# Patient Record
Sex: FEMALE | Race: WHITE | NOT HISPANIC OR LATINO | URBAN - METROPOLITAN AREA
[De-identification: names, ages, dates, MRNs, and addresses within clinical notes are randomized per-mention and may not be internally consistent; named-entity substitution may affect disease eponyms.]

---

## 2019-03-01 ENCOUNTER — HOSPITAL ENCOUNTER (OUTPATIENT)
Dept: LAB | Facility: HOSPITAL | Age: 79
Setting detail: SPECIMEN
Discharge: HOME | End: 2019-03-01
Attending: INTERNAL MEDICINE | Admitting: INTERNAL MEDICINE

## 2019-03-04 LAB — HBV SURFACE AG SERPL QL IA: NONREACTIVE

## 2019-03-07 ENCOUNTER — HOSPITAL ENCOUNTER (OUTPATIENT)
Dept: INTERVENTIONAL RADIOLOGY/VASCULAR | Facility: HOSPITAL | Age: 79
Discharge: HOME | End: 2019-03-07
Attending: INTERNAL MEDICINE | Admitting: INTERNAL MEDICINE

## 2024-04-07 ENCOUNTER — HOSPITAL ENCOUNTER (INPATIENT)
Facility: HOSPITAL | Age: 84
LOS: 3 days | Discharge: HOME | DRG: 312 | End: 2024-04-12
Attending: EMERGENCY MEDICINE | Admitting: HOSPITALIST
Payer: MEDICARE

## 2024-04-07 ENCOUNTER — APPOINTMENT (OUTPATIENT)
Dept: GENERAL RADIOLOGY | Facility: HOSPITAL | Age: 84
DRG: 312 | End: 2024-04-07
Payer: MEDICARE

## 2024-04-07 ENCOUNTER — APPOINTMENT (OUTPATIENT)
Dept: CARDIOLOGY | Facility: HOSPITAL | Age: 84
DRG: 312 | End: 2024-04-07
Payer: MEDICARE

## 2024-04-07 DIAGNOSIS — R55 SYNCOPE, UNSPECIFIED SYNCOPE TYPE: Primary | ICD-10-CM

## 2024-04-07 LAB
ANION GAP SERPL CALCULATED.3IONS-SCNC: 13 MMOL/L (ref 5–15)
BASOPHILS # BLD AUTO: 0.02 10*3/MM3 (ref 0–0.2)
BASOPHILS NFR BLD AUTO: 0.7 % (ref 0–1.5)
BH CV XLRA MEAS LEFT DIST CCA EDV: -7.8 CM/SEC
BH CV XLRA MEAS LEFT DIST CCA PSV: -45.9 CM/SEC
BH CV XLRA MEAS LEFT DIST ICA EDV: -10.4 CM/SEC
BH CV XLRA MEAS LEFT DIST ICA PSV: -47.5 CM/SEC
BH CV XLRA MEAS LEFT ICA/CCA RATIO: 0.7
BH CV XLRA MEAS LEFT PROX CCA EDV: 9.3 CM/SEC
BH CV XLRA MEAS LEFT PROX CCA PSV: 73.5 CM/SEC
BH CV XLRA MEAS LEFT PROX ECA PSV: -63.1 CM/SEC
BH CV XLRA MEAS LEFT PROX ICA EDV: -12.6 CM/SEC
BH CV XLRA MEAS LEFT PROX ICA PSV: -50.5 CM/SEC
BH CV XLRA MEAS LEFT PROX SCLA PSV: -191 CM/SEC
BH CV XLRA MEAS LEFT VERTEBRAL A PSV: -41.7 CM/SEC
BH CV XLRA MEAS RIGHT DIST CCA EDV: -7.9 CM/SEC
BH CV XLRA MEAS RIGHT DIST CCA PSV: -44.2 CM/SEC
BH CV XLRA MEAS RIGHT DIST ICA EDV: -13.1 CM/SEC
BH CV XLRA MEAS RIGHT DIST ICA PSV: -68.6 CM/SEC
BH CV XLRA MEAS RIGHT ICA/CCA RATIO: 1.1
BH CV XLRA MEAS RIGHT PROX CCA EDV: -12.4 CM/SEC
BH CV XLRA MEAS RIGHT PROX CCA PSV: -64.6 CM/SEC
BH CV XLRA MEAS RIGHT PROX ECA PSV: -53.3 CM/SEC
BH CV XLRA MEAS RIGHT PROX ICA EDV: -9.8 CM/SEC
BH CV XLRA MEAS RIGHT PROX ICA PSV: -45.5 CM/SEC
BH CV XLRA MEAS RIGHT PROX SCLA PSV: 118 CM/SEC
BH CV XLRA MEAS RIGHT VERTEBRAL A PSV: -47.7 CM/SEC
BUN SERPL-MCNC: 28 MG/DL (ref 8–23)
BUN/CREAT SERPL: 10.6 (ref 7–25)
CALCIUM SPEC-SCNC: 9.2 MG/DL (ref 8.6–10.5)
CHLORIDE SERPL-SCNC: 97 MMOL/L (ref 98–107)
CO2 SERPL-SCNC: 28 MMOL/L (ref 22–29)
CREAT SERPL-MCNC: 2.64 MG/DL (ref 0.57–1)
DEPRECATED RDW RBC AUTO: 50.2 FL (ref 37–54)
EGFRCR SERPLBLD CKD-EPI 2021: 17.5 ML/MIN/1.73
EOSINOPHIL # BLD AUTO: 0.02 10*3/MM3 (ref 0–0.4)
EOSINOPHIL NFR BLD AUTO: 0.7 % (ref 0.3–6.2)
ERYTHROCYTE [DISTWIDTH] IN BLOOD BY AUTOMATED COUNT: 13.8 % (ref 12.3–15.4)
GEN 5 2HR TROPONIN T REFLEX: 67 NG/L
GLUCOSE SERPL-MCNC: 81 MG/DL (ref 65–99)
HCT VFR BLD AUTO: 36.7 % (ref 34–46.6)
HGB BLD-MCNC: 11.2 G/DL (ref 12–15.9)
HOLD SPECIMEN: NORMAL
HOLD SPECIMEN: NORMAL
IMM GRANULOCYTES # BLD AUTO: 0.01 10*3/MM3 (ref 0–0.05)
IMM GRANULOCYTES NFR BLD AUTO: 0.3 % (ref 0–0.5)
LYMPHOCYTES # BLD AUTO: 0.43 10*3/MM3 (ref 0.7–3.1)
LYMPHOCYTES NFR BLD AUTO: 14.8 % (ref 19.6–45.3)
MAGNESIUM SERPL-MCNC: 2 MG/DL (ref 1.6–2.4)
MCH RBC QN AUTO: 30.3 PG (ref 26.6–33)
MCHC RBC AUTO-ENTMCNC: 30.5 G/DL (ref 31.5–35.7)
MCV RBC AUTO: 99.2 FL (ref 79–97)
MONOCYTES # BLD AUTO: 0.28 10*3/MM3 (ref 0.1–0.9)
MONOCYTES NFR BLD AUTO: 9.6 % (ref 5–12)
NEUTROPHILS NFR BLD AUTO: 2.15 10*3/MM3 (ref 1.7–7)
NEUTROPHILS NFR BLD AUTO: 73.9 % (ref 42.7–76)
NRBC BLD AUTO-RTO: 0 /100 WBC (ref 0–0.2)
NT-PROBNP SERPL-MCNC: ABNORMAL PG/ML (ref 0–1800)
PLATELET # BLD AUTO: 63 10*3/MM3 (ref 140–450)
PMV BLD AUTO: 10.3 FL (ref 6–12)
POTASSIUM SERPL-SCNC: 3.5 MMOL/L (ref 3.5–5.2)
RBC # BLD AUTO: 3.7 10*6/MM3 (ref 3.77–5.28)
SODIUM SERPL-SCNC: 138 MMOL/L (ref 136–145)
TROPONIN T DELTA: 2 NG/L
TROPONIN T SERPL HS-MCNC: 65 NG/L
TSH SERPL DL<=0.05 MIU/L-ACNC: 4.83 UIU/ML (ref 0.27–4.2)
WBC NRBC COR # BLD AUTO: 2.91 10*3/MM3 (ref 3.4–10.8)
WHOLE BLOOD HOLD COAG: NORMAL
WHOLE BLOOD HOLD SPECIMEN: NORMAL

## 2024-04-07 PROCEDURE — G0378 HOSPITAL OBSERVATION PER HR: HCPCS

## 2024-04-07 PROCEDURE — 99285 EMERGENCY DEPT VISIT HI MDM: CPT

## 2024-04-07 PROCEDURE — 93880 EXTRACRANIAL BILAT STUDY: CPT

## 2024-04-07 PROCEDURE — 80048 BASIC METABOLIC PNL TOTAL CA: CPT | Performed by: PHYSICIAN ASSISTANT

## 2024-04-07 PROCEDURE — 83735 ASSAY OF MAGNESIUM: CPT | Performed by: PHYSICIAN ASSISTANT

## 2024-04-07 PROCEDURE — 36415 COLL VENOUS BLD VENIPUNCTURE: CPT

## 2024-04-07 PROCEDURE — 93880 EXTRACRANIAL BILAT STUDY: CPT | Performed by: SURGERY

## 2024-04-07 PROCEDURE — 85025 COMPLETE CBC W/AUTO DIFF WBC: CPT | Performed by: PHYSICIAN ASSISTANT

## 2024-04-07 PROCEDURE — 84484 ASSAY OF TROPONIN QUANT: CPT | Performed by: PHYSICIAN ASSISTANT

## 2024-04-07 PROCEDURE — 83880 ASSAY OF NATRIURETIC PEPTIDE: CPT | Performed by: PHYSICIAN ASSISTANT

## 2024-04-07 PROCEDURE — 84443 ASSAY THYROID STIM HORMONE: CPT | Performed by: PHYSICIAN ASSISTANT

## 2024-04-07 PROCEDURE — 71045 X-RAY EXAM CHEST 1 VIEW: CPT

## 2024-04-07 PROCEDURE — 93005 ELECTROCARDIOGRAM TRACING: CPT

## 2024-04-07 RX ORDER — POLYETHYLENE GLYCOL 3350 17 G/17G
17 POWDER, FOR SOLUTION ORAL DAILY PRN
Status: DISCONTINUED | OUTPATIENT
Start: 2024-04-07 | End: 2024-04-12 | Stop reason: HOSPADM

## 2024-04-07 RX ORDER — SIMVASTATIN 20 MG
20 TABLET ORAL NIGHTLY
COMMUNITY

## 2024-04-07 RX ORDER — FEBUXOSTAT 40 MG/1
40 TABLET, FILM COATED ORAL DAILY
COMMUNITY

## 2024-04-07 RX ORDER — SODIUM CHLORIDE 0.9 % (FLUSH) 0.9 %
10 SYRINGE (ML) INJECTION AS NEEDED
Status: DISCONTINUED | OUTPATIENT
Start: 2024-04-07 | End: 2024-04-12 | Stop reason: HOSPADM

## 2024-04-07 RX ORDER — BISACODYL 10 MG
10 SUPPOSITORY, RECTAL RECTAL DAILY PRN
Status: DISCONTINUED | OUTPATIENT
Start: 2024-04-07 | End: 2024-04-12 | Stop reason: HOSPADM

## 2024-04-07 RX ORDER — AMOXICILLIN 250 MG
2 CAPSULE ORAL 2 TIMES DAILY PRN
Status: DISCONTINUED | OUTPATIENT
Start: 2024-04-07 | End: 2024-04-12 | Stop reason: HOSPADM

## 2024-04-07 RX ORDER — ONDANSETRON 2 MG/ML
4 INJECTION INTRAMUSCULAR; INTRAVENOUS EVERY 6 HOURS PRN
Status: DISCONTINUED | OUTPATIENT
Start: 2024-04-07 | End: 2024-04-12 | Stop reason: HOSPADM

## 2024-04-07 RX ORDER — BISACODYL 5 MG/1
5 TABLET, DELAYED RELEASE ORAL DAILY PRN
Status: DISCONTINUED | OUTPATIENT
Start: 2024-04-07 | End: 2024-04-12 | Stop reason: HOSPADM

## 2024-04-07 RX ORDER — ACETAMINOPHEN 500 MG
500 TABLET ORAL EVERY 6 HOURS PRN
COMMUNITY

## 2024-04-07 RX ORDER — FEBUXOSTAT 40 MG/1
40 TABLET, FILM COATED ORAL DAILY
Status: DISCONTINUED | OUTPATIENT
Start: 2024-04-07 | End: 2024-04-12 | Stop reason: HOSPADM

## 2024-04-07 RX ORDER — ATORVASTATIN CALCIUM 10 MG/1
10 TABLET, FILM COATED ORAL NIGHTLY
Status: DISCONTINUED | OUTPATIENT
Start: 2024-04-07 | End: 2024-04-12 | Stop reason: HOSPADM

## 2024-04-07 RX ORDER — SODIUM CHLORIDE 9 MG/ML
40 INJECTION, SOLUTION INTRAVENOUS AS NEEDED
Status: DISCONTINUED | OUTPATIENT
Start: 2024-04-07 | End: 2024-04-12 | Stop reason: HOSPADM

## 2024-04-07 RX ORDER — SODIUM CHLORIDE 0.9 % (FLUSH) 0.9 %
10 SYRINGE (ML) INJECTION EVERY 12 HOURS SCHEDULED
Status: DISCONTINUED | OUTPATIENT
Start: 2024-04-07 | End: 2024-04-12 | Stop reason: HOSPADM

## 2024-04-07 RX ADMIN — Medication 10 ML: at 20:35

## 2024-04-07 RX ADMIN — FEBUXOSTAT 40 MG: 40 TABLET, FILM COATED ORAL at 15:39

## 2024-04-07 RX ADMIN — ATORVASTATIN CALCIUM 10 MG: 10 TABLET, FILM COATED ORAL at 20:34

## 2024-04-07 NOTE — ED NOTES
EMS states pt had a syncopal episode at home in her recliner. Pt did not fall out of recliner. Denies hitting head.

## 2024-04-07 NOTE — ED PROVIDER NOTES
Subjective   History of Present Illness  Patient is an 83-year-old female [facility] significant for chronic kidney disease currently on dialysis (M,W,F) presenting to the ED with reports of a syncopal episode earlier today.  Patient states she was try to get up to go the restroom when she felt very lightheaded and dizzy.  She states she has had syncopal episodes in the past usually after dialysis patient was able to get back in bed before she passed out.  No reports of falls or injury from the syncopal episode.  Does report some nausea and vomiting earlier today.  She denies significant abdominal pain, chest pain or current dyspnea.  She does report some palpitations at times.  Patient states she is currently followed by cardiology at [facility].  She denies any new fever, cough, congestion.  Last had dialysis 2 days ago.    Nephrologist Dr. Garcia  Cardiologist Dr. Figueroa    History provided by:  Patient      Review of Systems   Constitutional: Negative.    Respiratory:  Negative for shortness of breath.    Cardiovascular:  Negative for chest pain, palpitations and leg swelling.   Gastrointestinal:  Negative for diarrhea, nausea and vomiting.   Neurological:  Positive for dizziness, syncope and light-headedness.       History reviewed. No pertinent past medical history.    No Known Allergies    History reviewed. No pertinent surgical history.    History reviewed. No pertinent family history.    Social History     Socioeconomic History    Marital status:            Objective   Physical Exam  Vitals and nursing note reviewed.   Constitutional:       General: She is not in acute distress.     Appearance: Normal appearance. She is well-developed. She is not ill-appearing, toxic-appearing or diaphoretic.   HENT:      Head: Normocephalic and atraumatic.      Mouth/Throat:      Mouth: Mucous membranes are moist.      Pharynx: Oropharynx is clear.   Eyes:      General: No scleral icterus.     Extraocular Movements: Extraocular  "movements intact.      Pupils: Pupils are equal, round, and reactive to light.   Cardiovascular:      Rate and Rhythm: Normal rate and regular rhythm.      Pulses: Normal pulses.      Heart sounds: No murmur heard.     No friction rub. No gallop.   Pulmonary:      Effort: Pulmonary effort is normal. No respiratory distress.      Breath sounds: Normal breath sounds. No stridor. No wheezing, rhonchi or rales.   Chest:      Chest wall: No tenderness.   Abdominal:      General: Bowel sounds are normal. There is no distension. There are no signs of injury.      Palpations: Abdomen is soft.      Tenderness: There is no abdominal tenderness. There is no right CVA tenderness, left CVA tenderness, guarding or rebound.   Skin:     General: Skin is warm.      Capillary Refill: Capillary refill takes less than 2 seconds.      Coloration: Skin is not cyanotic, jaundiced or pale.      Findings: No rash.   Neurological:      General: No focal deficit present.      Mental Status: She is alert and oriented to person, place, and time.      GCS: GCS eye subscore is 4. GCS verbal subscore is 5. GCS motor subscore is 6.   Psychiatric:         Mood and Affect: Mood normal.         Behavior: Behavior normal.         Procedures           ED Course    /61   Pulse 80   Temp 97.9 °F (36.6 °C)   Resp 16   Ht 162.6 cm (64\")   Wt 53.5 kg (118 lb)   SpO2 96%   BMI 20.25 kg/m²   Medications   sodium chloride 0.9 % flush 10 mL (has no administration in time range)   sodium chloride 0.9 % flush 10 mL (has no administration in time range)     Labs Reviewed   BASIC METABOLIC PANEL - Abnormal; Notable for the following components:       Result Value    BUN 28 (*)     Creatinine 2.64 (*)     Chloride 97 (*)     eGFR 17.5 (*)     All other components within normal limits    Narrative:     GFR Normal >60  Chronic Kidney Disease <60  Kidney Failure <15    The GFR formula is only valid for adults with stable renal function between ages 18 and " 70.   TROPONIN - Abnormal; Notable for the following components:    HS Troponin T 65 (*)     All other components within normal limits    Narrative:     High Sensitive Troponin T Reference Range:  <14.0 ng/L- Negative Female for AMI  <22.0 ng/L- Negative Male for AMI  >=14 - Abnormal Female indicating possible myocardial injury.  >=22 - Abnormal Male indicating possible myocardial injury.   Clinicians would have to utilize clinical acumen, EKG, Troponin, and serial changes to determine if it is an Acute Myocardial Infarction or myocardial injury due to an underlying chronic condition.        BNP (IN-HOUSE) - Abnormal; Notable for the following components:    proBNP 51,559.0 (*)     All other components within normal limits    Narrative:     This assay is used as an aid in the diagnosis of individuals suspected of having heart failure. It can be used as an aid in the diagnosis of acute decompensated heart failure (ADHF) in patients presenting with signs and symptoms of ADHF to the emergency department (ED). In addition, NT-proBNP of <300 pg/mL indicates ADHF is not likely.    Age Range Result Interpretation  NT-proBNP Concentration (pg/mL:      <50             Positive            >450                   Gray                 300-450                    Negative             <300    50-75           Positive            >900                  Gray                300-900                  Negative            <300      >75             Positive            >1800                  Gray                300-1800                  Negative            <300   TSH - Abnormal; Notable for the following components:    TSH 4.830 (*)     All other components within normal limits   CBC WITH AUTO DIFFERENTIAL - Abnormal; Notable for the following components:    WBC 2.91 (*)     RBC 3.70 (*)     Hemoglobin 11.2 (*)     MCV 99.2 (*)     MCHC 30.5 (*)     Platelets 63 (*)     Lymphocyte % 14.8 (*)     Lymphocytes, Absolute 0.43 (*)     All other  components within normal limits   MAGNESIUM - Normal   RAINBOW DRAW    Narrative:     The following orders were created for panel order [place] Draw.  Procedure                               Abnormality         Status                     ---------                               -----------         ------                     Green Top (Gel)[[id number]]                                  Final result               Lavender Top[[id number]]                                     Final result               Gold Top - SST[[id number]]                                   Final result               Light Blue Top[[id number]]                                   Final result                 Please view results for these tests on the individual orders.   HIGH SENSITIVITIY TROPONIN T 2HR   GREEN TOP   LAVENDER TOP   GOLD TOP - SST   LIGHT BLUE TOP   CBC AND DIFFERENTIAL    Narrative:     The following orders were created for panel order CBC & Differential.  Procedure                               Abnormality         Status                     ---------                               -----------         ------                     CBC Auto Differential[[id number]]        Abnormal            Final result               Scan Slide[[id number]]                                                                    Please view results for these tests on the individual orders.     XR Chest 1 View   Final Result   Impression:   Stable chronic findings without acute process.         Electronically Signed: German Reece MD     4/7/2024 10:57 AM EDT     Workstation ID: [id number]                                                 Medical Decision Making  Chart Review: Cardiology note reviewed from 3/18/2024 follow-up on chronic A-fib was also noted she had a longstanding recurrent history of syncopal episodes after dialysis had 2 that week it was noted her antihypertensive medication is gradually being withdrawn never had ischemic heart disease.     Differentials:  Arrhythmia, ACS, electrolyte abnormality, dehydration, orthostatic hypotension     ;this list is not all inclusive and does not constitute the entirety of considered causes  EKG: Interpreted by myself and independently interpreted by Dr. Garcia shows A-fib rate 83 IVCD probable anterolateral infarct age indeterminant no previous to review.  Labs: As above  Radiology: Reviewed by myself and independently interpreted by Jose shows no acute infiltrate correlate with radiologist interpretation as below  XR Chest 1 View   Final Result    Impression:    Stable chronic findings without acute process.            Electronically Signed: German Reece MD      4/7/2024 10:57 AM EDT      Workstation ID: [id number]    Disposition/Treatment:  Appropriate PPE was worn during exam and throughout all encounters with the patient.  When the ED IV was placed and labs were obtained patient presents to the ED with reports of recurrent syncopal episode.  On chart review patient has been seen by her cardiologist in regards to this in the past.  I do see an echocardiogram was ordered last month but I cannot see results unfortunately.  Statics unremarkable for orthostatic hypotension today.  EKG showed chronic A-fib no acute STEMI.  Labs and imaging were obtained while in the ED.    CBC showed pancytopenia (wbc 2.91, hgb 11.2, plts 63) on chart review patient does have a history of this 3 weeks ago  WBC 3.12, HGB 10.6, PLTS 86.  Metabolic panel showed BUN 28 creatinine 2.64 normal potassium of 3.5 no recent charts to review to see patient's kidney function baseline.  Initial troponin 65 pending repeat troponin.  BNP 51,559 no old BNP's to compare.  No significant edema on physical exam.  Chest x-ray showed no signs of acute CHF.  Magnesium normal.  TSH mildly elevated 4.83.  Upon reassessment patient is resting quietly findings were discussed with the patient at bedside voiced understanding of admission for further cardiac workup including  possible echocardiogram.  She was in agreement with plan spoke to Dr. Erickson who agreed for admission with hospitalist group    Amount and/or Complexity of Data Reviewed  External Data Reviewed: notes.     Details: As above  Labs: ordered. Decision-making details documented in ED Course.  Radiology: ordered. Decision-making details documented in ED Course.  Discussion of management or test interpretation with external provider(s): As above     Risk  Prescription drug management.        Final diagnoses:   Syncope, unspecified syncope type       ED Disposition  ED Disposition       ED Disposition   Decision to Admit    Condition   --    Comment   Level of Care: Telemetry [5]   Admitting Physician: DELIA ERICKSON [[id number]]   Attending Physician: DELIA ERICKSON [[id number]]                 No follow-up provider specified.       Medication List      No changes were made to your prescriptions during this visit.            Nayely Ascencio PA  04/07/24 2239

## 2024-04-07 NOTE — PROGRESS NOTES
Renal  Received the consult   Or well known to me  She had previous similar presentations   No immediate need for the dialysis today.  Full consult to follow

## 2024-04-07 NOTE — CONSULTS
Nephrology Consult Note                                                [facility]      Patient Identification:  Name: Deann Warren  Age: 83 y.o.  Sex: female  :  1940  MRN: [id number]               Requesting Physician: Cindy Dasilva MD  Reason for Consultation: management recommendations      History of Present Illness:     Patient is an 83-year-old white female patient with history of end-stage renal disease on hemodialysis  history of hyperlipidemia Afib presented to the hospital with near syncope   Patient had similar episodes previously with workup in [facility]  She is due for dialysis tomorrow  Problem List:    Syncope and collapse     Past Medical History:  History reviewed. No pertinent past medical history.  Past Surgical History:  History reviewed. No pertinent surgical history.   Home Meds:  (Not in a hospital admission)    Current Meds:     Current Facility-Administered Medications:     [Held by provider] apixaban (ELIQUIS) tablet 2.5 mg, 2.5 mg, Oral, BID, Cindy Dasilva MD    atorvastatin (LIPITOR) tablet 10 mg, 10 mg, Oral, Nightly, Cindy Dasilva MD    sennosides-docusate (PERICOLACE) 8.6-50 MG per tablet 2 tablet, 2 tablet, Oral, BID PRN **AND** polyethylene glycol (MIRALAX) packet 17 g, 17 g, Oral, Daily PRN **AND** bisacodyl (DULCOLAX) EC tablet 5 mg, 5 mg, Oral, Daily PRN **AND** bisacodyl (DULCOLAX) suppository 10 mg, 10 mg, Rectal, Daily PRN, Cindy Dasilva MD    febuxostat (ULORIC) tablet 40 mg, 40 mg, Oral, Daily, Cindy Dasilva MD, 40 mg at 24 1539    ondansetron (ZOFRAN) injection 4 mg, 4 mg, Intravenous, Q6H PRN, Cindy Dasilva MD    sodium chloride 0.9 % flush 10 mL, 10 mL, Intravenous, PRN, Emergency, Triage Protocol, MD    [COMPLETED] Insert Peripheral IV, , , Once **AND** sodium chloride 0.9 % flush 10 mL, 10 mL, Intravenous, PRN, Nayely Ascencio PA    sodium chloride 0.9 % flush  "10 mL, 10 mL, Intravenous, Q12H, Cindy Dasilva MD    sodium chloride 0.9 % flush 10 mL, 10 mL, Intravenous, PRN, Cindy Dasilva MD    sodium chloride 0.9 % infusion 40 mL, 40 mL, Intravenous, PRN, Cindy Dasilva MD    Current Outpatient Medications:     apixaban (ELIQUIS) 2.5 MG tablet tablet, Take 1 tablet by mouth 2 (Two) Times a Day., Disp: , Rfl:     B Complex-C-Folic Acid (EM-MARIA ESTHER PO), Take 1 tablet by mouth Daily., Disp: , Rfl:     febuxostat (ULORIC) 40 MG tablet, Take 1 tablet by mouth Daily., Disp: , Rfl:     simvastatin (ZOCOR) 20 MG tablet, Take 1 tablet by mouth Every Night., Disp: , Rfl:     acetaminophen (TYLENOL) 500 MG tablet, Take 1 tablet by mouth Every 6 (Six) Hours As Needed for Mild Pain., Disp: , Rfl:     NON FORMULARY, Lidocaine 2.5% ointment -  Apply 1 application Monday, Wednesday, Friday before dialysis, Disp: , Rfl:     Allergies:  No Known Allergies  Social History:   Social History     Tobacco Use    Smoking status: Not on file    Smokeless tobacco: Not on file   Substance Use Topics    Alcohol use: Not on file      Family History:  History reviewed. No pertinent family history.     Review of Systems  Reviewed 12 systems were reviewed, all negative except for those mentioned in HPI    Objective:  Vitals:   /69   Pulse 70   Temp 97.9 °F (36.6 °C)   Resp 16   Ht 162.6 cm (64\")   Wt 53.5 kg (118 lb)   SpO2 97%   BMI 20.25 kg/m²   I/O:   No intake or output data in the 24 hours ending 04/07/24 5751    Exam:  General Appearance:  Alert  Head:  Normocephalic, without obvious abnormality, atraumatic  Eyes:  PERRL, conjunctiva/corneas clear     Neck:  Supple,  no adenopathy;      Lungs:  Decreased BS occasion ronchi  Heart:  Regular rate and rhythm, S1 and S2 normal  Abdomen:  Soft, non-tender, bowel sounds active   Extremities: trace edema  Pulses: 2+ and symmetric all extremities  Skin:  No rashes or lesions  Data Review:  All labs (24hrs):   Recent Results (from " the past 24 hour(s))   ECG 12 Lead Syncope    Collection Time: 04/07/24 10:07 AM   Result Value Ref Range    QT Interval 421 ms    QTC Interval 496 ms   Green Top (Gel)    Collection Time: 04/07/24 10:12 AM   Result Value Ref Range    Extra Tube Hold for add-ons.    Lavender Top    Collection Time: 04/07/24 10:12 AM   Result Value Ref Range    Extra Tube hold for add-on    Gold Top - SST    Collection Time: 04/07/24 10:12 AM   Result Value Ref Range    Extra Tube Hold for add-ons.    Light Blue Top    Collection Time: 04/07/24 10:12 AM   Result Value Ref Range    Extra Tube Hold for add-ons.    BNP    Collection Time: 04/07/24 10:12 AM    Specimen: Blood   Result Value Ref Range    proBNP 51,559.0 (H) 0.0 - 1,800.0 pg/mL   TSH    Collection Time: 04/07/24 10:12 AM    Specimen: Blood   Result Value Ref Range    TSH 4.830 (H) 0.270 - 4.200 uIU/mL   Magnesium    Collection Time: 04/07/24 10:12 AM    Specimen: Blood   Result Value Ref Range    Magnesium 2.0 1.6 - 2.4 mg/dL   CBC Auto Differential    Collection Time: 04/07/24 10:12 AM    Specimen: Blood   Result Value Ref Range    WBC 2.91 (L) 3.40 - 10.80 10*3/mm3    RBC 3.70 (L) 3.77 - 5.28 10*6/mm3    Hemoglobin 11.2 (L) 12.0 - 15.9 g/dL    Hematocrit 36.7 34.0 - 46.6 %    MCV 99.2 (H) 79.0 - 97.0 fL    MCH 30.3 26.6 - 33.0 pg    MCHC 30.5 (L) 31.5 - 35.7 g/dL    RDW 13.8 12.3 - 15.4 %    RDW-SD 50.2 37.0 - 54.0 fl    MPV 10.3 6.0 - 12.0 fL    Platelets 63 (L) 140 - 450 10*3/mm3    Neutrophil % 73.9 42.7 - 76.0 %    Lymphocyte % 14.8 (L) 19.6 - 45.3 %    Monocyte % 9.6 5.0 - 12.0 %    Eosinophil % 0.7 0.3 - 6.2 %    Basophil % 0.7 0.0 - 1.5 %    Immature Grans % 0.3 0.0 - 0.5 %    Neutrophils, Absolute 2.15 1.70 - 7.00 10*3/mm3    Lymphocytes, Absolute 0.43 (L) 0.70 - 3.10 10*3/mm3    Monocytes, Absolute 0.28 0.10 - 0.90 10*3/mm3    Eosinophils, Absolute 0.02 0.00 - 0.40 10*3/mm3    Basophils, Absolute 0.02 0.00 - 0.20 10*3/mm3    Immature Grans, Absolute 0.01 0.00 -  0.05 10*3/mm3    nRBC 0.0 0.0 - 0.2 /100 WBC   Basic Metabolic Panel    Collection Time: 04/07/24 11:18 AM    Specimen: Blood   Result Value Ref Range    Glucose 81 65 - 99 mg/dL    BUN 28 (H) 8 - 23 mg/dL    Creatinine 2.64 (H) 0.57 - 1.00 mg/dL    Sodium 138 136 - 145 mmol/L    Potassium 3.5 3.5 - 5.2 mmol/L    Chloride 97 (L) 98 - 107 mmol/L    CO2 28.0 22.0 - 29.0 mmol/L    Calcium 9.2 8.6 - 10.5 mg/dL    BUN/Creatinine Ratio 10.6 7.0 - 25.0    Anion Gap 13.0 5.0 - 15.0 mmol/L    eGFR 17.5 (L) >60.0 mL/min/1.73   High Sensitivity Troponin T    Collection Time: 04/07/24 11:18 AM    Specimen: Blood   Result Value Ref Range    HS Troponin T 65 (C) <14 ng/L   High Sensitivity Troponin T 2Hr    Collection Time: 04/07/24  1:16 PM    Specimen: Blood   Result Value Ref Range    HS Troponin T 67 (C) <14 ng/L    Troponin T Delta 2 >=-4 - <+4 ng/L   Duplex Carotid Ultrasound CAR    Collection Time: 04/07/24  4:16 PM   Result Value Ref Range    Prox CCA PSV -64.6 cm/sec    Prox CCA EDV -12.4 cm/sec    Dist CCA PSV -44.2 cm/sec    Dist CCA EDV -7.9 cm/sec    Prox ICA PSV -45.5 cm/sec    Prox ICA EDV -9.8 cm/sec    Dist ICA PSV -68.6 cm/sec    Dist ICA EDV -13.1 cm/sec    Prox ECA PSV -53.3 cm/sec    Vertebral A PSV -47.7 cm/sec    Prox SCLA .0 cm/sec    Prox CCA PSV 73.5 cm/sec    Prox CCA EDV 9.3 cm/sec    Dist CCA PSV -45.9 cm/sec    Dist CCA EDV -7.8 cm/sec    Prox ICA PSV -50.5 cm/sec    Prox ICA EDV -12.6 cm/sec    Dist ICA PSV -47.5 cm/sec    Dist ICA EDV -10.4 cm/sec    Prox ECA PSV -63.1 cm/sec    Vertebral A PSV -41.7 cm/sec    Prox SCLA PSV -191.0 cm/sec    ICA/CCA ratio 1.10     ICA/CCA ratio 0.70        Current Facility-Administered Medications:     [Held by provider] apixaban (ELIQUIS) tablet 2.5 mg, 2.5 mg, Oral, BID, Cindy Dasilva MD    atorvastatin (LIPITOR) tablet 10 mg, 10 mg, Oral, Nightly, Cindy Dasilva MD    sennosides-docusate (PERICOLACE) 8.6-50 MG per tablet 2 tablet, 2 tablet, Oral,  BID PRN **AND** polyethylene glycol (MIRALAX) packet 17 g, 17 g, Oral, Daily PRN **AND** bisacodyl (DULCOLAX) EC tablet 5 mg, 5 mg, Oral, Daily PRN **AND** bisacodyl (DULCOLAX) suppository 10 mg, 10 mg, Rectal, Daily PRN, Cindy Dasilva MD    febuxostat (ULORIC) tablet 40 mg, 40 mg, Oral, Daily, Cindy Dasilva MD, 40 mg at 04/07/24 1539    ondansetron (ZOFRAN) injection 4 mg, 4 mg, Intravenous, Q6H PRN, Cindy Dasilva MD    sodium chloride 0.9 % flush 10 mL, 10 mL, Intravenous, PRN, Emergency, Triage Protocol, MD    [COMPLETED] Insert Peripheral IV, , , Once **AND** sodium chloride 0.9 % flush 10 mL, 10 mL, Intravenous, PRN, Nayely Ascencio PA    sodium chloride 0.9 % flush 10 mL, 10 mL, Intravenous, Q12H, Cindy Dasilva MD    sodium chloride 0.9 % flush 10 mL, 10 mL, Intravenous, PRN, Cindy Dasilva MD    sodium chloride 0.9 % infusion 40 mL, 40 mL, Intravenous, PRN, Cindy Dasilva MD    Current Outpatient Medications:     apixaban (ELIQUIS) 2.5 MG tablet tablet, Take 1 tablet by mouth 2 (Two) Times a Day., Disp: , Rfl:     B Complex-C-Folic Acid (EM-MARIA ESTHER PO), Take 1 tablet by mouth Daily., Disp: , Rfl:     febuxostat (ULORIC) 40 MG tablet, Take 1 tablet by mouth Daily., Disp: , Rfl:     simvastatin (ZOCOR) 20 MG tablet, Take 1 tablet by mouth Every Night., Disp: , Rfl:     acetaminophen (TYLENOL) 500 MG tablet, Take 1 tablet by mouth Every 6 (Six) Hours As Needed for Mild Pain., Disp: , Rfl:     NON FORMULARY, Lidocaine 2.5% ointment -  Apply 1 application Monday, Wednesday, Friday before dialysis, Disp: , Rfl:     Assessment:   ESRD hemodialysis dependent   Syncope  Elevated BNP   Atrial fibrillation   Hyperlipidemia       Recommendations:  No need for dialysis yet   Will re-evaluate tomorrow      Cass Garcia MD  4/7/2024  17:51 EDT

## 2024-04-07 NOTE — H&P
[facility]  History & Physical    Patient Name: Deann Warren  : 1940  MRN: [id number]  Primary Care Physician:  Antonino Shen MD  Date of admission: 2024  Date and Time of Service: 2024 at 14:23 EDT     Subjective      Chief Complaint: Passing out frequently    History of Present Illness: Deann Warren is a 83 y.o. female with a PMH of hyperlipidemia, atrial fibrillation, end-stage renal disease on dialysis who presented to [facility] on 2024 with complaints of recurrent syncope.  Per patient, she has had history of multiple syncopal episodes especially during dialysis.  She said that she is followed by her cardiologist, Dr. Figueroa but nothing has been found as a source of her recurrent syncope.She always knows when her episodes are about to come up and would go lay down.  Sometimes laying down helps with the syncopal episodes. She stated that this morning when she woke up she had a syncopal episode twice. She stated that she passes out at least 2-3 times in the month.  She is tired of going to [facility] as they have not found the cause of her syncopal episode so she came here today.   Patient endorsed sweatiness and shakiness during this episode.  She also had an episode of nausea and vomiting this morning.  She denied any fever, chills, chest pain, palpitation or abdominal pain.        Review of Systems  As above  Personal History     History reviewed. No pertinent past medical history.    History reviewed. No pertinent surgical history.    Family History: family history is not on file. Otherwise pertinent FHx was reviewed and not pertinent to current issue.    Social History:      Home Medications:  Prior to Admission Medications       Prescriptions Last Dose Informant Patient Reported? Taking?    apixaban (ELIQUIS) 2.5 MG tablet tablet 2024  Yes Yes    Take 1 tablet by mouth 2 (Two) Times a Day.    B Complex-C-Folic Acid (EM-MARIA ESTHER PO) 2024  Yes  Yes    Take 1 tablet by mouth Daily.    febuxostat (ULORIC) 40 MG tablet 4/6/2024  Yes Yes    Take 1 tablet by mouth Daily.    simvastatin (ZOCOR) 20 MG tablet 4/6/2024  Yes Yes    Take 1 tablet by mouth Every Night.    acetaminophen (TYLENOL) 500 MG tablet   Yes No    Take 1 tablet by mouth Every 6 (Six) Hours As Needed for Mild Pain.    NON FORMULARY   Yes No    Lidocaine 2.5% ointment -  Apply 1 application Monday, Wednesday, Friday before dialysis              Allergies:  No Known Allergies    Objective      Vitals:   Temp:  [97.9 °F (36.6 °C)] 97.9 °F (36.6 °C)  Heart Rate:  [] 80  Resp:  [16] 16  BP: (124-147)/(61-74) 124/61  Body mass index is 20.25 kg/m².  Physical Exam  General Appearance: AOO x 4, cooperative, no distress, appropriate for age  Head:  Normocephalic, without obvious abnormality  Eyes:  PERRL, EOM's intact, conjunctivae and cornea clear  Nose:  Nares symmetrical, septum midline, mucosa pink  Throat:  Lips, tongue, and mucosa are moist, pink, and intact  Neck:  Supple; symmetrical, trachea midline, no adenopathy  Back:  Symmetrical, ROM normal, no CVA tenderness  Lungs: Respirations unlabored, no audible wheeze  Heart: Irregular rhythm.  Regular rate  Abdomen:  Soft, nontender, bowel sounds active all four quadrants  Musculoskeletal: Tone and strength strong and symmetrical, all extremities; no joint pain or edema         Skin/Hair/Nails:  Skin warm, dry and intact, no rashes or abnormal dyspigmentation       Diagnostic Data:  Lab Results (last 24 hours)       Procedure Component Value Units Date/Time    High Sensitivity Troponin T 2Hr [[id number]]  (Abnormal) Collected: 04/07/24 1316    Specimen: Blood Updated: 04/07/24 1347     HS Troponin T 67 ng/L      Comment: Specimen hemolyzed.  Results may be falsely decreased.        Troponin T Delta 2 ng/L     Narrative:      High Sensitive Troponin T Reference Range:  <14.0 ng/L- Negative Female for AMI  <22.0 ng/L- Negative Male for AMI  >=14  - Abnormal Female indicating possible myocardial injury.  >=22 - Abnormal Male indicating possible myocardial injury.   Clinicians would have to utilize clinical acumen, EKG, Troponin, and serial changes to determine if it is an Acute Myocardial Infarction or myocardial injury due to an underlying chronic condition.         Basic Metabolic Panel [[id number]]  (Abnormal) Collected: 04/07/24 1118    Specimen: Blood Updated: 04/07/24 1156     Glucose 81 mg/dL      BUN 28 mg/dL      Creatinine 2.64 mg/dL      Sodium 138 mmol/L      Potassium 3.5 mmol/L      Chloride 97 mmol/L      CO2 28.0 mmol/L      Calcium 9.2 mg/dL      BUN/Creatinine Ratio 10.6     Anion Gap 13.0 mmol/L      eGFR 17.5 mL/min/1.73     Narrative:      GFR Normal >60  Chronic Kidney Disease <60  Kidney Failure <15    The GFR formula is only valid for adults with stable renal function between ages 18 and 70.    High Sensitivity Troponin T [[id number]]  (Abnormal) Collected: 04/07/24 1118    Specimen: Blood Updated: 04/07/24 1148     HS Troponin T 65 ng/L     Narrative:      High Sensitive Troponin T Reference Range:  <14.0 ng/L- Negative Female for AMI  <22.0 ng/L- Negative Male for AMI  >=14 - Abnormal Female indicating possible myocardial injury.  >=22 - Abnormal Male indicating possible myocardial injury.   Clinicians would have to utilize clinical acumen, EKG, Troponin, and serial changes to determine if it is an Acute Myocardial Infarction or myocardial injury due to an underlying chronic condition.         [place] Draw [[id number]] Collected: 04/07/24 1012    Specimen: Blood Updated: 04/07/24 1115    Narrative:      The following orders were created for panel order [place] Draw.  Procedure                               Abnormality         Status                     ---------                               -----------         ------                     Green Top (Gel)[[id number]]                                  Final result               Lavender  Top[[id number]]                                     Final result               Gold Top - SST[[id number]]                                   Final result               Light Blue Top[[id number]]                                   Final result                 Please view results for these tests on the individual orders.    Green Top (Gel) [[id number]] Collected: 04/07/24 1012    Specimen: Blood Updated: 04/07/24 1115     Extra Tube Hold for add-ons.     Comment: Auto resulted.       Lavender Top [[id number]] Collected: 04/07/24 1012    Specimen: Blood Updated: 04/07/24 1115     Extra Tube hold for add-on     Comment: Auto resulted       Gold Top - SST [[id number]] Collected: 04/07/24 1012    Specimen: Blood Updated: 04/07/24 1115     Extra Tube Hold for add-ons.     Comment: Auto resulted.       Light Blue Top [[id number]] Collected: 04/07/24 1012    Specimen: Blood Updated: 04/07/24 1115     Extra Tube Hold for add-ons.     Comment: Auto resulted       BNP [[id number]]  (Abnormal) Collected: 04/07/24 1012    Specimen: Blood Updated: 04/07/24 1110     proBNP 51,559.0 pg/mL     Narrative:      This assay is used as an aid in the diagnosis of individuals suspected of having heart failure. It can be used as an aid in the diagnosis of acute decompensated heart failure (ADHF) in patients presenting with signs and symptoms of ADHF to the emergency department (ED). In addition, NT-proBNP of <300 pg/mL indicates ADHF is not likely.    Age Range Result Interpretation  NT-proBNP Concentration (pg/mL:      <50             Positive            >450                   Gray                 300-450                    Negative             <300    50-75           Positive            >900                  Gray                300-900                  Negative            <300      >75             Positive            >1800                  Gray                300-1800                  Negative            <300    Magnesium [[id number]]  (Normal)  Collected: 04/07/24 1012    Specimen: Blood Updated: 04/07/24 1101     Magnesium 2.0 mg/dL     TSH [[id number]]  (Abnormal) Collected: 04/07/24 1012    Specimen: Blood Updated: 04/07/24 1057     TSH 4.830 uIU/mL     CBC & Differential [[id number]]  (Abnormal) Collected: 04/07/24 1012    Specimen: Blood Updated: 04/07/24 1046    Narrative:      The following orders were created for panel order CBC & Differential.  Procedure                               Abnormality         Status                     ---------                               -----------         ------                     CBC Auto Differential[[id number]]        Abnormal            Final result               Scan Slide[[id number]]                                                                    Please view results for these tests on the individual orders.    CBC Auto Differential [[id number]]  (Abnormal) Collected: 04/07/24 1012    Specimen: Blood Updated: 04/07/24 1046     WBC 2.91 10*3/mm3      RBC 3.70 10*6/mm3      Hemoglobin 11.2 g/dL      Hematocrit 36.7 %      MCV 99.2 fL      MCH 30.3 pg      MCHC 30.5 g/dL      RDW 13.8 %      RDW-SD 50.2 fl      MPV 10.3 fL      Platelets 63 10*3/mm3      Neutrophil % 73.9 %      Lymphocyte % 14.8 %      Monocyte % 9.6 %      Eosinophil % 0.7 %      Basophil % 0.7 %      Immature Grans % 0.3 %      Neutrophils, Absolute 2.15 10*3/mm3      Lymphocytes, Absolute 0.43 10*3/mm3      Monocytes, Absolute 0.28 10*3/mm3      Eosinophils, Absolute 0.02 10*3/mm3      Basophils, Absolute 0.02 10*3/mm3      Immature Grans, Absolute 0.01 10*3/mm3      nRBC 0.0 /100 WBC              Imaging Results (Last 24 Hours)       Procedure Component Value Units Date/Time    XR Chest 1 View [[id number]] Collected: 04/07/24 1055     Updated: 04/07/24 1059    Narrative:      XR CHEST 1 VW    Date of Exam: 4/7/2024 10:40 AM EDT    Indication: Syncope    Comparison: 3/8/2024    Findings:  Heart is enlarged, stable from prior study. There is  left basilar retrocardiac airspace disease which may relate to chronic scarring or atelectasis, unchanged from prior exam. Calcified granuloma overlies the right lung base. Negative for pneumothorax.   Aortic arch atherosclerosis. Osseous structures grossly intact.      Impression:      Impression:  Stable chronic findings without acute process.      Electronically Signed: German Reece MD    4/7/2024 10:57 AM EDT    Workstation ID: [id number]              Assessment & Plan        This is a 83 y.o. female with PMH of    Active and Resolved Problems  Active Hospital Problems    Diagnosis  POA    **Syncope and collapse [R55]  Yes      Resolved Hospital Problems   No resolved problems to display.       Syncope and collapse  Patient has had recurrent episodes of syncope in the past  Obtain echocardiogram  Obtain carotid Dopplers  Consult cardiology    Elevated BNP  BNP 51,559  Obtain echocardiogram    End-stage renal disease  Dialysis Monday Wednesday Friday  Consult nephrology  Repeat BMP in the a.m    Thrombocytopenia  Platelets 63  Consult oncology    Atrial fibrillation  Rate controlled  Hold Eliquis due to thrombocytopenia    Hyperlipidemia  Resume statin  Obtain lipid panel    DVT prophylaxis:  Medical DVT prophylaxis orders are present.        CODE STATUS:    Code Status (Patient has no pulse and is not breathing): CPR (Attempt to Resuscitate)  Medical Interventions (Patient has pulse or is breathing): Full Support        Admission Status:  I believe this patient meets Obs status.      I discussed the patient's findings and my recommendations with patient.      Signature:     This document has been electronically signed by Cindy Dasilva MD on April 7, 2024 14:22 EDT   [facility] Hospitalist Team

## 2024-04-07 NOTE — Clinical Note
Level of Care: Telemetry [5]   Admitting Physician: DELIA ERICKSON [[id number]]   Attending Physician: DELIA ERICKSON [[id number]]

## 2024-04-08 PROBLEM — N18.6 ANEMIA DUE TO CHRONIC KIDNEY DISEASE, ON CHRONIC DIALYSIS: Status: ACTIVE | Noted: 2020-08-25

## 2024-04-08 PROBLEM — D63.1 ANEMIA DUE TO CHRONIC KIDNEY DISEASE, ON CHRONIC DIALYSIS: Chronic | Status: ACTIVE | Noted: 2020-08-25

## 2024-04-08 PROBLEM — N18.6 ESRD (END STAGE RENAL DISEASE): Chronic | Status: ACTIVE | Noted: 2020-08-25

## 2024-04-08 PROBLEM — E78.5 HYPERLIPIDEMIA: Chronic | Status: ACTIVE | Noted: 2024-04-08

## 2024-04-08 PROBLEM — E55.9 VITAMIN D DEFICIENCY: Status: ACTIVE | Noted: 2021-07-22

## 2024-04-08 PROBLEM — N18.6 ESRD (END STAGE RENAL DISEASE): Status: ACTIVE | Noted: 2020-08-25

## 2024-04-08 PROBLEM — E11.22 TYPE 2 DIABETES MELLITUS WITH DIABETIC CHRONIC KIDNEY DISEASE: Status: ACTIVE | Noted: 2022-07-01

## 2024-04-08 PROBLEM — N18.6 ANEMIA DUE TO CHRONIC KIDNEY DISEASE, ON CHRONIC DIALYSIS: Chronic | Status: ACTIVE | Noted: 2020-08-25

## 2024-04-08 PROBLEM — Z99.2 ANEMIA DUE TO CHRONIC KIDNEY DISEASE, ON CHRONIC DIALYSIS: Status: ACTIVE | Noted: 2020-08-25

## 2024-04-08 PROBLEM — E78.5 HYPERLIPIDEMIA: Status: ACTIVE | Noted: 2024-04-08

## 2024-04-08 PROBLEM — Z99.2 DEPENDENCE ON RENAL DIALYSIS: Status: ACTIVE | Noted: 2022-07-01

## 2024-04-08 PROBLEM — Z99.2 ANEMIA DUE TO CHRONIC KIDNEY DISEASE, ON CHRONIC DIALYSIS: Chronic | Status: ACTIVE | Noted: 2020-08-25

## 2024-04-08 PROBLEM — I48.0 PAROXYSMAL ATRIAL FIBRILLATION: Status: ACTIVE | Noted: 2022-07-01

## 2024-04-08 PROBLEM — D63.1 ANEMIA DUE TO CHRONIC KIDNEY DISEASE, ON CHRONIC DIALYSIS: Status: ACTIVE | Noted: 2020-08-25

## 2024-04-08 PROBLEM — I48.0 PAROXYSMAL ATRIAL FIBRILLATION: Chronic | Status: ACTIVE | Noted: 2022-07-01

## 2024-04-08 PROBLEM — Z86.19 HISTORY OF SHINGLES: Status: ACTIVE | Noted: 2022-01-18

## 2024-04-08 PROBLEM — E11.22 TYPE 2 DIABETES MELLITUS WITH DIABETIC CHRONIC KIDNEY DISEASE: Chronic | Status: ACTIVE | Noted: 2022-07-01

## 2024-04-08 LAB
ANION GAP SERPL CALCULATED.3IONS-SCNC: 12 MMOL/L (ref 5–15)
BUN SERPL-MCNC: 35 MG/DL (ref 8–23)
BUN/CREAT SERPL: 12.1 (ref 7–25)
CALCIUM SPEC-SCNC: 9 MG/DL (ref 8.6–10.5)
CHLORIDE SERPL-SCNC: 99 MMOL/L (ref 98–107)
CHOLEST SERPL-MCNC: 67 MG/DL (ref 0–200)
CO2 SERPL-SCNC: 23 MMOL/L (ref 22–29)
CREAT SERPL-MCNC: 2.89 MG/DL (ref 0.57–1)
EGFRCR SERPLBLD CKD-EPI 2021: 15.7 ML/MIN/1.73
GLUCOSE BLDC GLUCOMTR-MCNC: 81 MG/DL (ref 70–105)
GLUCOSE SERPL-MCNC: 101 MG/DL (ref 65–99)
HDLC SERPL-MCNC: 49 MG/DL (ref 40–60)
LDLC SERPL CALC-MCNC: <5 MG/DL (ref 0–100)
LDLC/HDLC SERPL: 0.09 {RATIO}
POTASSIUM SERPL-SCNC: 3.6 MMOL/L (ref 3.5–5.2)
QT INTERVAL: 421 MS
QTC INTERVAL: 496 MS
SODIUM SERPL-SCNC: 134 MMOL/L (ref 136–145)
TRIGL SERPL-MCNC: 67 MG/DL (ref 0–150)
VLDLC SERPL-MCNC: NORMAL MG/DL

## 2024-04-08 PROCEDURE — 99204 OFFICE O/P NEW MOD 45 MIN: CPT | Performed by: INTERNAL MEDICINE

## 2024-04-08 PROCEDURE — 36415 COLL VENOUS BLD VENIPUNCTURE: CPT | Performed by: HOSPITALIST

## 2024-04-08 PROCEDURE — G0378 HOSPITAL OBSERVATION PER HR: HCPCS

## 2024-04-08 PROCEDURE — 82948 REAGENT STRIP/BLOOD GLUCOSE: CPT

## 2024-04-08 PROCEDURE — 80048 BASIC METABOLIC PNL TOTAL CA: CPT | Performed by: HOSPITALIST

## 2024-04-08 PROCEDURE — 97161 PT EVAL LOW COMPLEX 20 MIN: CPT

## 2024-04-08 PROCEDURE — 80061 LIPID PANEL: CPT | Performed by: HOSPITALIST

## 2024-04-08 RX ADMIN — Medication 10 ML: at 10:38

## 2024-04-08 RX ADMIN — Medication 10 ML: at 20:43

## 2024-04-08 RX ADMIN — ATORVASTATIN CALCIUM 10 MG: 10 TABLET, FILM COATED ORAL at 20:43

## 2024-04-08 RX ADMIN — FEBUXOSTAT 40 MG: 40 TABLET, FILM COATED ORAL at 10:39

## 2024-04-08 NOTE — CONSULTS
Referring Provider: Cindy Dasilva MD    Reason for Consultation: syncope      Patient Care Team:  Antonino Shen MD as PCP - Family Medicine  Kamran Figueroa MD as Consulting Physician (Cardiology)      SUBJECTIVE     Chief Complaint:  syncope    History of present illness:  Deann Warren is a 83 y.o. female with a history of end-stage renal disease on dialysis, A-fib, hypertension, hyperlipidemia and recurrent syncope who presented to [facility] with complaint of syncope.  She follows with Dr. Figueroa at [place] and has been worked up for this syncope but continues to have episodes.    She recently saw Dr. Harris in the office.  She was taken off of most of her antihypertensives.  However she had remained on losartan and this was also recently discontinued.  In the ER her blood pressure was 147/65 with negative orthostatics.  Heart rate was 101 when she initially presented.  High-sensitivity troponin was 65 and 67.  proBNP was 51,000.  Creatinine was 2.64.  CBC was significant for leukopenia and thrombocytopenia chest x-ray was stable with no acute findings and bilateral carotid showed less than 50% stenosis in the VILMA and less than 50% stenosis in the ;LICA  Review of systems:    Constitutional: No weakness, fatigue, fever, rigors, chills   Eyes: No vision changes, eye pain   ENT/oropharynx: No difficulty swallowing, sore throat, epistaxis, changes in hearing   Cardiovascular: No chest pain, chest tightness, palpitations, paroxysmal nocturnal dyspnea, orthopnea, diaphoresis, dizziness / +syncopal episode   Respiratory: No shortness of breath, dyspnea on exertion, cough, wheezing, hemoptysis   Gastrointestinal: No abdominal pain, nausea, vomiting, diarrhea, bloody stools   Genitourinary: No hematuria, dysuria   Neurological: No headache, tremors, numbness, one-sided weakness    Musculoskeletal: No cramps, myalgias, joint pain, joint swelling   Integument: No rash, edema        Personal History:       Past Medical History:   Diagnosis Date    Allergic conjunctivitis and rhinitis 11/06/2014    Anemia due to chronic kidney disease, on chronic dialysis 08/25/2020    Anxiety 07/30/2012    Chronic gouty arthritis 11/06/2014    Dependence on renal dialysis 07/01/2022    ESRD (end stage renal disease) 08/25/2020    Essential hypertension 09/16/2015    History of shingles 01/18/2022    Hyperlipidemia 04/08/2024    Paroxysmal atrial fibrillation 07/01/2022    Type 2 diabetes mellitus with diabetic chronic kidney disease 07/01/2022    Vitamin D deficiency 07/22/2021       Past Surgical History:   Procedure Laterality Date    ARTERIOVENOUS FISTULA Left        Family History   Family history unknown: Yes       Social History     Tobacco Use    Smoking status: Never    Smokeless tobacco: Never   Vaping Use    Vaping status: Never Used   Substance Use Topics    Alcohol use: Never    Drug use: Never        Home meds:  Prior to Admission medications    Medication Sig Start Date End Date Taking? Authorizing Provider   apixaban (ELIQUIS) 2.5 MG tablet tablet Take 1 tablet by mouth 2 (Two) Times a Day.   Yes Katerin Bradshaw MD   B Complex-C-Folic Acid (EM-MARIA ESTHER PO) Take 1 tablet by mouth Daily.   Yes Katerin Bradshaw MD   febuxostat (ULORIC) 40 MG tablet Take 1 tablet by mouth Daily.   Yes Katerin Bradshaw MD   simvastatin (ZOCOR) 20 MG tablet Take 1 tablet by mouth Every Night.   Yes Katerin Bradshaw MD   acetaminophen (TYLENOL) 500 MG tablet Take 1 tablet by mouth Every 6 (Six) Hours As Needed for Mild Pain.    Katerin Bradshaw MD   NON FORMULARY Lidocaine 2.5% ointment -  Apply 1 application Monday, Wednesday, Friday before dialysis    ProviderKaterin MD       Allergies:     Patient has no known allergies.    Scheduled Meds:[Held by provider] apixaban, 2.5 mg, Oral, BID  atorvastatin, 10 mg, Oral, Nightly  febuxostat, 40 mg, Oral, Daily  sodium chloride, 10 mL, Intravenous,  "Q12H      Continuous Infusions:   PRN Meds:  senna-docusate sodium **AND** polyethylene glycol **AND** bisacodyl **AND** bisacodyl    ondansetron    sodium chloride    [COMPLETED] Insert Peripheral IV **AND** sodium chloride    sodium chloride    sodium chloride      OBJECTIVE    Vital Signs  Vitals:    04/08/24 0500 04/08/24 0530 04/08/24 0731 04/08/24 1100   BP: 144/69 126/59 146/71 127/57   Patient Position:       Pulse: 75 62 66 79   Resp:       Temp:    98.1 °F (36.7 °C)   TempSrc:    Oral   SpO2: 97% 95% 99% 96%   Weight:       Height:           Flowsheet Rows      Flowsheet Row First Filed Value   Admission Height 162.6 cm (64\") Documented at 04/07/2024 0953   Admission Weight 53.5 kg (118 lb) Documented at 04/07/2024 0953              Intake/Output Summary (Last 24 hours) at 4/8/2024 1353  Last data filed at 4/8/2024 1000  Gross per 24 hour   Intake 360 ml   Output --   Net 360 ml        Telemetry: Normal sinus rhythm  Physical Exam:  The patient is alert, oriented and in no distress.  Vital signs as noted above.  Head and neck revealed no carotid bruits or jugular venous distention.  No thyromegaly or lymphadenopathy is present  Lungs clear.  No wheezing.  Breath sounds are normal bilaterally.  Heart: Normal first and second heart sounds. No murmur.  No precordial rub is present.  No gallop is present.  AV fistula  Abdomen: Soft and nontender.  No organomegaly is present.  Extremities with good peripheral pulses without any pedal edema.  Skin: Warm and dry.  Musculoskeletal system is grossly normal.  CNS grossly normal.       Results Review:  I have personally reviewed the results from the time of this admission to 4/8/2024 13:53 EDT and agree with these findings:  []  Laboratory  []  Microbiology  []  Radiology  []  EKG/Telemetry   []  Cardiology/Vascular   []  Pathology  []  Old records  []  Other:    Most notable findings include:     Lab Results (last 24 hours)       Procedure Component Value Units " Date/Time    POC Glucose Once [[id number]]  (Normal) Collected: 04/08/24 0915    Specimen: Blood Updated: 04/08/24 0917     Glucose 81 mg/dL      Comment: Serial Number: [id number]:  [id number]       Basic Metabolic Panel [[id number]]  (Abnormal) Collected: 04/08/24 0446    Specimen: Blood from Arm, Right Updated: 04/08/24 0523     Glucose 101 mg/dL      BUN 35 mg/dL      Creatinine 2.89 mg/dL      Sodium 134 mmol/L      Potassium 3.6 mmol/L      Comment: Slight hemolysis detected by analyzer. Result may be falsely elevated.        Chloride 99 mmol/L      CO2 23.0 mmol/L      Calcium 9.0 mg/dL      BUN/Creatinine Ratio 12.1     Anion Gap 12.0 mmol/L      eGFR 15.7 mL/min/1.73     Narrative:      GFR Normal >60  Chronic Kidney Disease <60  Kidney Failure <15    The GFR formula is only valid for adults with stable renal function between ages 18 and 70.    Lipid Panel [[id number]] Collected: 04/08/24 0446    Specimen: Blood from Arm, Right Updated: 04/08/24 0523     Total Cholesterol 67 mg/dL      Triglycerides 67 mg/dL      HDL Cholesterol 49 mg/dL      LDL Cholesterol  <5 mg/dL      VLDL Cholesterol --     Comment: Unable to calculate        LDL/HDL Ratio 0.09    Narrative:      Cholesterol Reference Ranges  (U.S. Department of Health and Human Services ATP III Classifications)    Desirable          <200 mg/dL  Borderline High    200-239 mg/dL  High Risk          >240 mg/dL      Triglyceride Reference Ranges  (U.S. Department of Health and Human Services ATP III Classifications)    Normal           <150 mg/dL  Borderline High  150-199 mg/dL  High             200-499 mg/dL  Very High        >500 mg/dL    HDL Reference Ranges  (U.S. Department of Health and Human Services ATP III Classifications)    Low     <40 mg/dl (major risk factor for CHD)  High    >60 mg/dl ('negative' risk factor for CHD)        LDL Reference Ranges  (U.S. Department of Health and Human Services ATP III Classifications)    Optimal           <100 mg/dL  Near Optimal     100-129 mg/dL  Borderline High  130-159 mg/dL  High             160-189 mg/dL  Very High        >189 mg/dL            Imaging Results (Last 24 Hours)       ** No results found for the last 24 hours. **            LAB RESULTS (LAST 7 DAYS)    CBC  Results from last 7 days   Lab Units 04/07/24  1012   WBC 10*3/mm3 2.91*   RBC 10*6/mm3 3.70*   HEMOGLOBIN g/dL 11.2*   HEMATOCRIT % 36.7   MCV fL 99.2*   PLATELETS 10*3/mm3 63*       BMP  Results from last 7 days   Lab Units 04/08/24  0446 04/07/24  1118 04/07/24  1012   SODIUM mmol/L 134* 138  --    POTASSIUM mmol/L 3.6 3.5  --    CHLORIDE mmol/L 99 97*  --    CO2 mmol/L 23.0 28.0  --    BUN mg/dL 35* 28*  --    CREATININE mg/dL 2.89* 2.64*  --    GLUCOSE mg/dL 101* 81  --    MAGNESIUM mg/dL  --   --  2.0       CMP   Results from last 7 days   Lab Units 04/08/24  0446 04/07/24  1118   SODIUM mmol/L 134* 138   POTASSIUM mmol/L 3.6 3.5   CHLORIDE mmol/L 99 97*   CO2 mmol/L 23.0 28.0   BUN mg/dL 35* 28*   CREATININE mg/dL 2.89* 2.64*   GLUCOSE mg/dL 101* 81       BNP        TROPONIN  Results from last 7 days   Lab Units 04/07/24  1316   HSTROP T ng/L 67*       CoAg        Creatinine Clearance  Estimated Creatinine Clearance: 12.5 mL/min (A) (by C-G formula based on SCr of 2.89 mg/dL (H)).    ABG          Radiology  XR Chest 1 View    Result Date: 4/7/2024  Impression: Stable chronic findings without acute process. Electronically Signed: German Reece MD  4/7/2024 10:57 AM EDT  Workstation ID: [id number]       EKG  I personally viewed and interpreted the patient's EKG/Telemetry data:  ECG 12 Lead Syncope   Final Result   HEART RATE= 83  bpm   RR Interval= 720  ms   DC Interval=   ms   P Horizontal Axis=   deg   P Front Axis=   deg   QRSD Interval= 130  ms   QT Interval= 421  ms   QTcB= 496  ms   QRS Axis= -79  deg   T Wave Axis= 102  deg   - ABNORMAL ECG -   Atrial fibrillation   IVCD, consider LBBB   Probable anterolateral infarct, age indeterm   No  previous ECG available for comparison   Electronically Signed By: Harvinder Garcia (Russ) 08-Apr-2024 06:11:35   Date and Time of Study: 2024-04-07 10:07:46            Echocardiogram:          Stress Test:        Cardiac Catheterization:  No results found for this or any previous visit.        Other:      ASSESSMENT & PLAN:    Principal Problem:    Syncope and collapse  Active Problems:    Type 2 diabetes mellitus with diabetic chronic kidney disease    Paroxysmal atrial fibrillation    Essential hypertension    ESRD (end stage renal disease)    Anemia due to chronic kidney disease, on chronic dialysis    Hyperlipidemia    Syncope  Recurrent syncope with previous negative workup  Obtain echocardiogram to rule out any structural heart disease  Holter monitor on discharge  She may benefit from midodrine on dialysis days to prevent drops in blood pressure    End-stage renal disease  Currently on dialysis  Nephrology is following    Atrial fibrillation  Currently on low-dose Eliquis  Has had bleeding issues with dialysis  May benefit from Watchman    Hyperlipidemia  Continue with atorvastatin    Labile hypertension  Hold home antihypertensive    Dilcia Lui MD  04/08/24  13:53 EDT

## 2024-04-08 NOTE — THERAPY EVALUATION
Patient Name: Deann Warren  : 1940    MRN: [id number]                              Today's Date: 2024       Admit Date: 2024    Visit Dx:     ICD-10-CM ICD-9-CM   1. Syncope, unspecified syncope type  R55 780.2     Patient Active Problem List   Diagnosis    Syncope and collapse    Dependence on renal dialysis    Type 2 diabetes mellitus with diabetic chronic kidney disease    Anxiety    Chronic gouty arthritis    Allergic conjunctivitis and rhinitis    Paroxysmal atrial fibrillation    Essential hypertension    ESRD (end stage renal disease)    Anemia due to chronic kidney disease, on chronic dialysis    History of shingles    Hyperlipidemia    Vitamin D deficiency     Past Medical History:   Diagnosis Date    Allergic conjunctivitis and rhinitis 2014    Anemia due to chronic kidney disease, on chronic dialysis 2020    Anxiety 2012    Chronic gouty arthritis 2014    Dependence on renal dialysis 2022    ESRD (end stage renal disease) 2020    Essential hypertension 2015    History of shingles 2022    Hyperlipidemia 2024    Paroxysmal atrial fibrillation 2022    Type 2 diabetes mellitus with diabetic chronic kidney disease 2022    Vitamin D deficiency 2021     Past Surgical History:   Procedure Laterality Date    ARTERIOVENOUS FISTULA Left       General Information       Row Name 24 1447          Physical Therapy Time and Intention    Document Type evaluation  -CR     Mode of Treatment physical therapy  -CR       Row Name 24 1447          General Information    Patient Profile Reviewed yes  -CR     Prior Level of Function independent:;all household mobility;community mobility;driving;ADL's  patient reports recently started using rollator  -CR     Existing Precautions/Restrictions --  syncope  -CR     Barriers to Rehab medically complex  -CR       Row Name 24 1447          Living Environment    People in Home  grandchild(vamsi)  -CR       Row Name 04/08/24 1447          Home Main Entrance    Number of Stairs, Main Entrance none  -CR       Row Name 04/08/24 1447          Stairs Within Home, Primary    Number of Stairs, Within Home, Primary none  -CR       Row Name 04/08/24 1447          Cognition    Orientation Status (Cognition) oriented x 4  -CR               User Key  (r) = Recorded By, (t) = Taken By, (c) = Cosigned By      Initials Name Provider Type    CR Reyes, Carmela, PT Physical Therapist                   Mobility       Row Name 04/08/24 1448          Bed Mobility    Bed Mobility bed mobility (all) activities  -CR     All Activities, [place] (Bed Mobility) modified independence  -CR     Assistive Device (Bed Mobility) bed rails  -CR       Row Name 04/08/24 1448          Sit-Stand Transfer    Sit-Stand [place] (Transfers) standby assist  -CR     Assistive Device (Sit-Stand Transfers) walker, front-wheeled  -CR       Row Name 04/08/24 1448          Gait/Stairs (Locomotion)    [place] Level (Gait) standby assist  -CR     Assistive Device (Gait) walker, front-wheeled  -CR     Distance in Feet (Gait) 20  -CR     Deviations/Abnormal Patterns (Gait) gait speed decreased  -CR     Bilateral Gait Deviations forward flexed posture  -CR               User Key  (r) = Recorded By, (t) = Taken By, (c) = Cosigned By      Initials Name Provider Type    CR Reyes, Carmela, PT Physical Therapist                   Obj/Interventions       Row Name 04/08/24 1450          Range of Motion Comprehensive    General Range of Motion no range of motion deficits identified  -CR       Row Name 04/08/24 1450          Strength Comprehensive (MMT)    General Manual Muscle Testing (MMT) Assessment no strength deficits identified  -CR       Row Name 04/08/24 1450          Balance    Balance Assessment sitting static balance;standing static balance;standing dynamic balance  -CR     Static Sitting Balance independent  -CR     Position,  Sitting Balance sitting edge of bed  -CR     Static Standing Balance modified independence  -CR     Dynamic Standing Balance modified independence  -CR     Position/Device Used, Standing Balance walker, front-wheeled  -CR       Row Name 04/08/24 1453          Sensory Assessment (Somatosensory)    Sensory Assessment (Somatosensory) sensation intact  -CR               User Key  (r) = Recorded By, (t) = Taken By, (c) = Cosigned By      Initials Name Provider Type    CR Reyes, Carmela, VAL Physical Therapist                   Goals/Plan    No documentation.                  Clinical Impression       Row Name 04/08/24 1451          Pain    Pretreatment Pain Rating 0/10 - no pain  -CR     Posttreatment Pain Rating 0/10 - no pain  -CR       Row Name 04/08/24 145          Plan of Care Review    Plan of Care Reviewed With patient  -CR     Outcome Evaluation 84 y/o female brought in hospital on 4/7 due to syncopal episode with n/v earlier that day. Found with elevated troponin. PMH includes CKD on dialysis MWF, afib and recurrent syncopal episodes mostly after dialysis. Patient lives with grandson and normally independent with household and community mobility without a.d including driving self to/from dialysis. Patient reports using rollator last week due to not feeling as strong. Patient remains independent with bed mobility and transfers with SBA using rw. Patient ambulated 20 ft using rw with SBA, gait slow but steady, no loss of balance, no veering. No dizziness reported with position changes and BP/HR stable entire tx session. Patient did mention recent medication changes. At this time, patient is safe to return home but recommend she continue to utilize rollator for mobility. No further Physical therapy services indicated.  -CR       Row Name 04/08/24 1456          Therapy Assessment/Plan (PT)    Patient/Family Therapy Goals Statement (PT) figure out what is causing syncopal episodes  -CR     Criteria for Skilled  Interventions Met (PT) no;does not meet criteria for skilled intervention  -CR     Therapy Frequency (PT) evaluation only  -CR               User Key  (r) = Recorded By, (t) = Taken By, (c) = Cosigned By      Initials Name Provider Type    CR Reyes, Carmela, PT Physical Therapist                   Outcome Measures       Row Name 04/08/24 1525          How much help from another person do you currently need...    Turning from your back to your side while in flat bed without using bedrails? 4  -CR     Moving from lying on back to sitting on the side of a flat bed without bedrails? 4  -CR     Moving to and from a bed to a chair (including a wheelchair)? 4  -CR     Standing up from a chair using your arms (e.g., wheelchair, bedside chair)? 4  -CR     Climbing 3-5 steps with a railing? 4  -CR     To walk in hospital room? 4  -CR     AM-PAC 6 Clicks Score (PT) 24  -CR     Highest Level of Mobility Goal 8 --> Walked 250 feet or more  -CR               User Key  (r) = Recorded By, (t) = Taken By, (c) = Cosigned By      Initials Name Provider Type    CR Reyes, Carmela, PT Physical Therapist                                 Physical Therapy Education       Title: PT OT SLP Therapies (Done)       Topic: Physical Therapy (Done)       Point: Precautions (Done)       Learning Progress Summary             Patient Acceptance, E, VU by CR at 4/8/2024 1525                                         User Key       Initials Effective Dates Name Provider Type Discipline    CR 06/16/21 -  Reyes, Carmela, PT Physical Therapist PT                  PT Recommendation and Plan     Plan of Care Reviewed With: patient  Outcome Evaluation: 84 y/o female brought in hospital on 4/7 due to syncopal episode with n/v earlier that day. Found with elevated troponin. PMH includes CKD on dialysis MWF, afib and recurrent syncopal episodes mostly after dialysis. Patient lives with grandson and normally independent with household and community mobility without  a.d including driving self to/from dialysis. Patient reports using rollator last week due to not feeling as strong. Patient remains independent with bed mobility and transfers with SBA using rw. Patient ambulated 20 ft using rw with SBA, gait slow but steady, no loss of balance, no veering. No dizziness reported with position changes and BP/HR stable entire tx session. Patient did mention recent medication changes. At this time, patient is safe to return home but recommend she continue to utilize rollator for mobility. No further Physical therapy services indicated.     Time Calculation:         PT Charges       Row Name 04/08/24 1525             Time Calculation    Start Time 1415  -CR      Stop Time 1440  -CR      Time Calculation (min) 25 min  -CR      PT Received On 04/08/24  -CR         Time Calculation- PT    Total Timed Code Minutes- PT 0 minute(s)  -CR                User Key  (r) = Recorded By, (t) = Taken By, (c) = Cosigned By      Initials Name Provider Type    CR Reyes, Carmela, PT Physical Therapist                  Therapy Charges for Today       Code Description Service Date Service Provider Modifiers Qty    12970806478 HC PT EVAL LOW COMPLEXITY 4 4/8/2024 Reyes, Carmela, PT GP 1            PT G-Codes  AM-PAC 6 Clicks Score (PT): 24  PT Discharge Summary  Anticipated Discharge Disposition (PT): home with assist    Carmela Reyes, PT  4/8/2024

## 2024-04-08 NOTE — CASE MANAGEMENT/SOCIAL WORK
Discharge Planning Assessment   Neto     Patient Name: Deann Warren  MRN: [id number]  Today's Date: 4/8/2024    Admit Date: 4/7/2024    Plan: Home with Donaldo Fofana, pending PT/OT ashley. Pt is current with [facility].   Discharge Needs Assessment       Row Name 04/08/24 1120       Living Environment    People in Home grandchild(vamsi)    Name(s) of People in Home Adult Donaldo Fofana    Current Living Arrangements home    Potentially Unsafe Housing Conditions none    In the past 12 months has the electric, gas, oil, or water company threatened to shut off services in your home? No    Primary Care Provided by self;other (see comments)  Donaldo Rose    Provides Primary Care For no one    Quality of Family Relationships helpful;involved;supportive    Able to Return to Prior Arrangements yes       Resource/Environmental Concerns    Resource/Environmental Concerns none    Transportation Concerns none       Transportation Needs    In the past 12 months, has lack of transportation kept you from medical appointments or from getting medications? no    In the past 12 months, has lack of transportation kept you from meetings, work, or from getting things needed for daily living? No       Food Insecurity    Within the past 12 months, you worried that your food would run out before you got the money to buy more. Never true    Within the past 12 months, the food you bought just didn't last and you didn't have money to get more. Never true       Transition Planning    Patient/Family Anticipates Transition to home with family    Patient/Family Anticipated Services at Transition none    Transportation Anticipated family or friend will provide       Discharge Needs Assessment    Readmission Within the Last 30 Days no previous admission in last 30 days    Current Outpatient/Agency/Support Group outpatient peritoneal dialysis    Equipment Currently Used at Home walker, rolling;glucometer    Concerns to be Addressed  no discharge needs identified    Anticipated Changes Related to Illness none    Equipment Needed After Discharge none              Discharge Plan       Row Name 04/08/24 1122       Plan    Plan Home with Donaldo Larose, pending PT/OT eval. Pt is current with [facility].    Patient/Family in Agreement with Plan yes    Plan Comments CM met with patient at bedside. Confirmed PCP and pharmacy. Does not want to enroll in M2Bs. Pt lives at home with grown Donaldo larose and plans on returning home at d/c, pending PT/OT eval. Pt is current with [facility] through [facility]. Pt requires limited assistance with ADLs. DMEs: rollator, shower chair, glucommeter. Pt denies needing financial assistance with food or medication. DC Barriers: Hem/Onc, Cardio, Neph following. Echo pending, PT/OT eval.              Demographic Summary       Row Name 04/08/24 1119       General Information    Admission Type observation    Arrived From emergency department    Required Notices Provided Observation Status Notice    Referral Source admission list    Reason for Consult discharge planning    Preferred Language English              Functional Status       Row Name 04/08/24 1119       Functional Status    Usual Activity Tolerance moderate    Current Activity Tolerance moderate       Functional Status, IADL    Medications assistive equipment and person    Meal Preparation assistive equipment    Housekeeping assistive equipment    Laundry assistive equipment    Shopping assistive equipment and person       Mental Status    General Appearance WDL WDL       Mental Status Summary    Recent Changes in Mental Status/Cognitive Functioning no changes              Patient Forms       Row Name 04/08/24 1126       Patient Forms    Important Message from Medicare ([facility]) --  BRISENO 4/7/24 per registration           Zandra Mackey RN    phone [phone]  fax [phone]

## 2024-04-08 NOTE — PLAN OF CARE
Goal Outcome Evaluation:  Plan of Care Reviewed With: patient           Outcome Evaluation: Pt admited for syncope episodes. No c/o of dizziness tonight. Nephro consulted to manage dialysis. Pt expected to have Echo in AM. Will continue to monitor.

## 2024-04-08 NOTE — PROGRESS NOTES
"                                                                                                                                      Nephrology  Progress Note                                        [facility]    Patient Identification    Name: Deann Warren  Age: 83 y.o.  Sex: female  :  1940  MRN: [id number]      DATE OF SERVICE:  2024        Subective     Still feeling lightheaded had another syncopal episode earlier today     Objective   Scheduled Meds:[Held by provider] apixaban, 2.5 mg, Oral, BID  atorvastatin, 10 mg, Oral, Nightly  febuxostat, 40 mg, Oral, Daily  sodium chloride, 10 mL, Intravenous, Q12H          Continuous Infusions:     PRN Meds:  senna-docusate sodium **AND** polyethylene glycol **AND** bisacodyl **AND** bisacodyl    ondansetron    sodium chloride    [COMPLETED] Insert Peripheral IV **AND** sodium chloride    sodium chloride    sodium chloride     Exam:  /59   Pulse 62   Temp 97.5 °F (36.4 °C) (Oral)   Resp 18   Ht 162.6 cm (64\")   Wt 53.5 kg (118 lb)   SpO2 95%   BMI 20.25 kg/m²     Intake/Output last 3 shifts:  No intake/output data recorded.    Intake/Output this shift:  I/O this shift:  In: 120 [P.O.:120]  Out: -     Physical exam:  General Appearance:  Alert  Head:  Normocephalic, without obvious abnormality, atraumatic  Eyes:  PERRL, conjunctiva/corneas clear     Neck:  Supple,  no adenopathy;      Lungs:  Decreased BS occasion ronchi  Heart:  Regular rate and rhythm, S1 and S2 normal  Abdomen:  Soft, non-tender, bowel sounds active   Extremities: trace edema  Pulses: 2+ and symmetric all extremities  Skin:  No rashes or lesions       Data Review:  All labs (24hrs):   Recent Results (from the past 24 hour(s))   ECG 12 Lead Syncope    Collection Time: 24 10:07 AM   Result Value Ref Range    QT Interval 421 ms    QTC Interval 496 ms   Green Top (Gel)    Collection Time: 24 10:12 AM   Result Value Ref Range    Extra Tube Hold for " add-ons.    Lavender Top    Collection Time: 04/07/24 10:12 AM   Result Value Ref Range    Extra Tube hold for add-on    Gold Top - SST    Collection Time: 04/07/24 10:12 AM   Result Value Ref Range    Extra Tube Hold for add-ons.    Light Blue Top    Collection Time: 04/07/24 10:12 AM   Result Value Ref Range    Extra Tube Hold for add-ons.    BNP    Collection Time: 04/07/24 10:12 AM    Specimen: Blood   Result Value Ref Range    proBNP 51,559.0 (H) 0.0 - 1,800.0 pg/mL   TSH    Collection Time: 04/07/24 10:12 AM    Specimen: Blood   Result Value Ref Range    TSH 4.830 (H) 0.270 - 4.200 uIU/mL   Magnesium    Collection Time: 04/07/24 10:12 AM    Specimen: Blood   Result Value Ref Range    Magnesium 2.0 1.6 - 2.4 mg/dL   CBC Auto Differential    Collection Time: 04/07/24 10:12 AM    Specimen: Blood   Result Value Ref Range    WBC 2.91 (L) 3.40 - 10.80 10*3/mm3    RBC 3.70 (L) 3.77 - 5.28 10*6/mm3    Hemoglobin 11.2 (L) 12.0 - 15.9 g/dL    Hematocrit 36.7 34.0 - 46.6 %    MCV 99.2 (H) 79.0 - 97.0 fL    MCH 30.3 26.6 - 33.0 pg    MCHC 30.5 (L) 31.5 - 35.7 g/dL    RDW 13.8 12.3 - 15.4 %    RDW-SD 50.2 37.0 - 54.0 fl    MPV 10.3 6.0 - 12.0 fL    Platelets 63 (L) 140 - 450 10*3/mm3    Neutrophil % 73.9 42.7 - 76.0 %    Lymphocyte % 14.8 (L) 19.6 - 45.3 %    Monocyte % 9.6 5.0 - 12.0 %    Eosinophil % 0.7 0.3 - 6.2 %    Basophil % 0.7 0.0 - 1.5 %    Immature Grans % 0.3 0.0 - 0.5 %    Neutrophils, Absolute 2.15 1.70 - 7.00 10*3/mm3    Lymphocytes, Absolute 0.43 (L) 0.70 - 3.10 10*3/mm3    Monocytes, Absolute 0.28 0.10 - 0.90 10*3/mm3    Eosinophils, Absolute 0.02 0.00 - 0.40 10*3/mm3    Basophils, Absolute 0.02 0.00 - 0.20 10*3/mm3    Immature Grans, Absolute 0.01 0.00 - 0.05 10*3/mm3    nRBC 0.0 0.0 - 0.2 /100 WBC   Basic Metabolic Panel    Collection Time: 04/07/24 11:18 AM    Specimen: Blood   Result Value Ref Range    Glucose 81 65 - 99 mg/dL    BUN 28 (H) 8 - 23 mg/dL    Creatinine 2.64 (H) 0.57 - 1.00 mg/dL     Sodium 138 136 - 145 mmol/L    Potassium 3.5 3.5 - 5.2 mmol/L    Chloride 97 (L) 98 - 107 mmol/L    CO2 28.0 22.0 - 29.0 mmol/L    Calcium 9.2 8.6 - 10.5 mg/dL    BUN/Creatinine Ratio 10.6 7.0 - 25.0    Anion Gap 13.0 5.0 - 15.0 mmol/L    eGFR 17.5 (L) >60.0 mL/min/1.73   High Sensitivity Troponin T    Collection Time: 04/07/24 11:18 AM    Specimen: Blood   Result Value Ref Range    HS Troponin T 65 (C) <14 ng/L   High Sensitivity Troponin T 2Hr    Collection Time: 04/07/24  1:16 PM    Specimen: Blood   Result Value Ref Range    HS Troponin T 67 (C) <14 ng/L    Troponin T Delta 2 >=-4 - <+4 ng/L   Duplex Carotid Ultrasound CAR    Collection Time: 04/07/24  4:16 PM   Result Value Ref Range    Prox CCA PSV -64.6 cm/sec    Prox CCA EDV -12.4 cm/sec    Dist CCA PSV -44.2 cm/sec    Dist CCA EDV -7.9 cm/sec    Prox ICA PSV -45.5 cm/sec    Prox ICA EDV -9.8 cm/sec    Dist ICA PSV -68.6 cm/sec    Dist ICA EDV -13.1 cm/sec    Prox ECA PSV -53.3 cm/sec    Vertebral A PSV -47.7 cm/sec    Prox SCLA .0 cm/sec    Prox CCA PSV 73.5 cm/sec    Prox CCA EDV 9.3 cm/sec    Dist CCA PSV -45.9 cm/sec    Dist CCA EDV -7.8 cm/sec    Prox ICA PSV -50.5 cm/sec    Prox ICA EDV -12.6 cm/sec    Dist ICA PSV -47.5 cm/sec    Dist ICA EDV -10.4 cm/sec    Prox ECA PSV -63.1 cm/sec    Vertebral A PSV -41.7 cm/sec    Prox SCLA PSV -191.0 cm/sec    ICA/CCA ratio 1.10     ICA/CCA ratio 0.70    Basic Metabolic Panel    Collection Time: 04/08/24  4:46 AM    Specimen: Arm, Right; Blood   Result Value Ref Range    Glucose 101 (H) 65 - 99 mg/dL    BUN 35 (H) 8 - 23 mg/dL    Creatinine 2.89 (H) 0.57 - 1.00 mg/dL    Sodium 134 (L) 136 - 145 mmol/L    Potassium 3.6 3.5 - 5.2 mmol/L    Chloride 99 98 - 107 mmol/L    CO2 23.0 22.0 - 29.0 mmol/L    Calcium 9.0 8.6 - 10.5 mg/dL    BUN/Creatinine Ratio 12.1 7.0 - 25.0    Anion Gap 12.0 5.0 - 15.0 mmol/L    eGFR 15.7 (L) >60.0 mL/min/1.73   Lipid Panel    Collection Time: 04/08/24  4:46 AM    Specimen: Arm,  Right; Blood   Result Value Ref Range    Total Cholesterol 67 0 - 200 mg/dL    Triglycerides 67 0 - 150 mg/dL    HDL Cholesterol 49 40 - 60 mg/dL    LDL Cholesterol  <5 0 - 100 mg/dL    VLDL Cholesterol      LDL/HDL Ratio 0.09           Imaging:  XR Chest 1 View    Result Date: 4/7/2024  Impression: Stable chronic findings without acute process. Electronically Signed: German Reece MD  4/7/2024 10:57 AM EDT  Workstation ID: [id number]     Assessment/Plan:     Syncope and collapse            ESRD hemodialysis dependent   Syncope  Elevated BNP   Atrial fibrillation   Hyperlipidemia         Recommendations:   Needs formal cardiac workup for her syncopal episode   No need for dialysis yet   Will re-evaluate tomorrow

## 2024-04-08 NOTE — PLAN OF CARE
Goal Outcome Evaluation:            Patient A&O a MWF dialysis. Patient didn't have dialysis today. Planned ECHO tomorrow. Bedside table and call light within reach.

## 2024-04-08 NOTE — PROGRESS NOTES
[facility] MEDICINE SERVICE  DAILY PROGRESS NOTE    NAME: Deann Warren  : 1940  MRN: [id number]      LOS: 0 days     PROVIDER OF SERVICE: Cindy Dasilva MD    Chief Complaint: Syncope and collapse    Subjective:     Interval History:  History taken from: patient  Patient seen and examined.  No acute events overnight.  Stated she had a brief syncopal episode this morning while laying in bed.  States that she feels good however after she woke up.  Denies any chest pain or shortness of breath.  Vital signs reviewed and stable.    Review of Systems:   Review of Systems  As above  Objective:     Vital Signs  Temp:  [97.5 °F (36.4 °C)-98.1 °F (36.7 °C)] 98.1 °F (36.7 °C)  Heart Rate:  [47-86] 79  Resp:  [17-18] 18  BP: (114-146)/(49-71) 127/57   Body mass index is 20.25 kg/m².    Physical Exam  Physical Exam  General Appearance: AOO x 4, cooperative, no distress, appropriate for age  Head:  Normocephalic, without obvious abnormality  Eyes:  PERRL, EOM's intact, conjunctivae and cornea clear  Nose:  Nares symmetrical, septum midline, mucosa pink  Throat:  Lips, tongue, and mucosa are moist, pink, and intact  Neck:  Supple; symmetrical, trachea midline, no adenopathy  Back:  Symmetrical, ROM normal, no CVA tenderness  Lungs: Respirations unlabored, no audible wheeze  Heart: Regular rate & rhythm, S1 and S2 normal  Abdomen:  Soft, nontender, bowel sounds active all four quadrants  Musculoskeletal: Tone and strength strong and symmetrical, all extremities; no joint pain or edema         Skin/Hair/Nails:  Skin warm, dry and intact, no rashes or abnormal dyspigmentation     Scheduled Meds   [Held by provider] apixaban, 2.5 mg, Oral, BID  atorvastatin, 10 mg, Oral, Nightly  febuxostat, 40 mg, Oral, Daily  sodium chloride, 10 mL, Intravenous, Q12H       PRN Meds     senna-docusate sodium **AND** polyethylene glycol **AND** bisacodyl **AND** bisacodyl    ondansetron    sodium chloride    [COMPLETED] Insert  Peripheral IV **AND** sodium chloride    sodium chloride    sodium chloride   Infusions         Diagnostic Data    Results from last 7 days   Lab Units 04/08/24  0446 04/07/24  1118 04/07/24  1012   WBC 10*3/mm3  --   --  2.91*   HEMOGLOBIN g/dL  --   --  11.2*   HEMATOCRIT %  --   --  36.7   PLATELETS 10*3/mm3  --   --  63*   GLUCOSE mg/dL 101*   < >  --    CREATININE mg/dL 2.89*   < >  --    BUN mg/dL 35*   < >  --    SODIUM mmol/L 134*   < >  --    POTASSIUM mmol/L 3.6   < >  --    ANION GAP mmol/L 12.0   < >  --     < > = values in this interval not displayed.       XR Chest 1 View    Result Date: 4/7/2024  Impression: Stable chronic findings without acute process. Electronically Signed: German Reece MD  4/7/2024 10:57 AM EDT  Workstation ID: [id number]       I reviewed the patient's new clinical results.    Assessment/Plan:     Active and Resolved Problems  Active Hospital Problems    Diagnosis  POA    **Syncope and collapse [R55]  Yes    Hyperlipidemia [E78.5]  Yes    Type 2 diabetes mellitus with diabetic chronic kidney disease [E11.22]  Yes    Paroxysmal atrial fibrillation [I48.0]  Yes    ESRD (end stage renal disease) [N18.6]  Yes    Anemia due to chronic kidney disease, on chronic dialysis [N18.6, D63.1, Z99.2]  Not Applicable    Essential hypertension [I10]  Yes      Resolved Hospital Problems   No resolved problems to display.     Syncope and collapse  Patient has had recurrent episodes of syncope in the past  Echocardiogram pending  Carotid Dopplers unremarkable  Consult cardiology     Elevated BNP  BNP 51,559  Echocardiogram pending  Urology consulted     End-stage renal disease  Dialysis Monday Wednesday Friday  Urology on board  Repeat BMP in the a.m     Thrombocytopenia  Platelets 63  Consult oncology  Repeat CBC    Atrial fibrillation  Rate controlled  Hold Eliquis due to thrombocytopenia     Hyperlipidemia  Resume statin  Lipid panel within normal limits      DVT prophylaxis:  Medical DVT  prophylaxis orders are present.         Code status is   Code Status and Medical Interventions:   Ordered at: 04/07/24 1420     Code Status (Patient has no pulse and is not breathing):    CPR (Attempt to Resuscitate)     Medical Interventions (Patient has pulse or is breathing):    Full Support       Plan for disposition:Home in 1-2  days    Time: 30 minutes    Signature: Electronically signed by Cindy Dasilva MD, 04/08/24, 16:04 EDT.  [facility] Hospitalist Team

## 2024-04-08 NOTE — PLAN OF CARE
Goal Outcome Evaluation:  Plan of Care Reviewed With: patient           Outcome Evaluation: 82 y/o female brought in hospital on 4/7 due to syncopal episode with n/v earlier that day. Found with elevated troponin. PMH includes CKD on dialysis MWF, afib and recurrent syncopal episodes mostly after dialysis. Patient lives with grandson and normally independent with household and community mobility without a.d including driving self to/from dialysis. Patient reports using rollator last week due to not feeling as strong. Patient remains independent with bed mobility and transfers with SBA using rw. Patient ambulated 20 ft using rw with SBA, gait slow but steady, no loss of balance, no veering. No dizziness reported with position changes and BP/HR stable entire tx session. Patient did mention recent medication changes. At this time, patient is safe to return home but recommend she continue to utilize rollator for mobility. No further Physical therapy services indicated.      Anticipated Discharge Disposition (PT): home with assist

## 2024-04-09 ENCOUNTER — APPOINTMENT (OUTPATIENT)
Dept: GENERAL RADIOLOGY | Facility: HOSPITAL | Age: 84
DRG: 312 | End: 2024-04-09
Payer: MEDICARE

## 2024-04-09 ENCOUNTER — APPOINTMENT (OUTPATIENT)
Dept: CARDIOLOGY | Facility: HOSPITAL | Age: 84
DRG: 312 | End: 2024-04-09
Payer: MEDICARE

## 2024-04-09 ENCOUNTER — APPOINTMENT (OUTPATIENT)
Dept: CT IMAGING | Facility: HOSPITAL | Age: 84
DRG: 312 | End: 2024-04-09
Payer: MEDICARE

## 2024-04-09 LAB
ANION GAP SERPL CALCULATED.3IONS-SCNC: 13 MMOL/L (ref 5–15)
AORTIC DIMENSIONLESS INDEX: 0.49 (DI)
BH CV ECHO LEFT VENTRICLE GLOBAL LONGITUDINAL STRAIN: -7.8 %
BH CV ECHO MEAS - ACS: 1.3 CM
BH CV ECHO MEAS - AI P1/2T: 268.3 MSEC
BH CV ECHO MEAS - AO MAX PG: 11.7 MMHG
BH CV ECHO MEAS - AO MEAN PG: 7 MMHG
BH CV ECHO MEAS - AO V2 MAX: 171 CM/SEC
BH CV ECHO MEAS - AO V2 VTI: 32.4 CM
BH CV ECHO MEAS - AVA(I,D): 1.74 CM2
BH CV ECHO MEAS - EDV(CUBED): 132.7 ML
BH CV ECHO MEAS - EDV(MOD-SP4): 69.8 ML
BH CV ECHO MEAS - EF(MOD-BP): 35 %
BH CV ECHO MEAS - EF(MOD-SP4): 35.4 %
BH CV ECHO MEAS - ESV(CUBED): 64 ML
BH CV ECHO MEAS - ESV(MOD-SP4): 45.1 ML
BH CV ECHO MEAS - FS: 21.6 %
BH CV ECHO MEAS - IVS/LVPW: 1 CM
BH CV ECHO MEAS - IVSD: 1.1 CM
BH CV ECHO MEAS - LA DIMENSION: 4 CM
BH CV ECHO MEAS - LAT PEAK E' VEL: 8.6 CM/SEC
BH CV ECHO MEAS - LV DIASTOLIC VOL/BSA (35-75): 44.7 CM2
BH CV ECHO MEAS - LV MASS(C)D: 213.9 GRAMS
BH CV ECHO MEAS - LV MAX PG: 2.8 MMHG
BH CV ECHO MEAS - LV MEAN PG: 1 MMHG
BH CV ECHO MEAS - LV SYSTOLIC VOL/BSA (12-30): 28.9 CM2
BH CV ECHO MEAS - LV V1 MAX: 83.9 CM/SEC
BH CV ECHO MEAS - LV V1 VTI: 19.9 CM
BH CV ECHO MEAS - LVIDD: 5.1 CM
BH CV ECHO MEAS - LVIDS: 4 CM
BH CV ECHO MEAS - LVOT AREA: 2.8 CM2
BH CV ECHO MEAS - LVOT DIAM: 1.9 CM
BH CV ECHO MEAS - LVPWD: 1.1 CM
BH CV ECHO MEAS - MED PEAK E' VEL: 6.2 CM/SEC
BH CV ECHO MEAS - MR MAX PG: 78.1 MMHG
BH CV ECHO MEAS - MR MAX VEL: 442 CM/SEC
BH CV ECHO MEAS - MV DEC SLOPE: 477 CM/SEC2
BH CV ECHO MEAS - MV DEC TIME: 0.15 SEC
BH CV ECHO MEAS - MV E MAX VEL: 96.5 CM/SEC
BH CV ECHO MEAS - MV MAX PG: 4.8 MMHG
BH CV ECHO MEAS - MV MEAN PG: 2 MMHG
BH CV ECHO MEAS - MV P1/2T: 69.4 MSEC
BH CV ECHO MEAS - MV V2 VTI: 27.3 CM
BH CV ECHO MEAS - MVA(P1/2T): 3.2 CM2
BH CV ECHO MEAS - MVA(VTI): 2.07 CM2
BH CV ECHO MEAS - PA ACC TIME: 0.09 SEC
BH CV ECHO MEAS - PA V2 MAX: 118 CM/SEC
BH CV ECHO MEAS - PI END-D VEL: 102 CM/SEC
BH CV ECHO MEAS - PULM A REVS DUR: 0.1 SEC
BH CV ECHO MEAS - PULM A REVS VEL: 20.4 CM/SEC
BH CV ECHO MEAS - PULM DIAS VEL: 61.1 CM/SEC
BH CV ECHO MEAS - PULM S/D: 0.44
BH CV ECHO MEAS - PULM SYS VEL: 27 CM/SEC
BH CV ECHO MEAS - RAP SYSTOLE: 8 MMHG
BH CV ECHO MEAS - RV MAX PG: 3.5 MMHG
BH CV ECHO MEAS - RV V1 MAX: 93.8 CM/SEC
BH CV ECHO MEAS - RV V1 VTI: 18.9 CM
BH CV ECHO MEAS - RVDD: 2.7 CM
BH CV ECHO MEAS - RVSP: 43.8 MMHG
BH CV ECHO MEAS - SI(MOD-SP4): 15.8 ML/M2
BH CV ECHO MEAS - SV(LVOT): 56.4 ML
BH CV ECHO MEAS - SV(MOD-SP4): 24.7 ML
BH CV ECHO MEAS - TAPSE (>1.6): 1.37 CM
BH CV ECHO MEAS - TR MAX PG: 35.8 MMHG
BH CV ECHO MEAS - TR MAX VEL: 299 CM/SEC
BH CV ECHO MEASUREMENTS AVERAGE E/E' RATIO: 13.04
BH CV ECHO SHUNT ASSESSMENT PERFORMED (HIDDEN SCRIPTING): 1
BH CV XLRA - TDI S': 11.7 CM/SEC
BUN SERPL-MCNC: 49 MG/DL (ref 8–23)
BUN/CREAT SERPL: 15.9 (ref 7–25)
CALCIUM SPEC-SCNC: 9.5 MG/DL (ref 8.6–10.5)
CHLORIDE SERPL-SCNC: 100 MMOL/L (ref 98–107)
CO2 SERPL-SCNC: 26 MMOL/L (ref 22–29)
CREAT SERPL-MCNC: 3.08 MG/DL (ref 0.57–1)
DEPRECATED RDW RBC AUTO: 51.3 FL (ref 37–54)
EGFRCR SERPLBLD CKD-EPI 2021: 14.5 ML/MIN/1.73
ERYTHROCYTE [DISTWIDTH] IN BLOOD BY AUTOMATED COUNT: 14.1 % (ref 12.3–15.4)
GLUCOSE BLDC GLUCOMTR-MCNC: 162 MG/DL (ref 70–105)
GLUCOSE SERPL-MCNC: 92 MG/DL (ref 65–99)
HCT VFR BLD AUTO: 37.7 % (ref 34–46.6)
HGB BLD-MCNC: 11.4 G/DL (ref 12–15.9)
LEFT ATRIUM VOLUME INDEX: 35.5 ML/M2
MAGNESIUM SERPL-MCNC: 2.1 MG/DL (ref 1.6–2.4)
MCH RBC QN AUTO: 30.3 PG (ref 26.6–33)
MCHC RBC AUTO-ENTMCNC: 30.2 G/DL (ref 31.5–35.7)
MCV RBC AUTO: 100.3 FL (ref 79–97)
PHOSPHATE SERPL-MCNC: 4.1 MG/DL (ref 2.5–4.5)
PLATELET # BLD AUTO: 70 10*3/MM3 (ref 140–450)
PMV BLD AUTO: 10.3 FL (ref 6–12)
POTASSIUM SERPL-SCNC: 3.7 MMOL/L (ref 3.5–5.2)
RBC # BLD AUTO: 3.76 10*6/MM3 (ref 3.77–5.28)
SINUS: 3.1 CM
SODIUM SERPL-SCNC: 139 MMOL/L (ref 136–145)
TROPONIN T SERPL HS-MCNC: 60 NG/L
WBC NRBC COR # BLD AUTO: 3.71 10*3/MM3 (ref 3.4–10.8)

## 2024-04-09 PROCEDURE — 83735 ASSAY OF MAGNESIUM: CPT | Performed by: NURSE PRACTITIONER

## 2024-04-09 PROCEDURE — 99232 SBSQ HOSP IP/OBS MODERATE 35: CPT | Performed by: INTERNAL MEDICINE

## 2024-04-09 PROCEDURE — 71046 X-RAY EXAM CHEST 2 VIEWS: CPT

## 2024-04-09 PROCEDURE — 80048 BASIC METABOLIC PNL TOTAL CA: CPT | Performed by: NURSE PRACTITIONER

## 2024-04-09 PROCEDURE — 93306 TTE W/DOPPLER COMPLETE: CPT

## 2024-04-09 PROCEDURE — 94799 UNLISTED PULMONARY SVC/PX: CPT

## 2024-04-09 PROCEDURE — 85027 COMPLETE CBC AUTOMATED: CPT | Performed by: NURSE PRACTITIONER

## 2024-04-09 PROCEDURE — 82948 REAGENT STRIP/BLOOD GLUCOSE: CPT

## 2024-04-09 PROCEDURE — 93356 MYOCRD STRAIN IMG SPCKL TRCK: CPT | Performed by: INTERNAL MEDICINE

## 2024-04-09 PROCEDURE — 71250 CT THORAX DX C-: CPT

## 2024-04-09 PROCEDURE — 93356 MYOCRD STRAIN IMG SPCKL TRCK: CPT

## 2024-04-09 PROCEDURE — 93306 TTE W/DOPPLER COMPLETE: CPT | Performed by: INTERNAL MEDICINE

## 2024-04-09 PROCEDURE — 84100 ASSAY OF PHOSPHORUS: CPT | Performed by: NURSE PRACTITIONER

## 2024-04-09 PROCEDURE — 84484 ASSAY OF TROPONIN QUANT: CPT | Performed by: STUDENT IN AN ORGANIZED HEALTH CARE EDUCATION/TRAINING PROGRAM

## 2024-04-09 RX ORDER — BUDESONIDE 0.5 MG/2ML
0.5 INHALANT ORAL
Status: DISCONTINUED | OUTPATIENT
Start: 2024-04-09 | End: 2024-04-12

## 2024-04-09 RX ORDER — IPRATROPIUM BROMIDE AND ALBUTEROL SULFATE 2.5; .5 MG/3ML; MG/3ML
3 SOLUTION RESPIRATORY (INHALATION)
Status: DISCONTINUED | OUTPATIENT
Start: 2024-04-09 | End: 2024-04-12

## 2024-04-09 RX ADMIN — FEBUXOSTAT 40 MG: 40 TABLET, FILM COATED ORAL at 08:36

## 2024-04-09 RX ADMIN — IPRATROPIUM BROMIDE AND ALBUTEROL SULFATE 3 ML: .5; 3 SOLUTION RESPIRATORY (INHALATION) at 20:30

## 2024-04-09 RX ADMIN — BUDESONIDE 0.5 MG: 0.5 INHALANT RESPIRATORY (INHALATION) at 20:34

## 2024-04-09 RX ADMIN — ATORVASTATIN CALCIUM 10 MG: 10 TABLET, FILM COATED ORAL at 21:48

## 2024-04-09 RX ADMIN — Medication 10 ML: at 21:50

## 2024-04-09 RX ADMIN — Medication 10 ML: at 08:41

## 2024-04-09 NOTE — PROGRESS NOTES
[facility] MEDICINE SERVICE  DAILY PROGRESS NOTE    NAME: Deann Warren  : 1940  MRN: [id number]      LOS: 0 days     PROVIDER OF SERVICE: Mary Ellen Martinez MD    Chief Complaint: Syncope and collapse    Subjective:     Interval History:  History taken from: patient  Patient seen and examined.  No acute events overnight.  Stated she had a brief syncopal episode this morning while laying in bed.  States that she feels good however after she woke up.  Denies any chest pain or shortness of breath.  Vital signs reviewed and stable.    Review of Systems:   Review of Systems  As above      Patient had echocardiogram done with possible ASD that requires YINA for further evaluation  Patient already followed up per cardiology  Pleural effusion is noted  Will get chest x-ray repeated and pulmonary consult for pleural effusion  effusion to evaluate if she needs thoracentesis  Patient had multiple syncopes as an outpatient has been follow-up with Dr. Figueroa    Patient need Holter monitor as an outpatient when discharged  Objective:     Vital Signs  Temp:  [97.8 °F (36.6 °C)-99.1 °F (37.3 °C)] 97.8 °F (36.6 °C)  Heart Rate:  [69-77] 69  Resp:  [16-20] 20  BP: (122-136)/(51-64) 131/58  Flow (L/min):  [2-3] 2   Body mass index is 20.25 kg/m².    Physical Exam  Physical Exam  General Appearance: AOO x 4, cooperative, no distress, appropriate for age  Head:  Normocephalic, without obvious abnormality  Eyes:  PERRL, EOM's intact, conjunctivae and cornea clear  Nose:  Nares symmetrical, septum midline, mucosa pink  Throat:  Lips, tongue, and mucosa are moist, pink, and intact  Neck:  Supple; symmetrical, trachea midline, no adenopathy  Back:  Symmetrical, ROM normal, no CVA tenderness  Lungs: Respirations unlabored, no audible wheeze  Heart: Regular rate & rhythm, S1 and S2 normal  Abdomen:  Soft, nontender, bowel sounds active all four quadrants  Musculoskeletal: Tone and strength strong and symmetrical, all  extremities; no joint pain or edema         Skin/Hair/Nails:  Skin warm, dry and intact, no rashes or abnormal dyspigmentation     Scheduled Meds   [Held by provider] apixaban, 2.5 mg, Oral, BID  atorvastatin, 10 mg, Oral, Nightly  febuxostat, 40 mg, Oral, Daily  sodium chloride, 10 mL, Intravenous, Q12H       PRN Meds     senna-docusate sodium **AND** polyethylene glycol **AND** bisacodyl **AND** bisacodyl    ondansetron    sodium chloride    [COMPLETED] Insert Peripheral IV **AND** sodium chloride    sodium chloride    sodium chloride   Infusions         Diagnostic Data    Results from last 7 days   Lab Units 04/09/24  1044 04/08/24  0446   WBC 10*3/mm3 3.71  --    HEMOGLOBIN g/dL 11.4*  --    HEMATOCRIT % 37.7  --    PLATELETS 10*3/mm3 70*  --    GLUCOSE mg/dL  --  101*   CREATININE mg/dL  --  2.89*   BUN mg/dL  --  35*   SODIUM mmol/L  --  134*   POTASSIUM mmol/L  --  3.6   ANION GAP mmol/L  --  12.0       No radiology results for the last day      I reviewed the patient's new clinical results.    Assessment/Plan:     Active and Resolved Problems  Active Hospital Problems    Diagnosis  POA    **Syncope and collapse [R55]  Yes    Hyperlipidemia [E78.5]  Yes    Type 2 diabetes mellitus with diabetic chronic kidney disease [E11.22]  Yes    Paroxysmal atrial fibrillation [I48.0]  Yes    ESRD (end stage renal disease) [N18.6]  Yes    Anemia due to chronic kidney disease, on chronic dialysis [N18.6, D63.1, Z99.2]  Not Applicable    Essential hypertension [I10]  Yes      Resolved Hospital Problems   No resolved problems to display.     Syncope and collapse  Patient has had recurrent episodes of syncope in the past  Echocardiogram pending  Carotid Dopplers unremarkable  Consult cardiology     Elevated BNP  BNP 51,559  Echocardiogram pending  Urology consulted     End-stage renal disease  Dialysis Monday Wednesday Friday  Urology on board  Repeat BMP in the a.m     Thrombocytopenia  Platelets 63  Consult oncology  Repeat  CBC    Atrial fibrillation  Rate controlled  Hold Eliquis due to thrombocytopenia     Hyperlipidemia  Resume statin  Lipid panel within normal limits      DVT prophylaxis:  Medical DVT prophylaxis orders are present.         Code status is   Code Status and Medical Interventions:   Ordered at: 04/07/24 1420     Code Status (Patient has no pulse and is not breathing):    CPR (Attempt to Resuscitate)     Medical Interventions (Patient has pulse or is breathing):    Full Support       Plan for disposition:Home in 1-2  days    Time: 30 minutes    Signature: Electronically signed by Mary Ellen Martinez MD, 04/09/24, 12:37 EDT.  [facility] Hospitalist Team

## 2024-04-09 NOTE — PROGRESS NOTES
"                                                                                                                                      Nephrology  Progress Note                                        [facility]    Patient Identification    Name: Deann Warren  Age: 83 y.o.  Sex: female  :  1940  MRN: [id number]      DATE OF SERVICE:  2024        Subective    No new c/o  Objective   Scheduled Meds:[Held by provider] apixaban, 2.5 mg, Oral, BID  atorvastatin, 10 mg, Oral, Nightly  febuxostat, 40 mg, Oral, Daily  sodium chloride, 10 mL, Intravenous, Q12H          Continuous Infusions:     PRN Meds:  senna-docusate sodium **AND** polyethylene glycol **AND** bisacodyl **AND** bisacodyl    ondansetron    sodium chloride    [COMPLETED] Insert Peripheral IV **AND** sodium chloride    sodium chloride    sodium chloride     Exam:  /60 (BP Location: Left arm, Patient Position: Lying)   Pulse 79   Temp 98.7 °F (37.1 °C) (Oral)   Resp 16   Ht 162.6 cm (64\")   Wt 53.5 kg (118 lb)   SpO2 96%   BMI 20.25 kg/m²     Intake/Output last 3 shifts:  I/O last 3 completed shifts:  In: 360 [P.O.:360]  Out: -     Intake/Output this shift:  No intake/output data recorded.    Physical exam:  General Appearance:  Alert  Head:  Normocephalic, without obvious abnormality, atraumatic  Eyes:  PERRL, conjunctiva/corneas clear     Neck:  Supple,  no adenopathy;      Lungs:  Decreased BS occasion ronchi  Heart:  Regular rate and rhythm, S1 and S2 normal  Abdomen:  Soft, non-tender, bowel sounds active   Extremities: trace edema  Pulses: 2+ and symmetric all extremities  Skin:  No rashes or lesions       Data Review:  All labs (24hrs):   Recent Results (from the past 24 hour(s))   POC Glucose Once    Collection Time: 24  9:15 AM    Specimen: Blood   Result Value Ref Range    Glucose 81 70 - 105 mg/dL          Imaging:  XR Chest 1 View    Result Date: 2024  Impression: Stable chronic findings without " acute process. Electronically Signed: German Reece MD  4/7/2024 10:57 AM EDT  Workstation ID: [id number]     Assessment/Plan:     Syncope and collapse    Type 2 diabetes mellitus with diabetic chronic kidney disease    Paroxysmal atrial fibrillation    Essential hypertension    ESRD (end stage renal disease)    Anemia due to chronic kidney disease, on chronic dialysis    Hyperlipidemia            ESRD hemodialysis dependent   Syncope  Elevated BNP   Atrial fibrillation   Hyperlipidemia         Recommendations:   Needs formal cardiac workup - in progress for her syncopal episode   No need for dialysis yet   Will re-evaluate tomorrow

## 2024-04-09 NOTE — PLAN OF CARE
Goal Outcome Evaluation:         Patient has had no complaints this shift, call light in reach.

## 2024-04-09 NOTE — PLAN OF CARE
Goal Outcome Evaluation:         Patient A&O Had ECHO today. Hema/onc consulted. Call light and bedside table within reach.

## 2024-04-09 NOTE — PROGRESS NOTES
Referring Provider: Mary Ellen Martinez MD    Reason for follow-up syncope     Patient Care Team:  Antonino Shen MD as PCP - Family Medicine  FigueroaKamran chapa MD as Consulting Physician (Cardiology)      SUBJECTIVE      No further episodes of syncope.    Results for orders placed during the hospital encounter of 04/07/24    Adult Transthoracic Echo Complete W/ Cont if Necessary Per Protocol    Interpretation Summary    Left ventricular systolic function is moderately decreased. Calculated left ventricular EF = 35%    The left ventricular cavity is mildly dilated.    The following left ventricular wall segments are hypokinetic: apical anterior.    Left ventricular diastolic function was indeterminate.    The left atrial cavity is moderately dilated.    Saline test results are positive with valsalva manuever.    Estimated right ventricular systolic pressure from tricuspid regurgitation is mildly elevated (35-45 mmHg).    There is a large left pleural effusion.    There appears to be an ASD present with left to right shunting based on color dopple. Consider YINA to further evaluate      ROS  Review of all systems negative except as indicated.    Since I have last seen, the patient has been without any chest discomfort, shortness of breath, palpitations, dizziness or syncope.  Denies having any headache, abdominal pain, nausea, vomiting, diarrhea, constipation, loss of weight or loss of appetite.  Denies having any excessive bruising, hematuria or blood in the stool.  ROS      Personal History:    Past Medical History:   Diagnosis Date    Allergic conjunctivitis and rhinitis 11/06/2014    Anemia due to chronic kidney disease, on chronic dialysis 08/25/2020    Anxiety 07/30/2012    Chronic gouty arthritis 11/06/2014    Dependence on renal dialysis 07/01/2022    ESRD (end stage renal disease) 08/25/2020    Essential hypertension 09/16/2015    History of shingles 01/18/2022    Hyperlipidemia 04/08/2024    Paroxysmal atrial  fibrillation 07/01/2022    Type 2 diabetes mellitus with diabetic chronic kidney disease 07/01/2022    Vitamin D deficiency 07/22/2021       Past Surgical History:   Procedure Laterality Date    ARTERIOVENOUS FISTULA Left        Family History   Family history unknown: Yes       Social History     Tobacco Use    Smoking status: Never    Smokeless tobacco: Never   Vaping Use    Vaping status: Never Used   Substance Use Topics    Alcohol use: Never    Drug use: Never        Home meds:  Prior to Admission medications    Medication Sig Start Date End Date Taking? Authorizing Provider   apixaban (ELIQUIS) 2.5 MG tablet tablet Take 1 tablet by mouth 2 (Two) Times a Day.   Yes Katerin Bradshaw MD B Complex-C-Folic Acid (EM-MARIA ESTHER PO) Take 1 tablet by mouth Daily.   Yes Katerin Bradshaw MD   febuxostat (ULORIC) 40 MG tablet Take 1 tablet by mouth Daily.   Yes Katerin Bradshaw MD   simvastatin (ZOCOR) 20 MG tablet Take 1 tablet by mouth Every Night.   Yes Katerin Bradshaw MD   acetaminophen (TYLENOL) 500 MG tablet Take 1 tablet by mouth Every 6 (Six) Hours As Needed for Mild Pain.    ProviderKaterin MD   NON FORMULARY Lidocaine 2.5% ointment -  Apply 1 application Monday, Wednesday, Friday before dialysis    ProviderKaterin MD       Allergies:  Patient has no known allergies.    Scheduled Meds:[Held by provider] apixaban, 2.5 mg, Oral, BID  atorvastatin, 10 mg, Oral, Nightly  budesonide, 0.5 mg, Nebulization, BID - RT  febuxostat, 40 mg, Oral, Daily  ipratropium-albuterol, 3 mL, Nebulization, 4x Daily - RT  sodium chloride, 10 mL, Intravenous, Q12H      Continuous Infusions:   PRN Meds:.  senna-docusate sodium **AND** polyethylene glycol **AND** bisacodyl **AND** bisacodyl    ondansetron    sodium chloride    [COMPLETED] Insert Peripheral IV **AND** sodium chloride    sodium chloride    sodium chloride      OBJECTIVE    Vital Signs  Vitals:    04/09/24 0406 04/09/24 0821 04/09/24 1158  "04/09/24 1540   BP: 136/60 127/62 131/58 124/55   BP Location: Left arm Right arm Right arm Right arm   Patient Position: Lying Lying Lying Lying   Pulse:  77 69 61   Resp: 16 16 20 23   Temp: 98.7 °F (37.1 °C) 98.1 °F (36.7 °C) 97.8 °F (36.6 °C) 97.9 °F (36.6 °C)   TempSrc: Oral Oral Oral Oral   SpO2:  96% 97% 97%   Weight:  53.5 kg (118 lb)     Height:  162.6 cm (64\")         Flowsheet Rows      Flowsheet Row First Filed Value   Admission Height 162.6 cm (64\") Documented at 04/07/2024 0953   Admission Weight 53.5 kg (118 lb) Documented at 04/07/2024 0953              Intake/Output Summary (Last 24 hours) at 4/9/2024 1736  Last data filed at 4/9/2024 1540  Gross per 24 hour   Intake 760 ml   Output --   Net 760 ml          Telemetry: Normal sinus rhythm    Physical Exam:  The patient is alert, oriented and in no distress.  Vital signs as noted above.  Head and neck revealed no carotid bruits or jugular venous distention.  No thyromegaly or lymphadenopathy is present  Lungs clear.  No wheezing.  Breath sounds are normal bilaterally.  Heart normal first and second heart sounds.  No murmur. No precordial rub is present.  No gallop is present.  Abdomen soft and nontender.  No organomegaly is present.  Extremities with good peripheral pulses without any pedal edema.  Skin warm and dry.  Musculoskeletal system is grossly normal.  CNS grossly normal.       Results Review:  I have personally reviewed the results from the time of this admission to 4/9/2024 17:36 EDT and agree with these findings:  []  Laboratory  []  Microbiology  []  Radiology  []  EKG/Telemetry   []  Cardiology/Vascular   []  Pathology  []  Old records  []  Other:    Most notable findings include:    Lab Results (last 24 hours)       Procedure Component Value Units Date/Time    Basic Metabolic Panel [[id number]]  (Abnormal) Collected: 04/09/24 1157    Specimen: Blood from Arm, Right Updated: 04/09/24 1304     Glucose 92 mg/dL      BUN 49 mg/dL      " Creatinine 3.08 mg/dL      Sodium 139 mmol/L      Potassium 3.7 mmol/L      Chloride 100 mmol/L      CO2 26.0 mmol/L      Calcium 9.5 mg/dL      BUN/Creatinine Ratio 15.9     Anion Gap 13.0 mmol/L      eGFR 14.5 mL/min/1.73      Comment: <15 Indicative of kidney failure       Narrative:      GFR Normal >60  Chronic Kidney Disease <60  Kidney Failure <15    The GFR formula is only valid for adults with stable renal function between ages 18 and 70.    Magnesium [[id number]]  (Normal) Collected: 04/09/24 1157    Specimen: Blood from Arm, Right Updated: 04/09/24 1304     Magnesium 2.1 mg/dL     Phosphorus [[id number]]  (Normal) Collected: 04/09/24 1044    Specimen: Blood from Arm, Right Updated: 04/09/24 1138     Phosphorus 4.1 mg/dL     CBC (No Diff) [[id number]]  (Abnormal) Collected: 04/09/24 1044    Specimen: Blood from Arm, Right Updated: 04/09/24 1114     WBC 3.71 10*3/mm3      RBC 3.76 10*6/mm3      Hemoglobin 11.4 g/dL      Hematocrit 37.7 %      .3 fL      MCH 30.3 pg      MCHC 30.2 g/dL      RDW 14.1 %      RDW-SD 51.3 fl      MPV 10.3 fL      Platelets 70 10*3/mm3             Imaging Results (Last 24 Hours)       Procedure Component Value Units Date/Time    CT Chest Without Contrast Diagnostic [[id number]] Collected: 04/09/24 1604     Updated: 04/09/24 1610    Narrative:      CT CHEST WO CONTRAST DIAGNOSTIC    Date of Exam: 4/9/2024 4:02 PM EDT    Indication: sob.    Comparison: None available.    Technique: Axial CT images were obtained of the chest without contrast administration.  Sagittal and coronal reconstructions were performed.  Automated exposure control and iterative reconstruction methods were used.      Findings:  The right thyroid lobe is enlarged and extends behind the right clavicle. There are no enlarged mediastinal nodes. Central pulmonary arteries are prominent. The ascending thoracic aorta measures 4 cm transversely and the descending measures 3 cm   transversely. There are extensive  coronary calcifications. There is moderately severe cardiomegaly. There are small bilateral pleural effusions, greatest on the left. There is mild atelectasis of the lung bases, greatest on the left. There is no definite   pulmonary edema. Some increased interstitial markings of the bilateral lung fields suggest chronic lung disease.      Impression:      Impression:  Bilateral effusions with bibasilar atelectasis, greatest on the left. Moderately severe cardiomegaly without pulmonary edema. Evidence of chronic underlying lung disease.      Electronically Signed: Meghan Gregg MD    4/9/2024 4:08 PM EDT    Workstation ID: [id number]    XR Chest PA & Lateral [[id number]] Collected: 04/09/24 1403     Updated: 04/09/24 1409    Narrative:      XR CHEST PA AND LATERAL    Date of Exam: 4/9/2024 1:37 PM EDT    Indication: pleural effusion    Comparison: 4/7/2024    Findings:  Cardiomegaly is stable. Pulmonary vessels are within normal limits. There is tissue markings are prominent suggesting interstitial edema. There is a small left pleural effusion. No focal airspace consolidation. No right pleural effusion or pneumothorax.   Bony structures are unremarkable.      Impression:      Impression:    1. Stable cardiomegaly.  2. Prominent interstitial markings compatible with interstitial edema.  3. Stable small left pleural effusion      Electronically Signed: Arie Nathan MD    4/9/2024 2:05 PM EDT    Workstation ID: [id number]            LAB RESULTS (LAST 7 DAYS)    CBC  Results from last 7 days   Lab Units 04/09/24  1044 04/07/24  1012   WBC 10*3/mm3 3.71 2.91*   RBC 10*6/mm3 3.76* 3.70*   HEMOGLOBIN g/dL 11.4* 11.2*   HEMATOCRIT % 37.7 36.7   MCV fL 100.3* 99.2*   PLATELETS 10*3/mm3 70* 63*       BMP  Results from last 7 days   Lab Units 04/09/24  1157 04/09/24  1044 04/08/24  0446 04/07/24  1118 04/07/24  1012   SODIUM mmol/L 139  --  134* 138  --    POTASSIUM mmol/L 3.7  --  3.6 3.5  --    CHLORIDE mmol/L 100  --  99 97*   --    CO2 mmol/L 26.0  --  23.0 28.0  --    BUN mg/dL 49*  --  35* 28*  --    CREATININE mg/dL 3.08*  --  2.89* 2.64*  --    GLUCOSE mg/dL 92  --  101* 81  --    MAGNESIUM mg/dL 2.1  --   --   --  2.0   PHOSPHORUS mg/dL  --  4.1  --   --   --        CMP   Results from last 7 days   Lab Units 04/09/24  1157 04/08/24  0446 04/07/24  1118   SODIUM mmol/L 139 134* 138   POTASSIUM mmol/L 3.7 3.6 3.5   CHLORIDE mmol/L 100 99 97*   CO2 mmol/L 26.0 23.0 28.0   BUN mg/dL 49* 35* 28*   CREATININE mg/dL 3.08* 2.89* 2.64*   GLUCOSE mg/dL 92 101* 81       BNP        TROPONIN  Results from last 7 days   Lab Units 04/07/24  1316   HSTROP T ng/L 67*       CoAg        Creatinine Clearance  Estimated Creatinine Clearance: 11.7 mL/min (A) (by C-G formula based on SCr of 3.08 mg/dL (H)).    ABG        Radiology  CT Chest Without Contrast Diagnostic    Result Date: 4/9/2024  Impression: Bilateral effusions with bibasilar atelectasis, greatest on the left. Moderately severe cardiomegaly without pulmonary edema. Evidence of chronic underlying lung disease. Electronically Signed: Meghan Gregg MD  4/9/2024 4:08 PM EDT  Workstation ID: [id number]    XR Chest PA & Lateral    Result Date: 4/9/2024  Impression: 1. Stable cardiomegaly. 2. Prominent interstitial markings compatible with interstitial edema. 3. Stable small left pleural effusion Electronically Signed: Arie Nathan MD  4/9/2024 2:05 PM EDT  Workstation ID: [id number]       EKG  I personally viewed and interpreted the patient's EKG/Telemetry data:  ECG 12 Lead Syncope   Final Result   HEART RATE= 83  bpm   RR Interval= 720  ms   LA Interval=   ms   P Horizontal Axis=   deg   P Front Axis=   deg   QRSD Interval= 130  ms   QT Interval= 421  ms   QTcB= 496  ms   QRS Axis= -79  deg   T Wave Axis= 102  deg   - ABNORMAL ECG -   Atrial fibrillation   IVCD, consider LBBB   Probable anterolateral infarct, age indeterm   No previous ECG available for comparison   Electronically Signed By:  Harvinder Garcia ([facility]) 08-Apr-2024 06:11:35   Date and Time of Study: 2024-04-07 10:07:46      Telemetry Scan   Final Result      Telemetry Scan   Final Result      Telemetry Scan   Final Result            Echocardiogram:    Results for orders placed during the hospital encounter of 04/07/24    Adult Transthoracic Echo Complete W/ Cont if Necessary Per Protocol    Interpretation Summary    Left ventricular systolic function is moderately decreased. Calculated left ventricular EF = 35%    The left ventricular cavity is mildly dilated.    The following left ventricular wall segments are hypokinetic: apical anterior.    Left ventricular diastolic function was indeterminate.    The left atrial cavity is moderately dilated.    Saline test results are positive with valsalva manuever.    Estimated right ventricular systolic pressure from tricuspid regurgitation is mildly elevated (35-45 mmHg).    There is a large left pleural effusion.    There appears to be an ASD present with left to right shunting based on color dopple. Consider YINA to further evaluate        Stress Test:         Cardiac Catheterization:  No results found for this or any previous visit.         Other:         ASSESSMENT & PLAN:    Principal Problem:    Syncope and collapse  Active Problems:    Type 2 diabetes mellitus with diabetic chronic kidney disease    Paroxysmal atrial fibrillation    Essential hypertension    ESRD (end stage renal disease)    Anemia due to chronic kidney disease, on chronic dialysis    Hyperlipidemia    Syncope  Recurrent syncope with previous negative workup  No evidence of valvular heart disease that could explain her syncope on echocardiogram  Holter monitor on discharge  She may benefit from midodrine on dialysis days to prevent drops in blood pressure     ASD: Possible  This was an incidental finding on her echocardiogram  Given her advanced age I do not recommend further workup with a YINA at this time because I do not think  that closure would be indicated in this case    Cardiomyopathy  She did have a drop in her EF with reported normal echo at previous cardiologist  Also noted to have anteroapical wall motion abnormalities  Will start GDMT as tolerated  Will need ischemic evaluation and will decide timing on this whether it will be outpatient or inpatient as this is not urgent at this time    Thrombocytopenia  New onset  Being followed by hematology  No evidence of bleeding but is on Eliquis    Pleural effusion  Noted on echocardiogram  Pulmonology has been consulted    End-stage renal disease  Currently on dialysis  Nephrology is following     Atrial fibrillation  Currently on low-dose Eliquis  Has had bleeding issues with dialysis  She would benefit from Watchman and will discuss this outpatient with her     Hyperlipidemia  Continue with atorvastatin     Labile hypertension  Hold home antihypertensive      Dilcia Lui MD  04/09/24  17:36 EDT

## 2024-04-09 NOTE — CONSULTS
PULMONARY CRITICAL CARE CONSULT NOTE      PATIENT IDENTIFICATION:  Name: Deann Warren  MRN: [id number]  :  1940     Age: 83 y.o.  Sex: female        DATE OF CONSULTATION:  2024  PRIMARY CARE PHYSICIAN    No primary care provider on file.                  CHIEF COMPLAINT: pleural effusion     History of Present Illness:   Deann Warren is a 83 y.o. female with a PMH of hyperlipidemia, atrial fibrillation, end-stage renal disease on dialysis who presented to [facility] on 2024 with complaints of recurrent syncope.  Per patient, she has had history of multiple syncopal episodes especially during dialysis.  She said that she is followed by her cardiologist, Dr. Figueroa but nothing has been found as a source of her recurrent syncope.She always knows when her episodes are about to come up and would go lay down.  Sometimes laying down helps with the syncopal episodes. She stated that this morning when she woke up she had a syncopal episode twice. She stated that she passes out at least 2-3 times in the month.  She is tired of going to [facility] as they have not found the cause of her syncopal episode so she came here today.   Patient endorsed sweatiness and shakiness during this episode.  She also had an episode of nausea and vomiting this morning.  She denied any fever, chills, chest pain, palpitation or abdominal pain.  Per CXR patient noted with left pleural effusion and our service was asked to see.       Review of Systems:   Constitutional: As above   Eyes: negative   ENT/oropharynx: negative   Cardiovascular: As above   Respiratory: negative   Gastrointestinal: negative   Genitourinary: As above   Neurological: negative   Musculoskeletal: negative   Integument/breast: negative   Endocrine: negative   Allergic/Immunologic: negative     Past Medical History:  Past Medical History:   Diagnosis Date    Allergic conjunctivitis and rhinitis 2014    Anemia due to chronic kidney disease, on  chronic dialysis 2020    Anxiety 2012    Chronic gouty arthritis 2014    Dependence on renal dialysis 2022    ESRD (end stage renal disease) 2020    Essential hypertension 2015    History of shingles 2022    Hyperlipidemia 2024    Paroxysmal atrial fibrillation 2022    Type 2 diabetes mellitus with diabetic chronic kidney disease 2022    Vitamin D deficiency 2021       Past Surgical History:  Past Surgical History:   Procedure Laterality Date    ARTERIOVENOUS FISTULA Left         Family History:  Family History   Family history unknown: Yes        Social History:   Social History     Tobacco Use    Smoking status: Never    Smokeless tobacco: Never   Substance Use Topics    Alcohol use: Never        Allergies:  No Known Allergies    Home Meds:  Medications Prior to Admission   Medication Sig Dispense Refill Last Dose    apixaban (ELIQUIS) 2.5 MG tablet tablet Take 1 tablet by mouth 2 (Two) Times a Day.   2024    B Complex-C-Folic Acid (EM-MARIA ESTHER PO) Take 1 tablet by mouth Daily.   2024    febuxostat (ULORIC) 40 MG tablet Take 1 tablet by mouth Daily.   2024    simvastatin (ZOCOR) 20 MG tablet Take 1 tablet by mouth Every Night.   2024    acetaminophen (TYLENOL) 500 MG tablet Take 1 tablet by mouth Every 6 (Six) Hours As Needed for Mild Pain.       NON FORMULARY Lidocaine 2.5% ointment -  Apply 1 application Monday, Wednesday, Friday before dialysis          Objective:  tMax 24 hrs: Temp (24hrs), Av.4 °F (36.9 °C), Min:97.8 °F (36.6 °C), Max:99.1 °F (37.3 °C)      Vitals Ranges:   Temp:  [97.8 °F (36.6 °C)-99.1 °F (37.3 °C)] 97.8 °F (36.6 °C)  Heart Rate:  [69-77] 69  Resp:  [16-20] 20  BP: (122-136)/(51-64) 131/58    Intake and Output Last 3 Shifts:   I/O last 3 completed shifts:  In: 360 [P.O.:360]  Out: -     Exam:  /58 (BP Location: Right arm, Patient Position: Lying)   Pulse 69   Temp 97.8 °F (36.6 °C) (Oral)   Resp 20   " Ht 162.6 cm (64\")   Wt 53.5 kg (118 lb)   SpO2 97%   BMI 20.25 kg/m²       04/07/24  0953 04/09/24  0821   Weight: 53.5 kg (118 lb) 53.5 kg (118 lb)     General Appearance:      HEENT:  Normocephalic, without obvious abnormality, atraumatic. Conjunctivae/corneas clear.  Normal external ear canals. Nares normal, no drainage.  Neck:  Supple, symmetrical, trachea midline. No JVD.  Lungs /Chest wall:   Bilateral basal rhonchi, respirations unlabored, symmetrical wall movement.     Heart:  Regular rate and rhythm, systolic murmur PMI left sternal border  Abdomen: Soft, nontender, no masses, no organomegaly.    Extremities: Trace edema, no clubbing or cyanosis        Data Review:  All labs (24hrs):   Recent Results (from the past 24 hour(s))   Adult Transthoracic Echo Complete W/ Cont if Necessary Per Protocol    Collection Time: 04/09/24  9:35 AM   Result Value Ref Range    BH CV ECHO SHUNT ASSESSMENT PERFORMED (HIDDEN SCRIPTING) 1     LV GLOBAL STRAIN  -7.8 %    LVIDd 5.1 cm    LVIDs 4.0 cm    IVSd 1.10 cm    LVPWd 1.10 cm    FS 21.6 %    IVS/LVPW 1.00 cm    ESV(cubed) 64.0 ml    LV Sys Vol (BSA corrected) 28.9 cm2    EDV(cubed) 132.7 ml    LV [place] Vol (BSA corrected) 44.7 cm2    LV mass(C)d 213.9 grams    LVOT area 2.8 cm2    LVOT diam 1.90 cm    EDV(MOD-sp4) 69.8 ml    ESV(MOD-sp4) 45.1 ml    SV(MOD-sp4) 24.7 ml    SI(MOD-sp4) 15.8 ml/m2    EF(MOD-sp4) 35.4 %    MV E max felipe 96.5 cm/sec    MV dec time 0.15 sec    Pulm A Revs Dur 0.10 sec    LA ESV Index (BP) 35.5 ml/m2    Med Peak E' Felipe 6.2 cm/sec    Lat Peak E' Felipe 8.6 cm/sec    TR max felipe 299.0 cm/sec    Avg E/e' ratio 13.04     SV(LVOT) 56.4 ml    RVIDd 2.7 cm    TAPSE (>1.6) 1.37 cm    RV S' 11.7 cm/sec    LA dimension (2D)  4.0 cm    Pulm Sys Felipe 27.0 cm/sec    Pulm [place] Felipe 61.1 cm/sec    Pulm S/D 0.44     Pulm A Revs Felipe 20.4 cm/sec    LV V1 max 83.9 cm/sec    LV V1 max PG 2.8 mmHg    LV V1 mean PG 1.00 mmHg    LV V1 VTI 19.9 cm    Ao pk felipe 171.0 cm/sec "    Ao max PG 11.7 mmHg    Ao mean PG 7.0 mmHg    Ao V2 VTI 32.4 cm    EFRAIN(I,D) 1.74 cm2    AI P1/2t 268.3 msec    MV max PG 4.8 mmHg    MV mean PG 2.00 mmHg    MV V2 VTI 27.3 cm    MV P1/2t 69.4 msec    MVA(P1/2t) 3.2 cm2    MVA(VTI) 2.07 cm2    MV dec slope 477.0 cm/sec2    MR max marva 442.0 cm/sec    MR max PG 78.1 mmHg    TR max PG 35.8 mmHg    RVSP(TR) 43.8 mmHg    RAP systole 8.0 mmHg    RV V1 max PG 3.5 mmHg    RV V1 max 93.8 cm/sec    RV V1 VTI 18.9 cm    PA V2 max 118.0 cm/sec    PA acc time 0.09 sec    PI end-d marva 102.0 cm/sec    ACS 1.30 cm    Sinus 3.1 cm    EF(MOD-bp) 35.0 %    Dimensionless Index 0.49 (DI)   CBC (No Diff)    Collection Time: 04/09/24 10:44 AM    Specimen: Arm, Right; Blood   Result Value Ref Range    WBC 3.71 3.40 - 10.80 10*3/mm3    RBC 3.76 (L) 3.77 - 5.28 10*6/mm3    Hemoglobin 11.4 (L) 12.0 - 15.9 g/dL    Hematocrit 37.7 34.0 - 46.6 %    .3 (H) 79.0 - 97.0 fL    MCH 30.3 26.6 - 33.0 pg    MCHC 30.2 (L) 31.5 - 35.7 g/dL    RDW 14.1 12.3 - 15.4 %    RDW-SD 51.3 37.0 - 54.0 fl    MPV 10.3 6.0 - 12.0 fL    Platelets 70 (L) 140 - 450 10*3/mm3   Phosphorus    Collection Time: 04/09/24 10:44 AM    Specimen: Arm, Right; Blood   Result Value Ref Range    Phosphorus 4.1 2.5 - 4.5 mg/dL   Basic Metabolic Panel    Collection Time: 04/09/24 11:57 AM    Specimen: Arm, Right; Blood   Result Value Ref Range    Glucose 92 65 - 99 mg/dL    BUN 49 (H) 8 - 23 mg/dL    Creatinine 3.08 (H) 0.57 - 1.00 mg/dL    Sodium 139 136 - 145 mmol/L    Potassium 3.7 3.5 - 5.2 mmol/L    Chloride 100 98 - 107 mmol/L    CO2 26.0 22.0 - 29.0 mmol/L    Calcium 9.5 8.6 - 10.5 mg/dL    BUN/Creatinine Ratio 15.9 7.0 - 25.0    Anion Gap 13.0 5.0 - 15.0 mmol/L    eGFR 14.5 (L) >60.0 mL/min/1.73   Magnesium    Collection Time: 04/09/24 11:57 AM    Specimen: Arm, Right; Blood   Result Value Ref Range    Magnesium 2.1 1.6 - 2.4 mg/dL        Imaging:  Adult Transthoracic Echo Complete W/ Cont if Necessary Per  Protocol    Result Date: 4/9/2024    Left ventricular systolic function is moderately decreased. Calculated left ventricular EF = 35%   The left ventricular cavity is mildly dilated.   The following left ventricular wall segments are hypokinetic: apical anterior.   Left ventricular diastolic function was indeterminate.   The left atrial cavity is moderately dilated.   Saline test results are positive with valsalva manuever.   Estimated right ventricular systolic pressure from tricuspid regurgitation is mildly elevated (35-45 mmHg).   There is a large left pleural effusion.   There appears to be an ASD present with left to right shunting based on color dopple. Consider YINA to further evaluate     Duplex Carotid Ultrasound CAR    Result Date: 4/7/2024    Right internal carotid artery demonstrates a less than 50% stenosis.   Left internal carotid artery demonstrates a less than 50% stenosis.     XR Chest 1 View    Result Date: 4/7/2024  XR CHEST 1 VW Date of Exam: 4/7/2024 10:40 AM EDT Indication: Syncope Comparison: 3/8/2024 Findings: Heart is enlarged, stable from prior study. There is left basilar retrocardiac airspace disease which may relate to chronic scarring or atelectasis, unchanged from prior exam. Calcified granuloma overlies the right lung base. Negative for pneumothorax. Aortic arch atherosclerosis. Osseous structures grossly intact.     Impression: Stable chronic findings without acute process. Electronically Signed: German Reece MD  4/7/2024 10:57 AM EDT  Workstation ID: [id number]       Current:  [Held by provider] apixaban, 2.5 mg, Oral, BID  atorvastatin, 10 mg, Oral, Nightly  febuxostat, 40 mg, Oral, Daily  sodium chloride, 10 mL, Intravenous, Q12H        Infusion:        PRN:    senna-docusate sodium **AND** polyethylene glycol **AND** bisacodyl **AND** bisacodyl    ondansetron    sodium chloride    [COMPLETED] Insert Peripheral IV **AND** sodium chloride    sodium chloride    sodium  chloride    ASSESSMENT:  Left pleural effusion   Syncope  Congestive heart failure EF 35%  A-fib  HTN  ESRD on HD  Anemia  Hyperlipidemia           PLAN:  Get CT chest without contrast   Chest x-ray suggestive of underlying emphysematous changes  Need to watch JUMANA's and diuretics per renal  Antibiotics  Bronchodilators  Inhaled corticosteroids  Incentive spirometer  Electrolytes/ glycemic control  DVT and GI prophylaxis.    Discussed with Dr Stephanie Christianson, APRN  4/9/2024  13:45 EDT      I personally have examined  and interviewed the patient. I have reviewed the history, data, problems, assessment and plan with our NP.  Total Critical care time in direct medical management (   ) minutes, This time specifically excludes time spent performing procedures.    Alma Brown MD   4/9/2024  14:53 EDT

## 2024-04-10 LAB
ANION GAP SERPL CALCULATED.3IONS-SCNC: 12 MMOL/L (ref 5–15)
BASOPHILS # BLD AUTO: 0.01 10*3/MM3 (ref 0–0.2)
BASOPHILS NFR BLD AUTO: 0.3 % (ref 0–1.5)
BUN SERPL-MCNC: 56 MG/DL (ref 8–23)
BUN/CREAT SERPL: 16.1 (ref 7–25)
CALCIUM SPEC-SCNC: 8.7 MG/DL (ref 8.6–10.5)
CHLORIDE SERPL-SCNC: 103 MMOL/L (ref 98–107)
CO2 SERPL-SCNC: 24 MMOL/L (ref 22–29)
CREAT SERPL-MCNC: 3.48 MG/DL (ref 0.57–1)
DEPRECATED RDW RBC AUTO: 51 FL (ref 37–54)
DEPRECATED RDW RBC AUTO: 51.7 FL (ref 37–54)
EGFRCR SERPLBLD CKD-EPI 2021: 12.5 ML/MIN/1.73
ELLIPTOCYTES BLD QL SMEAR: NORMAL
EOSINOPHIL # BLD AUTO: 0.05 10*3/MM3 (ref 0–0.4)
EOSINOPHIL NFR BLD AUTO: 1.5 % (ref 0.3–6.2)
ERYTHROCYTE [DISTWIDTH] IN BLOOD BY AUTOMATED COUNT: 14.2 % (ref 12.3–15.4)
ERYTHROCYTE [DISTWIDTH] IN BLOOD BY AUTOMATED COUNT: 14.3 % (ref 12.3–15.4)
GEN 5 2HR TROPONIN T REFLEX: 64 NG/L
GLUCOSE SERPL-MCNC: 97 MG/DL (ref 65–99)
HBV SURFACE AG SERPL QL IA: NORMAL
HCT VFR BLD AUTO: 32.3 % (ref 34–46.6)
HCT VFR BLD AUTO: 34.8 % (ref 34–46.6)
HCV AB SER DONR QL: NORMAL
HGB BLD-MCNC: 10.1 G/DL (ref 12–15.9)
HGB BLD-MCNC: 10.7 G/DL (ref 12–15.9)
HIV 1+2 AB+HIV1 P24 AG SERPL QL IA: NORMAL
IMM GRANULOCYTES # BLD AUTO: 0.01 10*3/MM3 (ref 0–0.05)
IMM GRANULOCYTES NFR BLD AUTO: 0.3 % (ref 0–0.5)
LYMPHOCYTES # BLD AUTO: 0.7 10*3/MM3 (ref 0.7–3.1)
LYMPHOCYTES NFR BLD AUTO: 21.1 % (ref 19.6–45.3)
MCH RBC QN AUTO: 30.7 PG (ref 26.6–33)
MCH RBC QN AUTO: 30.7 PG (ref 26.6–33)
MCHC RBC AUTO-ENTMCNC: 30.7 G/DL (ref 31.5–35.7)
MCHC RBC AUTO-ENTMCNC: 31.3 G/DL (ref 31.5–35.7)
MCV RBC AUTO: 100 FL (ref 79–97)
MCV RBC AUTO: 98.2 FL (ref 79–97)
MONOCYTES # BLD AUTO: 0.33 10*3/MM3 (ref 0.1–0.9)
MONOCYTES NFR BLD AUTO: 9.9 % (ref 5–12)
NEUTROPHILS NFR BLD AUTO: 2.22 10*3/MM3 (ref 1.7–7)
NEUTROPHILS NFR BLD AUTO: 66.9 % (ref 42.7–76)
NRBC BLD AUTO-RTO: 0 /100 WBC (ref 0–0.2)
PLATELET # BLD AUTO: 63 10*3/MM3 (ref 140–450)
PLATELET # BLD AUTO: 67 10*3/MM3 (ref 140–450)
PMV BLD AUTO: 10.1 FL (ref 6–12)
PMV BLD AUTO: 10.1 FL (ref 6–12)
POTASSIUM SERPL-SCNC: 4.3 MMOL/L (ref 3.5–5.2)
RBC # BLD AUTO: 3.29 10*6/MM3 (ref 3.77–5.28)
RBC # BLD AUTO: 3.48 10*6/MM3 (ref 3.77–5.28)
SMALL PLATELETS BLD QL SMEAR: NORMAL
SODIUM SERPL-SCNC: 139 MMOL/L (ref 136–145)
TROPONIN T DELTA: 4 NG/L
WBC MORPH BLD: NORMAL
WBC NRBC COR # BLD AUTO: 3.32 10*3/MM3 (ref 3.4–10.8)
WBC NRBC COR # BLD AUTO: 3.6 10*3/MM3 (ref 3.4–10.8)

## 2024-04-10 PROCEDURE — 5A1D70Z PERFORMANCE OF URINARY FILTRATION, INTERMITTENT, LESS THAN 6 HOURS PER DAY: ICD-10-PCS | Performed by: INTERNAL MEDICINE

## 2024-04-10 PROCEDURE — 25010000002 ONDANSETRON PER 1 MG: Performed by: HOSPITALIST

## 2024-04-10 PROCEDURE — 84466 ASSAY OF TRANSFERRIN: CPT | Performed by: NURSE PRACTITIONER

## 2024-04-10 PROCEDURE — 80048 BASIC METABOLIC PNL TOTAL CA: CPT | Performed by: NURSE PRACTITIONER

## 2024-04-10 PROCEDURE — 94664 DEMO&/EVAL PT USE INHALER: CPT

## 2024-04-10 PROCEDURE — 82728 ASSAY OF FERRITIN: CPT | Performed by: NURSE PRACTITIONER

## 2024-04-10 PROCEDURE — 99221 1ST HOSP IP/OBS SF/LOW 40: CPT | Performed by: INTERNAL MEDICINE

## 2024-04-10 PROCEDURE — 94799 UNLISTED PULMONARY SVC/PX: CPT

## 2024-04-10 PROCEDURE — G0432 EIA HIV-1/HIV-2 SCREEN: HCPCS | Performed by: INTERNAL MEDICINE

## 2024-04-10 PROCEDURE — 85025 COMPLETE CBC W/AUTO DIFF WBC: CPT | Performed by: INTERNAL MEDICINE

## 2024-04-10 PROCEDURE — 86803 HEPATITIS C AB TEST: CPT | Performed by: INTERNAL MEDICINE

## 2024-04-10 PROCEDURE — 83540 ASSAY OF IRON: CPT | Performed by: NURSE PRACTITIONER

## 2024-04-10 PROCEDURE — 85007 BL SMEAR W/DIFF WBC COUNT: CPT | Performed by: INTERNAL MEDICINE

## 2024-04-10 PROCEDURE — 87340 HEPATITIS B SURFACE AG IA: CPT | Performed by: INTERNAL MEDICINE

## 2024-04-10 PROCEDURE — 86022 PLATELET ANTIBODIES: CPT | Performed by: INTERNAL MEDICINE

## 2024-04-10 PROCEDURE — 99232 SBSQ HOSP IP/OBS MODERATE 35: CPT | Performed by: INTERNAL MEDICINE

## 2024-04-10 PROCEDURE — 94761 N-INVAS EAR/PLS OXIMETRY MLT: CPT

## 2024-04-10 PROCEDURE — 84484 ASSAY OF TROPONIN QUANT: CPT | Performed by: STUDENT IN AN ORGANIZED HEALTH CARE EDUCATION/TRAINING PROGRAM

## 2024-04-10 PROCEDURE — 85027 COMPLETE CBC AUTOMATED: CPT | Performed by: NURSE PRACTITIONER

## 2024-04-10 PROCEDURE — 82607 VITAMIN B-12: CPT | Performed by: INTERNAL MEDICINE

## 2024-04-10 PROCEDURE — 82746 ASSAY OF FOLIC ACID SERUM: CPT | Performed by: INTERNAL MEDICINE

## 2024-04-10 PROCEDURE — 86038 ANTINUCLEAR ANTIBODIES: CPT | Performed by: INTERNAL MEDICINE

## 2024-04-10 RX ORDER — ALBUMIN (HUMAN) 12.5 G/50ML
12.5 SOLUTION INTRAVENOUS AS NEEDED
Status: ACTIVE | OUTPATIENT
Start: 2024-04-10 | End: 2024-04-11

## 2024-04-10 RX ORDER — METOPROLOL SUCCINATE 25 MG/1
12.5 TABLET, EXTENDED RELEASE ORAL
Status: DISCONTINUED | OUTPATIENT
Start: 2024-04-10 | End: 2024-04-12 | Stop reason: HOSPADM

## 2024-04-10 RX ADMIN — FEBUXOSTAT 40 MG: 40 TABLET, FILM COATED ORAL at 10:07

## 2024-04-10 RX ADMIN — METOPROLOL SUCCINATE 12.5 MG: 25 TABLET, FILM COATED, EXTENDED RELEASE ORAL at 10:07

## 2024-04-10 RX ADMIN — IPRATROPIUM BROMIDE AND ALBUTEROL SULFATE 3 ML: .5; 3 SOLUTION RESPIRATORY (INHALATION) at 07:06

## 2024-04-10 RX ADMIN — Medication 10 ML: at 10:07

## 2024-04-10 RX ADMIN — Medication 10 ML: at 21:06

## 2024-04-10 RX ADMIN — ATORVASTATIN CALCIUM 10 MG: 10 TABLET, FILM COATED ORAL at 21:06

## 2024-04-10 RX ADMIN — BUDESONIDE 0.5 MG: 0.5 INHALANT RESPIRATORY (INHALATION) at 07:06

## 2024-04-10 RX ADMIN — ONDANSETRON 4 MG: 2 INJECTION INTRAMUSCULAR; INTRAVENOUS at 21:08

## 2024-04-10 RX ADMIN — IPRATROPIUM BROMIDE AND ALBUTEROL SULFATE 3 ML: .5; 3 SOLUTION RESPIRATORY (INHALATION) at 15:04

## 2024-04-10 NOTE — PROGRESS NOTES
[facility] MEDICINE SERVICE  DAILY PROGRESS NOTE    NAME: Deann Warren  : 1940  MRN: [id number]      LOS: 1 day     PROVIDER OF SERVICE: Mary Ellen Martinez MD    Chief Complaint: Syncope and collapse    Subjective:     Interval History:  History taken from: patient  Patient seen and examined.  No acute events overnight.  Stated she had a brief syncopal episode this morning while laying in bed.  States that she feels good however after she woke up.  Denies any chest pain or shortness of breath.  Vital signs reviewed and stable.    Review of Systems:   Review of Systems  As above      Patient had echocardiogram done with possible ASD that requires YINA for further evaluation  Patient already followed up per cardiology  Pleural effusion is noted  Will get chest x-ray repeated and pulmonary consult for pleural effusion  effusion to evaluate if she needs thoracentesis  Patient had multiple syncopes as an outpatient has been follow-up with Dr. Figueroa    Patient need Holter monitor as an outpatient when discharged  4/10  Patient seen per cardiology  Patient echo noted  AST is probably not clinically significant at her age no YINA will be needed  Patient will need ischemic workup prior to discharge  Her platelet is 70 hematology will be consulted  Pulmonary CT of the chest done with bilateral pleural effusion left more than right  Hemodynamically stable otherwise    Objective:     Vital Signs  Temp:  [97.4 °F (36.3 °C)-98 °F (36.7 °C)] 98 °F (36.7 °C)  Heart Rate:  [61-82] 81  Resp:  [16-24] 24  BP: (118-151)/(42-77) 133/66   Body mass index is 20.25 kg/m².    Physical Exam  Physical Exam  General Appearance: AOO x 4, cooperative, no distress, appropriate for age  Head:  Normocephalic, without obvious abnormality  Eyes:  PERRL, EOM's intact, conjunctivae and cornea clear  Nose:  Nares symmetrical, septum midline, mucosa pink  Throat:  Lips, tongue, and mucosa are moist, pink, and intact  Neck:  Supple;  symmetrical, trachea midline, no adenopathy  Back:  Symmetrical, ROM normal, no CVA tenderness  Lungs: Respirations unlabored, no audible wheeze  Heart: Regular rate & rhythm, S1 and S2 normal  Abdomen:  Soft, nontender, bowel sounds active all four quadrants  Musculoskeletal: Tone and strength strong and symmetrical, all extremities; no joint pain or edema         Skin/Hair/Nails:  Skin warm, dry and intact, no rashes or abnormal dyspigmentation     Scheduled Meds   [Held by provider] apixaban, 2.5 mg, Oral, BID  atorvastatin, 10 mg, Oral, Nightly  budesonide, 0.5 mg, Nebulization, BID - RT  febuxostat, 40 mg, Oral, Daily  ipratropium-albuterol, 3 mL, Nebulization, 4x Daily - RT  metoprolol succinate XL, 12.5 mg, Oral, Q24H  sodium chloride, 10 mL, Intravenous, Q12H       PRN Meds     senna-docusate sodium **AND** polyethylene glycol **AND** bisacodyl **AND** bisacodyl    ondansetron    sodium chloride    [COMPLETED] Insert Peripheral IV **AND** sodium chloride    sodium chloride    sodium chloride   Infusions         Diagnostic Data    Results from last 7 days   Lab Units 04/10/24  1021 04/10/24  0354   WBC 10*3/mm3 3.60  --    HEMOGLOBIN g/dL 10.7*  --    HEMATOCRIT % 34.8  --    PLATELETS 10*3/mm3 63*  --    GLUCOSE mg/dL  --  97   CREATININE mg/dL  --  3.48*   BUN mg/dL  --  56*   SODIUM mmol/L  --  139   POTASSIUM mmol/L  --  4.3   ANION GAP mmol/L  --  12.0       CT Chest Without Contrast Diagnostic    Result Date: 4/9/2024  Impression: Bilateral effusions with bibasilar atelectasis, greatest on the left. Moderately severe cardiomegaly without pulmonary edema. Evidence of chronic underlying lung disease. Electronically Signed: Meghan Gregg MD  4/9/2024 4:08 PM EDT  Workstation ID: [id number]    XR Chest PA & Lateral    Result Date: 4/9/2024  Impression: 1. Stable cardiomegaly. 2. Prominent interstitial markings compatible with interstitial edema. 3. Stable small left pleural effusion Electronically Signed:  Arie Nathan MD  4/9/2024 2:05 PM EDT  Workstation ID: [id number]       I reviewed the patient's new clinical results.    Assessment/Plan:     Active and Resolved Problems  Active Hospital Problems    Diagnosis  POA    **Syncope and collapse [R55]  Yes    Hyperlipidemia [E78.5]  Yes    Type 2 diabetes mellitus with diabetic chronic kidney disease [E11.22]  Yes    Paroxysmal atrial fibrillation [I48.0]  Yes    ESRD (end stage renal disease) [N18.6]  Yes    Anemia due to chronic kidney disease, on chronic dialysis [N18.6, D63.1, Z99.2]  Not Applicable    Essential hypertension [I10]  Yes      Resolved Hospital Problems   No resolved problems to display.     Syncope and collapse  Patient has had recurrent episodes of syncope in the past  Echocardiogram pending  Carotid Dopplers unremarkable  Consult cardiology     Elevated BNP  BNP 51,559  Echocardiogram pending  Urology consulted     End-stage renal disease  Dialysis Monday Wednesday Friday  Urology on board  Repeat BMP in the a.m     Thrombocytopenia  Platelets 63  Consult oncology  Repeat CBC    Atrial fibrillation  Rate controlled  Hold Eliquis due to thrombocytopenia     Hyperlipidemia  Resume statin  Lipid panel within normal limits      DVT prophylaxis:  Medical DVT prophylaxis orders are present.         Code status is   Code Status and Medical Interventions:   Ordered at: 04/07/24 1420     Code Status (Patient has no pulse and is not breathing):    CPR (Attempt to Resuscitate)     Medical Interventions (Patient has pulse or is breathing):    Full Support       Plan for disposition:Home in 1-2  days    Time: 30 minutes    Signature: Electronically signed by Mary Ellen Martinez MD, 04/10/24, 12:41 EDT.  [facility] Hospitalist Team

## 2024-04-10 NOTE — NURSING NOTE
Dialysis Treatment    Treatment: HD  Access: AVF  BVP: 40.7 liters   UF: 0 mL    VSS. No distress present. Tolerated well.    Report given to JULIO Hubbard .

## 2024-04-10 NOTE — PLAN OF CARE
Problem: Adult Inpatient Plan of Care  Goal: Plan of Care Review  Outcome: Ongoing, Progressing  Goal: Patient-Specific Goal (Individualized)  Outcome: Ongoing, Progressing  Goal: Absence of Hospital-Acquired Illness or Injury  Outcome: Ongoing, Progressing  Intervention: Identify and Manage Fall Risk  Recent Flowsheet Documentation  Taken 4/10/2024 1659 by Jolanta Beaver RN  Safety Promotion/Fall Prevention: safety round/check completed  Taken 4/10/2024 1424 by Jolanta Beaver RN  Safety Promotion/Fall Prevention: safety round/check completed  Taken 4/10/2024 1230 by Jolanta Beaver RN  Safety Promotion/Fall Prevention: safety round/check completed  Taken 4/10/2024 1000 by Jolanta Beaver RN  Safety Promotion/Fall Prevention: safety round/check completed  Taken 4/10/2024 0837 by Jolanta Beaver RN  Safety Promotion/Fall Prevention: safety round/check completed  Intervention: Prevent Skin Injury  Recent Flowsheet Documentation  Taken 4/10/2024 0837 by Jolanta Beaver RN  Body Position: position changed independently  Intervention: Prevent and Manage VTE (Venous Thromboembolism) Risk  Recent Flowsheet Documentation  Taken 4/10/2024 0837 by Jolanta Beaver RN  VTE Prevention/Management:   bilateral   sequential compression devices off  Range of Motion: active ROM (range of motion) encouraged  Intervention: Prevent Infection  Recent Flowsheet Documentation  Taken 4/10/2024 0837 by Jolanta Beaver RN  Infection Prevention: single patient room provided  Goal: Optimal Comfort and Wellbeing  Outcome: Ongoing, Progressing  Intervention: Provide Person-Centered Care  Recent Flowsheet Documentation  Taken 4/10/2024 0837 by Jolanta Beaver RN  Trust Relationship/Rapport:   care explained   choices provided   emotional support provided   empathic listening provided  Goal: Readiness for Transition of Care  Outcome: Ongoing, Progressing     Problem: Syncope  Goal: Absence of  Syncopal Symptoms  Outcome: Ongoing, Progressing     Problem: Fall Injury Risk  Goal: Absence of Fall and Fall-Related Injury  Outcome: Ongoing, Progressing  Intervention: Promote Injury-Free Environment  Recent Flowsheet Documentation  Taken 4/10/2024 1659 by Jolanta eBaver RN  Safety Promotion/Fall Prevention: safety round/check completed  Taken 4/10/2024 1424 by Jolanta Beaver RN  Safety Promotion/Fall Prevention: safety round/check completed  Taken 4/10/2024 1230 by Jolanta Beaver RN  Safety Promotion/Fall Prevention: safety round/check completed  Taken 4/10/2024 1000 by Jolanta Beaver RN  Safety Promotion/Fall Prevention: safety round/check completed  Taken 4/10/2024 0837 by Jolanta Beaver RN  Safety Promotion/Fall Prevention: safety round/check completed   Goal Outcome Evaluation:   Pt completed dialysis today. No concerns and call light within reach.

## 2024-04-10 NOTE — PLAN OF CARE
Problem: Adult Inpatient Plan of Care  Goal: Plan of Care Review  Outcome: Ongoing, Progressing  Goal: Patient-Specific Goal (Individualized)  Outcome: Ongoing, Progressing  Goal: Absence of Hospital-Acquired Illness or Injury  Outcome: Ongoing, Progressing  Intervention: Identify and Manage Fall Risk  Recent Flowsheet Documentation  Taken 4/10/2024 0300 by Lary Crocker RN  Safety Promotion/Fall Prevention: safety round/check completed  Taken 4/10/2024 0045 by Lary Crocker RN  Safety Promotion/Fall Prevention: safety round/check completed  Taken 4/9/2024 2030 by Lary Crocker RN  Safety Promotion/Fall Prevention: safety round/check completed  Taken 4/9/2024 1931 by Lary Crocker RN  Safety Promotion/Fall Prevention:   safety round/check completed   clutter free environment maintained   nonskid shoes/slippers when out of bed   fall prevention program maintained   room organization consistent  Intervention: Prevent Skin Injury  Recent Flowsheet Documentation  Taken 4/10/2024 0045 by Lary Crocker RN  Body Position: position changed independently  Taken 4/9/2024 1931 by Lary Crocker RN  Body Position: position changed independently  Intervention: Prevent and Manage VTE (Venous Thromboembolism) Risk  Recent Flowsheet Documentation  Taken 4/10/2024 0045 by Lary Crocker RN  Activity Management: ambulated to bathroom  Intervention: Prevent Infection  Recent Flowsheet Documentation  Taken 4/9/2024 1931 by Lary Crocker RN  Infection Prevention: rest/sleep promoted  Goal: Optimal Comfort and Wellbeing  Outcome: Ongoing, Progressing  Intervention: Provide Person-Centered Care  Recent Flowsheet Documentation  Taken 4/9/2024 1931 by Lary Crocker RN  Trust Relationship/Rapport:   care explained   emotional support provided   choices provided   empathic listening provided   questions answered   questions encouraged   reassurance provided   thoughts/feelings acknowledged  Goal:  Readiness for Transition of Care  Outcome: Ongoing, Progressing     Problem: Syncope  Goal: Absence of Syncopal Symptoms  Outcome: Ongoing, Progressing  Intervention: Manage Effect of Syncopal Symptoms  Recent Flowsheet Documentation  Taken 4/9/2024 1931 by Lary Crocker RN  Supportive Measures: self-care encouraged     Problem: Fall Injury Risk  Goal: Absence of Fall and Fall-Related Injury  Outcome: Ongoing, Progressing  Intervention: Promote Injury-Free Environment  Recent Flowsheet Documentation  Taken 4/10/2024 0300 by Lary Crocker, RN  Safety Promotion/Fall Prevention: safety round/check completed  Taken 4/10/2024 0045 by Lary Crocker RN  Safety Promotion/Fall Prevention: safety round/check completed  Taken 4/9/2024 2030 by Lary Crocker, RN  Safety Promotion/Fall Prevention: safety round/check completed  Taken 4/9/2024 1931 by Lary Crocker, RN  Safety Promotion/Fall Prevention:   safety round/check completed   clutter free environment maintained   nonskid shoes/slippers when out of bed   fall prevention program maintained   room organization consistent   Goal Outcome Evaluation:

## 2024-04-10 NOTE — PROGRESS NOTES
Referring Provider: Mary Ellen Martinez MD    Reason for follow-up syncope     Patient Care Team:  Antonino Shen MD as PCP - Family Medicine  FigueroaKamran chapa MD as Consulting Physician (Cardiology)      SUBJECTIVE      Feels well, no syncope noted, no tele events      ROS  Review of all systems negative except as indicated.    Since I have last seen, the patient has been without any chest discomfort, shortness of breath, palpitations, dizziness or syncope.  Denies having any headache, abdominal pain, nausea, vomiting, diarrhea, constipation, loss of weight or loss of appetite.  Denies having any excessive bruising, hematuria or blood in the stool.  ROS      Personal History:    Past Medical History:   Diagnosis Date    Allergic conjunctivitis and rhinitis 11/06/2014    Anemia due to chronic kidney disease, on chronic dialysis 08/25/2020    Anxiety 07/30/2012    Chronic gouty arthritis 11/06/2014    Dependence on renal dialysis 07/01/2022    ESRD (end stage renal disease) 08/25/2020    Essential hypertension 09/16/2015    History of shingles 01/18/2022    Hyperlipidemia 04/08/2024    Paroxysmal atrial fibrillation 07/01/2022    Type 2 diabetes mellitus with diabetic chronic kidney disease 07/01/2022    Vitamin D deficiency 07/22/2021       Past Surgical History:   Procedure Laterality Date    ARTERIOVENOUS FISTULA Left        Family History   Family history unknown: Yes       Social History     Tobacco Use    Smoking status: Never    Smokeless tobacco: Never   Vaping Use    Vaping status: Never Used   Substance Use Topics    Alcohol use: Never    Drug use: Never        Home meds:  Prior to Admission medications    Medication Sig Start Date End Date Taking? Authorizing Provider   apixaban (ELIQUIS) 2.5 MG tablet tablet Take 1 tablet by mouth 2 (Two) Times a Day.   Yes ProviderKaterin MD   B Complex-C-Folic Acid (EM-MARIA ESTHER PO) Take 1 tablet by mouth Daily.   Yes ProviderKaterin MD   febuxostat (ULORIC) 40  "MG tablet Take 1 tablet by mouth Daily.   Yes Katerin Bradshaw MD   simvastatin (ZOCOR) 20 MG tablet Take 1 tablet by mouth Every Night.   Yes Katerin Bradshaw MD   acetaminophen (TYLENOL) 500 MG tablet Take 1 tablet by mouth Every 6 (Six) Hours As Needed for Mild Pain.    ProviderKaterin MD   NON FORMULARY Lidocaine 2.5% ointment -  Apply 1 application Monday, Wednesday, Friday before dialysis    ProviderKaterin MD       Allergies:  Patient has no known allergies.    Scheduled Meds:[Held by provider] apixaban, 2.5 mg, Oral, BID  atorvastatin, 10 mg, Oral, Nightly  budesonide, 0.5 mg, Nebulization, BID - RT  febuxostat, 40 mg, Oral, Daily  ipratropium-albuterol, 3 mL, Nebulization, 4x Daily - RT  sodium chloride, 10 mL, Intravenous, Q12H      Continuous Infusions:   PRN Meds:.  senna-docusate sodium **AND** polyethylene glycol **AND** bisacodyl **AND** bisacodyl    ondansetron    sodium chloride    [COMPLETED] Insert Peripheral IV **AND** sodium chloride    sodium chloride    sodium chloride      OBJECTIVE    Vital Signs  Vitals:    04/10/24 0709 04/10/24 0710 04/10/24 0713 04/10/24 0837   BP:    133/57   BP Location:    Right arm   Patient Position:    Lying   Pulse: 80 69 73 76   Resp: 18 18 20 22   Temp:    97.9 °F (36.6 °C)   TempSrc:    Oral   SpO2: 100% 100% 100% 92%   Weight:       Height:           Flowsheet Rows      Flowsheet Row First Filed Value   Admission Height 162.6 cm (64\") Documented at 04/07/2024 0953   Admission Weight 53.5 kg (118 lb) Documented at 04/07/2024 0953              Intake/Output Summary (Last 24 hours) at 4/10/2024 0934  Last data filed at 4/10/2024 0837  Gross per 24 hour   Intake 1000 ml   Output --   Net 1000 ml          Telemetry: Normal sinus rhythm    Physical Exam:  The patient is alert, oriented and in no distress.  Vital signs as noted above.  Head and neck revealed no carotid bruits or jugular venous distention.  No thyromegaly or lymphadenopathy is " present  Lungs clear.  No wheezing.  Breath sounds are normal bilaterally.  Heart normal first and second heart sounds.  No murmur. No precordial rub is present.  No gallop is present.  Abdomen soft and nontender.  No organomegaly is present.  Extremities with good peripheral pulses without any pedal edema.  Skin warm and dry.  Musculoskeletal system is grossly normal.  CNS grossly normal.       Results Review:  I have personally reviewed the results from the time of this admission to 4/10/2024 09:34 EDT and agree with these findings:  []  Laboratory  []  Microbiology  []  Radiology  []  EKG/Telemetry   []  Cardiology/Vascular   []  Pathology  []  Old records  []  Other:    Most notable findings include:    Lab Results (last 24 hours)       Procedure Component Value Units Date/Time    Basic Metabolic Panel [[id number]]  (Abnormal) Collected: 04/10/24 0354    Specimen: Blood Updated: 04/10/24 0902     Glucose 97 mg/dL      BUN 56 mg/dL      Creatinine 3.48 mg/dL      Sodium 139 mmol/L      Potassium 4.3 mmol/L      Comment: Slight hemolysis detected by analyzer. Result may be falsely elevated.        Chloride 103 mmol/L      CO2 24.0 mmol/L      Calcium 8.7 mg/dL      BUN/Creatinine Ratio 16.1     Anion Gap 12.0 mmol/L      eGFR 12.5 mL/min/1.73      Comment: <15 Indicative of kidney failure       Narrative:      GFR Normal >60  Chronic Kidney Disease <60  Kidney Failure <15    The GFR formula is only valid for adults with stable renal function between ages 18 and 70.    High Sensitivity Troponin T 2Hr [[id number]]  (Abnormal) Collected: 04/10/24 0354    Specimen: Blood Updated: 04/10/24 0513     HS Troponin T 64 ng/L      Troponin T Delta 4 ng/L     Narrative:      High Sensitive Troponin T Reference Range:  <14.0 ng/L- Negative Female for AMI  <22.0 ng/L- Negative Male for AMI  >=14 - Abnormal Female indicating possible myocardial injury.  >=22 - Abnormal Male indicating possible myocardial injury.   Clinicians would  have to utilize clinical acumen, EKG, Troponin, and serial changes to determine if it is an Acute Myocardial Infarction or myocardial injury due to an underlying chronic condition.         High Sensitivity Troponin T [[id number]]  (Abnormal) Collected: 04/09/24 2236    Specimen: Blood Updated: 04/09/24 2322     HS Troponin T 60 ng/L      Comment: Specimen hemolyzed.  Results may be falsely decreased.       Narrative:      High Sensitive Troponin T Reference Range:  <14.0 ng/L- Negative Female for AMI  <22.0 ng/L- Negative Male for AMI  >=14 - Abnormal Female indicating possible myocardial injury.  >=22 - Abnormal Male indicating possible myocardial injury.   Clinicians would have to utilize clinical acumen, EKG, Troponin, and serial changes to determine if it is an Acute Myocardial Infarction or myocardial injury due to an underlying chronic condition.         POC Glucose Once [[id number]]  (Abnormal) Collected: 04/09/24 1925    Specimen: Blood Updated: 04/09/24 1927     Glucose 162 mg/dL      Comment: Serial Number: [id number]:  [id number]       Basic Metabolic Panel [[id number]]  (Abnormal) Collected: 04/09/24 1157    Specimen: Blood from Arm, Right Updated: 04/09/24 1304     Glucose 92 mg/dL      BUN 49 mg/dL      Creatinine 3.08 mg/dL      Sodium 139 mmol/L      Potassium 3.7 mmol/L      Chloride 100 mmol/L      CO2 26.0 mmol/L      Calcium 9.5 mg/dL      BUN/Creatinine Ratio 15.9     Anion Gap 13.0 mmol/L      eGFR 14.5 mL/min/1.73      Comment: <15 Indicative of kidney failure       Narrative:      GFR Normal >60  Chronic Kidney Disease <60  Kidney Failure <15    The GFR formula is only valid for adults with stable renal function between ages 18 and 70.    Magnesium [[id number]]  (Normal) Collected: 04/09/24 1157    Specimen: Blood from Arm, Right Updated: 04/09/24 1304     Magnesium 2.1 mg/dL     Phosphorus [[id number]]  (Normal) Collected: 04/09/24 1044    Specimen: Blood from Arm, Right Updated: 04/09/24  1138     Phosphorus 4.1 mg/dL     CBC (No Diff) [[id number]]  (Abnormal) Collected: 04/09/24 1044    Specimen: Blood from Arm, Right Updated: 04/09/24 1114     WBC 3.71 10*3/mm3      RBC 3.76 10*6/mm3      Hemoglobin 11.4 g/dL      Hematocrit 37.7 %      .3 fL      MCH 30.3 pg      MCHC 30.2 g/dL      RDW 14.1 %      RDW-SD 51.3 fl      MPV 10.3 fL      Platelets 70 10*3/mm3             Imaging Results (Last 24 Hours)       Procedure Component Value Units Date/Time    CT Chest Without Contrast Diagnostic [[id number]] Collected: 04/09/24 1604     Updated: 04/09/24 1610    Narrative:      CT CHEST WO CONTRAST DIAGNOSTIC    Date of Exam: 4/9/2024 4:02 PM EDT    Indication: sob.    Comparison: None available.    Technique: Axial CT images were obtained of the chest without contrast administration.  Sagittal and coronal reconstructions were performed.  Automated exposure control and iterative reconstruction methods were used.      Findings:  The right thyroid lobe is enlarged and extends behind the right clavicle. There are no enlarged mediastinal nodes. Central pulmonary arteries are prominent. The ascending thoracic aorta measures 4 cm transversely and the descending measures 3 cm   transversely. There are extensive coronary calcifications. There is moderately severe cardiomegaly. There are small bilateral pleural effusions, greatest on the left. There is mild atelectasis of the lung bases, greatest on the left. There is no definite   pulmonary edema. Some increased interstitial markings of the bilateral lung fields suggest chronic lung disease.      Impression:      Impression:  Bilateral effusions with bibasilar atelectasis, greatest on the left. Moderately severe cardiomegaly without pulmonary edema. Evidence of chronic underlying lung disease.      Electronically Signed: Meghan Gregg MD    4/9/2024 4:08 PM EDT    Workstation ID: [id number]    XR Chest PA & Lateral [[id number]] Collected: 04/09/24 2570      Updated: 04/09/24 1409    Narrative:      XR CHEST PA AND LATERAL    Date of Exam: 4/9/2024 1:37 PM EDT    Indication: pleural effusion    Comparison: 4/7/2024    Findings:  Cardiomegaly is stable. Pulmonary vessels are within normal limits. There is tissue markings are prominent suggesting interstitial edema. There is a small left pleural effusion. No focal airspace consolidation. No right pleural effusion or pneumothorax.   Bony structures are unremarkable.      Impression:      Impression:    1. Stable cardiomegaly.  2. Prominent interstitial markings compatible with interstitial edema.  3. Stable small left pleural effusion      Electronically Signed: Arie Nathan MD    4/9/2024 2:05 PM EDT    Workstation ID: [id number]            LAB RESULTS (LAST 7 DAYS)    CBC  Results from last 7 days   Lab Units 04/09/24  1044 04/07/24  1012   WBC 10*3/mm3 3.71 2.91*   RBC 10*6/mm3 3.76* 3.70*   HEMOGLOBIN g/dL 11.4* 11.2*   HEMATOCRIT % 37.7 36.7   MCV fL 100.3* 99.2*   PLATELETS 10*3/mm3 70* 63*       BMP  Results from last 7 days   Lab Units 04/10/24  0354 04/09/24  1157 04/09/24  1044 04/08/24  0446 04/07/24  1118 04/07/24  1012   SODIUM mmol/L 139 139  --  134* 138  --    POTASSIUM mmol/L 4.3 3.7  --  3.6 3.5  --    CHLORIDE mmol/L 103 100  --  99 97*  --    CO2 mmol/L 24.0 26.0  --  23.0 28.0  --    BUN mg/dL 56* 49*  --  35* 28*  --    CREATININE mg/dL 3.48* 3.08*  --  2.89* 2.64*  --    GLUCOSE mg/dL 97 92  --  101* 81  --    MAGNESIUM mg/dL  --  2.1  --   --   --  2.0   PHOSPHORUS mg/dL  --   --  4.1  --   --   --        CMP   Results from last 7 days   Lab Units 04/10/24  0354 04/09/24  1157 04/08/24  0446 04/07/24  1118   SODIUM mmol/L 139 139 134* 138   POTASSIUM mmol/L 4.3 3.7 3.6 3.5   CHLORIDE mmol/L 103 100 99 97*   CO2 mmol/L 24.0 26.0 23.0 28.0   BUN mg/dL 56* 49* 35* 28*   CREATININE mg/dL 3.48* 3.08* 2.89* 2.64*   GLUCOSE mg/dL 97 92 101* 81       BNP        TROPONIN  Results from last 7 days   Lab Units  04/10/24  0354   HSTROP T ng/L 64*       CoAg        Creatinine Clearance  Estimated Creatinine Clearance: 10.3 mL/min (A) (by C-G formula based on SCr of 3.48 mg/dL (H)).    ABG        Radiology  CT Chest Without Contrast Diagnostic    Result Date: 4/9/2024  Impression: Bilateral effusions with bibasilar atelectasis, greatest on the left. Moderately severe cardiomegaly without pulmonary edema. Evidence of chronic underlying lung disease. Electronically Signed: Meghan Gregg MD  4/9/2024 4:08 PM EDT  Workstation ID: [id number]    XR Chest PA & Lateral    Result Date: 4/9/2024  Impression: 1. Stable cardiomegaly. 2. Prominent interstitial markings compatible with interstitial edema. 3. Stable small left pleural effusion Electronically Signed: Arie Nathan MD  4/9/2024 2:05 PM EDT  Workstation ID: [id number]       EKG  I personally viewed and interpreted the patient's EKG/Telemetry data:  ECG 12 Lead Syncope   Final Result   HEART RATE= 83  bpm   RR Interval= 720  ms   MD Interval=   ms   P Horizontal Axis=   deg   P Front Axis=   deg   QRSD Interval= 130  ms   QT Interval= 421  ms   QTcB= 496  ms   QRS Axis= -79  deg   T Wave Axis= 102  deg   - ABNORMAL ECG -   Atrial fibrillation   IVCD, consider LBBB   Probable anterolateral infarct, age indeterm   No previous ECG available for comparison   Electronically Signed By: Harvinder Garcia (Russ) 08-Apr-2024 06:11:35   Date and Time of Study: 2024-04-07 10:07:46      Telemetry Scan   Final Result      Telemetry Scan   Final Result      Telemetry Scan   Final Result      Telemetry Scan   Final Result      Telemetry Scan   Final Result            Echocardiogram:    Results for orders placed during the hospital encounter of 04/07/24    Adult Transthoracic Echo Complete W/ Cont if Necessary Per Protocol    Interpretation Summary    Left ventricular systolic function is moderately decreased. Calculated left ventricular EF = 35%    The left ventricular cavity is mildly dilated.     The following left ventricular wall segments are hypokinetic: apical anterior.    Left ventricular diastolic function was indeterminate.    The left atrial cavity is moderately dilated.    Saline test results are positive with valsalva manuever.    Estimated right ventricular systolic pressure from tricuspid regurgitation is mildly elevated (35-45 mmHg).    There is a large left pleural effusion.    There appears to be an ASD present with left to right shunting based on color dopple. Consider YINA to further evaluate        Stress Test:         Cardiac Catheterization:  No results found for this or any previous visit.         Other:         ASSESSMENT & PLAN:    Principal Problem:    Syncope and collapse  Active Problems:    Type 2 diabetes mellitus with diabetic chronic kidney disease    Paroxysmal atrial fibrillation    Essential hypertension    ESRD (end stage renal disease)    Anemia due to chronic kidney disease, on chronic dialysis    Hyperlipidemia    Syncope  Recurrent syncope with previous negative workup  No evidence of valvular heart disease that could explain her syncope on echocardiogram  Holter monitor on discharge  She may benefit from midodrine on dialysis days to prevent drops in blood pressure     ASD: Possible  This was an incidental finding on her echocardiogram  Given her advanced age I do not recommend further workup with a YINA at this time because I do not think that closure would be indicated in this case    Cardiomyopathy  She did have a drop in her EF with reported normal echo at previous cardiologist  Also noted to have anteroapical wall motion abnormalities  Start low dose BB to avoid hypotension  Will introduce lisinopril if BP stays stable  No SGLT2i due to ESRD  Will need ischemic evaluation and will decide timing on this whether it will be outpatient or inpatient as this is not urgent at this time    Thrombocytopenia  New onset  Being followed by hematology  No evidence of bleeding  but is on Eliquis    Pleural effusion  Noted on echocardiogram  Pulmonology has been consulted    End-stage renal disease  Currently on dialysis  Nephrology is following     Atrial fibrillation  Currently on low-dose Eliquis  Has had bleeding issues with dialysis  She would benefit from Watchman and will discuss this outpatient with her     Hyperlipidemia  Continue with atorvastatin     Labile hypertension  Hold home antihypertensive      Dilcia Lui MD  04/10/24  09:34 EDT

## 2024-04-10 NOTE — PROGRESS NOTES
"                                                                                                                                      Nephrology  Progress Note                                        [facility]    Patient Identification    Name: Deann Warren  Age: 83 y.o.  Sex: female  :  1940  MRN: [id number]      DATE OF SERVICE:  4/10/2024        Subective    No new c/o  Objective   Scheduled Meds:[Held by provider] apixaban, 2.5 mg, Oral, BID  atorvastatin, 10 mg, Oral, Nightly  budesonide, 0.5 mg, Nebulization, BID - RT  febuxostat, 40 mg, Oral, Daily  ipratropium-albuterol, 3 mL, Nebulization, 4x Daily - RT  sodium chloride, 10 mL, Intravenous, Q12H          Continuous Infusions:     PRN Meds:  senna-docusate sodium **AND** polyethylene glycol **AND** bisacodyl **AND** bisacodyl    ondansetron    sodium chloride    [COMPLETED] Insert Peripheral IV **AND** sodium chloride    sodium chloride    sodium chloride     Exam:  /56 (BP Location: Right arm, Patient Position: Lying)   Pulse 73   Temp 97.4 °F (36.3 °C) (Axillary)   Resp 20   Ht 162.6 cm (64\")   Wt 53.5 kg (118 lb)   SpO2 100%   BMI 20.25 kg/m²     Intake/Output last 3 shifts:  I/O last 3 completed shifts:  In: 1000 [P.O.:1000]  Out: -     Intake/Output this shift:  No intake/output data recorded.    Physical exam:  General Appearance:  Alert  Head:  Normocephalic, without obvious abnormality, atraumatic  Eyes:  PERRL, conjunctiva/corneas clear     Neck:  Supple,  no adenopathy;      Lungs:  Decreased BS occasion ronchi  Heart:  Regular rate and rhythm, S1 and S2 normal  Abdomen:  Soft, non-tender, bowel sounds active   Extremities: trace edema  Pulses: 2+ and symmetric all extremities  Skin:  No rashes or lesions       Data Review:  All labs (24hrs):   Recent Results (from the past 24 hour(s))   Adult Transthoracic Echo Complete W/ Cont if Necessary Per Protocol    Collection Time: 24  9:35 AM   Result Value " Prescription approved per [facility] Refill Protocol.  Neel Zarate RN, BSN     Ref Range    BH CV ECHO SHUNT ASSESSMENT PERFORMED (HIDDEN SCRIPTING) 1     LV GLOBAL STRAIN  -7.8 %    LVIDd 5.1 cm    LVIDs 4.0 cm    IVSd 1.10 cm    LVPWd 1.10 cm    FS 21.6 %    IVS/LVPW 1.00 cm    ESV(cubed) 64.0 ml    LV Sys Vol (BSA corrected) 28.9 cm2    EDV(cubed) 132.7 ml    LV [place] Vol (BSA corrected) 44.7 cm2    LV mass(C)d 213.9 grams    LVOT area 2.8 cm2    LVOT diam 1.90 cm    EDV(MOD-sp4) 69.8 ml    ESV(MOD-sp4) 45.1 ml    SV(MOD-sp4) 24.7 ml    SI(MOD-sp4) 15.8 ml/m2    EF(MOD-sp4) 35.4 %    MV E max felipe 96.5 cm/sec    MV dec time 0.15 sec    Pulm A Revs Dur 0.10 sec    LA ESV Index (BP) 35.5 ml/m2    Med Peak E' Felipe 6.2 cm/sec    Lat Peak E' Felipe 8.6 cm/sec    TR max felipe 299.0 cm/sec    Avg E/e' ratio 13.04     SV(LVOT) 56.4 ml    RVIDd 2.7 cm    TAPSE (>1.6) 1.37 cm    RV S' 11.7 cm/sec    LA dimension (2D)  4.0 cm    Pulm Sys Felipe 27.0 cm/sec    Pulm [place] Felipe 61.1 cm/sec    Pulm S/D 0.44     Pulm A Revs Felipe 20.4 cm/sec    LV V1 max 83.9 cm/sec    LV V1 max PG 2.8 mmHg    LV V1 mean PG 1.00 mmHg    LV V1 VTI 19.9 cm    Ao pk felipe 171.0 cm/sec    Ao max PG 11.7 mmHg    Ao mean PG 7.0 mmHg    Ao V2 VTI 32.4 cm    EFRAIN(I,D) 1.74 cm2    AI P1/2t 268.3 msec    MV max PG 4.8 mmHg    MV mean PG 2.00 mmHg    MV V2 VTI 27.3 cm    MV P1/2t 69.4 msec    MVA(P1/2t) 3.2 cm2    MVA(VTI) 2.07 cm2    MV dec slope 477.0 cm/sec2    MR max felipe 442.0 cm/sec    MR max PG 78.1 mmHg    TR max PG 35.8 mmHg    RVSP(TR) 43.8 mmHg    RAP systole 8.0 mmHg    RV V1 max PG 3.5 mmHg    RV V1 max 93.8 cm/sec    RV V1 VTI 18.9 cm    PA V2 max 118.0 cm/sec    PA acc time 0.09 sec    PI end-d felipe 102.0 cm/sec    ACS 1.30 cm    Sinus 3.1 cm    EF(MOD-bp) 35.0 %    Dimensionless Index 0.49 (DI)   CBC (No Diff)    Collection Time: 04/09/24 10:44 AM    Specimen: Arm, Right; Blood   Result Value Ref Range    WBC 3.71 3.40 - 10.80 10*3/mm3    RBC 3.76 (L) 3.77 - 5.28 10*6/mm3    Hemoglobin 11.4 (L) 12.0 - 15.9 g/dL    Hematocrit 37.7 34.0 -  46.6 %    .3 (H) 79.0 - 97.0 fL    MCH 30.3 26.6 - 33.0 pg    MCHC 30.2 (L) 31.5 - 35.7 g/dL    RDW 14.1 12.3 - 15.4 %    RDW-SD 51.3 37.0 - 54.0 fl    MPV 10.3 6.0 - 12.0 fL    Platelets 70 (L) 140 - 450 10*3/mm3   Phosphorus    Collection Time: 04/09/24 10:44 AM    Specimen: Arm, Right; Blood   Result Value Ref Range    Phosphorus 4.1 2.5 - 4.5 mg/dL   Basic Metabolic Panel    Collection Time: 04/09/24 11:57 AM    Specimen: Arm, Right; Blood   Result Value Ref Range    Glucose 92 65 - 99 mg/dL    BUN 49 (H) 8 - 23 mg/dL    Creatinine 3.08 (H) 0.57 - 1.00 mg/dL    Sodium 139 136 - 145 mmol/L    Potassium 3.7 3.5 - 5.2 mmol/L    Chloride 100 98 - 107 mmol/L    CO2 26.0 22.0 - 29.0 mmol/L    Calcium 9.5 8.6 - 10.5 mg/dL    BUN/Creatinine Ratio 15.9 7.0 - 25.0    Anion Gap 13.0 5.0 - 15.0 mmol/L    eGFR 14.5 (L) >60.0 mL/min/1.73   Magnesium    Collection Time: 04/09/24 11:57 AM    Specimen: Arm, Right; Blood   Result Value Ref Range    Magnesium 2.1 1.6 - 2.4 mg/dL   POC Glucose Once    Collection Time: 04/09/24  7:25 PM    Specimen: Blood   Result Value Ref Range    Glucose 162 (H) 70 - 105 mg/dL   High Sensitivity Troponin T    Collection Time: 04/09/24 10:36 PM    Specimen: Blood   Result Value Ref Range    HS Troponin T 60 (C) <14 ng/L   High Sensitivity Troponin T 2Hr    Collection Time: 04/10/24  3:54 AM    Specimen: Blood   Result Value Ref Range    HS Troponin T 64 (C) <14 ng/L    Troponin T Delta 4 (C) >=-4 - <+4 ng/L          Imaging:  CT Chest Without Contrast Diagnostic    Result Date: 4/9/2024  Impression: Bilateral effusions with bibasilar atelectasis, greatest on the left. Moderately severe cardiomegaly without pulmonary edema. Evidence of chronic underlying lung disease. Electronically Signed: Meghan Gregg MD  4/9/2024 4:08 PM EDT  Workstation ID: [id number]    XR Chest PA & Lateral    Result Date: 4/9/2024  Impression: 1. Stable cardiomegaly. 2. Prominent interstitial markings compatible with  interstitial edema. 3. Stable small left pleural effusion Electronically Signed: Arie Nathan MD  4/9/2024 2:05 PM EDT  Workstation ID: [id number]    XR Chest 1 View    Result Date: 4/7/2024  Impression: Stable chronic findings without acute process. Electronically Signed: German Reece MD  4/7/2024 10:57 AM EDT  Workstation ID: [id number]     Assessment/Plan:     Syncope and collapse    Type 2 diabetes mellitus with diabetic chronic kidney disease    Paroxysmal atrial fibrillation    Essential hypertension    ESRD (end stage renal disease)    Anemia due to chronic kidney disease, on chronic dialysis    Hyperlipidemia            ESRD hemodialysis dependent   Syncope  Elevated BNP   Atrial fibrillation   Hyperlipidemia         Recommendations:  HD today  Follow cardiology work up

## 2024-04-10 NOTE — CONSULTS
Hematology/Oncology Inpatient Consultation    Patient name: Deann Warren  : 1940  MRN: [id number]  Referring Provider: Dr. Martinez  Reason for Consultation: Thrombocytopenia    Chief complaint: Multiple syncopal episodes    History of present illness:    Deann Warren is a 83 y.o. female who presented to [facility] on 2024 with complaints of multiple syncopal episodes often occurring during dialysis.  Past medical history of hyperlipidemia, atrial fibrillation, end-stage renal disease on dialysis.  Per the patient she is followed by Dr. Figueroa, cardiologist, but nothing has been found as a source of her recurrent syncope.  Her admission she is underwent cardiac evaluation that showed no evidence of valvular heart disease that could explain her syncope on echocardiogram.  She will have a Holter monitor placed at discharge.    04/10/24  Hematology/Oncology was consulted for thrombocytopenia.  Per review of the chart patient had a platelet count of 86,000 a month ago.  Baseline CBC 3 years ago shows mild macrocytic anemia and a normal platelet count at 180,000.    He/She  has a past medical history of Allergic conjunctivitis and rhinitis (2014), Anemia due to chronic kidney disease, on chronic dialysis (2020), Anxiety (2012), Chronic gouty arthritis (2014), Dependence on renal dialysis (2022), ESRD (end stage renal disease) (2020), Essential hypertension (2015), History of shingles (2022), Hyperlipidemia (2024), Paroxysmal atrial fibrillation (2022), Type 2 diabetes mellitus with diabetic chronic kidney disease (2022), and Vitamin D deficiency (2021).    PCP: No primary care provider on file.    History:  Past Medical History:   Diagnosis Date    Allergic conjunctivitis and rhinitis 2014    Anemia due to chronic kidney disease, on chronic dialysis 2020    Anxiety 2012    Chronic gouty arthritis  11/06/2014    Dependence on renal dialysis 07/01/2022    ESRD (end stage renal disease) 08/25/2020    Essential hypertension 09/16/2015    History of shingles 01/18/2022    Hyperlipidemia 04/08/2024    Paroxysmal atrial fibrillation 07/01/2022    Type 2 diabetes mellitus with diabetic chronic kidney disease 07/01/2022    Vitamin D deficiency 07/22/2021   ,   Past Surgical History:   Procedure Laterality Date    ARTERIOVENOUS FISTULA Left    ,   Family History   Family history unknown: Yes   ,   Social History     Tobacco Use    Smoking status: Never    Smokeless tobacco: Never   Vaping Use    Vaping status: Never Used   Substance Use Topics    Alcohol use: Never    Drug use: Never   ,   Medications Prior to Admission   Medication Sig Dispense Refill Last Dose    apixaban (ELIQUIS) 2.5 MG tablet tablet Take 1 tablet by mouth 2 (Two) Times a Day.   4/6/2024    B Complex-C-Folic Acid (EM-MARIA ESTHER PO) Take 1 tablet by mouth Daily.   4/6/2024    febuxostat (ULORIC) 40 MG tablet Take 1 tablet by mouth Daily.   4/6/2024    simvastatin (ZOCOR) 20 MG tablet Take 1 tablet by mouth Every Night.   4/6/2024    acetaminophen (TYLENOL) 500 MG tablet Take 1 tablet by mouth Every 6 (Six) Hours As Needed for Mild Pain.       NON FORMULARY Lidocaine 2.5% ointment -  Apply 1 application Monday, Wednesday, Friday before dialysis      , Scheduled Meds:  [Held by provider] apixaban, 2.5 mg, Oral, BID  atorvastatin, 10 mg, Oral, Nightly  budesonide, 0.5 mg, Nebulization, BID - RT  febuxostat, 40 mg, Oral, Daily  ipratropium-albuterol, 3 mL, Nebulization, 4x Daily - RT  metoprolol succinate XL, 12.5 mg, Oral, Q24H  Pharmacy Consult, , Does not apply, Once  sodium chloride, 10 mL, Intravenous, Q12H    , Continuous Infusions:   , PRN Meds:    albumin human    senna-docusate sodium **AND** polyethylene glycol **AND** bisacodyl **AND** bisacodyl    ondansetron    sodium chloride    [COMPLETED] Insert Peripheral IV **AND** sodium chloride     "sodium chloride    sodium chloride   Allergies:  Heparin    Subjective     ROS:  Review of Systems   Neurological:  Positive for syncope.   Hematological:  Bruises/bleeds easily.        Reports prolonged bleeding from her AV shunt post dialysis catheter removal.        Objective   Vital Signs:   /55   Pulse 65   Temp 97.8 °F (36.6 °C) (Oral)   Resp 18   Ht 162.6 cm (64\")   Wt 53.5 kg (118 lb)   SpO2 97%   BMI 20.25 kg/m²     Physical Exam: (performed by MD)  Physical Exam    Results Review:  Lab Results (last 48 hours)       Procedure Component Value Units Date/Time    Basic Metabolic Panel [[id number]] Collected: 04/10/24 0354    Specimen: Blood Updated: 04/10/24 0835    High Sensitivity Troponin T 2Hr [[id number]]  (Abnormal) Collected: 04/10/24 0354    Specimen: Blood Updated: 04/10/24 0513     HS Troponin T 64 ng/L      Troponin T Delta 4 ng/L     Narrative:      High Sensitive Troponin T Reference Range:  <14.0 ng/L- Negative Female for AMI  <22.0 ng/L- Negative Male for AMI  >=14 - Abnormal Female indicating possible myocardial injury.  >=22 - Abnormal Male indicating possible myocardial injury.   Clinicians would have to utilize clinical acumen, EKG, Troponin, and serial changes to determine if it is an Acute Myocardial Infarction or myocardial injury due to an underlying chronic condition.         High Sensitivity Troponin T [[id number]]  (Abnormal) Collected: 04/09/24 2236    Specimen: Blood Updated: 04/09/24 2322     HS Troponin T 60 ng/L      Comment: Specimen hemolyzed.  Results may be falsely decreased.       Narrative:      High Sensitive Troponin T Reference Range:  <14.0 ng/L- Negative Female for AMI  <22.0 ng/L- Negative Male for AMI  >=14 - Abnormal Female indicating possible myocardial injury.  >=22 - Abnormal Male indicating possible myocardial injury.   Clinicians would have to utilize clinical acumen, EKG, Troponin, and serial changes to determine if it is an Acute Myocardial " Infarction or myocardial injury due to an underlying chronic condition.         POC Glucose Once [[id number]]  (Abnormal) Collected: 04/09/24 1925    Specimen: Blood Updated: 04/09/24 1927     Glucose 162 mg/dL      Comment: Serial Number: [id number]:  [id number]       Basic Metabolic Panel [[id number]]  (Abnormal) Collected: 04/09/24 1157    Specimen: Blood from Arm, Right Updated: 04/09/24 1304     Glucose 92 mg/dL      BUN 49 mg/dL      Creatinine 3.08 mg/dL      Sodium 139 mmol/L      Potassium 3.7 mmol/L      Chloride 100 mmol/L      CO2 26.0 mmol/L      Calcium 9.5 mg/dL      BUN/Creatinine Ratio 15.9     Anion Gap 13.0 mmol/L      eGFR 14.5 mL/min/1.73      Comment: <15 Indicative of kidney failure       Narrative:      GFR Normal >60  Chronic Kidney Disease <60  Kidney Failure <15    The GFR formula is only valid for adults with stable renal function between ages 18 and 70.    Magnesium [[id number]]  (Normal) Collected: 04/09/24 1157    Specimen: Blood from Arm, Right Updated: 04/09/24 1304     Magnesium 2.1 mg/dL     Phosphorus [[id number]]  (Normal) Collected: 04/09/24 1044    Specimen: Blood from Arm, Right Updated: 04/09/24 1138     Phosphorus 4.1 mg/dL     CBC (No Diff) [[id number]]  (Abnormal) Collected: 04/09/24 1044    Specimen: Blood from Arm, Right Updated: 04/09/24 1114     WBC 3.71 10*3/mm3      RBC 3.76 10*6/mm3      Hemoglobin 11.4 g/dL      Hematocrit 37.7 %      .3 fL      MCH 30.3 pg      MCHC 30.2 g/dL      RDW 14.1 %      RDW-SD 51.3 fl      MPV 10.3 fL      Platelets 70 10*3/mm3     POC Glucose Once [[id number]]  (Normal) Collected: 04/08/24 0915    Specimen: Blood Updated: 04/08/24 0917     Glucose 81 mg/dL      Comment: Serial Number: [id number]:  [id number]                Pending Results:     Imaging Reviewed:   CT Chest Without Contrast Diagnostic    Result Date: 4/9/2024  Impression: Bilateral effusions with bibasilar atelectasis, greatest on the left. Moderately severe  cardiomegaly without pulmonary edema. Evidence of chronic underlying lung disease. Electronically Signed: Meghan Gregg MD  4/9/2024 4:08 PM EDT  Workstation ID: [id number]    XR Chest PA & Lateral    Result Date: 4/9/2024  Impression: 1. Stable cardiomegaly. 2. Prominent interstitial markings compatible with interstitial edema. 3. Stable small left pleural effusion Electronically Signed: Arie Nathan MD  4/9/2024 2:05 PM EDT  Workstation ID: [id number]    XR Chest 1 View    Result Date: 4/7/2024  Impression: Stable chronic findings without acute process. Electronically Signed: German Reece MD  4/7/2024 10:57 AM EDT  Workstation ID: [id number]          Assessment & Plan   ASSESSMENT  Thrombocytopenia: Patient with platelet count of 70,000 today which is up from 63,000 at admission.  Out patient labs show a platelet count of 86,000 last month.  Patient reports that she has had some prolonged bleeding and also easy bruising.  Denies any new medications.  Denies heparin use with her dialysis-although current dialysis orders show tight heparin.  4t score shows high probability for HIT.  Review of medications shows Uloric as a possible culprit for thrombocytopenia.  Macrocytic anemia: Hemoglobin 11.4, .3  Atrial fibrillation: On Eliquis for anticoagulation.  Platelets sufficient for continued anticoagulation.  Per review of cardiology notes patient being considered for watchman's procedure as an outpatient.    PLAN  Check HIT antibody, begin argatroban while in progress  CBC completed up to evaluate for platelet count being  Viral serologies  Check WOODROW, B12, folate, iron profile and ferritin      Further recommendations per Dr. Hansen  Electronically signed by LUCA Gomes, 04/10/24, 8:50 AM EDT.    4/10/2024: I was asked to see Ms. Warren, who came in the hospital because of recurrent syncope. She has a history of atrial fibrillation and she has been on hemodialysis for several years. For at least 3  years her dialysis has been done without hepatin because of presumed heparin allergy. However, she has not had thrombocytopenia or a diagnosis of heparin induced thrombocytopenia before. She is feeling better. Underwent dialysis today without heparin. She has had no bleeding and no history of thrombosis. Her past medical history is significant for diabetes that she reports never has required medication. She also has been treated for hypertension. Prior to the admission she had been on febuxostat and simvastatin. Social history and family history were reviewed. On exam she was found to be a chronically ill appearing woman. No distress. No jaundice. Poor dentition. No palpable adenopathy. Lungs clear and heart irregular. Abdomen soft and without hepatomegaly or splenomegaly. No edema. Reviewed the laboratory exams. My first inclination was to consider heparin induced thrombocytopenia because of the history of dialysis. However, that is not likely the case. Other possibilities need to be investigated. Discussed with her.   Reviewed the records with Ms. Manolo SEGOVIA and concur with her notes. I formulated the analysis and the plans.     Lazaro Hansen MD on 4/10/2024 at 19:24

## 2024-04-11 ENCOUNTER — APPOINTMENT (OUTPATIENT)
Dept: ULTRASOUND IMAGING | Facility: HOSPITAL | Age: 84
DRG: 312 | End: 2024-04-11
Payer: MEDICARE

## 2024-04-11 LAB
ANISOCYTOSIS BLD QL: NORMAL
APTT PPP: 27.6 SECONDS (ref 24–31)
BASOPHILS # BLD AUTO: 0.02 10*3/MM3 (ref 0–0.2)
BASOPHILS NFR BLD AUTO: 0.6 % (ref 0–1.5)
D DIMER PPP FEU-MCNC: 0.48 MG/L (FEU) (ref 0–0.83)
DEPRECATED RDW RBC AUTO: 52.4 FL (ref 37–54)
ELLIPTOCYTES BLD QL SMEAR: NORMAL
EOSINOPHIL # BLD AUTO: 0.08 10*3/MM3 (ref 0–0.4)
EOSINOPHIL NFR BLD AUTO: 2.4 % (ref 0.3–6.2)
ERYTHROCYTE [DISTWIDTH] IN BLOOD BY AUTOMATED COUNT: 14.2 % (ref 12.3–15.4)
FERRITIN SERPL-MCNC: 877 NG/ML (ref 13–150)
FOLATE SERPL-MCNC: 4.4 NG/ML (ref 4.78–24.2)
HCT VFR BLD AUTO: 33 % (ref 34–46.6)
HGB BLD-MCNC: 9.9 G/DL (ref 12–15.9)
IMM GRANULOCYTES # BLD AUTO: 0.01 10*3/MM3 (ref 0–0.05)
IMM GRANULOCYTES NFR BLD AUTO: 0.3 % (ref 0–0.5)
IRON 24H UR-MRATE: 66 MCG/DL (ref 37–145)
IRON SATN MFR SERPL: 25 % (ref 20–50)
LARGE PLATELETS: NORMAL
LYMPHOCYTES # BLD AUTO: 0.8 10*3/MM3 (ref 0.7–3.1)
LYMPHOCYTES NFR BLD AUTO: 24.2 % (ref 19.6–45.3)
MCH RBC QN AUTO: 30.3 PG (ref 26.6–33)
MCHC RBC AUTO-ENTMCNC: 30 G/DL (ref 31.5–35.7)
MCV RBC AUTO: 100.9 FL (ref 79–97)
MONOCYTES # BLD AUTO: 0.35 10*3/MM3 (ref 0.1–0.9)
MONOCYTES NFR BLD AUTO: 10.6 % (ref 5–12)
NEUTROPHILS NFR BLD AUTO: 2.05 10*3/MM3 (ref 1.7–7)
NEUTROPHILS NFR BLD AUTO: 61.9 % (ref 42.7–76)
NRBC BLD AUTO-RTO: 0 /100 WBC (ref 0–0.2)
PLATELET # BLD AUTO: 66 10*3/MM3 (ref 140–450)
PMV BLD AUTO: 10.3 FL (ref 6–12)
POIKILOCYTOSIS BLD QL SMEAR: NORMAL
RBC # BLD AUTO: 3.27 10*6/MM3 (ref 3.77–5.28)
SMALL PLATELETS BLD QL SMEAR: NORMAL
TIBC SERPL-MCNC: 265 MCG/DL (ref 298–536)
TRANSFERRIN SERPL-MCNC: 178 MG/DL (ref 200–360)
VIT B12 BLD-MCNC: 654 PG/ML (ref 211–946)
WBC MORPH BLD: NORMAL
WBC NRBC COR # BLD AUTO: 3.31 10*3/MM3 (ref 3.4–10.8)

## 2024-04-11 PROCEDURE — 85730 THROMBOPLASTIN TIME PARTIAL: CPT | Performed by: NURSE PRACTITIONER

## 2024-04-11 PROCEDURE — 99232 SBSQ HOSP IP/OBS MODERATE 35: CPT | Performed by: INTERNAL MEDICINE

## 2024-04-11 PROCEDURE — 94799 UNLISTED PULMONARY SVC/PX: CPT

## 2024-04-11 PROCEDURE — 83921 ORGANIC ACID SINGLE QUANT: CPT | Performed by: INTERNAL MEDICINE

## 2024-04-11 PROCEDURE — 85014 HEMATOCRIT: CPT | Performed by: INTERNAL MEDICINE

## 2024-04-11 PROCEDURE — 94761 N-INVAS EAR/PLS OXIMETRY MLT: CPT

## 2024-04-11 PROCEDURE — 85379 FIBRIN DEGRADATION QUANT: CPT | Performed by: NURSE PRACTITIONER

## 2024-04-11 PROCEDURE — 85007 BL SMEAR W/DIFF WBC COUNT: CPT | Performed by: NURSE PRACTITIONER

## 2024-04-11 PROCEDURE — 85025 COMPLETE CBC W/AUTO DIFF WBC: CPT | Performed by: NURSE PRACTITIONER

## 2024-04-11 PROCEDURE — 82747 ASSAY OF FOLIC ACID RBC: CPT | Performed by: INTERNAL MEDICINE

## 2024-04-11 PROCEDURE — 94664 DEMO&/EVAL PT USE INHALER: CPT

## 2024-04-11 PROCEDURE — 76705 ECHO EXAM OF ABDOMEN: CPT

## 2024-04-11 PROCEDURE — 83090 ASSAY OF HOMOCYSTEINE: CPT | Performed by: INTERNAL MEDICINE

## 2024-04-11 RX ORDER — FOLIC ACID 1 MG/1
1 TABLET ORAL DAILY
Status: DISCONTINUED | OUTPATIENT
Start: 2024-04-11 | End: 2024-04-12 | Stop reason: HOSPADM

## 2024-04-11 RX ADMIN — Medication 10 ML: at 09:29

## 2024-04-11 RX ADMIN — FEBUXOSTAT 40 MG: 40 TABLET, FILM COATED ORAL at 09:28

## 2024-04-11 RX ADMIN — IPRATROPIUM BROMIDE AND ALBUTEROL SULFATE 3 ML: .5; 3 SOLUTION RESPIRATORY (INHALATION) at 07:16

## 2024-04-11 RX ADMIN — FOLIC ACID 1 MG: 1 TABLET ORAL at 17:05

## 2024-04-11 RX ADMIN — METOPROLOL SUCCINATE 12.5 MG: 25 TABLET, FILM COATED, EXTENDED RELEASE ORAL at 09:28

## 2024-04-11 RX ADMIN — ATORVASTATIN CALCIUM 10 MG: 10 TABLET, FILM COATED ORAL at 20:32

## 2024-04-11 RX ADMIN — Medication 10 ML: at 20:33

## 2024-04-11 RX ADMIN — IPRATROPIUM BROMIDE AND ALBUTEROL SULFATE 3 ML: .5; 3 SOLUTION RESPIRATORY (INHALATION) at 11:19

## 2024-04-11 RX ADMIN — BUDESONIDE 0.5 MG: 0.5 INHALANT RESPIRATORY (INHALATION) at 07:11

## 2024-04-11 NOTE — PLAN OF CARE
Goal Outcome Evaluation: Patient will discharge home when medically stable.

## 2024-04-11 NOTE — PROGRESS NOTES
[facility] MEDICINE SERVICE  DAILY PROGRESS NOTE    NAME: Deann Warren  : 1940  MRN: [id number]      LOS: 2 days     PROVIDER OF SERVICE: Mary Ellen Martinez MD    Chief Complaint: Syncope and collapse    Subjective:     Interval History:  History taken from: patient  Patient seen and examined.  No acute events overnight.  Stated she had a brief syncopal episode this morning while laying in bed.  States that she feels good however after she woke up.  Denies any chest pain or shortness of breath.  Vital signs reviewed and stable.    Review of Systems:   Review of Systems  As above      Patient had echocardiogram done with possible ASD that requires YINA for further evaluation  Patient already followed up per cardiology  Pleural effusion is noted  Will get chest x-ray repeated and pulmonary consult for pleural effusion  effusion to evaluate if she needs thoracentesis  Patient had multiple syncopes as an outpatient has been follow-up with Dr. Figueroa    Patient need Holter monitor as an outpatient when discharged  4/10  Patient seen per cardiology  Patient echo noted  AST is probably not clinically significant at her age no YINA will be needed  Patient will need ischemic workup prior to discharge  Her platelet is 70 hematology will be consulted  Pulmonary CT of the chest done with bilateral pleural effusion left more than right  Hemodynamically stable otherwise      Patient seen with the son at bedside  As per cardiology ischemic workup may need to be done as an outpatient  Seen per pulmonary for pleural effusion  Seen per hematology for her thrombocytopenia which seems to be chronic  Complaining of cough  No respiratory distress currently on room air  Continue hemodialysis  Hold Eliquis due to her thrombocytopenia    Patient was possible ASD but given her advanced age she does not need YINA or any intervention at this time  Patient with recurrent syncope with no evidence of valvular heart disease to  explain her syncope on echo  Patient is going to need Holter monitor as an outpatient  May benefit from midodrine postdialysis  Discharge planning to skilled nursing facility  Will get PT evaluation and ambulation    Objective:     Vital Signs  Temp:  [97.5 °F (36.4 °C)-98.2 °F (36.8 °C)] 97.5 °F (36.4 °C)  Heart Rate:  [58-80] 61  Resp:  [14-22] 17  BP: (110-137)/(47-93) 126/57   Body mass index is 20.25 kg/m².    Physical Exam  Physical Exam  General Appearance: AOO x 4, cooperative, no distress, appropriate for age  Head:  Normocephalic, without obvious abnormality  Eyes:  PERRL, EOM's intact, conjunctivae and cornea clear  Nose:  Nares symmetrical, septum midline, mucosa pink  Throat:  Lips, tongue, and mucosa are moist, pink, and intact  Neck:  Supple; symmetrical, trachea midline, no adenopathy  Back:  Symmetrical, ROM normal, no CVA tenderness  Lungs: Respirations unlabored, no audible wheeze  Heart: Regular rate & rhythm, S1 and S2 normal  Abdomen:  Soft, nontender, bowel sounds active all four quadrants  Musculoskeletal: Tone and strength strong and symmetrical, all extremities; no joint pain or edema         Skin/Hair/Nails:  Skin warm, dry and intact, no rashes or abnormal dyspigmentation     Scheduled Meds   [Held by provider] apixaban, 2.5 mg, Oral, BID  atorvastatin, 10 mg, Oral, Nightly  budesonide, 0.5 mg, Nebulization, BID - RT  febuxostat, 40 mg, Oral, Daily  ipratropium-albuterol, 3 mL, Nebulization, 4x Daily - RT  metoprolol succinate XL, 12.5 mg, Oral, Q24H  sodium chloride, 10 mL, Intravenous, Q12H       PRN Meds     albumin human    senna-docusate sodium **AND** polyethylene glycol **AND** bisacodyl **AND** bisacodyl    ondansetron    sodium chloride    [COMPLETED] Insert Peripheral IV **AND** sodium chloride    sodium chloride    sodium chloride   Infusions         Diagnostic Data    Results from last 7 days   Lab Units 04/11/24  0500 04/10/24  1021 04/10/24  0354   WBC 10*3/mm3 3.31*   < >   --    HEMOGLOBIN g/dL 9.9*   < >  --    HEMATOCRIT % 33.0*   < >  --    PLATELETS 10*3/mm3 66*   < >  --    GLUCOSE mg/dL  --   --  97   CREATININE mg/dL  --   --  3.48*   BUN mg/dL  --   --  56*   SODIUM mmol/L  --   --  139   POTASSIUM mmol/L  --   --  4.3   ANION GAP mmol/L  --   --  12.0    < > = values in this interval not displayed.       US Abdomen Limited    Result Date: 4/11/2024  Spleen is normal in size without acute abnormality Liver is grossly unremarkable in appearance. Bilateral pleural effusions. Electronically Signed: Mani Hernandez MD  4/11/2024 10:45 AM EDT  Workstation ID: [id number]    CT Chest Without Contrast Diagnostic    Result Date: 4/9/2024  Impression: Bilateral effusions with bibasilar atelectasis, greatest on the left. Moderately severe cardiomegaly without pulmonary edema. Evidence of chronic underlying lung disease. Electronically Signed: Meghan Gregg MD  4/9/2024 4:08 PM EDT  Workstation ID: [id number]    XR Chest PA & Lateral    Result Date: 4/9/2024  Impression: 1. Stable cardiomegaly. 2. Prominent interstitial markings compatible with interstitial edema. 3. Stable small left pleural effusion Electronically Signed: Arie Nathan MD  4/9/2024 2:05 PM EDT  Workstation ID: [id number]       I reviewed the patient's new clinical results.    Assessment/Plan:     Active and Resolved Problems  Active Hospital Problems    Diagnosis  POA    **Syncope and collapse [R55]  Yes    Hyperlipidemia [E78.5]  Yes    Type 2 diabetes mellitus with diabetic chronic kidney disease [E11.22]  Yes    Paroxysmal atrial fibrillation [I48.0]  Yes    ESRD (end stage renal disease) [N18.6]  Yes    Anemia due to chronic kidney disease, on chronic dialysis [N18.6, D63.1, Z99.2]  Not Applicable    Essential hypertension [I10]  Yes      Resolved Hospital Problems   No resolved problems to display.     Syncope and collapse  Patient has had recurrent episodes of syncope in the past  Echocardiogram pending  Carotid  Dopplers unremarkable  Consult cardiology     Elevated BNP  BNP 51,559  Echocardiogram pending  Urology consulted     End-stage renal disease  Dialysis Monday Wednesday Friday  Urology on board  Repeat BMP in the a.m     Thrombocytopenia  Platelets 63  Consult oncology  Repeat CBC    Atrial fibrillation  Rate controlled  Hold Eliquis due to thrombocytopenia     Hyperlipidemia  Resume statin  Lipid panel within normal limits      DVT prophylaxis:  Medical DVT prophylaxis orders are present.         Code status is   Code Status and Medical Interventions:   Ordered at: 04/07/24 1420     Code Status (Patient has no pulse and is not breathing):    CPR (Attempt to Resuscitate)     Medical Interventions (Patient has pulse or is breathing):    Full Support       Plan for disposition:Home in 1-2  days    Time: 30 minutes    Signature: Electronically signed by Mary Ellen Martinez MD, 04/11/24, 11:56 EDT.  [facility] Hospitalist Team

## 2024-04-11 NOTE — PROGRESS NOTES
Hematology/Oncology Inpatient Progress Note    PATIENT NAME: Deann Warren  : 1940  MRN: [id number]    CHIEF COMPLAINT: Thrombocytopenia    HISTORY OF PRESENT ILLNESS:      4/10/2024: I was asked to see Ms. Warren, who came in the hospital because of recurrent syncope. She has a history of atrial fibrillation and she has been on hemodialysis for several years. For at least 3 years her dialysis has been done without hepatin because of presumed heparin allergy. However, she has not had thrombocytopenia or a diagnosis of heparin induced thrombocytopenia before. She is feeling better. Underwent dialysis today without heparin. She has had no bleeding and no history of thrombosis. Her past medical history is significant for diabetes that she reports never has required medication. She also has been treated for hypertension. Prior to the admission she had been on febuxostat and simvastatin. Social history and family history were reviewed. On exam she was found to be a chronically ill appearing woman. No distress. No jaundice. Poor dentition. No palpable adenopathy. Lungs clear and heart irregular. Abdomen soft and without hepatomegaly or splenomegaly. No edema. Reviewed the laboratory exams. My first inclination was to consider heparin induced thrombocytopenia because of the history of dialysis. However, that is not likely the case. Other possibilities need to be investigated. Discussed with her.     Subjective   2024: Without any symptoms today.  She was able to sleep well and ate a good breakfast this morning.  No bleeding.  No syncope.    ROS:  Review of Systems   Constitutional:  Negative for activity change, appetite change, chills, diaphoresis, fatigue, fever and unexpected weight change.   HENT:  Negative for congestion, dental problem, drooling, ear discharge, ear pain, facial swelling, hearing loss, mouth sores, nosebleeds, postnasal drip, rhinorrhea, sinus pressure, sinus pain, sneezing, sore  throat, tinnitus, trouble swallowing and voice change.    Eyes:  Negative for photophobia, pain, discharge, redness, itching and visual disturbance.   Respiratory:  Negative for apnea, cough, choking, chest tightness, shortness of breath, wheezing and stridor.    Cardiovascular:  Negative for chest pain, palpitations and leg swelling.   Gastrointestinal:  Negative for abdominal distention, abdominal pain, anal bleeding, blood in stool, constipation, diarrhea, nausea, rectal pain and vomiting.   Endocrine: Negative for cold intolerance, heat intolerance, polydipsia and polyuria.   Genitourinary:  Negative for decreased urine volume, difficulty urinating, dysuria, flank pain, frequency, genital sores, hematuria and urgency.   Musculoskeletal:  Negative for arthralgias, back pain, gait problem, joint swelling, myalgias, neck pain and neck stiffness.   Skin:  Negative for color change, pallor and rash.   Neurological:  Negative for dizziness, tremors, seizures, syncope, facial asymmetry, speech difficulty, weakness, light-headedness, numbness and headaches.   Hematological:  Negative for adenopathy. Does not bruise/bleed easily.   Psychiatric/Behavioral:  Negative for agitation, behavioral problems, confusion, decreased concentration, hallucinations, self-injury, sleep disturbance and suicidal ideas. The patient is not nervous/anxious.         MEDICATIONS:    Scheduled Meds:  [Held by provider] apixaban, 2.5 mg, Oral, BID  atorvastatin, 10 mg, Oral, Nightly  budesonide, 0.5 mg, Nebulization, BID - RT  febuxostat, 40 mg, Oral, Daily  ipratropium-albuterol, 3 mL, Nebulization, 4x Daily - RT  metoprolol succinate XL, 12.5 mg, Oral, Q24H  sodium chloride, 10 mL, Intravenous, Q12H       Continuous Infusions:      PRN Meds:    albumin human    senna-docusate sodium **AND** polyethylene glycol **AND** bisacodyl **AND** bisacodyl    ondansetron    sodium chloride    [COMPLETED] Insert Peripheral IV **AND** sodium chloride     "sodium chloride    sodium chloride     ALLERGIES:    Allergies   Allergen Reactions    Heparin Hives       Objective    VITALS:   /62 (BP Location: Right arm, Patient Position: Lying)   Pulse 61   Temp 97.5 °F (36.4 °C) (Oral)   Resp 22   Ht 162.6 cm (64\")   Wt 53.5 kg (118 lb)   SpO2 98%   BMI 20.25 kg/m²     PHYSICAL EXAM: (performed by MD)  Physical Exam  Constitutional:       General: She is not in acute distress.     Appearance: She is not ill-appearing, toxic-appearing or diaphoretic.      Comments: Slender, well-built and well-oriented woman.  No distress.   HENT:      Head: Normocephalic and atraumatic.      Right Ear: External ear normal.      Left Ear: External ear normal.      Nose: Nose normal.      Mouth/Throat:      Mouth: Mucous membranes are moist.      Pharynx: Oropharynx is clear. No oropharyngeal exudate or posterior oropharyngeal erythema.   Eyes:      General: No scleral icterus.        Right eye: No discharge.         Left eye: No discharge.      Conjunctiva/sclera: Conjunctivae normal.      Pupils: Pupils are equal, round, and reactive to light.   Cardiovascular:      Rate and Rhythm: Normal rate and regular rhythm.      Pulses: Normal pulses.      Heart sounds: No murmur heard.     No friction rub. No gallop.   Pulmonary:      Effort: No respiratory distress.      Breath sounds: No stridor. No wheezing, rhonchi or rales.   Abdominal:      General: Abdomen is flat. Bowel sounds are normal. There is no distension.      Palpations: Abdomen is soft. There is no mass.      Tenderness: There is no abdominal tenderness. There is no right CVA tenderness, left CVA tenderness, guarding or rebound.      Hernia: No hernia is present.   Musculoskeletal:         General: No swelling, tenderness, deformity or signs of injury.      Cervical back: No rigidity.      Right lower leg: No edema.      Left lower leg: No edema.   Lymphadenopathy:      Cervical: No cervical adenopathy.   Skin:     " Coloration: Skin is not jaundiced.      Findings: No bruising, lesion or rash.   Neurological:      General: No focal deficit present.      Mental Status: She is alert and oriented to person, place, and time.      Cranial Nerves: No cranial nerve deficit.      Motor: No weakness.      Gait: Gait normal.   Psychiatric:         Mood and Affect: Mood normal.         Behavior: Behavior normal.         Thought Content: Thought content normal.         Judgment: Judgment normal.     DANAY Hansen MD performed the physical exam on 4/11/2024 as documented above.    RECENT LABS:  Lab Results (last 24 hours)       Procedure Component Value Units Date/Time    Folate [[id number]]  (Abnormal) Collected: 04/10/24 2020    Specimen: Blood Updated: 04/11/24 1134     Folate 4.40 ng/mL     Narrative:      Results may be falsely increased if patient taking Biotin.      Vitamin B12 [[id number]]  (Normal) Collected: 04/10/24 2020    Specimen: Blood Updated: 04/11/24 1134     Vitamin B-12 654 pg/mL     Narrative:      Results may be falsely increased if patient taking Biotin.      Ferritin [[id number]]  (Abnormal) Collected: 04/10/24 2020    Specimen: Blood Updated: 04/11/24 1134     Ferritin 877.00 ng/mL     Narrative:      Results may be falsely decreased if patient taking Biotin.      Iron Profile [[id number]]  (Abnormal) Collected: 04/10/24 2020    Specimen: Blood Updated: 04/11/24 1120     Iron 66 mcg/dL      Iron Saturation (TSAT) 25 %      Transferrin 178 mg/dL      TIBC 265 mcg/dL     CBC & Differential [[id number]]  (Abnormal) Collected: 04/11/24 0500    Specimen: Blood Updated: 04/11/24 0553    Narrative:      The following orders were created for panel order CBC & Differential.  Procedure                               Abnormality         Status                     ---------                               -----------         ------                     CBC Auto Differential[[id number]]        Abnormal            Final result                Scan Slide[[id number]]                                       Final result                 Please view results for these tests on the individual orders.    CBC Auto Differential [[id number]]  (Abnormal) Collected: 04/11/24 0500    Specimen: Blood Updated: 04/11/24 0553     WBC 3.31 10*3/mm3      RBC 3.27 10*6/mm3      Hemoglobin 9.9 g/dL      Hematocrit 33.0 %      .9 fL      MCH 30.3 pg      MCHC 30.0 g/dL      RDW 14.2 %      RDW-SD 52.4 fl      MPV 10.3 fL      Platelets 66 10*3/mm3      Comment: Result checked          Neutrophil % 61.9 %      Lymphocyte % 24.2 %      Monocyte % 10.6 %      Eosinophil % 2.4 %      Basophil % 0.6 %      Immature Grans % 0.3 %      Neutrophils, Absolute 2.05 10*3/mm3      Lymphocytes, Absolute 0.80 10*3/mm3      Monocytes, Absolute 0.35 10*3/mm3      Eosinophils, Absolute 0.08 10*3/mm3      Basophils, Absolute 0.02 10*3/mm3      Immature Grans, Absolute 0.01 10*3/mm3      nRBC 0.0 /100 WBC     Scan Slide [[id number]] Collected: 04/11/24 0500    Specimen: Blood Updated: 04/11/24 0553     Anisocytosis Slight/1+     Elliptocytes Slight/1+     Poikilocytes Slight/1+     WBC Morphology Normal     Platelet Estimate Decreased     Large Platelets Slight/1+    aPTT [[id number]]  (Normal) Collected: 04/11/24 0500    Specimen: Blood Updated: 04/11/24 0526     PTT 27.6 seconds     D-dimer, Quantitative [[id number]]  (Normal) Collected: 04/11/24 0500    Specimen: Blood Updated: 04/11/24 0526     D-Dimer, Quantitative 0.48 mg/L (FEU)     Narrative:      According to the assay 's published package insert, a normal (<0.50 mg/L (FEU)) D-dimer result in conjunction with a non-high clinical probability assessment, excludes deep vein thrombosis (DVT) and pulmonary embolism (PE) with high sensitivity.    D-dimer values increase with age and this can make VTE exclusion of an older population difficult. To address this, the [employer], based on best available  "evidence and recent guidelines, recommends that clinicians use age-adjusted D-dimer thresholds in patients greater than 50 years of age with: a) a low probability of PE who do not meet all Pulmonary Embolism Rule Out Criteria, or b) in those with intermediate probability of PE.   The formula for an age-adjusted D-dimer cut-off is \"age/100\".  For example, a 60 year old patient would have an age-adjusted cut-off of 0.60 mg/L (FEU) and an 80 year old 0.80 mg/L (FEU).    CBC & Differential [[id number]]  (Abnormal) Collected: 04/10/24 2020    Specimen: Blood Updated: 04/10/24 2129    Narrative:      The following orders were created for panel order CBC & Differential.  Procedure                               Abnormality         Status                     ---------                               -----------         ------                     CBC Auto Differential[[id number]]        Abnormal            Final result               Scan Slide[[id number]]                                       Final result                 Please view results for these tests on the individual orders.    CBC Auto Differential [[id number]]  (Abnormal) Collected: 04/10/24 2020    Specimen: Blood Updated: 04/10/24 2129     WBC 3.32 10*3/mm3      RBC 3.29 10*6/mm3      Hemoglobin 10.1 g/dL      Hematocrit 32.3 %      MCV 98.2 fL      MCH 30.7 pg      MCHC 31.3 g/dL      RDW 14.2 %      RDW-SD 51.0 fl      MPV 10.1 fL      Platelets 67 10*3/mm3      Comment: Result checked          Neutrophil % 66.9 %      Lymphocyte % 21.1 %      Monocyte % 9.9 %      Eosinophil % 1.5 %      Basophil % 0.3 %      Immature Grans % 0.3 %      Neutrophils, Absolute 2.22 10*3/mm3      Lymphocytes, Absolute 0.70 10*3/mm3      Monocytes, Absolute 0.33 10*3/mm3      Eosinophils, Absolute 0.05 10*3/mm3      Basophils, Absolute 0.01 10*3/mm3      Immature Grans, Absolute 0.01 10*3/mm3      nRBC 0.0 /100 WBC     Scan Slide [[id number]] Collected: 04/10/24 2020    Specimen: Blood " Updated: 04/10/24 2129     Elliptocytes Slight/1+     WBC Morphology Normal     Platelet Estimate Decreased    HIV-1 & HIV-2 Antibodies [[id number]]  (Normal) Collected: 04/10/24 2020    Specimen: Blood Updated: 04/10/24 2115    Narrative:      The following orders were created for panel order HIV-1 & HIV-2 Antibodies.  Procedure                               Abnormality         Status                     ---------                               -----------         ------                     HIV-1 / O / 2 Ag / Antibody[[id number]]  Normal              Final result                 Please view results for these tests on the individual orders.    HIV-1 / O / 2 Ag / Antibody [[id number]]  (Normal) Collected: 04/10/24 2020    Specimen: Blood Updated: 04/10/24 2115     HIV-1/ HIV-2 Non-Reactive     Comment: A non-reactive test result does not preclude the possibility of exposure to HIV or infection with HIV. An antibody response to recent exposure may take several months to reach detectable levels.       Narrative:      The HIV antibody/antigen combo assay is a qualitative assay for HIV that includes the p24 antigen as well as antibodies to HIV types 1 and 2. This test is intended to be used as a screening assay in the diagnosis of HIV infection in patients over the age of 2.    Hepatitis C Antibody [[id number]]  (Normal) Collected: 04/10/24 2020    Specimen: Blood Updated: 04/10/24 2106     Hepatitis C Ab Non-Reactive    [place] [[id number]] Collected: 04/10/24 2020    Specimen: Blood Updated: 04/10/24 2033    Heparin Induced PLT Antibody With / Rfx [[id number]] Collected: 04/10/24 2020    Specimen: Blood Updated: 04/10/24 2033    Hepatitis B Surface Antigen [[id number]]  (Normal) Collected: 04/10/24 1606    Specimen: Blood Updated: 04/10/24 1848     Hepatitis B Surface Ag Non-Reactive          IMAGING REVIEWED:  US Abdomen Limited    Result Date: 4/11/2024  Spleen is normal in size without acute abnormality Liver is grossly  unremarkable in appearance. Bilateral pleural effusions. Electronically Signed: Mani Hernandez MD  4/11/2024 10:45 AM EDT  Workstation ID: [id number]     Assessment & Plan   ASSESSMENT:  Thrombocytopenia: Persists.  No evidence of iron or B12 deficiency.  Her folate, however, is rather low.  Will start supplementation.  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  No intervention was first transfusion at this time.  Reviewed all laboratory exams.  Discussed the results with her.    PLAN:  As above.    Lazaro Hansen MD on 4/11/2024 at 1649.

## 2024-04-11 NOTE — PROGRESS NOTES
"PULMONARY CRITICAL CARE PROGRESS  NOTE      PATIENT IDENTIFICATION:  Name: Deann Warren  MRN: [id number]  :  1940     Age: 83 y.o.  Sex: female    DATE OF Note:  2024   Referring Physician: Cindy Dasilva MD                  Subjective:   Feeling better, on room air,   No new issue overnight    no chest or abd pain, no bowel or bladder issues     Objective:  tMax 24 hrs: Temp (24hrs), Av.8 °F (36.6 °C), Min:97.5 °F (36.4 °C), Max:98.2 °F (36.8 °C)      Vitals Ranges:   Temp:  [97.5 °F (36.4 °C)-98.2 °F (36.8 °C)] 97.5 °F (36.4 °C)  Heart Rate:  [58-80] 66  Resp:  [14-23] 23  BP: (110-137)/(47-93) 120/65    Intake and Output Last 3 Shifts:   I/O last 3 completed shifts:  In: 360 [P.O.:360]  Out: 0     Exam:  /65 (BP Location: Right arm, Patient Position: Lying)   Pulse 66   Temp 97.5 °F (36.4 °C) (Oral)   Resp 23   Ht 162.6 cm (64\")   Wt 53.5 kg (118 lb)   SpO2 96%   BMI 20.25 kg/m²     General Appearance:     HEENT:  Normocephalic, without obvious abnormality. Conjunctivae/corneas clear.  Normal external ear canals. Nares normal, no drainage     Neck:  Supple, symmetrical, trachea midline. No JVD.  Lungs /Chest wall:   Bilateral basal rhonchi, respirations unlabored, symmetrical wall movement.     Heart:  Regular rate and rhythm, systolic murmur PMI left sternal border  Abdomen: Soft, nontender, no masses, no organomegaly.    Extremities: Trace edema, no clubbing or cyanosis        Medications:  [Held by provider] apixaban, 2.5 mg, Oral, BID  atorvastatin, 10 mg, Oral, Nightly  budesonide, 0.5 mg, Nebulization, BID - RT  febuxostat, 40 mg, Oral, Daily  ipratropium-albuterol, 3 mL, Nebulization, 4x Daily - RT  metoprolol succinate XL, 12.5 mg, Oral, Q24H  sodium chloride, 10 mL, Intravenous, Q12H        Infusion:        PRN:    albumin human    senna-docusate sodium **AND** polyethylene glycol **AND** bisacodyl **AND** bisacodyl    ondansetron    sodium chloride    [COMPLETED] " Insert Peripheral IV **AND** sodium chloride    sodium chloride    sodium chloride  Data Review:  All labs (24hrs):   Recent Results (from the past 24 hour(s))   Hepatitis B Surface Antigen    Collection Time: 04/10/24  4:06 PM    Specimen: Blood   Result Value Ref Range    Hepatitis B Surface Ag Non-Reactive Non-Reactive   Folate    Collection Time: 04/10/24  8:20 PM    Specimen: Blood   Result Value Ref Range    Folate 4.40 (L) 4.78 - 24.20 ng/mL   Hepatitis C Antibody    Collection Time: 04/10/24  8:20 PM    Specimen: Blood   Result Value Ref Range    Hepatitis C Ab Non-Reactive Non-Reactive   Vitamin B12    Collection Time: 04/10/24  8:20 PM    Specimen: Blood   Result Value Ref Range    Vitamin B-12 654 211 - 946 pg/mL   CBC Auto Differential    Collection Time: 04/10/24  8:20 PM    Specimen: Blood   Result Value Ref Range    WBC 3.32 (L) 3.40 - 10.80 10*3/mm3    RBC 3.29 (L) 3.77 - 5.28 10*6/mm3    Hemoglobin 10.1 (L) 12.0 - 15.9 g/dL    Hematocrit 32.3 (L) 34.0 - 46.6 %    MCV 98.2 (H) 79.0 - 97.0 fL    MCH 30.7 26.6 - 33.0 pg    MCHC 31.3 (L) 31.5 - 35.7 g/dL    RDW 14.2 12.3 - 15.4 %    RDW-SD 51.0 37.0 - 54.0 fl    MPV 10.1 6.0 - 12.0 fL    Platelets 67 (L) 140 - 450 10*3/mm3    Neutrophil % 66.9 42.7 - 76.0 %    Lymphocyte % 21.1 19.6 - 45.3 %    Monocyte % 9.9 5.0 - 12.0 %    Eosinophil % 1.5 0.3 - 6.2 %    Basophil % 0.3 0.0 - 1.5 %    Immature Grans % 0.3 0.0 - 0.5 %    Neutrophils, Absolute 2.22 1.70 - 7.00 10*3/mm3    Lymphocytes, Absolute 0.70 0.70 - 3.10 10*3/mm3    Monocytes, Absolute 0.33 0.10 - 0.90 10*3/mm3    Eosinophils, Absolute 0.05 0.00 - 0.40 10*3/mm3    Basophils, Absolute 0.01 0.00 - 0.20 10*3/mm3    Immature Grans, Absolute 0.01 0.00 - 0.05 10*3/mm3    nRBC 0.0 0.0 - 0.2 /100 WBC   HIV-1 / O / 2 Ag / Antibody    Collection Time: 04/10/24  8:20 PM    Specimen: Blood   Result Value Ref Range    HIV-1/ HIV-2 Non-Reactive Non-Reactive   Scan Slide    Collection Time: 04/10/24  8:20 PM     Specimen: Blood   Result Value Ref Range    Elliptocytes Slight/1+ None Seen    WBC Morphology Normal Normal    Platelet Estimate Decreased Normal   Iron Profile    Collection Time: 04/10/24  8:20 PM    Specimen: Blood   Result Value Ref Range    Iron 66 37 - 145 mcg/dL    Iron Saturation (TSAT) 25 20 - 50 %    Transferrin 178 (L) 200 - 360 mg/dL    TIBC 265 (L) 298 - 536 mcg/dL   Ferritin    Collection Time: 04/10/24  8:20 PM    Specimen: Blood   Result Value Ref Range    Ferritin 877.00 (H) 13.00 - 150.00 ng/mL   aPTT    Collection Time: 04/11/24  5:00 AM    Specimen: Blood   Result Value Ref Range    PTT 27.6 24.0 - 31.0 seconds   D-dimer, Quantitative    Collection Time: 04/11/24  5:00 AM    Specimen: Blood   Result Value Ref Range    D-Dimer, Quantitative 0.48 0.00 - 0.83 mg/L (FEU)   CBC Auto Differential    Collection Time: 04/11/24  5:00 AM    Specimen: Blood   Result Value Ref Range    WBC 3.31 (L) 3.40 - 10.80 10*3/mm3    RBC 3.27 (L) 3.77 - 5.28 10*6/mm3    Hemoglobin 9.9 (L) 12.0 - 15.9 g/dL    Hematocrit 33.0 (L) 34.0 - 46.6 %    .9 (H) 79.0 - 97.0 fL    MCH 30.3 26.6 - 33.0 pg    MCHC 30.0 (L) 31.5 - 35.7 g/dL    RDW 14.2 12.3 - 15.4 %    RDW-SD 52.4 37.0 - 54.0 fl    MPV 10.3 6.0 - 12.0 fL    Platelets 66 (L) 140 - 450 10*3/mm3    Neutrophil % 61.9 42.7 - 76.0 %    Lymphocyte % 24.2 19.6 - 45.3 %    Monocyte % 10.6 5.0 - 12.0 %    Eosinophil % 2.4 0.3 - 6.2 %    Basophil % 0.6 0.0 - 1.5 %    Immature Grans % 0.3 0.0 - 0.5 %    Neutrophils, Absolute 2.05 1.70 - 7.00 10*3/mm3    Lymphocytes, Absolute 0.80 0.70 - 3.10 10*3/mm3    Monocytes, Absolute 0.35 0.10 - 0.90 10*3/mm3    Eosinophils, Absolute 0.08 0.00 - 0.40 10*3/mm3    Basophils, Absolute 0.02 0.00 - 0.20 10*3/mm3    Immature Grans, Absolute 0.01 0.00 - 0.05 10*3/mm3    nRBC 0.0 0.0 - 0.2 /100 WBC   Scan Slide    Collection Time: 04/11/24  5:00 AM    Specimen: Blood   Result Value Ref Range    Anisocytosis Slight/1+ None Seen     Elliptocytes Slight/1+ None Seen    Poikilocytes Slight/1+ None Seen    WBC Morphology Normal Normal    Platelet Estimate Decreased Normal    Large Platelets Slight/1+ None Seen        Imaging:  US Abdomen Limited  Narrative: US ABDOMEN LIMITED    Date of Exam: 2024 10:03 AM EDT    Indication: Measure spleen size and evaluate echotexture of liver..    Comparison: No comparisons available.    Technique: Grayscale and color Doppler ultrasound evaluation of the liver and spleen was performed.    FINDINGS:    Liver: The liver demonstrates normal echogenicity without evidence of intrahepatic biliary ductal dilatation. Hepatic and portal vein are patent.    Spleen is grossly unremarkable on limited imaging. Spleen volume 307 mL.    Patient appears to be status post cholecystectomy    Common bile duct is within normal limits measurin.4 mm    Bilateral pleural effusions noted left greater than right.  Impression: Spleen is normal in size without acute abnormality    Liver is grossly unremarkable in appearance.    Bilateral pleural effusions.    Electronically Signed: Mani Hernandez MD    2024 10:45 AM EDT    Workstation ID: [id number]       ASSESSMENT:  Left pleural effusion   Syncope  Congestive heart failure EF 35%  A-fib  HTN  ESRD on HD  Anemia  Hyperlipidemia   Thrombocytopenia           PLAN:  Room air no shortness a breath  Bronchodilators  Inhaled corticosteroids  Incentive spirometer  Diuresis per renal, monitor JUMANA's  Dialysis per renal  Electrolytes/ glycemic control  DVT and GI prophylaxis      Total Critical care time in direct medical management (   ) minutes, This time specifically excludes time spent performing procedures.    Alma Brown MD   2024  12:43 EDT

## 2024-04-11 NOTE — PROGRESS NOTES
"PULMONARY CRITICAL CARE PROGRESS  NOTE      PATIENT IDENTIFICATION:  Name: Deann Warren  MRN: [id number]  :  1940     Age: 83 y.o.  Sex: female    DATE OF Note:  4/10/2024   Referring Physician: Cindy Dasilva MD                  Subjective:   Feeling better, on room air, no SOB, no chest or abd pain, no bowel or bladder issues     Objective:  tMax 24 hrs: Temp (24hrs), Av.7 °F (36.5 °C), Min:97.4 °F (36.3 °C), Max:98 °F (36.7 °C)      Vitals Ranges:   Temp:  [97.4 °F (36.3 °C)-98 °F (36.7 °C)] 97.8 °F (36.6 °C)  Heart Rate:  [59-82] 63  Resp:  [14-24] 20  BP: (113-137)/(42-93) 126/52    Intake and Output Last 3 Shifts:   I/O last 3 completed shifts:  In: 1360 [P.O.:1360]  Out: 0     Exam:  /52   Pulse 63   Temp 97.8 °F (36.6 °C) (Oral)   Resp 20   Ht 162.6 cm (64\")   Wt 53.5 kg (118 lb)   SpO2 96%   BMI 20.25 kg/m²     General Appearance:     HEENT:  Normocephalic, without obvious abnormality. Conjunctivae/corneas clear.  Normal external ear canals. Nares normal, no drainage     Neck:  Supple, symmetrical, trachea midline. No JVD.  Lungs /Chest wall:   Bilateral basal rhonchi, respirations unlabored, symmetrical wall movement.     Heart:  Regular rate and rhythm, systolic murmur PMI left sternal border  Abdomen: Soft, nontender, no masses, no organomegaly.    Extremities: Trace edema, no clubbing or cyanosis        Medications:  [Held by provider] apixaban, 2.5 mg, Oral, BID  atorvastatin, 10 mg, Oral, Nightly  budesonide, 0.5 mg, Nebulization, BID - RT  febuxostat, 40 mg, Oral, Daily  ipratropium-albuterol, 3 mL, Nebulization, 4x Daily - RT  metoprolol succinate XL, 12.5 mg, Oral, Q24H  sodium chloride, 10 mL, Intravenous, Q12H        Infusion:        PRN:    albumin human    senna-docusate sodium **AND** polyethylene glycol **AND** bisacodyl **AND** bisacodyl    ondansetron    sodium chloride    [COMPLETED] Insert Peripheral IV **AND** sodium chloride    sodium chloride    sodium " chloride  Data Review:  All labs (24hrs):   Recent Results (from the past 24 hour(s))   High Sensitivity Troponin T    Collection Time: 04/09/24 10:36 PM    Specimen: Blood   Result Value Ref Range    HS Troponin T 60 (C) <14 ng/L   High Sensitivity Troponin T 2Hr    Collection Time: 04/10/24  3:54 AM    Specimen: Blood   Result Value Ref Range    HS Troponin T 64 (C) <14 ng/L    Troponin T Delta 4 (C) >=-4 - <+4 ng/L   Basic Metabolic Panel    Collection Time: 04/10/24  3:54 AM    Specimen: Blood   Result Value Ref Range    Glucose 97 65 - 99 mg/dL    BUN 56 (H) 8 - 23 mg/dL    Creatinine 3.48 (H) 0.57 - 1.00 mg/dL    Sodium 139 136 - 145 mmol/L    Potassium 4.3 3.5 - 5.2 mmol/L    Chloride 103 98 - 107 mmol/L    CO2 24.0 22.0 - 29.0 mmol/L    Calcium 8.7 8.6 - 10.5 mg/dL    BUN/Creatinine Ratio 16.1 7.0 - 25.0    Anion Gap 12.0 5.0 - 15.0 mmol/L    eGFR 12.5 (L) >60.0 mL/min/1.73   CBC (No Diff)    Collection Time: 04/10/24 10:21 AM    Specimen: Arm, Right; Blood   Result Value Ref Range    WBC 3.60 3.40 - 10.80 10*3/mm3    RBC 3.48 (L) 3.77 - 5.28 10*6/mm3    Hemoglobin 10.7 (L) 12.0 - 15.9 g/dL    Hematocrit 34.8 34.0 - 46.6 %    .0 (H) 79.0 - 97.0 fL    MCH 30.7 26.6 - 33.0 pg    MCHC 30.7 (L) 31.5 - 35.7 g/dL    RDW 14.3 12.3 - 15.4 %    RDW-SD 51.7 37.0 - 54.0 fl    MPV 10.1 6.0 - 12.0 fL    Platelets 63 (L) 140 - 450 10*3/mm3   Hepatitis B Surface Antigen    Collection Time: 04/10/24  4:06 PM    Specimen: Blood   Result Value Ref Range    Hepatitis B Surface Ag Non-Reactive Non-Reactive        Imaging:  CT Chest Without Contrast Diagnostic  Narrative: CT CHEST WO CONTRAST DIAGNOSTIC    Date of Exam: 4/9/2024 4:02 PM EDT    Indication: sob.    Comparison: None available.    Technique: Axial CT images were obtained of the chest without contrast administration.  Sagittal and coronal reconstructions were performed.  Automated exposure control and iterative reconstruction methods were  used.    Findings:  The right thyroid lobe is enlarged and extends behind the right clavicle. There are no enlarged mediastinal nodes. Central pulmonary arteries are prominent. The ascending thoracic aorta measures 4 cm transversely and the descending measures 3 cm   transversely. There are extensive coronary calcifications. There is moderately severe cardiomegaly. There are small bilateral pleural effusions, greatest on the left. There is mild atelectasis of the lung bases, greatest on the left. There is no definite   pulmonary edema. Some increased interstitial markings of the bilateral lung fields suggest chronic lung disease.  Impression: Impression:  Bilateral effusions with bibasilar atelectasis, greatest on the left. Moderately severe cardiomegaly without pulmonary edema. Evidence of chronic underlying lung disease.    Electronically Signed: Meghan Gregg MD    4/9/2024 4:08 PM EDT    Workstation ID: [id number]  XR Chest PA & Lateral  Narrative: XR CHEST PA AND LATERAL    Date of Exam: 4/9/2024 1:37 PM EDT    Indication: pleural effusion    Comparison: 4/7/2024    Findings:  Cardiomegaly is stable. Pulmonary vessels are within normal limits. There is tissue markings are prominent suggesting interstitial edema. There is a small left pleural effusion. No focal airspace consolidation. No right pleural effusion or pneumothorax.   Bony structures are unremarkable.  Impression: Impression:    1. Stable cardiomegaly.  2. Prominent interstitial markings compatible with interstitial edema.  3. Stable small left pleural effusion    Electronically Signed: Arie Nathan MD    4/9/2024 2:05 PM EDT    Workstation ID: [id number]  Adult Transthoracic Echo Complete W/ Cont if Necessary Per Protocol    Left ventricular systolic function is moderately decreased. Calculated   left ventricular EF = 35%    The left ventricular cavity is mildly dilated.    The following left ventricular wall segments are hypokinetic: apical    anterior.    Left ventricular diastolic function was indeterminate.    The left atrial cavity is moderately dilated.    Saline test results are positive with valsalva manuever.    Estimated right ventricular systolic pressure from tricuspid   regurgitation is mildly elevated (35-45 mmHg).    There is a large left pleural effusion.    There appears to be an ASD present with left to right shunting based on   color dopple. Consider YINA to further evaluate       ASSESSMENT:  Left pleural effusion   Syncope  Congestive heart failure EF 35%  A-fib  HTN  ESRD on HD  Anemia  Hyperlipidemia   Thrombocytopenia           PLAN:  Encourage OOB daily   Bronchodilators  Inhaled corticosteroids  Incentive spirometer  Diuresis per renal, monitor JUMANA's  Dialysis per renal  Electrolytes/ glycemic control  DVT and GI prophylaxis     Discussed with Dr Stephanie Christianson, APRN  4/10/2024  20:05 EDT    I personally have examined  and interviewed the patient. I have reviewed the history, data, problems, assessment and plan with our NP.  Total Critical care time in direct medical management (   ) minutes, This time specifically excludes time spent performing procedures.    Amla Brown MD

## 2024-04-11 NOTE — PLAN OF CARE
Goal Outcome Evaluation:  Plan of Care Reviewed With: patient        Progress: no change     Pt currently resting abed. Pt did c/o nausea early in the shift see mar. No distress noted. VSS cont plan of care.

## 2024-04-11 NOTE — PROGRESS NOTES
"                                                                                                                                      Nephrology  Progress Note                                        [facility]    Patient Identification    Name: Deann Warren  Age: 83 y.o.  Sex: female  :  1940  MRN: [id number]      DATE OF SERVICE:  2024        Subective    No new c/o  Objective   Scheduled Meds:[Held by provider] apixaban, 2.5 mg, Oral, BID  atorvastatin, 10 mg, Oral, Nightly  budesonide, 0.5 mg, Nebulization, BID - RT  febuxostat, 40 mg, Oral, Daily  ipratropium-albuterol, 3 mL, Nebulization, 4x Daily - RT  metoprolol succinate XL, 12.5 mg, Oral, Q24H  sodium chloride, 10 mL, Intravenous, Q12H          Continuous Infusions:     PRN Meds:  albumin human    senna-docusate sodium **AND** polyethylene glycol **AND** bisacodyl **AND** bisacodyl    ondansetron    sodium chloride    [COMPLETED] Insert Peripheral IV **AND** sodium chloride    sodium chloride    sodium chloride     Exam:  /51 (BP Location: Right arm, Patient Position: Lying)   Pulse 63   Temp 98.2 °F (36.8 °C) (Oral)   Resp 16   Ht 162.6 cm (64\")   Wt 53.5 kg (118 lb)   SpO2 96%   BMI 20.25 kg/m²     Intake/Output last 3 shifts:  I/O last 3 completed shifts:  In: 1360 [P.O.:1360]  Out: 0     Intake/Output this shift:  No intake/output data recorded.    Physical exam:  General Appearance:  Alert  Head:  Normocephalic, without obvious abnormality, atraumatic  Eyes:  PERRL, conjunctiva/corneas clear     Neck:  Supple,  no adenopathy;      Lungs:  Decreased BS occasion ronchi  Heart:  Regular rate and rhythm, S1 and S2 normal  Abdomen:  Soft, non-tender, bowel sounds active   Extremities: trace edema  Pulses: 2+ and symmetric all extremities  Skin:  No rashes or lesions       Data Review:  All labs (24hrs):   Recent Results (from the past 24 hour(s))   CBC (No Diff)    Collection Time: 04/10/24 10:21 AM    Specimen: " Arm, Right; Blood   Result Value Ref Range    WBC 3.60 3.40 - 10.80 10*3/mm3    RBC 3.48 (L) 3.77 - 5.28 10*6/mm3    Hemoglobin 10.7 (L) 12.0 - 15.9 g/dL    Hematocrit 34.8 34.0 - 46.6 %    .0 (H) 79.0 - 97.0 fL    MCH 30.7 26.6 - 33.0 pg    MCHC 30.7 (L) 31.5 - 35.7 g/dL    RDW 14.3 12.3 - 15.4 %    RDW-SD 51.7 37.0 - 54.0 fl    MPV 10.1 6.0 - 12.0 fL    Platelets 63 (L) 140 - 450 10*3/mm3   Hepatitis B Surface Antigen    Collection Time: 04/10/24  4:06 PM    Specimen: Blood   Result Value Ref Range    Hepatitis B Surface Ag Non-Reactive Non-Reactive   Hepatitis C Antibody    Collection Time: 04/10/24  8:20 PM    Specimen: Blood   Result Value Ref Range    Hepatitis C Ab Non-Reactive Non-Reactive   CBC Auto Differential    Collection Time: 04/10/24  8:20 PM    Specimen: Blood   Result Value Ref Range    WBC 3.32 (L) 3.40 - 10.80 10*3/mm3    RBC 3.29 (L) 3.77 - 5.28 10*6/mm3    Hemoglobin 10.1 (L) 12.0 - 15.9 g/dL    Hematocrit 32.3 (L) 34.0 - 46.6 %    MCV 98.2 (H) 79.0 - 97.0 fL    MCH 30.7 26.6 - 33.0 pg    MCHC 31.3 (L) 31.5 - 35.7 g/dL    RDW 14.2 12.3 - 15.4 %    RDW-SD 51.0 37.0 - 54.0 fl    MPV 10.1 6.0 - 12.0 fL    Platelets 67 (L) 140 - 450 10*3/mm3    Neutrophil % 66.9 42.7 - 76.0 %    Lymphocyte % 21.1 19.6 - 45.3 %    Monocyte % 9.9 5.0 - 12.0 %    Eosinophil % 1.5 0.3 - 6.2 %    Basophil % 0.3 0.0 - 1.5 %    Immature Grans % 0.3 0.0 - 0.5 %    Neutrophils, Absolute 2.22 1.70 - 7.00 10*3/mm3    Lymphocytes, Absolute 0.70 0.70 - 3.10 10*3/mm3    Monocytes, Absolute 0.33 0.10 - 0.90 10*3/mm3    Eosinophils, Absolute 0.05 0.00 - 0.40 10*3/mm3    Basophils, Absolute 0.01 0.00 - 0.20 10*3/mm3    Immature Grans, Absolute 0.01 0.00 - 0.05 10*3/mm3    nRBC 0.0 0.0 - 0.2 /100 WBC   HIV-1 / O / 2 Ag / Antibody    Collection Time: 04/10/24  8:20 PM    Specimen: Blood   Result Value Ref Range    HIV-1/ HIV-2 Non-Reactive Non-Reactive   Scan Slide    Collection Time: 04/10/24  8:20 PM    Specimen: Blood    Result Value Ref Range    Elliptocytes Slight/1+ None Seen    WBC Morphology Normal Normal    Platelet Estimate Decreased Normal   aPTT    Collection Time: 04/11/24  5:00 AM    Specimen: Blood   Result Value Ref Range    PTT 27.6 24.0 - 31.0 seconds   D-dimer, Quantitative    Collection Time: 04/11/24  5:00 AM    Specimen: Blood   Result Value Ref Range    D-Dimer, Quantitative 0.48 0.00 - 0.83 mg/L (FEU)   CBC Auto Differential    Collection Time: 04/11/24  5:00 AM    Specimen: Blood   Result Value Ref Range    WBC 3.31 (L) 3.40 - 10.80 10*3/mm3    RBC 3.27 (L) 3.77 - 5.28 10*6/mm3    Hemoglobin 9.9 (L) 12.0 - 15.9 g/dL    Hematocrit 33.0 (L) 34.0 - 46.6 %    .9 (H) 79.0 - 97.0 fL    MCH 30.3 26.6 - 33.0 pg    MCHC 30.0 (L) 31.5 - 35.7 g/dL    RDW 14.2 12.3 - 15.4 %    RDW-SD 52.4 37.0 - 54.0 fl    MPV 10.3 6.0 - 12.0 fL    Platelets 66 (L) 140 - 450 10*3/mm3    Neutrophil % 61.9 42.7 - 76.0 %    Lymphocyte % 24.2 19.6 - 45.3 %    Monocyte % 10.6 5.0 - 12.0 %    Eosinophil % 2.4 0.3 - 6.2 %    Basophil % 0.6 0.0 - 1.5 %    Immature Grans % 0.3 0.0 - 0.5 %    Neutrophils, Absolute 2.05 1.70 - 7.00 10*3/mm3    Lymphocytes, Absolute 0.80 0.70 - 3.10 10*3/mm3    Monocytes, Absolute 0.35 0.10 - 0.90 10*3/mm3    Eosinophils, Absolute 0.08 0.00 - 0.40 10*3/mm3    Basophils, Absolute 0.02 0.00 - 0.20 10*3/mm3    Immature Grans, Absolute 0.01 0.00 - 0.05 10*3/mm3    nRBC 0.0 0.0 - 0.2 /100 WBC   Scan Slide    Collection Time: 04/11/24  5:00 AM    Specimen: Blood   Result Value Ref Range    Anisocytosis Slight/1+ None Seen    Elliptocytes Slight/1+ None Seen    Poikilocytes Slight/1+ None Seen    WBC Morphology Normal Normal    Platelet Estimate Decreased Normal    Large Platelets Slight/1+ None Seen          Imaging:  CT Chest Without Contrast Diagnostic    Result Date: 4/9/2024  Impression: Bilateral effusions with bibasilar atelectasis, greatest on the left. Moderately severe cardiomegaly without pulmonary edema.  Evidence of chronic underlying lung disease. Electronically Signed: Meghan Gregg MD  4/9/2024 4:08 PM EDT  Workstation ID: [id number]    XR Chest PA & Lateral    Result Date: 4/9/2024  Impression: 1. Stable cardiomegaly. 2. Prominent interstitial markings compatible with interstitial edema. 3. Stable small left pleural effusion Electronically Signed: Arie Nathan MD  4/9/2024 2:05 PM EDT  Workstation ID: [id number]    XR Chest 1 View    Result Date: 4/7/2024  Impression: Stable chronic findings without acute process. Electronically Signed: German Reece MD  4/7/2024 10:57 AM EDT  Workstation ID: [id number]     Assessment/Plan:     Syncope and collapse    Type 2 diabetes mellitus with diabetic chronic kidney disease    Paroxysmal atrial fibrillation    Essential hypertension    ESRD (end stage renal disease)    Anemia due to chronic kidney disease, on chronic dialysis    Hyperlipidemia            ESRD hemodialysis dependent   Syncope  Elevated BNP   Atrial fibrillation   Hyperlipidemia         Recommendations:  HD was done yesterday   uneventful  Follow cardiology work up noted  HD again in am

## 2024-04-11 NOTE — PROGRESS NOTES
Referring Provider: Mary Ellen Martinez MD    Reason for follow-up syncope     Patient Care Team:  Antonino Shen MD as PCP - Family Medicine  FigueroaKamran chapa MD as Consulting Physician (Cardiology)      SUBJECTIVE    Denies any chest pain or shortness of breath.  No events noted on telemetry      ROS  Review of all systems negative except as indicated.    Since I have last seen, the patient has been without any chest discomfort, shortness of breath, palpitations, dizziness or syncope.  Denies having any headache, abdominal pain, nausea, vomiting, diarrhea, constipation, loss of weight or loss of appetite.  Denies having any excessive bruising, hematuria or blood in the stool.  ROS      Personal History:    Past Medical History:   Diagnosis Date    Allergic conjunctivitis and rhinitis 11/06/2014    Anemia due to chronic kidney disease, on chronic dialysis 08/25/2020    Anxiety 07/30/2012    Chronic gouty arthritis 11/06/2014    Dependence on renal dialysis 07/01/2022    ESRD (end stage renal disease) 08/25/2020    Essential hypertension 09/16/2015    History of shingles 01/18/2022    Hyperlipidemia 04/08/2024    Paroxysmal atrial fibrillation 07/01/2022    Type 2 diabetes mellitus with diabetic chronic kidney disease 07/01/2022    Vitamin D deficiency 07/22/2021       Past Surgical History:   Procedure Laterality Date    ARTERIOVENOUS FISTULA Left        Family History   Family history unknown: Yes       Social History     Tobacco Use    Smoking status: Never    Smokeless tobacco: Never   Vaping Use    Vaping status: Never Used   Substance Use Topics    Alcohol use: Never    Drug use: Never        Home meds:  Prior to Admission medications    Medication Sig Start Date End Date Taking? Authorizing Provider   apixaban (ELIQUIS) 2.5 MG tablet tablet Take 1 tablet by mouth 2 (Two) Times a Day.   Yes Provider, Historical, MD   B Complex-C-Folic Acid (EM-MARIA ESTHER PO) Take 1 tablet by mouth Daily.   Yes Katerin Bradshaw  "MD   febuxostat (ULORIC) 40 MG tablet Take 1 tablet by mouth Daily.   Yes Provider, MD Katerin   simvastatin (ZOCOR) 20 MG tablet Take 1 tablet by mouth Every Night.   Yes Provider, MD Katerin   acetaminophen (TYLENOL) 500 MG tablet Take 1 tablet by mouth Every 6 (Six) Hours As Needed for Mild Pain.    Provider, MD Katerin   NON FORMULARY Lidocaine 2.5% ointment -  Apply 1 application Monday, Wednesday, Friday before dialysis    Provider, MD Katerin       Allergies:  Heparin    Scheduled Meds:[Held by provider] apixaban, 2.5 mg, Oral, BID  atorvastatin, 10 mg, Oral, Nightly  budesonide, 0.5 mg, Nebulization, BID - RT  febuxostat, 40 mg, Oral, Daily  ipratropium-albuterol, 3 mL, Nebulization, 4x Daily - RT  metoprolol succinate XL, 12.5 mg, Oral, Q24H  sodium chloride, 10 mL, Intravenous, Q12H      Continuous Infusions:   PRN Meds:.  albumin human    senna-docusate sodium **AND** polyethylene glycol **AND** bisacodyl **AND** bisacodyl    ondansetron    sodium chloride    [COMPLETED] Insert Peripheral IV **AND** sodium chloride    sodium chloride    sodium chloride      OBJECTIVE    Vital Signs  Vitals:    04/11/24 0714 04/11/24 0716 04/11/24 0723 04/11/24 0801   BP:    126/57   BP Location:    Right arm   Patient Position:    Lying   Pulse: 58 65 61 61   Resp: 19 19 19 17   Temp:    97.5 °F (36.4 °C)   TempSrc:    Oral   SpO2: 95% 95% 96% 97%   Weight:       Height:           Flowsheet Rows      Flowsheet Row First Filed Value   Admission Height 162.6 cm (64\") Documented at 04/07/2024 0953   Admission Weight 53.5 kg (118 lb) Documented at 04/07/2024 0953              Intake/Output Summary (Last 24 hours) at 4/11/2024 1134  Last data filed at 4/11/2024 1018  Gross per 24 hour   Intake 100 ml   Output 0 ml   Net 100 ml          Telemetry: Normal sinus rhythm    Physical Exam:  The patient is alert, oriented and in no distress.  Vital signs as noted above.  Head and neck revealed no carotid bruits or " jugular venous distention.  No thyromegaly or lymphadenopathy is present  Lungs clear.  No wheezing.  Breath sounds are normal bilaterally.  Heart normal first and second heart sounds.  No murmur. No precordial rub is present.  No gallop is present.  Abdomen soft and nontender.  No organomegaly is present.  Extremities with good peripheral pulses without any pedal edema.  Skin warm and dry.  Musculoskeletal system is grossly normal.  CNS grossly normal.       Results Review:  I have personally reviewed the results from the time of this admission to 4/11/2024 11:34 EDT and agree with these findings:  []  Laboratory  []  Microbiology  []  Radiology  []  EKG/Telemetry   []  Cardiology/Vascular   []  Pathology  []  Old records  []  Other:    Most notable findings include:    Lab Results (last 24 hours)       Procedure Component Value Units Date/Time    Iron Profile [[id number]]  (Abnormal) Collected: 04/10/24 2020    Specimen: Blood Updated: 04/11/24 1120     Iron 66 mcg/dL      Iron Saturation (TSAT) 25 %      Transferrin 178 mg/dL      TIBC 265 mcg/dL     Ferritin [[id number]] Collected: 04/10/24 2020    Specimen: Blood Updated: 04/11/24 1053    CBC & Differential [[id number]]  (Abnormal) Collected: 04/11/24 0500    Specimen: Blood Updated: 04/11/24 0553    Narrative:      The following orders were created for panel order CBC & Differential.  Procedure                               Abnormality         Status                     ---------                               -----------         ------                     CBC Auto Differential[[id number]]        Abnormal            Final result               Scan Slide[[id number]]                                       Final result                 Please view results for these tests on the individual orders.    CBC Auto Differential [[id number]]  (Abnormal) Collected: 04/11/24 0500    Specimen: Blood Updated: 04/11/24 0553     WBC 3.31 10*3/mm3      RBC 3.27 10*6/mm3      Hemoglobin  9.9 g/dL      Hematocrit 33.0 %      .9 fL      MCH 30.3 pg      MCHC 30.0 g/dL      RDW 14.2 %      RDW-SD 52.4 fl      MPV 10.3 fL      Platelets 66 10*3/mm3      Comment: Result checked          Neutrophil % 61.9 %      Lymphocyte % 24.2 %      Monocyte % 10.6 %      Eosinophil % 2.4 %      Basophil % 0.6 %      Immature Grans % 0.3 %      Neutrophils, Absolute 2.05 10*3/mm3      Lymphocytes, Absolute 0.80 10*3/mm3      Monocytes, Absolute 0.35 10*3/mm3      Eosinophils, Absolute 0.08 10*3/mm3      Basophils, Absolute 0.02 10*3/mm3      Immature Grans, Absolute 0.01 10*3/mm3      nRBC 0.0 /100 WBC     Scan Slide [[id number]] Collected: 04/11/24 0500    Specimen: Blood Updated: 04/11/24 0553     Anisocytosis Slight/1+     Elliptocytes Slight/1+     Poikilocytes Slight/1+     WBC Morphology Normal     Platelet Estimate Decreased     Large Platelets Slight/1+    aPTT [[id number]]  (Normal) Collected: 04/11/24 0500    Specimen: Blood Updated: 04/11/24 0526     PTT 27.6 seconds     D-dimer, Quantitative [[id number]]  (Normal) Collected: 04/11/24 0500    Specimen: Blood Updated: 04/11/24 0526     D-Dimer, Quantitative 0.48 mg/L (FEU)     Narrative:      According to the assay 's published package insert, a normal (<0.50 mg/L (FEU)) D-dimer result in conjunction with a non-high clinical probability assessment, excludes deep vein thrombosis (DVT) and pulmonary embolism (PE) with high sensitivity.    D-dimer values increase with age and this can make VTE exclusion of an older population difficult. To address this, the [employer], based on best available evidence and recent guidelines, recommends that clinicians use age-adjusted D-dimer thresholds in patients greater than 50 years of age with: a) a low probability of PE who do not meet all Pulmonary Embolism Rule Out Criteria, or b) in those with intermediate probability of PE.   The formula for an age-adjusted D-dimer cut-off is  "\"age/100\".  For example, a 60 year old patient would have an age-adjusted cut-off of 0.60 mg/L (FEU) and an 80 year old 0.80 mg/L (FEU).    CBC & Differential [[id number]]  (Abnormal) Collected: 04/10/24 2020    Specimen: Blood Updated: 04/10/24 2129    Narrative:      The following orders were created for panel order CBC & Differential.  Procedure                               Abnormality         Status                     ---------                               -----------         ------                     CBC Auto Differential[[id number]]        Abnormal            Final result               Scan Slide[[id number]]                                       Final result                 Please view results for these tests on the individual orders.    CBC Auto Differential [[id number]]  (Abnormal) Collected: 04/10/24 2020    Specimen: Blood Updated: 04/10/24 2129     WBC 3.32 10*3/mm3      RBC 3.29 10*6/mm3      Hemoglobin 10.1 g/dL      Hematocrit 32.3 %      MCV 98.2 fL      MCH 30.7 pg      MCHC 31.3 g/dL      RDW 14.2 %      RDW-SD 51.0 fl      MPV 10.1 fL      Platelets 67 10*3/mm3      Comment: Result checked          Neutrophil % 66.9 %      Lymphocyte % 21.1 %      Monocyte % 9.9 %      Eosinophil % 1.5 %      Basophil % 0.3 %      Immature Grans % 0.3 %      Neutrophils, Absolute 2.22 10*3/mm3      Lymphocytes, Absolute 0.70 10*3/mm3      Monocytes, Absolute 0.33 10*3/mm3      Eosinophils, Absolute 0.05 10*3/mm3      Basophils, Absolute 0.01 10*3/mm3      Immature Grans, Absolute 0.01 10*3/mm3      nRBC 0.0 /100 WBC     Scan Slide [[id number]] Collected: 04/10/24 2020    Specimen: Blood Updated: 04/10/24 2129     Elliptocytes Slight/1+     WBC Morphology Normal     Platelet Estimate Decreased    HIV-1 & HIV-2 Antibodies [[id number]]  (Normal) Collected: 04/10/24 2020    Specimen: Blood Updated: 04/10/24 2115    Narrative:      The following orders were created for panel order HIV-1 & HIV-2 Antibodies.  Procedure      "                          Abnormality         Status                     ---------                               -----------         ------                     HIV-1 / O / 2 Ag / Antibody[[id number]]  Normal              Final result                 Please view results for these tests on the individual orders.    HIV-1 / O / 2 Ag / Antibody [[id number]]  (Normal) Collected: 04/10/24 2020    Specimen: Blood Updated: 04/10/24 2115     HIV-1/ HIV-2 Non-Reactive     Comment: A non-reactive test result does not preclude the possibility of exposure to HIV or infection with HIV. An antibody response to recent exposure may take several months to reach detectable levels.       Narrative:      The HIV antibody/antigen combo assay is a qualitative assay for HIV that includes the p24 antigen as well as antibodies to HIV types 1 and 2. This test is intended to be used as a screening assay in the diagnosis of HIV infection in patients over the age of 2.    Hepatitis C Antibody [[id number]]  (Normal) Collected: 04/10/24 2020    Specimen: Blood Updated: 04/10/24 2106     Hepatitis C Ab Non-Reactive    [place] [[id number]] Collected: 04/10/24 2020    Specimen: Blood Updated: 04/10/24 2033    Folate [[id number]] Collected: 04/10/24 2020    Specimen: Blood Updated: 04/10/24 2033    Vitamin B12 [[id number]] Collected: 04/10/24 2020    Specimen: Blood Updated: 04/10/24 2033    Heparin Induced PLT Antibody With / Rfx [[id number]] Collected: 04/10/24 2020    Specimen: Blood Updated: 04/10/24 2033    Hepatitis B Surface Antigen [[id number]]  (Normal) Collected: 04/10/24 1606    Specimen: Blood Updated: 04/10/24 1848     Hepatitis B Surface Ag Non-Reactive            Imaging Results (Last 24 Hours)       Procedure Component Value Units Date/Time    US Abdomen Limited [[id number]] Collected: 04/11/24 1036     Updated: 04/11/24 1047    Narrative:      US ABDOMEN LIMITED    Date of Exam: 4/11/2024 10:03 AM EDT    Indication: Measure spleen size and evaluate  echotexture of liver..    Comparison: No comparisons available.    Technique: Grayscale and color Doppler ultrasound evaluation of the liver and spleen was performed.        FINDINGS:    Liver: The liver demonstrates normal echogenicity without evidence of intrahepatic biliary ductal dilatation. Hepatic and portal vein are patent.    Spleen is grossly unremarkable on limited imaging. Spleen volume 307 mL.    Patient appears to be status post cholecystectomy    Common bile duct is within normal limits measurin.4 mm    Bilateral pleural effusions noted left greater than right.        Impression:      Spleen is normal in size without acute abnormality    Liver is grossly unremarkable in appearance.    Bilateral pleural effusions.    Electronically Signed: Mani Hernandez MD    2024 10:45 AM EDT    Workstation ID: [id number]            LAB RESULTS (LAST 7 DAYS)    CBC  Results from last 7 days   Lab Units 24  0500 04/10/24  2020 04/10/24  1021 24  1044 24  1012   WBC 10*3/mm3 3.31* 3.32* 3.60 3.71 2.91*   RBC 10*6/mm3 3.27* 3.29* 3.48* 3.76* 3.70*   HEMOGLOBIN g/dL 9.9* 10.1* 10.7* 11.4* 11.2*   HEMATOCRIT % 33.0* 32.3* 34.8 37.7 36.7   MCV fL 100.9* 98.2* 100.0* 100.3* 99.2*   PLATELETS 10*3/mm3 66* 67* 63* 70* 63*       BMP  Results from last 7 days   Lab Units 04/10/24  0354 24  1157 24  1044 24  0446 24  1118 24  1012   SODIUM mmol/L 139 139  --  134* 138  --    POTASSIUM mmol/L 4.3 3.7  --  3.6 3.5  --    CHLORIDE mmol/L 103 100  --  99 97*  --    CO2 mmol/L 24.0 26.0  --  23.0 28.0  --    BUN mg/dL 56* 49*  --  35* 28*  --    CREATININE mg/dL 3.48* 3.08*  --  2.89* 2.64*  --    GLUCOSE mg/dL 97 92  --  101* 81  --    MAGNESIUM mg/dL  --  2.1  --   --   --  2.0   PHOSPHORUS mg/dL  --   --  4.1  --   --   --        CMP   Results from last 7 days   Lab Units 04/10/24  0354 24  1157 24  0446 24  1118   SODIUM mmol/L 139 139 134* 138   POTASSIUM  mmol/L 4.3 3.7 3.6 3.5   CHLORIDE mmol/L 103 100 99 97*   CO2 mmol/L 24.0 26.0 23.0 28.0   BUN mg/dL 56* 49* 35* 28*   CREATININE mg/dL 3.48* 3.08* 2.89* 2.64*   GLUCOSE mg/dL 97 92 101* 81       BNP        TROPONIN  Results from last 7 days   Lab Units 04/10/24  0354   HSTROP T ng/L 64*       CoAg  Results from last 7 days   Lab Units 04/11/24  0500   APTT seconds 27.6       Creatinine Clearance  Estimated Creatinine Clearance: 10.3 mL/min (A) (by C-G formula based on SCr of 3.48 mg/dL (H)).    ABG        Radiology  US Abdomen Limited    Result Date: 4/11/2024  Spleen is normal in size without acute abnormality Liver is grossly unremarkable in appearance. Bilateral pleural effusions. Electronically Signed: Mani Hernandez MD  4/11/2024 10:45 AM EDT  Workstation ID: [id number]    CT Chest Without Contrast Diagnostic    Result Date: 4/9/2024  Impression: Bilateral effusions with bibasilar atelectasis, greatest on the left. Moderately severe cardiomegaly without pulmonary edema. Evidence of chronic underlying lung disease. Electronically Signed: Meghan Gregg MD  4/9/2024 4:08 PM EDT  Workstation ID: [id number]    XR Chest PA & Lateral    Result Date: 4/9/2024  Impression: 1. Stable cardiomegaly. 2. Prominent interstitial markings compatible with interstitial edema. 3. Stable small left pleural effusion Electronically Signed: Arie Nathan MD  4/9/2024 2:05 PM EDT  Workstation ID: [id number]       EKG  I personally viewed and interpreted the patient's EKG/Telemetry data:  ECG 12 Lead Syncope   Final Result   HEART RATE= 83  bpm   RR Interval= 720  ms   DE Interval=   ms   P Horizontal Axis=   deg   P Front Axis=   deg   QRSD Interval= 130  ms   QT Interval= 421  ms   QTcB= 496  ms   QRS Axis= -79  deg   T Wave Axis= 102  deg   - ABNORMAL ECG -   Atrial fibrillation   IVCD, consider LBBB   Probable anterolateral infarct, age indeterm   No previous ECG available for comparison   Electronically Signed By: Harvinder Garcia  (Russ) 08-Apr-2024 06:11:35   Date and Time of Study: 2024-04-07 10:07:46      Telemetry Scan   Final Result      Telemetry Scan   Final Result      Telemetry Scan   Final Result      Telemetry Scan   Final Result      Telemetry Scan   Final Result      Telemetry Scan   Final Result      Telemetry Scan   Final Result      Telemetry Scan   Final Result      Telemetry Scan   Final Result      Telemetry Scan   Final Result            Echocardiogram:    Results for orders placed during the hospital encounter of 04/07/24    Adult Transthoracic Echo Complete W/ Cont if Necessary Per Protocol    Interpretation Summary    Left ventricular systolic function is moderately decreased. Calculated left ventricular EF = 35%    The left ventricular cavity is mildly dilated.    The following left ventricular wall segments are hypokinetic: apical anterior.    Left ventricular diastolic function was indeterminate.    The left atrial cavity is moderately dilated.    Saline test results are positive with valsalva manuever.    Estimated right ventricular systolic pressure from tricuspid regurgitation is mildly elevated (35-45 mmHg).    There is a large left pleural effusion.    There appears to be an ASD present with left to right shunting based on color dopple. Consider YINA to further evaluate        Stress Test:         Cardiac Catheterization:  No results found for this or any previous visit.         Other:         ASSESSMENT & PLAN:    Principal Problem:    Syncope and collapse  Active Problems:    Type 2 diabetes mellitus with diabetic chronic kidney disease    Paroxysmal atrial fibrillation    Essential hypertension    ESRD (end stage renal disease)    Anemia due to chronic kidney disease, on chronic dialysis    Hyperlipidemia    Syncope  Recurrent syncope with previous negative workup  No evidence of valvular heart disease that could explain her syncope on echocardiogram  Holter monitor on discharge  She may benefit from midodrine  on dialysis days to prevent drops in blood pressure     ASD: Possible  This was an incidental finding on her echocardiogram  Given her advanced age I do not recommend further workup with a YINA at this time because I do not think that closure would be indicated in this case    Cardiomyopathy  She did have a drop in her EF with reported normal echo at previous cardiologist  Also noted to have anteroapical wall motion abnormalities  Start low dose BB to avoid hypotension  Will introduce lisinopril if BP stays stable  No SGLT2i due to ESRD  I will plan on outpatient ischemic evaluation after she has been on GDMT and has recovered from this acute illness.    Thrombocytopenia  New onset  Being followed by hematology  No evidence of bleeding but is on Eliquis  Can hold Eliquis    Pleural effusion  Noted on echocardiogram  Pulmonology has been consulted    End-stage renal disease  Currently on dialysis  Nephrology is following     Atrial fibrillation  Currently on low-dose Eliquis  Has had bleeding issues with dialysis  She would benefit from Watchman and will discuss this outpatient with her     Hyperlipidemia  Continue with atorvastatin     Labile hypertension  Hold home antihypertensive      Dilcia Lui MD  04/11/24  11:34 EDT

## 2024-04-12 ENCOUNTER — APPOINTMENT (OUTPATIENT)
Dept: RESPIRATORY THERAPY | Facility: HOSPITAL | Age: 84
DRG: 312 | End: 2024-04-12
Payer: MEDICARE

## 2024-04-12 ENCOUNTER — READMISSION MANAGEMENT (OUTPATIENT)
Dept: CALL CENTER | Facility: HOSPITAL | Age: 84
End: 2024-04-12
Payer: MEDICARE

## 2024-04-12 VITALS
OXYGEN SATURATION: 91 % | HEIGHT: 64 IN | RESPIRATION RATE: 18 BRPM | DIASTOLIC BLOOD PRESSURE: 52 MMHG | BODY MASS INDEX: 20.14 KG/M2 | WEIGHT: 118 LBS | TEMPERATURE: 96.4 F | SYSTOLIC BLOOD PRESSURE: 127 MMHG | HEART RATE: 57 BPM

## 2024-04-12 LAB
ANA SER QL: NEGATIVE
APTT PPP: 26.7 SECONDS (ref 24–31)
BASOPHILS # BLD AUTO: 0.03 10*3/MM3 (ref 0–0.2)
BASOPHILS NFR BLD AUTO: 0.8 % (ref 0–1.5)
D DIMER PPP FEU-MCNC: 0.57 MG/L (FEU) (ref 0–0.83)
DEPRECATED RDW RBC AUTO: 53.1 FL (ref 37–54)
EOSINOPHIL # BLD AUTO: 0.13 10*3/MM3 (ref 0–0.4)
EOSINOPHIL NFR BLD AUTO: 3.6 % (ref 0.3–6.2)
ERYTHROCYTE [DISTWIDTH] IN BLOOD BY AUTOMATED COUNT: 14.5 % (ref 12.3–15.4)
FOLATE BLD-MCNC: 400 NG/ML
FOLATE RBC-MCNC: 1303 NG/ML
HCT VFR BLD AUTO: 30.7 % (ref 34–46.6)
HCT VFR BLD AUTO: 33.3 % (ref 34–46.6)
HCYS SERPL-MCNC: 22.5 UMOL/L (ref 0–15)
HGB BLD-MCNC: 10.3 G/DL (ref 12–15.9)
IMM GRANULOCYTES # BLD AUTO: 0.01 10*3/MM3 (ref 0–0.05)
IMM GRANULOCYTES NFR BLD AUTO: 0.3 % (ref 0–0.5)
LARGE PLATELETS: NORMAL
LYMPHOCYTES # BLD AUTO: 0.91 10*3/MM3 (ref 0.7–3.1)
LYMPHOCYTES NFR BLD AUTO: 24.9 % (ref 19.6–45.3)
MCH RBC QN AUTO: 31.2 PG (ref 26.6–33)
MCHC RBC AUTO-ENTMCNC: 30.9 G/DL (ref 31.5–35.7)
MCV RBC AUTO: 100.9 FL (ref 79–97)
MONOCYTES # BLD AUTO: 0.45 10*3/MM3 (ref 0.1–0.9)
MONOCYTES NFR BLD AUTO: 12.3 % (ref 5–12)
NEUTROPHILS NFR BLD AUTO: 2.13 10*3/MM3 (ref 1.7–7)
NEUTROPHILS NFR BLD AUTO: 58.1 % (ref 42.7–76)
NRBC BLD AUTO-RTO: 0 /100 WBC (ref 0–0.2)
PLATELET # BLD AUTO: 73 10*3/MM3 (ref 140–450)
PMV BLD AUTO: 10.5 FL (ref 6–12)
RBC # BLD AUTO: 3.3 10*6/MM3 (ref 3.77–5.28)
RBC MORPH BLD: NORMAL
SMALL PLATELETS BLD QL SMEAR: NORMAL
WBC MORPH BLD: NORMAL
WBC NRBC COR # BLD AUTO: 3.66 10*3/MM3 (ref 3.4–10.8)

## 2024-04-12 PROCEDURE — 85730 THROMBOPLASTIN TIME PARTIAL: CPT | Performed by: NURSE PRACTITIONER

## 2024-04-12 PROCEDURE — 85007 BL SMEAR W/DIFF WBC COUNT: CPT | Performed by: NURSE PRACTITIONER

## 2024-04-12 PROCEDURE — 85379 FIBRIN DEGRADATION QUANT: CPT | Performed by: NURSE PRACTITIONER

## 2024-04-12 PROCEDURE — 93246 EXT ECG>7D<15D RECORDING: CPT

## 2024-04-12 PROCEDURE — 85025 COMPLETE CBC W/AUTO DIFF WBC: CPT | Performed by: NURSE PRACTITIONER

## 2024-04-12 PROCEDURE — 97166 OT EVAL MOD COMPLEX 45 MIN: CPT

## 2024-04-12 PROCEDURE — 99232 SBSQ HOSP IP/OBS MODERATE 35: CPT | Performed by: NURSE PRACTITIONER

## 2024-04-12 RX ORDER — FOLIC ACID 1 MG/1
1 TABLET ORAL DAILY
Qty: 30 TABLET | Refills: 0 | Status: SHIPPED | OUTPATIENT
Start: 2024-04-13

## 2024-04-12 RX ORDER — METOPROLOL SUCCINATE 25 MG/1
12.5 TABLET, EXTENDED RELEASE ORAL
Qty: 30 TABLET | Refills: 0 | Status: SHIPPED | OUTPATIENT
Start: 2024-04-13

## 2024-04-12 RX ADMIN — FEBUXOSTAT 40 MG: 40 TABLET, FILM COATED ORAL at 11:24

## 2024-04-12 RX ADMIN — METOPROLOL SUCCINATE 12.5 MG: 25 TABLET, FILM COATED, EXTENDED RELEASE ORAL at 11:25

## 2024-04-12 RX ADMIN — Medication 10 ML: at 11:25

## 2024-04-12 RX ADMIN — FOLIC ACID 1 MG: 1 TABLET ORAL at 11:25

## 2024-04-12 NOTE — PLAN OF CARE
Goal Outcome Evaluation:  Plan of Care Reviewed With: patient     Outcome Evaluation: Pt is a 82 y/o female admitted to [facility] on 4/7/24 with c/o recurrent syncope, found with elevated troponin. Pt's cardiologist is following, Dr. Figueroa, though pt continues to exhibit syncope anisa during dialysis. CT chest shows isra pleural effusions, mod severe cardiomegaly w/o PE. PMH of hyperlipidemia, atrial fibrillation, end-stage renal disease on dialysis. PT A&O x4 with no reprots of pain. Pt presents on RA at 96%, BP fowlers 128/57, sitting /73. Pt completed bed mobility with mod I and IND sitting EOB. Pt requires CGA for safety to complete functional transfers and ambulation this date. Pt ambulated 20ft with no s/s of SOA. Sitting EOB pt IND doffed/donned socks. OT will complete orders at this time as pt reports functional near baseline. New orders will need to be placed if functional decline is noted. OT recommends home with family assist as needed.    Anticipated Discharge Disposition (OT): home with assist

## 2024-04-12 NOTE — PROGRESS NOTES
Referring Provider: Hernesto Sanchez MD    Reason for follow-up syncope     Patient Care Team:  Antonino Shen MD as PCP - Family Medicine  FigueroaKamran chapa MD as Consulting Physician (Cardiology)      SUBJECTIVE    The patient denies any new complaints. She is currently getting hemodialysis.       ROS  Review of all systems negative except as indicated.    Since I have last seen, the patient has been without any chest discomfort, shortness of breath, palpitations, dizziness or syncope.  Denies having any headache, abdominal pain, nausea, vomiting, diarrhea, constipation, loss of weight or loss of appetite.  Denies having any excessive bruising, hematuria or blood in the stool.        Personal History:    Past Medical History:   Diagnosis Date    Allergic conjunctivitis and rhinitis 11/06/2014    Anemia due to chronic kidney disease, on chronic dialysis 08/25/2020    Anxiety 07/30/2012    Chronic gouty arthritis 11/06/2014    Dependence on renal dialysis 07/01/2022    ESRD (end stage renal disease) 08/25/2020    Essential hypertension 09/16/2015    History of shingles 01/18/2022    Hyperlipidemia 04/08/2024    Paroxysmal atrial fibrillation 07/01/2022    Type 2 diabetes mellitus with diabetic chronic kidney disease 07/01/2022    Vitamin D deficiency 07/22/2021       Past Surgical History:   Procedure Laterality Date    ARTERIOVENOUS FISTULA Left        Family History   Family history unknown: Yes       Social History     Tobacco Use    Smoking status: Never    Smokeless tobacco: Never   Vaping Use    Vaping status: Never Used   Substance Use Topics    Alcohol use: Never    Drug use: Never        Home meds:  Prior to Admission medications    Medication Sig Start Date End Date Taking? Authorizing Provider   apixaban (ELIQUIS) 2.5 MG tablet tablet Take 1 tablet by mouth 2 (Two) Times a Day.   Yes Provider, Historical, MD   B Complex-C-Folic Acid (EM-MARIA ESTHER PO) Take 1 tablet by mouth Daily.   Yes Katerin Bradshaw  "MD   febuxostat (ULORIC) 40 MG tablet Take 1 tablet by mouth Daily.   Yes Provider, MD Katerin   simvastatin (ZOCOR) 20 MG tablet Take 1 tablet by mouth Every Night.   Yes Provider, MD Katerin   acetaminophen (TYLENOL) 500 MG tablet Take 1 tablet by mouth Every 6 (Six) Hours As Needed for Mild Pain.    Provider, MD Katerin   NON FORMULARY Lidocaine 2.5% ointment -  Apply 1 application Monday, Wednesday, Friday before dialysis    Provider, MD Katerin       Allergies:  Heparin    Scheduled Meds:[Held by provider] apixaban, 2.5 mg, Oral, BID  atorvastatin, 10 mg, Oral, Nightly  budesonide, 0.5 mg, Nebulization, BID - RT  febuxostat, 40 mg, Oral, Daily  folic acid, 1 mg, Oral, Daily  ipratropium-albuterol, 3 mL, Nebulization, 4x Daily - RT  metoprolol succinate XL, 12.5 mg, Oral, Q24H  sodium chloride, 10 mL, Intravenous, Q12H      Continuous Infusions:   PRN Meds:.  senna-docusate sodium **AND** polyethylene glycol **AND** bisacodyl **AND** bisacodyl    ondansetron    sodium chloride    [COMPLETED] Insert Peripheral IV **AND** sodium chloride    sodium chloride    sodium chloride      OBJECTIVE    Vital Signs  Vitals:    04/12/24 0345 04/12/24 0735 04/12/24 0800 04/12/24 0830   BP: 120/54 122/63 117/56 123/50   BP Location: Right arm Right arm Right arm Right arm   Patient Position: Lying Lying Lying Lying   Pulse: 57 57 64 62   Resp: 13 18 17 21   Temp:       TempSrc:       SpO2: 95% 98% 99% 98%   Weight:       Height:           Flowsheet Rows      Flowsheet Row First Filed Value   Admission Height 162.6 cm (64\") Documented at 04/07/2024 0953   Admission Weight 53.5 kg (118 lb) Documented at 04/07/2024 0953              Intake/Output Summary (Last 24 hours) at 4/12/2024 0950  Last data filed at 4/11/2024 1908  Gross per 24 hour   Intake 700 ml   Output --   Net 700 ml          Telemetry: Normal sinus rhythm    Physical Exam:  The patient is alert, oriented and in no distress.  Vital signs as noted " above.  Head and neck revealed no carotid bruits or jugular venous distention.  No thyromegaly or lymphadenopathy is present  Lungs clear.  No wheezing.  Breath sounds are normal bilaterally.  Heart normal first and second heart sounds.  No murmur. No precordial rub is present.  No gallop is present.  Abdomen soft and nontender.  No organomegaly is present.  Extremities with good peripheral pulses without any pedal edema.  Skin warm and dry.  Left arm bruised.  Musculoskeletal system is grossly normal.  CNS grossly normal.       Results Review:  I have personally reviewed the results from the time of this admission to 4/12/2024 09:50 EDT and agree with these findings:  [x]  Laboratory  []  Microbiology  [x]  Radiology  [x]  EKG/Telemetry   [x]  Cardiology/Vascular   []  Pathology  [x]  Old records  []  Other:    Most notable findings include:    Lab Results (last 24 hours)       Procedure Component Value Units Date/Time    CBC & Differential [[id number]]  (Abnormal) Collected: 04/12/24 0428    Specimen: Blood Updated: 04/12/24 0650    Narrative:      The following orders were created for panel order CBC & Differential.  Procedure                               Abnormality         Status                     ---------                               -----------         ------                     CBC Auto Differential[[id number]]        Abnormal            Final result               Scan Slide[[id number]]                                       Final result                 Please view results for these tests on the individual orders.    CBC Auto Differential [[id number]]  (Abnormal) Collected: 04/12/24 0428    Specimen: Blood Updated: 04/12/24 0650     WBC 3.66 10*3/mm3      RBC 3.30 10*6/mm3      Hemoglobin 10.3 g/dL      Hematocrit 33.3 %      .9 fL      MCH 31.2 pg      MCHC 30.9 g/dL      RDW 14.5 %      RDW-SD 53.1 fl      MPV 10.5 fL      Platelets 73 10*3/mm3      Comment: Result checked          Neutrophil % 58.1  "%      Lymphocyte % 24.9 %      Monocyte % 12.3 %      Eosinophil % 3.6 %      Basophil % 0.8 %      Immature Grans % 0.3 %      Neutrophils, Absolute 2.13 10*3/mm3      Lymphocytes, Absolute 0.91 10*3/mm3      Monocytes, Absolute 0.45 10*3/mm3      Eosinophils, Absolute 0.13 10*3/mm3      Basophils, Absolute 0.03 10*3/mm3      Immature Grans, Absolute 0.01 10*3/mm3      nRBC 0.0 /100 WBC     Scan Slide [[id number]] Collected: 04/12/24 0428    Specimen: Blood Updated: 04/12/24 0650     RBC Morphology Normal     WBC Morphology Normal     Platelet Estimate Decreased     Large Platelets Slight/1+    aPTT [[id number]]  (Normal) Collected: 04/12/24 0428    Specimen: Blood Updated: 04/12/24 0609     PTT 26.7 seconds     D-dimer, Quantitative [[id number]]  (Normal) Collected: 04/12/24 0428    Specimen: Blood Updated: 04/12/24 0609     D-Dimer, Quantitative 0.57 mg/L (FEU)     Narrative:      According to the assay 's published package insert, a normal (<0.50 mg/L (FEU)) D-dimer result in conjunction with a non-high clinical probability assessment, excludes deep vein thrombosis (DVT) and pulmonary embolism (PE) with high sensitivity.    D-dimer values increase with age and this can make VTE exclusion of an older population difficult. To address this, the [employer], based on best available evidence and recent guidelines, recommends that clinicians use age-adjusted D-dimer thresholds in patients greater than 50 years of age with: a) a low probability of PE who do not meet all Pulmonary Embolism Rule Out Criteria, or b) in those with intermediate probability of PE.   The formula for an age-adjusted D-dimer cut-off is \"age/100\".  For example, a 60 year old patient would have an age-adjusted cut-off of 0.60 mg/L (FEU) and an 80 year old 0.80 mg/L (FEU).    Homocysteine [[id number]]  (Abnormal) Collected: 04/11/24 1755    Specimen: Blood Updated: 04/12/24 0109     Homocysteine, Plasma (Quant) 22.5 " umol/L     Methylmalonic Acid, Serum [[id number]] Collected: 24    Specimen: Blood Updated: 24    Folate RBC [[id number]] Collected: 24    Specimen: Blood Updated: 24    Folate [[id number]]  (Abnormal) Collected: 04/10/24 2020    Specimen: Blood Updated: 24 1134     Folate 4.40 ng/mL     Narrative:      Results may be falsely increased if patient taking Biotin.      Vitamin B12 [[id number]]  (Normal) Collected: 04/10/24 2020    Specimen: Blood Updated: 24 113     Vitamin B-12 654 pg/mL     Narrative:      Results may be falsely increased if patient taking Biotin.      Ferritin [[id number]]  (Abnormal) Collected: 04/10/24 2020    Specimen: Blood Updated: 24 1134     Ferritin 877.00 ng/mL     Narrative:      Results may be falsely decreased if patient taking Biotin.      Iron Profile [[id number]]  (Abnormal) Collected: 04/10/24 2020    Specimen: Blood Updated: 24 1120     Iron 66 mcg/dL      Iron Saturation (TSAT) 25 %      Transferrin 178 mg/dL      TIBC 265 mcg/dL             Imaging Results (Last 24 Hours)       Procedure Component Value Units Date/Time    US Abdomen Limited [[id number]] Collected: 24 1036     Updated: 24 1047    Narrative:      US ABDOMEN LIMITED    Date of Exam: 2024 10:03 AM EDT    Indication: Measure spleen size and evaluate echotexture of liver..    Comparison: No comparisons available.    Technique: Grayscale and color Doppler ultrasound evaluation of the liver and spleen was performed.        FINDINGS:    Liver: The liver demonstrates normal echogenicity without evidence of intrahepatic biliary ductal dilatation. Hepatic and portal vein are patent.    Spleen is grossly unremarkable on limited imaging. Spleen volume 307 mL.    Patient appears to be status post cholecystectomy    Common bile duct is within normal limits measurin.4 mm    Bilateral pleural effusions noted left greater than right.         Impression:      Spleen is normal in size without acute abnormality    Liver is grossly unremarkable in appearance.    Bilateral pleural effusions.    Electronically Signed: Mani eHrnandez MD    4/11/2024 10:45 AM EDT    Workstation ID: [id number]            LAB RESULTS (LAST 7 DAYS)    CBC  Results from last 7 days   Lab Units 04/12/24  0428 04/11/24  0500 04/10/24  2020 04/10/24  1021 04/09/24  1044 04/07/24  1012   WBC 10*3/mm3 3.66 3.31* 3.32* 3.60 3.71 2.91*   RBC 10*6/mm3 3.30* 3.27* 3.29* 3.48* 3.76* 3.70*   HEMOGLOBIN g/dL 10.3* 9.9* 10.1* 10.7* 11.4* 11.2*   HEMATOCRIT % 33.3* 33.0* 32.3* 34.8 37.7 36.7   MCV fL 100.9* 100.9* 98.2* 100.0* 100.3* 99.2*   PLATELETS 10*3/mm3 73* 66* 67* 63* 70* 63*       BMP  Results from last 7 days   Lab Units 04/10/24  0354 04/09/24  1157 04/09/24  1044 04/08/24  0446 04/07/24  1118 04/07/24  1012   SODIUM mmol/L 139 139  --  134* 138  --    POTASSIUM mmol/L 4.3 3.7  --  3.6 3.5  --    CHLORIDE mmol/L 103 100  --  99 97*  --    CO2 mmol/L 24.0 26.0  --  23.0 28.0  --    BUN mg/dL 56* 49*  --  35* 28*  --    CREATININE mg/dL 3.48* 3.08*  --  2.89* 2.64*  --    GLUCOSE mg/dL 97 92  --  101* 81  --    MAGNESIUM mg/dL  --  2.1  --   --   --  2.0   PHOSPHORUS mg/dL  --   --  4.1  --   --   --        CMP   Results from last 7 days   Lab Units 04/10/24  0354 04/09/24  1157 04/08/24  0446 04/07/24  1118   SODIUM mmol/L 139 139 134* 138   POTASSIUM mmol/L 4.3 3.7 3.6 3.5   CHLORIDE mmol/L 103 100 99 97*   CO2 mmol/L 24.0 26.0 23.0 28.0   BUN mg/dL 56* 49* 35* 28*   CREATININE mg/dL 3.48* 3.08* 2.89* 2.64*   GLUCOSE mg/dL 97 92 101* 81       BNP        TROPONIN  Results from last 7 days   Lab Units 04/10/24  0354   HSTROP T ng/L 64*       CoAg  Results from last 7 days   Lab Units 04/12/24  0428 04/11/24  0500   APTT seconds 26.7 27.6       Creatinine Clearance  Estimated Creatinine Clearance: 10.3 mL/min (A) (by C-G formula based on SCr of 3.48 mg/dL (H)).    ABG         Radiology  US Abdomen Limited    Result Date: 4/11/2024  Spleen is normal in size without acute abnormality Liver is grossly unremarkable in appearance. Bilateral pleural effusions. Electronically Signed: Mani Hernandez MD  4/11/2024 10:45 AM EDT  Workstation ID: [id number]       EKG  I personally viewed and interpreted the patient's EKG/Telemetry data:  ECG 12 Lead Syncope   Final Result   HEART RATE= 83  bpm   RR Interval= 720  ms   DC Interval=   ms   P Horizontal Axis=   deg   P Front Axis=   deg   QRSD Interval= 130  ms   QT Interval= 421  ms   QTcB= 496  ms   QRS Axis= -79  deg   T Wave Axis= 102  deg   - ABNORMAL ECG -   Atrial fibrillation   IVCD, consider LBBB   Probable anterolateral infarct, age indeterm   No previous ECG available for comparison   Electronically Signed By: Harvinder Garcia ([facility]) 08-Apr-2024 06:11:35   Date and Time of Study: 2024-04-07 10:07:46      Telemetry Scan   Final Result      Telemetry Scan   Final Result      Telemetry Scan   Final Result      Telemetry Scan   Final Result      Telemetry Scan   Final Result      Telemetry Scan   Final Result      Telemetry Scan   Final Result      Telemetry Scan   Final Result      Telemetry Scan   Final Result      Telemetry Scan   Final Result      Telemetry Scan   Final Result            Echocardiogram:    Results for orders placed during the hospital encounter of 04/07/24    Adult Transthoracic Echo Complete W/ Cont if Necessary Per Protocol    Interpretation Summary    Left ventricular systolic function is moderately decreased. Calculated left ventricular EF = 35%    The left ventricular cavity is mildly dilated.    The following left ventricular wall segments are hypokinetic: apical anterior.    Left ventricular diastolic function was indeterminate.    The left atrial cavity is moderately dilated.    Saline test results are positive with valsalva manuever.    Estimated right ventricular systolic pressure from tricuspid regurgitation is  mildly elevated (35-45 mmHg).    There is a large left pleural effusion.    There appears to be an ASD present with left to right shunting based on color dopple. Consider YINA to further evaluate        Stress Test:         Cardiac Catheterization:  No results found for this or any previous visit.         Other:         ASSESSMENT & PLAN:    Principal Problem:    Syncope and collapse  Active Problems:    Type 2 diabetes mellitus with diabetic chronic kidney disease    Paroxysmal atrial fibrillation    Essential hypertension    ESRD (end stage renal disease)    Anemia due to chronic kidney disease, on chronic dialysis    Hyperlipidemia    Syncope  Recurrent syncope with previous negative workup  No evidence of valvular heart disease that could explain her syncope on echocardiogram  Holter monitor on discharge  She may benefit from midodrine on dialysis days to prevent drops in blood pressure     ASD: Possible  This was an incidental finding on her echocardiogram  Given her advanced age I do not recommend further workup with a YINA at this time because I do not think that closure would be indicated in this case    Cardiomyopathy  She did have a drop in her EF with reported normal echo at previous cardiologist  Also noted to have anteroapical wall motion abnormalities  Start low dose BB to avoid hypotension  Will introduce lisinopril as outpatient if BP stays stable  No SGLT2i due to ESRD  She will need an outpatient ischemic evaluation after she has been on GDMT and has recovered from this acute illness.    Thrombocytopenia  New onset  Being followed by hematology  No evidence of bleeding, but is on Eliquis at home  Eliquis on hold     Pleural effusion  Noted on echocardiogram  Pulmonology following  On hemodialysis  Remains on room air    End-stage renal disease  On hemodialysis  Nephrology is following     Atrial fibrillation  Low-dose Eliquis on hold due to thrombocytopenia  Patient has had bleeding issues with  dialysis  She would benefit from Watchman, which will be done as outpatient     Hyperlipidemia  Continue with atorvastatin     Labile hypertension  Hold home antihypertensive      The patient can be discharged from cardiology standpoint. She will need a Holter monitor at discharge. She will need to follow-up with Dr. Lui in 2 weeks. Outpatient ischemic evaluation will be arranged.     Electronically signed by LUCA Herron, 04/12/24, 10:06 AM EDT.

## 2024-04-12 NOTE — PROGRESS NOTES
"                                                                                                                                      Nephrology  Progress Note                                        [facility]    Patient Identification    Name: Deann Warren  Age: 83 y.o.  Sex: female  :  1940  MRN: [id number]      DATE OF SERVICE:  2024        Subective    No new c/o  Objective   Scheduled Meds:[Held by provider] apixaban, 2.5 mg, Oral, BID  atorvastatin, 10 mg, Oral, Nightly  budesonide, 0.5 mg, Nebulization, BID - RT  febuxostat, 40 mg, Oral, Daily  folic acid, 1 mg, Oral, Daily  ipratropium-albuterol, 3 mL, Nebulization, 4x Daily - RT  metoprolol succinate XL, 12.5 mg, Oral, Q24H  sodium chloride, 10 mL, Intravenous, Q12H          Continuous Infusions:     PRN Meds:  senna-docusate sodium **AND** polyethylene glycol **AND** bisacodyl **AND** bisacodyl    ondansetron    sodium chloride    [COMPLETED] Insert Peripheral IV **AND** sodium chloride    sodium chloride    sodium chloride     Exam:  /54 (BP Location: Right arm, Patient Position: Lying)   Pulse 57   Temp 98.1 °F (36.7 °C) (Oral)   Resp 13   Ht 162.6 cm (64\")   Wt 53.5 kg (118 lb)   SpO2 95%   BMI 20.25 kg/m²     Intake/Output last 3 shifts:  I/O last 3 completed shifts:  In: 820 [P.O.:820]  Out: 0     Intake/Output this shift:  I/O this shift:  In: 240 [P.O.:240]  Out: -     Physical exam:  General Appearance:  Alert  Head:  Normocephalic, without obvious abnormality, atraumatic  Eyes:  PERRL, conjunctiva/corneas clear     Neck:  Supple,  no adenopathy;      Lungs:  Decreased BS occasion ronchi  Heart:  Regular rate and rhythm, S1 and S2 normal  Abdomen:  Soft, non-tender, bowel sounds active   Extremities: trace edema  Pulses: 2+ and symmetric all extremities  Skin:  No rashes or lesions       Data Review:  All labs (24hrs):   Recent Results (from the past 24 hour(s))   Homocysteine    Collection Time: " 04/11/24  5:55 PM    Specimen: Blood   Result Value Ref Range    Homocysteine, Plasma (Quant) 22.5 (H) 0.0 - 15.0 umol/L   aPTT    Collection Time: 04/12/24  4:28 AM    Specimen: Blood   Result Value Ref Range    PTT 26.7 24.0 - 31.0 seconds   D-dimer, Quantitative    Collection Time: 04/12/24  4:28 AM    Specimen: Blood   Result Value Ref Range    D-Dimer, Quantitative 0.57 0.00 - 0.83 mg/L (FEU)   CBC Auto Differential    Collection Time: 04/12/24  4:28 AM    Specimen: Blood   Result Value Ref Range    WBC 3.66 3.40 - 10.80 10*3/mm3    RBC 3.30 (L) 3.77 - 5.28 10*6/mm3    Hemoglobin 10.3 (L) 12.0 - 15.9 g/dL    Hematocrit 33.3 (L) 34.0 - 46.6 %    .9 (H) 79.0 - 97.0 fL    MCH 31.2 26.6 - 33.0 pg    MCHC 30.9 (L) 31.5 - 35.7 g/dL    RDW 14.5 12.3 - 15.4 %    RDW-SD 53.1 37.0 - 54.0 fl    MPV 10.5 6.0 - 12.0 fL    Platelets 73 (L) 140 - 450 10*3/mm3    Neutrophil % 58.1 42.7 - 76.0 %    Lymphocyte % 24.9 19.6 - 45.3 %    Monocyte % 12.3 (H) 5.0 - 12.0 %    Eosinophil % 3.6 0.3 - 6.2 %    Basophil % 0.8 0.0 - 1.5 %    Immature Grans % 0.3 0.0 - 0.5 %    Neutrophils, Absolute 2.13 1.70 - 7.00 10*3/mm3    Lymphocytes, Absolute 0.91 0.70 - 3.10 10*3/mm3    Monocytes, Absolute 0.45 0.10 - 0.90 10*3/mm3    Eosinophils, Absolute 0.13 0.00 - 0.40 10*3/mm3    Basophils, Absolute 0.03 0.00 - 0.20 10*3/mm3    Immature Grans, Absolute 0.01 0.00 - 0.05 10*3/mm3    nRBC 0.0 0.0 - 0.2 /100 WBC   Scan Slide    Collection Time: 04/12/24  4:28 AM    Specimen: Blood   Result Value Ref Range    RBC Morphology Normal Normal    WBC Morphology Normal Normal    Platelet Estimate Decreased Normal    Large Platelets Slight/1+ None Seen          Imaging:  US Abdomen Limited    Result Date: 4/11/2024  Spleen is normal in size without acute abnormality Liver is grossly unremarkable in appearance. Bilateral pleural effusions. Electronically Signed: Mani Hernandez MD  4/11/2024 10:45 AM EDT  Workstation ID: [id number]    CT Chest Without  Contrast Diagnostic    Result Date: 4/9/2024  Impression: Bilateral effusions with bibasilar atelectasis, greatest on the left. Moderately severe cardiomegaly without pulmonary edema. Evidence of chronic underlying lung disease. Electronically Signed: Meghan Gregg MD  4/9/2024 4:08 PM EDT  Workstation ID: [id number]    XR Chest PA & Lateral    Result Date: 4/9/2024  Impression: 1. Stable cardiomegaly. 2. Prominent interstitial markings compatible with interstitial edema. 3. Stable small left pleural effusion Electronically Signed: Arie Nathan MD  4/9/2024 2:05 PM EDT  Workstation ID: [id number]    XR Chest 1 View    Result Date: 4/7/2024  Impression: Stable chronic findings without acute process. Electronically Signed: German Reece MD  4/7/2024 10:57 AM EDT  Workstation ID: [id number]     Assessment/Plan:     Syncope and collapse    Type 2 diabetes mellitus with diabetic chronic kidney disease    Paroxysmal atrial fibrillation    Essential hypertension    ESRD (end stage renal disease)    Anemia due to chronic kidney disease, on chronic dialysis    Hyperlipidemia            ESRD hemodialysis dependent   Syncope  Elevated BNP   Atrial fibrillation   Hyperlipidemia         Recommendations:  HD this morning seen on dialysis  uneventful  Follow cardiology work up noted  HD again Monday

## 2024-04-12 NOTE — DISCHARGE SUMMARY
[facility]         DISCHARGE SUMMARY    Patient Name: Deann Warren  : 1940  MRN: [id number]    Date of Admission: 2024  Date of Discharge:  2024  Primary Care Physician: No primary care provider on file.    Consults       Date and Time Order Name Status Description    4/10/2024  9:18 AM Hematology & Oncology Inpatient Consult      2024 12:37 PM Inpatient Pulmonology Consult Completed     2024  4:33 PM Hematology & Oncology Inpatient Consult Completed     2024  2:20 PM Inpatient Nephrology Consult Completed     2024  2:20 PM Inpatient Cardiology Consult Completed     2024 12:40 PM Hospitalist (on-call MD unless specified)              Presenting Problem:   Syncope and collapse [R55]  Syncope, unspecified syncope type [R55]    Active and Resolved Hospital Problems:  Active Hospital Problems    Diagnosis POA   • **Syncope and collapse [R55] Yes   • Hyperlipidemia [E78.5] Yes   • Type 2 diabetes mellitus with diabetic chronic kidney disease [E11.22] Yes   • Paroxysmal atrial fibrillation [I48.0] Yes   • ESRD (end stage renal disease) [N18.6] Yes   • Anemia due to chronic kidney disease, on chronic dialysis [N18.6, D63.1, Z99.2] Not Applicable   • Essential hypertension [I10] Yes      Resolved Hospital Problems   No resolved problems to display.         Hospital Course     Hospital Course:   Deann Warren is a 83 y.o. female with a PMH of hyperlipidemia, atrial fibrillation, end-stage renal disease on dialysis who presented to [facility] on 2024 with complaints of recurrent syncope.  Per patient, she has had history of multiple syncopal episodes especially during dialysis.  She said that she is followed by her cardiologist, Dr. Figueroa but nothing has been found as a source of her recurrent syncope.She always knows when her episodes are about to come up and would go lay down.  Sometimes laying down helps with the syncopal episodes. She stated that this  morning when she woke up she had a syncopal episode twice. She stated that she passes out at least 2-3 times in the month.  She is tired of going to [facility] as they have not found the cause of her syncopal episode so she came here today.   Patient endorsed sweatiness and shakiness during this episode.  She also had an episode of nausea and vomiting this morning.  She denied any fever, chills, chest pain, palpitation or abdominal pain.    Patient was admitted, evaluated by nephrology, patient received hemodialysis as scheduled, patient was found to have abdominal hypertension as her oral hypertensive medications were placed on hold, also her Eliquis was placed on hold because of thrombocytopenia.  Patient did receive physical therapy and Occupational Therapy, patient will be discharged home today, she will have assistance by her family members.  Patient will have cardiac monitor placed prior to discharge.      Day of Discharge     Vital Signs:  Temp:  [96.4 °F (35.8 °C)-98.1 °F (36.7 °C)] 96.4 °F (35.8 °C)  Heart Rate:  [57-71] 57  Resp:  [12-22] 18  BP: (117-127)/(50-63) 127/52  Physical Exam:  Constitutional: Awake, alert   Eyes: PERRLA, sclerae anicteric, no conjunctival injection   HENT: NCAT, mucous membranes moist   Neck: Supple, no thyromegaly, no lymphadenopathy, trachea midline   Respiratory: Clear to auscultation bilaterally, nonlabored respirations    Cardiovascular: RRR, no murmurs, rubs, or gallops, palpable pedal pulses bilaterally   Gastrointestinal: Positive bowel sounds, soft, nontender, nondistended   Musculoskeletal: No bilateral ankle edema, no clubbing or cyanosis to extremities   Psychiatric: Appropriate affect, cooperative   Neurologic: Oriented x 3, strength symmetric in all extremities, Cranial Nerves grossly intact to confrontation, speech clear   Skin: No rashes     Pertinent  and/or Most Recent Results     LAB RESULTS:      Lab 04/12/24  0428 04/11/24  0500 04/10/24  2020  04/10/24  1021 04/09/24  1044 04/07/24  1012   WBC 3.66 3.31* 3.32* 3.60 3.71 2.91*   HEMOGLOBIN 10.3* 9.9* 10.1* 10.7* 11.4* 11.2*   HEMATOCRIT 33.3* 33.0* 32.3* 34.8 37.7 36.7   PLATELETS 73* 66* 67* 63* 70* 63*   NEUTROS ABS 2.13 2.05 2.22  --   --  2.15   IMMATURE GRANS (ABS) 0.01 0.01 0.01  --   --  0.01   LYMPHS ABS 0.91 0.80 0.70  --   --  0.43*   MONOS ABS 0.45 0.35 0.33  --   --  0.28   EOS ABS 0.13 0.08 0.05  --   --  0.02   .9* 100.9* 98.2* 100.0* 100.3* 99.2*   APTT 26.7 27.6  --   --   --   --          Lab 04/10/24  0354 04/09/24  1157 04/09/24  1044 04/08/24  0446 04/07/24  1118 04/07/24  1012   SODIUM 139 139  --  134* 138  --    POTASSIUM 4.3 3.7  --  3.6 3.5  --    CHLORIDE 103 100  --  99 97*  --    CO2 24.0 26.0  --  23.0 28.0  --    ANION GAP 12.0 13.0  --  12.0 13.0  --    BUN 56* 49*  --  35* 28*  --    CREATININE 3.48* 3.08*  --  2.89* 2.64*  --    EGFR 12.5* 14.5*  --  15.7* 17.5*  --    GLUCOSE 97 92  --  101* 81  --    CALCIUM 8.7 9.5  --  9.0 9.2  --    MAGNESIUM  --  2.1  --   --   --  2.0   PHOSPHORUS  --   --  4.1  --   --   --    TSH  --   --   --   --   --  4.830*             Lab 04/10/24  0354 04/09/24  2236 04/07/24  1316 04/07/24  1118 04/07/24  1012   PROBNP  --   --   --   --  51,559.0*   HSTROP T 64* 60* 67* 65*  --          Lab 04/08/24  0446   CHOLESTEROL 67   LDL CHOL <5   HDL CHOL 49   TRIGLYCERIDES 67         Lab 04/10/24  2020   IRON 66   IRON SATURATION (TSAT) 25   TIBC 265*   TRANSFERRIN 178*   FERRITIN 877.00*   FOLATE 4.40*   VITAMIN B 12 654         Brief Urine Lab Results       None          Microbiology Results (last 10 days)       ** No results found for the last 240 hours. **            US Abdomen Limited    Result Date: 4/11/2024  Impression: Spleen is normal in size without acute abnormality Liver is grossly unremarkable in appearance. Bilateral pleural effusions. Electronically Signed: Mani Hernandez MD  4/11/2024 10:45 AM EDT  Workstation ID:  [id number]    CT Chest Without Contrast Diagnostic    Result Date: 4/9/2024  Impression: Impression: Bilateral effusions with bibasilar atelectasis, greatest on the left. Moderately severe cardiomegaly without pulmonary edema. Evidence of chronic underlying lung disease. Electronically Signed: Meghan Gregg MD  4/9/2024 4:08 PM EDT  Workstation ID: [id number]    XR Chest PA & Lateral    Result Date: 4/9/2024  Impression: Impression: 1. Stable cardiomegaly. 2. Prominent interstitial markings compatible with interstitial edema. 3. Stable small left pleural effusion Electronically Signed: Arie Nathan MD  4/9/2024 2:05 PM EDT  Workstation ID: [id number]    XR Chest 1 View    Result Date: 4/7/2024  Impression: Impression: Stable chronic findings without acute process. Electronically Signed: German Reece MD  4/7/2024 10:57 AM EDT  Workstation ID: [id number]     Results for orders placed during the hospital encounter of 04/07/24    Duplex Carotid Ultrasound CAR    Interpretation Summary  •  Right internal carotid artery demonstrates a less than 50% stenosis.  •  Left internal carotid artery demonstrates a less than 50% stenosis.      Results for orders placed during the hospital encounter of 04/07/24    Duplex Carotid Ultrasound CAR    Interpretation Summary  •  Right internal carotid artery demonstrates a less than 50% stenosis.  •  Left internal carotid artery demonstrates a less than 50% stenosis.      Results for orders placed during the hospital encounter of 04/07/24    Adult Transthoracic Echo Complete W/ Cont if Necessary Per Protocol    Interpretation Summary  •  Left ventricular systolic function is moderately decreased. Calculated left ventricular EF = 35%  •  The left ventricular cavity is mildly dilated.  •  The following left ventricular wall segments are hypokinetic: apical anterior.  •  Left ventricular diastolic function was indeterminate.  •  The left atrial cavity is moderately dilated.  •  Saline test  results are positive with valsalva manuever.  •  Estimated right ventricular systolic pressure from tricuspid regurgitation is mildly elevated (35-45 mmHg).  •  There is a large left pleural effusion.  •  There appears to be an ASD present with left to right shunting based on color dopple. Consider YINA to further evaluate      Labs Pending at Discharge:  Pending Labs       Order Current Status    Folate RBC In process    Heparin Induced PLT Antibody With / Rfx In process    Methylmalonic Acid, Serum In process              Discharge Details        Discharge Medications        New Medications        Instructions Start Date   folic acid 1 MG tablet  Commonly known as: FOLVITE   1 mg, Oral, Daily   Start Date: April 13, 2024     metoprolol succinate XL 25 MG 24 hr tablet  Commonly known as: TOPROL-XL   12.5 mg, Oral, Every 24 Hours Scheduled   Start Date: April 13, 2024            Continue These Medications        Instructions Start Date   acetaminophen 500 MG tablet  Commonly known as: TYLENOL   500 mg, Oral, Every 6 Hours PRN      febuxostat 40 MG tablet  Commonly known as: ULORIC   40 mg, Oral, Daily      NON FORMULARY   Lidocaine 2.5% ointment -  Apply 1 application Monday, Wednesday, Friday before dialysis       EM-MARIA ESTHER PO   1 tablet, Oral, Daily      simvastatin 20 MG tablet  Commonly known as: ZOCOR   20 mg, Oral, Nightly             Stop These Medications      apixaban 2.5 MG tablet tablet  Commonly known as: ELIQUIS              Allergies   Allergen Reactions   • Heparin Hives         Discharge Disposition:   Home or Self Care    Discharge Condition: .cond    Diet:  Hospital:  Diet Order   Procedures   • Diet: Cardiac, Renal; Healthy Heart (2-3 Na+); Low Potassium, Low Phosphorus; Fluid Consistency: Thin (IDDSI 0)         Discharge Activity:   Activity Instructions       Activity as Tolerated                CODE STATUS:  Code Status and Medical Interventions:   Ordered at: 04/07/24 1420     Code Status  (Patient has no pulse and is not breathing):    CPR (Attempt to Resuscitate)     Medical Interventions (Patient has pulse or is breathing):    Full Support         Future Appointments   Date Time Provider Department Center   4/18/2024  3:15 PM Dilcia Lui MD [facility]       Additional Instructions for the Follow-ups that You Need to Schedule       Discharge Follow-up with PCP   As directed       Currently Documented PCP:    No primary care provider on file.    PCP Phone Number:    None     Follow Up Details: in 3 to 5 days                Time spent on Discharge including face to face service:  30 minutes    Hernesto Sanchez MD

## 2024-04-12 NOTE — PLAN OF CARE
Goal Outcome Evaluation:  Plan of Care Reviewed With: patient        Progress: no change       Pt currently resting abed. No complaints this shift. VSS no distress noted. VSS cont plan of care.

## 2024-04-12 NOTE — PROGRESS NOTES
"PULMONARY CRITICAL CARE PROGRESS  NOTE      PATIENT IDENTIFICATION:  Name: Deann Warren  MRN: [id number]  :  1940     Age: 83 y.o.  Sex: female    DATE OF Note:  2024   Referring Physician: Cindy Dasilva MD                  Subjective:   Feeling better, on room air, no SOB, no chest or abd pain, no bowel or bladder issues     Objective:  tMax 24 hrs: Temp (24hrs), Av.4 °F (36.3 °C), Min:96.4 °F (35.8 °C), Max:98.1 °F (36.7 °C)      Vitals Ranges:   Temp:  [96.4 °F (35.8 °C)-98.1 °F (36.7 °C)] 96.4 °F (35.8 °C)  Heart Rate:  [57-71] 57  Resp:  [12-21] 18  BP: (117-127)/(50-63) 127/52    Intake and Output Last 3 Shifts:   I/O last 3 completed shifts:  In: 700 [P.O.:700]  Out: -     Exam:  /52 (BP Location: Right arm, Patient Position: Lying)   Pulse 57   Temp 96.4 °F (35.8 °C) (Axillary)   Resp 18   Ht 162.6 cm (64\")   Wt 53.5 kg (118 lb)   SpO2 91%   BMI 20.25 kg/m²     General Appearance:     HEENT:  Normocephalic, without obvious abnormality. Conjunctivae/corneas clear.  Normal external ear canals. Nares normal, no drainage     Neck:  Supple, symmetrical, trachea midline. No JVD.  Lungs /Chest wall:   Bilateral basal rhonchi, respirations unlabored, symmetrical wall movement.     Heart:  Regular rate and rhythm, systolic murmur PMI left sternal border  Abdomen: Soft, nontender, no masses, no organomegaly.    Extremities: Trace edema, no clubbing or cyanosis        Medications:  [Held by provider] apixaban, 2.5 mg, Oral, BID  atorvastatin, 10 mg, Oral, Nightly  febuxostat, 40 mg, Oral, Daily  folic acid, 1 mg, Oral, Daily  metoprolol succinate XL, 12.5 mg, Oral, Q24H  sodium chloride, 10 mL, Intravenous, Q12H        Infusion:        PRN:    senna-docusate sodium **AND** polyethylene glycol **AND** bisacodyl **AND** bisacodyl    ondansetron    sodium chloride    [COMPLETED] Insert Peripheral IV **AND** sodium chloride    sodium chloride    sodium chloride  Data Review:  All labs " (24hrs):   Recent Results (from the past 24 hour(s))   aPTT    Collection Time: 04/12/24  4:28 AM    Specimen: Blood   Result Value Ref Range    PTT 26.7 24.0 - 31.0 seconds   D-dimer, Quantitative    Collection Time: 04/12/24  4:28 AM    Specimen: Blood   Result Value Ref Range    D-Dimer, Quantitative 0.57 0.00 - 0.83 mg/L (FEU)   CBC Auto Differential    Collection Time: 04/12/24  4:28 AM    Specimen: Blood   Result Value Ref Range    WBC 3.66 3.40 - 10.80 10*3/mm3    RBC 3.30 (L) 3.77 - 5.28 10*6/mm3    Hemoglobin 10.3 (L) 12.0 - 15.9 g/dL    Hematocrit 33.3 (L) 34.0 - 46.6 %    .9 (H) 79.0 - 97.0 fL    MCH 31.2 26.6 - 33.0 pg    MCHC 30.9 (L) 31.5 - 35.7 g/dL    RDW 14.5 12.3 - 15.4 %    RDW-SD 53.1 37.0 - 54.0 fl    MPV 10.5 6.0 - 12.0 fL    Platelets 73 (L) 140 - 450 10*3/mm3    Neutrophil % 58.1 42.7 - 76.0 %    Lymphocyte % 24.9 19.6 - 45.3 %    Monocyte % 12.3 (H) 5.0 - 12.0 %    Eosinophil % 3.6 0.3 - 6.2 %    Basophil % 0.8 0.0 - 1.5 %    Immature Grans % 0.3 0.0 - 0.5 %    Neutrophils, Absolute 2.13 1.70 - 7.00 10*3/mm3    Lymphocytes, Absolute 0.91 0.70 - 3.10 10*3/mm3    Monocytes, Absolute 0.45 0.10 - 0.90 10*3/mm3    Eosinophils, Absolute 0.13 0.00 - 0.40 10*3/mm3    Basophils, Absolute 0.03 0.00 - 0.20 10*3/mm3    Immature Grans, Absolute 0.01 0.00 - 0.05 10*3/mm3    nRBC 0.0 0.0 - 0.2 /100 WBC   Scan Slide    Collection Time: 04/12/24  4:28 AM    Specimen: Blood   Result Value Ref Range    RBC Morphology Normal Normal    WBC Morphology Normal Normal    Platelet Estimate Decreased Normal    Large Platelets Slight/1+ None Seen        Imaging:  US Abdomen Limited  Narrative: US ABDOMEN LIMITED    Date of Exam: 4/11/2024 10:03 AM EDT    Indication: Measure spleen size and evaluate echotexture of liver..    Comparison: No comparisons available.    Technique: Grayscale and color Doppler ultrasound evaluation of the liver and spleen was performed.    FINDINGS:    Liver: The liver demonstrates  normal echogenicity without evidence of intrahepatic biliary ductal dilatation. Hepatic and portal vein are patent.    Spleen is grossly unremarkable on limited imaging. Spleen volume 307 mL.    Patient appears to be status post cholecystectomy    Common bile duct is within normal limits measurin.4 mm    Bilateral pleural effusions noted left greater than right.  Impression: Spleen is normal in size without acute abnormality    Liver is grossly unremarkable in appearance.    Bilateral pleural effusions.    Electronically Signed: Mani Hernandez MD    2024 10:45 AM EDT    Workstation ID: [id number]       ASSESSMENT:  Left pleural effusion   Syncope  Congestive heart failure EF 35%  A-fib  HTN  ESRD on HD  Anemia  Hyperlipidemia   Thrombocytopenia           PLAN:  Discharge and follow up in 2-3 weeks  Bronchodilators  Inhaled corticosteroids  Incentive spirometer  Diuresis per renal, monitor JUMANA's  Dialysis per renal  Electrolytes/ glycemic control  DVT and GI prophylaxis      Discussed with Dr Stephanie Christianson, APRN   2024  17:59 EDT      I personally have examined  and interviewed the patient. I have reviewed the history, data, problems, assessment and plan with our NP.  Total Critical care time in direct medical management (   ) minutes, This time specifically excludes time spent performing procedures.    Alma Brown MD   2024  23:21 EDT

## 2024-04-12 NOTE — NURSING NOTE
Dialysis Treatment    Treatment HD  Access Access: AVF    BVP 34.2 liters  UF 0 ML    Patient tolerated treatment well; no s/s distress noted; VSS    Report called to JULIO Ellsworth

## 2024-04-12 NOTE — THERAPY EVALUATION
Patient Name: Deann Warren  : 1940    MRN: [id number]                              Today's Date: 2024       Admit Date: 2024    Visit Dx:     ICD-10-CM ICD-9-CM   1. Syncope, unspecified syncope type  R55 780.2     Patient Active Problem List   Diagnosis    Syncope and collapse    Dependence on renal dialysis    Type 2 diabetes mellitus with diabetic chronic kidney disease    Anxiety    Chronic gouty arthritis    Allergic conjunctivitis and rhinitis    Paroxysmal atrial fibrillation    Essential hypertension    ESRD (end stage renal disease)    Anemia due to chronic kidney disease, on chronic dialysis    History of shingles    Hyperlipidemia    Vitamin D deficiency     Past Medical History:   Diagnosis Date    Allergic conjunctivitis and rhinitis 2014    Anemia due to chronic kidney disease, on chronic dialysis 2020    Anxiety 2012    Chronic gouty arthritis 2014    Dependence on renal dialysis 2022    ESRD (end stage renal disease) 2020    Essential hypertension 2015    History of shingles 2022    Hyperlipidemia 2024    Paroxysmal atrial fibrillation 2022    Type 2 diabetes mellitus with diabetic chronic kidney disease 2022    Vitamin D deficiency 2021     Past Surgical History:   Procedure Laterality Date    ARTERIOVENOUS FISTULA Left       General Information       Row Name 24 1242          OT Time and Intention    Document Type evaluation  -SP     Mode of Treatment occupational therapy  -SP       Row Name 24 1242          General Information    Patient Profile Reviewed yes  -SP     Prior Level of Function independent:;all household mobility;cooking;bed mobility;ADL's;driving  -SP     Existing Precautions/Restrictions other (see comments)  Syncope  -SP     Barriers to Rehab medically complex  -SP       Row Name 24 1242          Occupational Profile    Reason for Services/Referral (Occupational Profile) Pt is  a 82 y/o female admitted to [facility] on 4/7/24 with c/o recurrent syncope, found with elevated troponin. Pt's cardiologist is following, Dr. Figueroa, though pt continues to exhibit syncope ainsa during dialysis. CT chest shows isra pleural effusions, mod severe cardiomegaly w/o PE. PMH of hyperlipidemia, atrial fibrillation, end-stage renal disease on dialysis.  -SP       Row Name 04/12/24 1242          Living Environment    People in Home child(vamsi), adult  Adult grandchild, home 24/7, mental health disorder  -SP       Row Name 04/12/24 1242          Home Main Entrance    Number of Stairs, Main Entrance none  -SP       Row Name 04/12/24 1242          Stairs Within Home, Primary    Number of Stairs, Within Home, Primary none  -SP       Row Name 04/12/24 1242          Cognition    Orientation Status (Cognition) oriented x 4  -SP               User Key  (r) = Recorded By, (t) = Taken By, (c) = Cosigned By      Initials Name Provider Type    SP Aurelio Schmidt OT Occupational Therapist                     Mobility/ADL's       Row Name 04/12/24 1244          Bed Mobility    Bed Mobility bed mobility (all) activities  -SP     All Activities, Foard (Bed Mobility) modified independence  -SP     Assistive Device (Bed Mobility) bed rails  -SP       Row Name 04/12/24 1244          Sit-Stand Transfer    Sit-Stand Foard (Transfers) contact guard  For safety  -SP       Row Name 04/12/24 1244          Functional Mobility    Functional Mobility- Ind. Level contact guard assist  -SP       Row Name 04/12/24 1244          Activities of Daily Living    BADL Assessment/Intervention lower body dressing  -SP       Row Name 04/12/24 1244          Lower Body Dressing Assessment/Training    Foard Level (Lower Body Dressing) doff;don;socks  -SP     Position (Lower Body Dressing) edge of bed sitting  -SP               User Key  (r) = Recorded By, (t) = Taken By, (c) = Cosigned By      Initials Name Provider Type    LORENA Schmidt  SAUMYA Carey Occupational Therapist                   Obj/Interventions       Row Name 04/12/24 1249          Sensory Assessment (Somatosensory)    Sensory Assessment (Somatosensory) UE sensation intact  -SP       Row Name 04/12/24 1249          Vision Assessment/Intervention    Visual Impairment/Limitations corrective lenses full-time  -SP       Row Name 04/12/24 1249          Range of Motion Comprehensive    General Range of Motion no range of motion deficits identified  -SP       [facility] Name 04/12/24 1249          Strength Comprehensive (MMT)    General Manual Muscle Testing (MMT) Assessment no strength deficits identified  -SP       [facility] Name 04/12/24 1249          Balance    Balance Assessment sitting static balance;sitting dynamic balance;sit to stand dynamic balance;standing static balance;standing dynamic balance  -SP     Static Sitting Balance independent  -SP     Dynamic Sitting Balance independent  -SP     Position, Sitting Balance sitting edge of bed  -SP     Sit to Stand Dynamic Balance contact guard  -SP     Static Standing Balance contact guard  -SP     Dynamic Standing Balance contact guard  -SP               User Key  (r) = Recorded By, (t) = Taken By, (c) = Cosigned By      Initials Name Provider Type    SP Aurelio Schmidt OT Occupational Therapist                   Goals/Plan    No documentation.                  Clinical Impression       Row Name 04/12/24 1251          Pain Assessment    Pretreatment Pain Rating 0/10 - no pain  -SP     Posttreatment Pain Rating 0/10 - no pain  -SP       Row Name 04/12/24 1251          Plan of Care Review    Plan of Care Reviewed With patient  -SP     Outcome Evaluation Pt is a 82 y/o female admitted to [facility] on 4/7/24 with c/o recurrent syncope, found with elevated troponin. Pt's cardiologist is following, Dr. Figueroa, though pt continues to exhibit syncope anisa during dialysis. CT chest shows isra pleural effusions, mod severe cardiomegaly w/o PE. PMH of hyperlipidemia,  atrial fibrillation, end-stage renal disease on dialysis. PT A&O x4 with no reprots of pain. Pt presents on RA at 96%, BP fowlers 128/57, sitting /73. Pt completed bed mobility with mod I and IND sitting EOB. Pt requires CGA for safety to complete functional transfers and ambulation this date. Pt ambulated 20ft with no s/s of SOA. Sitting EOB pt IND doffed/donned socks. OT will complete orders at this time as pt reports functional near baseline. New orders will need to be placed if functional decline is noted. OT recommends home with family assist as needed.  -SP       Row Name 04/12/24 1251          Therapy Assessment/Plan (OT)    Criteria for Skilled Therapeutic Interventions Met (OT) no  -SP       Row Name 04/12/24 1251          Therapy Plan Review/Discharge Plan (OT)    Anticipated Discharge Disposition (OT) home with assist  -SP       Row Name 04/12/24 1251          Vital Signs    Pre Systolic BP Rehab 128  -SP     Pre Treatment Diastolic BP 57  -SP     Intra Systolic BP Rehab 122  -SP     Intra Treatment Diastolic BP 73  -SP     O2 Delivery Pre Treatment room air  -SP     O2 Delivery Intra Treatment room air  -SP     Post SpO2 (%) 96  -SP     O2 Delivery Post Treatment room air  -SP     Pre Patient Position Supine  -SP     Intra Patient Position Standing  -SP     Post Patient Position --  fowlers  -SP       Row Name 04/12/24 1251          Positioning and Restraints    Pre-Treatment Position in bed  -SP     Post Treatment Position bed  -SP     In Bed call light within reach;encouraged to call for assist;fowlers  -SP               User Key  (r) = Recorded By, (t) = Taken By, (c) = Cosigned By      Initials Name Provider Type    Aurelio Taylor, OT Occupational Therapist                   Outcome Measures       Row Name 04/12/24 125          How much help from another is currently needed...    Putting on and taking off regular lower body clothing? 4  -SP     Bathing (including washing, rinsing, and  drying) 4  -SP     Toileting (which includes using toilet bed pan or urinal) 4  -SP     Putting on and taking off regular upper body clothing 4  -SP     Taking care of personal grooming (such as brushing teeth) 4  -SP     Eating meals 4  -SP     AM-PAC 6 Clicks Score (OT) 24  -SP       Row Name 04/12/24 1120          How much help from another person do you currently need...    Turning from your back to your side while in flat bed without using bedrails? 4  -AY     Moving from lying on back to sitting on the side of a flat bed without bedrails? 4  -AY     Moving to and from a bed to a chair (including a wheelchair)? 4  -AY     Standing up from a chair using your arms (e.g., wheelchair, bedside chair)? 4  -AY     Climbing 3-5 steps with a railing? 4  -AY     To walk in hospital room? 3  -AY     AM-PAC 6 Clicks Score (PT) 23  -AY     Highest Level of Mobility Goal 7 --> Walk 25 feet or more  -AY       Row Name 04/12/24 1255          Functional Assessment    Outcome Measure Options AM-PAC 6 Clicks Daily Activity (OT)  -SP               User Key  (r) = Recorded By, (t) = Taken By, (c) = Cosigned By      Initials Name Provider Type    Jodee Desai, RN Registered Nurse    Aurelio Taylor, SAUMYA Occupational Therapist                    Occupational Therapy Education       Title: PT OT SLP Therapies (In Progress)       Topic: Occupational Therapy (In Progress)       Point: ADL training (Done)       Description:   Instruct learner(s) on proper safety adaptation and remediation techniques during self care or transfers.   Instruct in proper use of assistive devices.                  Learning Progress Summary             Patient Acceptance, E,TB, VU by SP at 4/12/2024 1255                         Point: Home exercise program (Not Started)       Description:   Instruct learner(s) on appropriate technique for monitoring, assisting and/or progressing therapeutic exercises/activities.                  Learner Progress:  Not  documented in this visit.              Point: Precautions (Not Started)       Description:   Instruct learner(s) on prescribed precautions during self-care and functional transfers.                  Learner Progress:  Not documented in this visit.              Point: Body mechanics (Done)       Description:   Instruct learner(s) on proper positioning and spine alignment during self-care, functional mobility activities and/or exercises.                  Learning Progress Summary             Patient Acceptance, E,TB, VU by SP at 4/12/2024 1255                                         User Key       Initials Effective Dates Name Provider Type Discipline    SP 11/15/23 -  Aurelio Schmidt, SAUMYA Occupational Therapist OT                  OT Recommendation and Plan     Plan of Care Review  Plan of Care Reviewed With: patient  Outcome Evaluation: Pt is a 84 y/o female admitted to [facility] on 4/7/24 with c/o recurrent syncope, found with elevated troponin. Pt's cardiologist is following, Dr. Figueroa, though pt continues to exhibit syncope anisa during dialysis. CT chest shows isra pleural effusions, mod severe cardiomegaly w/o PE. PMH of hyperlipidemia, atrial fibrillation, end-stage renal disease on dialysis. PT A&O x4 with no reprots of pain. Pt presents on RA at 96%, BP fowlers 128/57, sitting /73. Pt completed bed mobility with mod I and IND sitting EOB. Pt requires CGA for safety to complete functional transfers and ambulation this date. Pt ambulated 20ft with no s/s of SOA. Sitting EOB pt IND doffed/donned socks. OT will complete orders at this time as pt reports functional near baseline. New orders will need to be placed if functional decline is noted. OT recommends home with family assist as needed.     Time Calculation:         Time Calculation- OT       Row Name 04/12/24 1256 04/12/24 0941          Time Calculation- OT    OT Start Time 1218  -SP --     OT Stop Time 1240  -SP --     OT Time Calculation (min) 22 min  -SP  --     OT Received On 04/12/24  -LORENA --     OT - Next Appointment -- 04/13/24  -LORENA               User Key  (r) = Recorded By, (t) = Taken By, (c) = Cosigned By      Initials Name Provider Type    SP Aurelio Schmidt OT Occupational Therapist                  Therapy Charges for Today       Code Description Service Date Service Provider Modifiers Qty    [id number] HC OT EVAL MOD COMPLEXITY 4 4/12/2024 Aurelio Schmidt OT GO 1                 Aurelio Schmidt OT  4/12/2024

## 2024-04-12 NOTE — PLAN OF CARE
Patient remains independent with bed mobility and transfers and ambulating short distances without a.d. per Occupational therapy. No more syncopal episodes. Safe to return home.     Anticipated Discharge Disposition (PT): home with assist

## 2024-04-12 NOTE — CASE MANAGEMENT/SOCIAL WORK
Continued Stay Note  HYUN Duque     Patient Name: Deann Warren  MRN: [id number]  Today's Date: 4/12/2024    Admit Date: 4/7/2024    Plan: Home with GrandsonDonaldo, pending PT/OT ashley. Pt is current with [facility].   Discharge Plan       Row Name 04/12/24 1326       Plan    Plan Comments DC barriers: needs Holter monitor placed. Cardiology, oncology, pulmonology, nephrology following                    Ashly Salas RN     Office phone: [phone]  Office fax: [phone]

## 2024-04-12 NOTE — SIGNIFICANT NOTE
04/12/24 0941   OTHER   Discipline occupational therapist   Rehab Time/Intention   Session Not Performed other (see comments)  (Pt off floor in dialysis, OT will follow-up as appropriate.)   Therapy Assessment/Plan (PT)   Criteria for Skilled Interventions Met (PT) yes   Recommendation   OT - Next Appointment 04/13/24

## 2024-04-13 LAB — PF4 HEPARIN CMPLX IGG SERPL IA: 0.05 OD (ref 0–0.4)

## 2024-04-13 NOTE — PROGRESS NOTES
"Enter Query Response Below      Query Response:     Systolic heart failure/HFrEF          If applicable, please update the problem list.   Patient: Deann Warren        : 1940  Account: [id number]           Admit Date: 2024        How to Respond to this query:       a. Click New Note     b. Answer query within the yellow box.                c. Update the Problem List, if applicable.      If you have any questions about this query contact me at: [email or web address]     :     83-year-old patient with history of ESRD with dialysis Mon, Wed, and Fri, was admitted with recurrent syncope. Pulmonology PNs - includes \"Congestive heart failure EF 35%\". Cardiology PN - include cardiomyopathy and state \"She did have a drop in her EF with reported normal echo at previous cardiologist\". Patient also found to have pleural effusions. Per ED Provider Notes, \"Chest x-ray showed no signs of acute CHF.\"  ProBNP 51,559 on .   CT  shows \"Bilateral effusions with bibasilar atelectasis, greatest on the left. Moderately severe cardiomegaly without pulmonary edema. Evidence of chronic underlying lung disease\".   Treatment has included hemodialysis 4/10 and .    Please clarify the type of heart failure treated/monitored:    Systolic heart failure/HFrEF  Diastolic and systolic heart failure  Cardiomyopathy only  Other- specify_________  Unable to determine    By submitting this query, we are merely seeking further clarification of documentation to accurately reflect all conditions that you are monitoring, evaluating, treating or that extend the hospitalization or utilize additional resources of care. Please utilize your independent clinical judgment when addressing the question(s) above.     This query and your response, once completed, will be entered into the legal medical record.    Sincerely,  Daphnie Mccloud, RN, BSN, CCDS  Clinical Documentation Integrity Program "   [email or web address]

## 2024-04-13 NOTE — OUTREACH NOTE
Prep Survey      Flowsheet Row Responses   [facility] facility patient discharged from? [facility]   Is LACE score < 7 ? No   Eligibility Readm Mgmt   Discharge diagnosis Syncope and collapse   Does the patient have one of the following disease processes/diagnoses(primary or secondary)? Other   Does the patient have Home health ordered? No   Is there a DME ordered? No   General alerts for this patient HD MWF   Prep survey completed? Yes            Mary LEMON - Registered Nurse

## 2024-04-15 NOTE — CASE MANAGEMENT/SOCIAL WORK
Case Management Discharge Note      Final Note: Routine home with family         Selected Continued Care - Discharged on 4/12/2024 Admission date: 4/7/2024 - Discharge disposition: Home or Self Care           Transportation Services  Private: Car    Final Discharge Disposition Code: 01 - home or self-care

## 2024-04-17 ENCOUNTER — READMISSION MANAGEMENT (OUTPATIENT)
Dept: CALL CENTER | Facility: HOSPITAL | Age: 84
End: 2024-04-17
Payer: MEDICARE

## 2024-04-17 NOTE — OUTREACH NOTE
Medical Week 1 Survey      Flowsheet Row Responses   [facility] facility patient discharged from? [facility]   Does the patient have one of the following disease processes/diagnoses(primary or secondary)? Other   Week 1 attempt successful? No   Unsuccessful attempts Attempt 1            Janneth ALEXANDRA - Licensed Nurse

## 2024-04-18 ENCOUNTER — OFFICE VISIT (OUTPATIENT)
Dept: CARDIOLOGY | Facility: CLINIC | Age: 84
End: 2024-04-18
Payer: MEDICARE

## 2024-04-18 VITALS
WEIGHT: 118 LBS | DIASTOLIC BLOOD PRESSURE: 62 MMHG | BODY MASS INDEX: 20.14 KG/M2 | OXYGEN SATURATION: 99 % | HEIGHT: 64 IN | SYSTOLIC BLOOD PRESSURE: 128 MMHG | HEART RATE: 73 BPM

## 2024-04-18 DIAGNOSIS — E78.2 MIXED HYPERLIPIDEMIA: Chronic | ICD-10-CM

## 2024-04-18 DIAGNOSIS — N18.6 ANEMIA DUE TO CHRONIC KIDNEY DISEASE, ON CHRONIC DIALYSIS: Chronic | ICD-10-CM

## 2024-04-18 DIAGNOSIS — N18.6 ESRD (END STAGE RENAL DISEASE): Chronic | ICD-10-CM

## 2024-04-18 DIAGNOSIS — Z99.2 ANEMIA DUE TO CHRONIC KIDNEY DISEASE, ON CHRONIC DIALYSIS: Chronic | ICD-10-CM

## 2024-04-18 DIAGNOSIS — R55 SYNCOPE AND COLLAPSE: ICD-10-CM

## 2024-04-18 DIAGNOSIS — I42.0 CARDIOMYOPATHY, DILATED: Primary | ICD-10-CM

## 2024-04-18 DIAGNOSIS — I48.0 PAROXYSMAL A-FIB: ICD-10-CM

## 2024-04-18 DIAGNOSIS — D63.1 ANEMIA DUE TO CHRONIC KIDNEY DISEASE, ON CHRONIC DIALYSIS: Chronic | ICD-10-CM

## 2024-04-18 DIAGNOSIS — I10 ESSENTIAL HYPERTENSION: Chronic | ICD-10-CM

## 2024-04-18 LAB — METHYLMALONATE SERPL-SCNC: 607 NMOL/L (ref 0–378)

## 2024-04-18 NOTE — PROGRESS NOTES
Encounter Date:04/18/2024      Patient ID: Deann Warren is a 83 y.o. female.    Chief Complaint   Patient presents with    Cardiomyopathy    Congestive Heart Failure    Atrial Fibrillation          History of Present Illness    Deann Warren is a 83 y.o. female with a history of end-stage renal disease on dialysis, A-fib, hypertension, hyperlipidemia and recurrent syncope who presented to [facility] with complaint of syncope.  Previously followed with [facility].  During her hospitalization she had a repeat echocardiogram which showed that her EF was 35% which was new to her.  She was started on low-dose beta-blocker to avoid any hypotension.  Of note she has had bleeding issues with dialysis and is on low-dose Eliquis.  She has thrombocytopenia at baseline.     She presents today for follow-up.  She has been doing fairly well since discharge.  Says she has not had any further episodes of syncope.  She is currently wearing her monitor.  Her blood pressure has been well-controlled at dialysis.  She was taken off of her Eliquis completely due to bleeding.    The following portions of the patient's history were reviewed and updated as appropriate: allergies, current medications, past family history, past medical history, past social history, past surgical history, and problem list.    Review of Systems   Constitutional: Negative for malaise/fatigue.   Cardiovascular:  Positive for leg swelling. Negative for chest pain, dyspnea on exertion and palpitations.   Respiratory:  Negative for cough and shortness of breath.    Gastrointestinal:  Negative for abdominal pain, nausea and vomiting.   Neurological:  Positive for dizziness, light-headedness and numbness. Negative for focal weakness and headaches.   All other systems reviewed and are negative.        Current Outpatient Medications:     acetaminophen (TYLENOL) 500 MG tablet, Take 1 tablet by mouth Every 6 (Six) Hours As Needed for Mild Pain., Disp: ,  "Rfl:     B Complex-C-Folic Acid (EM-MARIA ESTHER PO), Take 1 tablet by mouth Daily., Disp: , Rfl:     febuxostat (ULORIC) 40 MG tablet, Take 1 tablet by mouth Daily., Disp: , Rfl:     folic acid (FOLVITE) 1 MG tablet, Take 1 tablet by mouth Daily., Disp: 30 tablet, Rfl: 0    metoprolol succinate XL (TOPROL-XL) 25 MG 24 hr tablet, Take 0.5 tablets by mouth Daily., Disp: 30 tablet, Rfl: 0    NON FORMULARY, Lidocaine 2.5% ointment -  Apply 1 application Monday, Wednesday, Friday before dialysis, Disp: , Rfl:     simvastatin (ZOCOR) 20 MG tablet, Take 1 tablet by mouth Every Night., Disp: , Rfl:     Allergies   Allergen Reactions    Heparin Hives       Family History   Family history unknown: Yes       Past Surgical History:   Procedure Laterality Date    ARTERIOVENOUS FISTULA Left        Past Medical History:   Diagnosis Date    Allergic conjunctivitis and rhinitis 11/06/2014    Anemia due to chronic kidney disease, on chronic dialysis 08/25/2020    Anxiety 07/30/2012    Chronic gouty arthritis 11/06/2014    Dependence on renal dialysis 07/01/2022    ESRD (end stage renal disease) 08/25/2020    Essential hypertension 09/16/2015    History of shingles 01/18/2022    Hyperlipidemia 04/08/2024    Paroxysmal atrial fibrillation 07/01/2022    Type 2 diabetes mellitus with diabetic chronic kidney disease 07/01/2022    Vitamin D deficiency 07/22/2021       Social History     Socioeconomic History    Marital status:    Tobacco Use    Smoking status: Never    Smokeless tobacco: Never   Vaping Use    Vaping status: Never Used   Substance and Sexual Activity    Alcohol use: Never    Drug use: Never    Sexual activity: Defer         Procedures      Objective:       Physical Exam    /62 (BP Location: Right arm, Patient Position: Sitting)   Pulse 73   Ht 162.6 cm (64\")   Wt 53.5 kg (118 lb)   SpO2 99%   BMI 20.25 kg/m²   The patient is alert, oriented and in no distress.  Frail    Vital signs as noted above.    Head and " neck revealed no carotid bruits or jugular venous distension.  No thyromegaly or lymphadenopathy is present.    Lungs clear.  No wheezing.  Breath sounds are normal bilaterally.    Heart normal first and second heart sounds.  Grade 2 murmur at the right upper sternal border.  No pericardial rub is present.  No gallop is present.    Abdomen soft and nontender.  No organomegaly is present.    Extremities revealed good peripheral pulses without any pedal edema.    Skin warm and dry.    Musculoskeletal system is grossly normal.    CNS grossly normal.           Diagnosis Plan   1. Cardiomyopathy, dilated  Case Request Cath Lab: Right and Left Heart Cath    CBC (No Diff)    Basic Metabolic Panel      2. Paroxysmal A-fib        3. Essential hypertension        4. ESRD (end stage renal disease)        5. Anemia due to chronic kidney disease, on chronic dialysis        6. Mixed hyperlipidemia        7. Syncope and collapse        LAB RESULTS (LAST 7 DAYS)    CBC  Results from last 7 days   Lab Units 04/12/24  0428 04/11/24  1755   WBC 10*3/mm3 3.66  --    RBC 10*6/mm3 3.30*  --    HEMOGLOBIN g/dL 10.3*  --    HEMATOCRIT % 33.3* 30.7*   MCV fL 100.9*  --    PLATELETS 10*3/mm3 73*  --        BMP        CMP         BNP        TROPONIN        CoAg  Results from last 7 days   Lab Units 04/12/24  0428   APTT seconds 26.7       Creatinine Clearance  Estimated Creatinine Clearance: 10.3 mL/min (A) (by C-G formula based on SCr of 3.48 mg/dL (H)).    ABG        Radiology  No radiology results for the last day         Assessment and Plan       Diagnoses and all orders for this visit:    1. Cardiomyopathy, dilated (Primary)  -     Case Request Cath Lab: Right and Left Heart Cath  -     CBC (No Diff); Future  -     Basic Metabolic Panel; Future    2. Paroxysmal A-fib    3. Essential hypertension    4. ESRD (end stage renal disease)    5. Anemia due to chronic kidney disease, on chronic dialysis    6. Mixed hyperlipidemia    7. Syncope  and collapse       Syncope  Recurrent syncope with previous negative workup  No evidence of valvular heart disease that could explain her syncope on echocardiogram  Holter monitor in place.  No urgent alarms noted  She may benefit from midodrine on dialysis days to prevent drops in blood pressure if she has recurrent episodes     ASD: Possible  This was an incidental finding on her echocardiogram  Given her advanced age I do not recommend further workup with a YINA at this time because I do not think that closure would be indicated in this case     Cardiomyopathy  She did have a drop in her EF with reported normal echo at previous cardiologist  Also noted to have anteroapical wall motion abnormalities  Start low dose BB to avoid hypotension  I will proceed with right and left heart catheterization  Risks and benefits of procedure discussed with patient today and she is agreeable to proceed  If her blood pressure is stable at that time we will start lisinopril 2.5 mg  No SGLT2i due to ESRD     Thrombocytopenia  New onset  Being followed by hematology  Eliquis on hold      End-stage renal disease  On hemodialysis Monday Wednesday Friday     Atrial fibrillation  Shared decision making for Watchman  Currently rate controlled  Eliquis has been stopped due to bleeding and thrombocytopenia  [id number] score 5  Has bled score 4  I believe she would benefit from left atrial appendage occlusion  I discussed with her in detail the procedure and risks and benefits using a heart model and provided her with reading materials.  She will go home and think about this     Hyperlipidemia  Continue with atorvastatin     Labile hypertension  Continue metoprolol    Dilcia Lui MD

## 2024-04-18 NOTE — H&P (VIEW-ONLY)
Encounter Date:04/18/2024      Patient ID: Deann Warren is a 83 y.o. female.    Chief Complaint   Patient presents with    Cardiomyopathy    Congestive Heart Failure    Atrial Fibrillation          History of Present Illness    Deann Warren is a 83 y.o. female with a history of end-stage renal disease on dialysis, A-fib, hypertension, hyperlipidemia and recurrent syncope who presented to [facility] with complaint of syncope.  Previously followed with [place] [facility].  During her hospitalization she had a repeat echocardiogram which showed that her EF was 35% which was new to her.  She was started on low-dose beta-blocker to avoid any hypotension.  Of note she has had bleeding issues with dialysis and is on low-dose Eliquis.  She has thrombocytopenia at baseline.     She presents today for follow-up.  She has been doing fairly well since discharge.  Says she has not had any further episodes of syncope.  She is currently wearing her monitor.  Her blood pressure has been well-controlled at dialysis.  She was taken off of her Eliquis completely due to bleeding.    The following portions of the patient's history were reviewed and updated as appropriate: allergies, current medications, past family history, past medical history, past social history, past surgical history, and problem list.    Review of Systems   Constitutional: Negative for malaise/fatigue.   Cardiovascular:  Positive for leg swelling. Negative for chest pain, dyspnea on exertion and palpitations.   Respiratory:  Negative for cough and shortness of breath.    Gastrointestinal:  Negative for abdominal pain, nausea and vomiting.   Neurological:  Positive for dizziness, light-headedness and numbness. Negative for focal weakness and headaches.   All other systems reviewed and are negative.        Current Outpatient Medications:     acetaminophen (TYLENOL) 500 MG tablet, Take 1 tablet by mouth Every 6 (Six) Hours As Needed for Mild Pain., Disp: ,  "Rfl:     B Complex-C-Folic Acid (EM-MARIA ESTHER PO), Take 1 tablet by mouth Daily., Disp: , Rfl:     febuxostat (ULORIC) 40 MG tablet, Take 1 tablet by mouth Daily., Disp: , Rfl:     folic acid (FOLVITE) 1 MG tablet, Take 1 tablet by mouth Daily., Disp: 30 tablet, Rfl: 0    metoprolol succinate XL (TOPROL-XL) 25 MG 24 hr tablet, Take 0.5 tablets by mouth Daily., Disp: 30 tablet, Rfl: 0    NON FORMULARY, Lidocaine 2.5% ointment -  Apply 1 application Monday, Wednesday, Friday before dialysis, Disp: , Rfl:     simvastatin (ZOCOR) 20 MG tablet, Take 1 tablet by mouth Every Night., Disp: , Rfl:     Allergies   Allergen Reactions    Heparin Hives       Family History   Family history unknown: Yes       Past Surgical History:   Procedure Laterality Date    ARTERIOVENOUS FISTULA Left        Past Medical History:   Diagnosis Date    Allergic conjunctivitis and rhinitis 11/06/2014    Anemia due to chronic kidney disease, on chronic dialysis 08/25/2020    Anxiety 07/30/2012    Chronic gouty arthritis 11/06/2014    Dependence on renal dialysis 07/01/2022    ESRD (end stage renal disease) 08/25/2020    Essential hypertension 09/16/2015    History of shingles 01/18/2022    Hyperlipidemia 04/08/2024    Paroxysmal atrial fibrillation 07/01/2022    Type 2 diabetes mellitus with diabetic chronic kidney disease 07/01/2022    Vitamin D deficiency 07/22/2021       Social History     Socioeconomic History    Marital status:    Tobacco Use    Smoking status: Never    Smokeless tobacco: Never   Vaping Use    Vaping status: Never Used   Substance and Sexual Activity    Alcohol use: Never    Drug use: Never    Sexual activity: Defer         Procedures      Objective:       Physical Exam    /62 (BP Location: Right arm, Patient Position: Sitting)   Pulse 73   Ht 162.6 cm (64\")   Wt 53.5 kg (118 lb)   SpO2 99%   BMI 20.25 kg/m²   The patient is alert, oriented and in no distress.  Frail    Vital signs as noted above.    Head and " neck revealed no carotid bruits or jugular venous distension.  No thyromegaly or lymphadenopathy is present.    Lungs clear.  No wheezing.  Breath sounds are normal bilaterally.    Heart normal first and second heart sounds.  Grade 2 murmur at the right upper sternal border.  No pericardial rub is present.  No gallop is present.    Abdomen soft and nontender.  No organomegaly is present.    Extremities revealed good peripheral pulses without any pedal edema.    Skin warm and dry.    Musculoskeletal system is grossly normal.    CNS grossly normal.           Diagnosis Plan   1. Cardiomyopathy, dilated  Case Request Cath Lab: Right and Left Heart Cath    CBC (No Diff)    Basic Metabolic Panel      2. Paroxysmal A-fib        3. Essential hypertension        4. ESRD (end stage renal disease)        5. Anemia due to chronic kidney disease, on chronic dialysis        6. Mixed hyperlipidemia        7. Syncope and collapse        LAB RESULTS (LAST 7 DAYS)    CBC  Results from last 7 days   Lab Units 04/12/24  0428 04/11/24  1755   WBC 10*3/mm3 3.66  --    RBC 10*6/mm3 3.30*  --    HEMOGLOBIN g/dL 10.3*  --    HEMATOCRIT % 33.3* 30.7*   MCV fL 100.9*  --    PLATELETS 10*3/mm3 73*  --        BMP        CMP         BNP        TROPONIN        CoAg  Results from last 7 days   Lab Units 04/12/24  0428   APTT seconds 26.7       Creatinine Clearance  Estimated Creatinine Clearance: 10.3 mL/min (A) (by C-G formula based on SCr of 3.48 mg/dL (H)).    ABG        Radiology  No radiology results for the last day         Assessment and Plan       Diagnoses and all orders for this visit:    1. Cardiomyopathy, dilated (Primary)  -     Case Request Cath Lab: Right and Left Heart Cath  -     CBC (No Diff); Future  -     Basic Metabolic Panel; Future    2. Paroxysmal A-fib    3. Essential hypertension    4. ESRD (end stage renal disease)    5. Anemia due to chronic kidney disease, on chronic dialysis    6. Mixed hyperlipidemia    7. Syncope  and collapse       Syncope  Recurrent syncope with previous negative workup  No evidence of valvular heart disease that could explain her syncope on echocardiogram  Holter monitor in place.  No urgent alarms noted  She may benefit from midodrine on dialysis days to prevent drops in blood pressure if she has recurrent episodes     ASD: Possible  This was an incidental finding on her echocardiogram  Given her advanced age I do not recommend further workup with a YINA at this time because I do not think that closure would be indicated in this case     Cardiomyopathy  She did have a drop in her EF with reported normal echo at previous cardiologist  Also noted to have anteroapical wall motion abnormalities  Start low dose BB to avoid hypotension  I will proceed with right and left heart catheterization  Risks and benefits of procedure discussed with patient today and she is agreeable to proceed  If her blood pressure is stable at that time we will start lisinopril 2.5 mg  No SGLT2i due to ESRD     Thrombocytopenia  New onset  Being followed by hematology  Eliquis on hold      End-stage renal disease  On hemodialysis Monday Wednesday Friday     Atrial fibrillation  Shared decision making for Watchman  Currently rate controlled  Eliquis has been stopped due to bleeding and thrombocytopenia  [id number] score 5  Has bled score 4  I believe she would benefit from left atrial appendage occlusion  I discussed with her in detail the procedure and risks and benefits using a heart model and provided her with reading materials.  She will go home and think about this     Hyperlipidemia  Continue with atorvastatin     Labile hypertension  Continue metoprolol    Dilcia Lui MD

## 2024-04-22 ENCOUNTER — READMISSION MANAGEMENT (OUTPATIENT)
Dept: CALL CENTER | Facility: HOSPITAL | Age: 84
End: 2024-04-22
Payer: MEDICARE

## 2024-04-22 NOTE — OUTREACH NOTE
Medical Week 2 Survey      Flowsheet Row Responses   [facility] facility patient discharged from? [facility]   Does the patient have one of the following disease processes/diagnoses(primary or secondary)? Other   Week 2 attempt successful? No   Unsuccessful attempts Attempt 1            Janneth ALEXANDRA - Licensed Nurse

## 2024-04-23 ENCOUNTER — LAB (OUTPATIENT)
Dept: LAB | Facility: HOSPITAL | Age: 84
End: 2024-04-23
Payer: MEDICARE

## 2024-04-23 DIAGNOSIS — I42.0 CARDIOMYOPATHY, DILATED: ICD-10-CM

## 2024-04-23 LAB
ANION GAP SERPL CALCULATED.3IONS-SCNC: 14 MMOL/L (ref 5–15)
BUN SERPL-MCNC: 21 MG/DL (ref 8–23)
BUN/CREAT SERPL: 8.9 (ref 7–25)
CALCIUM SPEC-SCNC: 8.5 MG/DL (ref 8.6–10.5)
CHLORIDE SERPL-SCNC: 99 MMOL/L (ref 98–107)
CO2 SERPL-SCNC: 24 MMOL/L (ref 22–29)
CREAT SERPL-MCNC: 2.35 MG/DL (ref 0.57–1)
DEPRECATED RDW RBC AUTO: 46.9 FL (ref 37–54)
EGFRCR SERPLBLD CKD-EPI 2021: 20.1 ML/MIN/1.73
ERYTHROCYTE [DISTWIDTH] IN BLOOD BY AUTOMATED COUNT: 13.6 % (ref 12.3–15.4)
GLUCOSE SERPL-MCNC: 80 MG/DL (ref 65–99)
HCT VFR BLD AUTO: 35.7 % (ref 34–46.6)
HGB BLD-MCNC: 11.4 G/DL (ref 12–15.9)
MCH RBC QN AUTO: 30.1 PG (ref 26.6–33)
MCHC RBC AUTO-ENTMCNC: 31.9 G/DL (ref 31.5–35.7)
MCV RBC AUTO: 94.2 FL (ref 79–97)
PLATELET # BLD AUTO: 87 10*3/MM3 (ref 140–450)
PMV BLD AUTO: 10.8 FL (ref 6–12)
POTASSIUM SERPL-SCNC: 4 MMOL/L (ref 3.5–5.2)
RBC # BLD AUTO: 3.79 10*6/MM3 (ref 3.77–5.28)
SODIUM SERPL-SCNC: 137 MMOL/L (ref 136–145)
WBC NRBC COR # BLD AUTO: 3.51 10*3/MM3 (ref 3.4–10.8)

## 2024-04-23 PROCEDURE — 36415 COLL VENOUS BLD VENIPUNCTURE: CPT

## 2024-04-23 PROCEDURE — 85027 COMPLETE CBC AUTOMATED: CPT

## 2024-04-23 PROCEDURE — 80048 BASIC METABOLIC PNL TOTAL CA: CPT

## 2024-04-25 ENCOUNTER — HOSPITAL ENCOUNTER (OUTPATIENT)
Facility: HOSPITAL | Age: 84
Setting detail: HOSPITAL OUTPATIENT SURGERY
Discharge: HOME | End: 2024-04-25
Attending: INTERNAL MEDICINE | Admitting: INTERNAL MEDICINE
Payer: MEDICARE

## 2024-04-25 VITALS
WEIGHT: 115.08 LBS | RESPIRATION RATE: 16 BRPM | SYSTOLIC BLOOD PRESSURE: 138 MMHG | BODY MASS INDEX: 21.18 KG/M2 | TEMPERATURE: 97.5 F | HEART RATE: 59 BPM | HEIGHT: 62 IN | DIASTOLIC BLOOD PRESSURE: 49 MMHG | OXYGEN SATURATION: 100 %

## 2024-04-25 DIAGNOSIS — I42.0 CARDIOMYOPATHY, DILATED: ICD-10-CM

## 2024-04-25 LAB
BASE DEFICIT: ABNORMAL
BASE DEFICIT: ABNORMAL
BASE EXCESS BLDA CALC-SCNC: <0 MMOL/L (ref 0–3)
BASE EXCESS BLDA CALC-SCNC: <0 MMOL/L (ref 0–3)
CA-I BLDA-SCNC: 1.1 MMOL/L (ref 1.12–1.32)
CA-I BLDA-SCNC: 1.1 MMOL/L (ref 1.12–1.32)
CO2 BLDA-SCNC: 26 MMOL/L (ref 23–27)
CO2 BLDA-SCNC: 27 MMOL/L (ref 23–27)
GLUCOSE BLDC GLUCOMTR-MCNC: 82 MG/DL (ref 70–105)
GLUCOSE BLDC GLUCOMTR-MCNC: 84 MG/DL (ref 70–105)
GLUCOSE BLDC GLUCOMTR-MCNC: 85 MG/DL (ref 70–105)
HCO3 BLDA-SCNC: 24.9 MMOL/L (ref 22–26)
HCO3 BLDA-SCNC: 25.2 MMOL/L (ref 22–26)
HCT VFR BLDA CALC: 34 % (ref 38–51)
HCT VFR BLDA CALC: 34 % (ref 38–51)
HGB BLDA-MCNC: 11.6 G/DL (ref 12–17)
HGB BLDA-MCNC: 11.6 G/DL (ref 12–17)
PCO2 BLDA: 48.3 MM HG (ref 35–45)
PCO2 BLDA: 50.8 MM HG (ref 35–45)
PH BLDA: 7.3 PH UNITS (ref 7.35–7.45)
PH BLDA: 7.32 PH UNITS (ref 7.35–7.45)
PO2 BLDA: 328 MM HG (ref 80–105)
PO2 BLDA: 56 MM HG (ref 80–105)
POTASSIUM BLDA-SCNC: 3.2 MMOL/L (ref 3.5–4.9)
POTASSIUM BLDA-SCNC: 3.2 MMOL/L (ref 3.5–4.9)
SAO2 % BLDCOA: 100 % (ref 95–98)
SAO2 % BLDCOA: 85 % (ref 95–98)
SODIUM BLD-SCNC: 135 MMOL/L (ref 138–146)
SODIUM BLD-SCNC: 135 MMOL/L (ref 138–146)

## 2024-04-25 PROCEDURE — 92979 ENDOLUMINL IVUS OCT C EA: CPT | Performed by: INTERNAL MEDICINE

## 2024-04-25 PROCEDURE — C1887 CATHETER, GUIDING: HCPCS | Performed by: INTERNAL MEDICINE

## 2024-04-25 PROCEDURE — C1760 CLOSURE DEV, VASC: HCPCS | Performed by: INTERNAL MEDICINE

## 2024-04-25 PROCEDURE — 93460 R&L HRT ART/VENTRICLE ANGIO: CPT | Performed by: INTERNAL MEDICINE

## 2024-04-25 PROCEDURE — 84132 ASSAY OF SERUM POTASSIUM: CPT

## 2024-04-25 PROCEDURE — 99024 POSTOP FOLLOW-UP VISIT: CPT | Performed by: THORACIC SURGERY (CARDIOTHORACIC VASCULAR SURGERY)

## 2024-04-25 PROCEDURE — 84295 ASSAY OF SERUM SODIUM: CPT

## 2024-04-25 PROCEDURE — C1769 GUIDE WIRE: HCPCS | Performed by: INTERNAL MEDICINE

## 2024-04-25 PROCEDURE — C1725 CATH, TRANSLUMIN NON-LASER: HCPCS | Performed by: INTERNAL MEDICINE

## 2024-04-25 PROCEDURE — C1753 CATH, INTRAVAS ULTRASOUND: HCPCS | Performed by: INTERNAL MEDICINE

## 2024-04-25 PROCEDURE — 82947 ASSAY GLUCOSE BLOOD QUANT: CPT

## 2024-04-25 PROCEDURE — 99153 MOD SED SAME PHYS/QHP EA: CPT | Performed by: INTERNAL MEDICINE

## 2024-04-25 PROCEDURE — C1894 INTRO/SHEATH, NON-LASER: HCPCS | Performed by: INTERNAL MEDICINE

## 2024-04-25 PROCEDURE — 25010000002 FENTANYL CITRATE (PF) 100 MCG/2ML SOLUTION: Performed by: INTERNAL MEDICINE

## 2024-04-25 PROCEDURE — 82330 ASSAY OF CALCIUM: CPT

## 2024-04-25 PROCEDURE — 92978 ENDOLUMINL IVUS OCT C 1ST: CPT | Performed by: INTERNAL MEDICINE

## 2024-04-25 PROCEDURE — 25010000002 MIDAZOLAM PER 1 MG: Performed by: INTERNAL MEDICINE

## 2024-04-25 PROCEDURE — 82948 REAGENT STRIP/BLOOD GLUCOSE: CPT

## 2024-04-25 PROCEDURE — C1751 CATH, INF, PER/CENT/MIDLINE: HCPCS | Performed by: INTERNAL MEDICINE

## 2024-04-25 PROCEDURE — 85014 HEMATOCRIT: CPT

## 2024-04-25 PROCEDURE — 82803 BLOOD GASES ANY COMBINATION: CPT

## 2024-04-25 PROCEDURE — 99152 MOD SED SAME PHYS/QHP 5/>YRS: CPT | Performed by: INTERNAL MEDICINE

## 2024-04-25 PROCEDURE — 25010000002 LIDOCAINE 1 % SOLUTION: Performed by: INTERNAL MEDICINE

## 2024-04-25 PROCEDURE — 25010000002 PHENYLEPHRINE 10 MG/ML SOLUTION: Performed by: INTERNAL MEDICINE

## 2024-04-25 RX ORDER — PHENYLEPHRINE HYDROCHLORIDE 10 MG/ML
INJECTION INTRAVENOUS
Status: DISCONTINUED | OUTPATIENT
Start: 2024-04-25 | End: 2024-04-25 | Stop reason: HOSPADM

## 2024-04-25 RX ORDER — ONDANSETRON 2 MG/ML
4 INJECTION INTRAMUSCULAR; INTRAVENOUS EVERY 6 HOURS PRN
Status: DISCONTINUED | OUTPATIENT
Start: 2024-04-25 | End: 2024-04-25 | Stop reason: HOSPADM

## 2024-04-25 RX ORDER — LIDOCAINE HYDROCHLORIDE 10 MG/ML
INJECTION, SOLUTION INFILTRATION; PERINEURAL
Status: DISCONTINUED | OUTPATIENT
Start: 2024-04-25 | End: 2024-04-25 | Stop reason: HOSPADM

## 2024-04-25 RX ORDER — ASPIRIN 81 MG/1
81 TABLET ORAL DAILY
Qty: 90 TABLET | Refills: 3 | Status: SHIPPED | OUTPATIENT
Start: 2024-04-25

## 2024-04-25 RX ORDER — CLOPIDOGREL 300 MG/1
TABLET, FILM COATED ORAL
Status: DISCONTINUED | OUTPATIENT
Start: 2024-04-25 | End: 2024-04-25 | Stop reason: HOSPADM

## 2024-04-25 RX ORDER — ATORVASTATIN CALCIUM 40 MG/1
40 TABLET, FILM COATED ORAL DAILY
Qty: 90 TABLET | Refills: 3 | Status: SHIPPED | OUTPATIENT
Start: 2024-04-25

## 2024-04-25 RX ORDER — MIDAZOLAM HYDROCHLORIDE 1 MG/ML
INJECTION INTRAMUSCULAR; INTRAVENOUS
Status: DISCONTINUED | OUTPATIENT
Start: 2024-04-25 | End: 2024-04-25 | Stop reason: HOSPADM

## 2024-04-25 RX ORDER — CLOPIDOGREL BISULFATE 75 MG/1
75 TABLET ORAL DAILY
Qty: 90 TABLET | Refills: 3 | Status: SHIPPED | OUTPATIENT
Start: 2024-04-25

## 2024-04-25 RX ORDER — DIPHENHYDRAMINE HCL 25 MG
25 CAPSULE ORAL EVERY 6 HOURS PRN
Status: DISCONTINUED | OUTPATIENT
Start: 2024-04-25 | End: 2024-04-25 | Stop reason: HOSPADM

## 2024-04-25 RX ORDER — FENTANYL CITRATE 50 UG/ML
INJECTION, SOLUTION INTRAMUSCULAR; INTRAVENOUS
Status: DISCONTINUED | OUTPATIENT
Start: 2024-04-25 | End: 2024-04-25 | Stop reason: HOSPADM

## 2024-04-25 RX ORDER — NITROGLYCERIN 0.4 MG/1
0.4 TABLET SUBLINGUAL
Status: DISCONTINUED | OUTPATIENT
Start: 2024-04-25 | End: 2024-04-25 | Stop reason: HOSPADM

## 2024-04-25 RX ORDER — ONDANSETRON 4 MG/1
4 TABLET, ORALLY DISINTEGRATING ORAL EVERY 6 HOURS PRN
Status: DISCONTINUED | OUTPATIENT
Start: 2024-04-25 | End: 2024-04-25 | Stop reason: HOSPADM

## 2024-04-25 RX ORDER — ACETAMINOPHEN 325 MG/1
650 TABLET ORAL EVERY 4 HOURS PRN
Status: DISCONTINUED | OUTPATIENT
Start: 2024-04-25 | End: 2024-04-25 | Stop reason: HOSPADM

## 2024-04-25 NOTE — DISCHARGE INSTRUCTIONS
Post Cath Instructions         Medication Sheets given? ***     Education Booklet *** given? ***    Call  ***’s office to schedule a follow up appointment in *** days/weeks at ***.  Specific Physician Instructions: ***    Drink plenty of fluids for the next 24 hours.  This helps to eliminate the dye used in your procedure through urination.  You may resume a normal diet; however, try to avoid foods that would cause gas or constipation.    Sedative medication given to you during your catheterization may decrease your judgement and reaction time for up to 24-48 hours.  Therefore:  DO NOT drive or operate hazardous machinery (48 hours)  DO NOT consume alcoholic beverages  DO NOT make any important/legal decisions  Have someone stay with you for at least 24 hours    To allow proper healing and prevent bleeding, the following activities are to be strictly avoided for the next 24-48 hours:  Excessive bending at wound site  Straining (anything that would tense up muscles around the affected puncture site)  Lifting objects greater than 5 pounds, pushing, or pulling for 5 days      For Groin Cases:  Refrain from sexual activity  Refrain from running or vigorous walking  No prolonged sitting or standing  Limit stair climbing as much as possible    Keep the puncture site clean and dry.  You may remove the dressing tomorrow and replace it with a band-aid for at least one additional day.  Gently clean the site with mild soap and water.  No scrubbing/rubbing and lightly pat the area dry.  Showers are acceptable; however, avoid submerging in water (tub baths, hot tubs, swimming pools, dishwater, etc…) for at least one week.  The site should be completely healed before resuming these activities to reduce the risk of infection.  Check the site often.  Watch for signs and symptoms of infection and notify your physician if any of the following occur:  Bleeding or an increase in swelling at the puncture site  Fever  Increased  soreness around puncture site  Foul odor or significant drainage from the puncture site  Swelling, redness, or warmth at the puncture site    **A bruise or small “pea sized” lump under the skin at the puncture site is not unusual.  This should disappear within 3-4 weeks.**  CONTACT YOUR PHYSICIAN OR CALL 911 IF YOU EXPERIENCE ANY OF THE FOLLOWING:  Increased angina (chest pain) or frequent sensations of pressure, burning, pain, or other discomfort in the chest, arm, jaws, or stomach  Lightheadedness, dizziness, faint feeling, sweating, or difficulty breathing  Odd sensation changes like numbness, tingling, coldness, or pain in the arm or leg in which the catheter was inserted  Limb in which the catheter was inserted becomes pale/bluish in color    IMPORTANT:  Although this occurs very rarely, if you should develop bright red or excessive bleeding, feel a “pop” inside at the insertion site, or notice a sudden increase in swelling larger than a walnut, you should call 911.  Hold continuous firm pressure to the access site until emergency personnel arrive.  It is best if someone else can do this for you.

## 2024-04-25 NOTE — NURSING NOTE
Discharge instructions discussed with patient and friend; both voice understanding; gave patient the phone number to call outpatient wound clinic for appointment; no  bleeding from right groin, no hematoma. Patient discharged home.

## 2024-04-25 NOTE — NURSING NOTE
WOCN note:    83 yr old female admitted 4/25/24 for outpatient heart cath. WOCN consulted for pressure injuries noted during post procedure. Patient reported she first noticed a left foot blister on Monday after dialysis.     Patient presents with a stage 2 pressure injury with underlying deep tissue pressure injury to her left posterior heel measuring approximately 5x6cm. The blister has partially ruptured with clear serous exudate. There is also a darkening and bogginess to the plantar heel.   The heel wound was dressed with Xeroform gauze, ABD pad and secured with kerlix. Patient was provided a foam off-loading boot and instructed on the use and cautioned against standing or ambulating with the boot on.     Patient also noted to have a healing pressure injury to her low sacrum with surrounding blanchable and non blanchable erythema. A silicone bordered foam dressing was applied. Patient and family at the bedside instructed on pressure injury prevention measures. Recommend patient follow up with outpatient wound center.

## 2024-04-25 NOTE — PROGRESS NOTES
Pt underwent heart cath today by Dr. Lui.  She asked that pt see Dr. Edward in clinic to discuss CAD.    She has known ESRD on HD, atrial fib on chronic anticoagulation (Eliquis), HTN, HLD, and dilated ICM (EF 35%).      She is allergic to HEPARIN.    New patient appt with Dr. Edward on 5/2/2024 at [facility] office at 11:15 AM

## 2024-04-29 ENCOUNTER — READMISSION MANAGEMENT (OUTPATIENT)
Dept: CALL CENTER | Facility: HOSPITAL | Age: 84
End: 2024-04-29
Payer: MEDICARE

## 2024-04-29 NOTE — OUTREACH NOTE
Medical Week 2 Survey      Flowsheet Row Responses   [facility] facility patient discharged from? [facility]   Does the patient have one of the following disease processes/diagnoses(primary or secondary)? Other   Week 2 attempt successful? No   Unsuccessful attempts Attempt 2            SANDRA GONZALEZ - Registered Nurse

## 2024-05-01 ENCOUNTER — TELEPHONE (OUTPATIENT)
Dept: CARDIAC SURGERY | Facility: CLINIC | Age: 84
End: 2024-05-01
Payer: MEDICARE

## 2024-05-01 NOTE — TELEPHONE ENCOUNTER
CALLED PT'S PRIMARY NUMBER TO DISCUSS MOVING APPT FROM 5/2 TO 5/3 WITH DR. WHITE. PT WASN'T HOME BUT THE MAN WHO ANSWERED SAID HE WOULD PASS THE MESSAGE ALONG AND HAVE TIMOTHY C/B.

## 2024-05-07 ENCOUNTER — TELEPHONE (OUTPATIENT)
Dept: CARDIAC SURGERY | Facility: CLINIC | Age: 84
End: 2024-05-07
Payer: MEDICARE

## 2024-05-09 ENCOUNTER — READMISSION MANAGEMENT (OUTPATIENT)
Dept: CALL CENTER | Facility: HOSPITAL | Age: 84
End: 2024-05-09
Payer: MEDICARE

## 2024-05-09 ENCOUNTER — OFFICE VISIT (OUTPATIENT)
Dept: CARDIAC SURGERY | Facility: CLINIC | Age: 84
End: 2024-05-09
Payer: MEDICARE

## 2024-05-09 VITALS
OXYGEN SATURATION: 100 % | WEIGHT: 121 LBS | HEART RATE: 64 BPM | BODY MASS INDEX: 20.66 KG/M2 | HEIGHT: 64 IN | TEMPERATURE: 96.8 F | RESPIRATION RATE: 20 BRPM | SYSTOLIC BLOOD PRESSURE: 139 MMHG | DIASTOLIC BLOOD PRESSURE: 70 MMHG

## 2024-05-09 DIAGNOSIS — I10 ESSENTIAL HYPERTENSION: Primary | Chronic | ICD-10-CM

## 2024-05-09 DIAGNOSIS — I42.0 CARDIOMYOPATHY, DILATED: ICD-10-CM

## 2024-05-09 DIAGNOSIS — N18.6 ESRD (END STAGE RENAL DISEASE): Chronic | ICD-10-CM

## 2024-05-09 DIAGNOSIS — Z99.2 DEPENDENCE ON RENAL DIALYSIS: ICD-10-CM

## 2024-05-09 DIAGNOSIS — Z99.2 ANEMIA DUE TO CHRONIC KIDNEY DISEASE, ON CHRONIC DIALYSIS: Chronic | ICD-10-CM

## 2024-05-09 DIAGNOSIS — N18.6 ANEMIA DUE TO CHRONIC KIDNEY DISEASE, ON CHRONIC DIALYSIS: Chronic | ICD-10-CM

## 2024-05-09 DIAGNOSIS — R55 SYNCOPE AND COLLAPSE: ICD-10-CM

## 2024-05-09 DIAGNOSIS — E11.22 TYPE 2 DIABETES MELLITUS WITH DIABETIC CHRONIC KIDNEY DISEASE, UNSPECIFIED CKD STAGE, UNSPECIFIED WHETHER LONG TERM INSULIN USE: Chronic | ICD-10-CM

## 2024-05-09 DIAGNOSIS — D63.1 ANEMIA DUE TO CHRONIC KIDNEY DISEASE, ON CHRONIC DIALYSIS: Chronic | ICD-10-CM

## 2024-05-09 DIAGNOSIS — I48.0 PAROXYSMAL A-FIB: ICD-10-CM

## 2024-05-09 RX ORDER — CHOLECALCIFEROL (VITAMIN D3) 125 MCG
CAPSULE ORAL DAILY
COMMUNITY

## 2024-05-09 NOTE — LETTER
May 18, 2024     Antonino Shen MD  [address] [facility] [place] [address]    Patient: Deann Warren   YOB: 1940   Date of Visit: 5/9/2024     Dear Antonino Shen MD:       Thank you for referring Deann Warren to me for evaluation. Below are the relevant portions of my assessment and plan of care.    If you have questions, please do not hesitate to call me. I look forward to following Deann along with you.         Sincerely,        Doron Edward MD        CC: MD Wagner Browning Sebastian, MD  05/18/24 0909  Sign when Signing Visit  5/18/2024    Seen on 5/9/2024    Chief Complaint     Evaluation of coronary artery disease, syncope    History of Present Illness:       Deaian Lowry and Colleagues,  It was nice to see Deann Warren in consultation at your request. She is a 83 y.o. female with a complex medical history including end-stage renal disease on hemodialysis, diabetes, gout, paroxysmal atrial fibrillation, hypertension, hyperlipidemia who has developed dizziness and syncope.  She was evaluated initially with an echocardiogram that showed an ejection fraction of 35%, mild to moderate aortic regurgitation and mild mitral valve regurgitation.  Also there was an ASD present.  She had a cardiac cath that showed a 60% ostial left main stenosis with about 50% distal left main stenosis.  There was diffuse 60 to 70% in the proximal to mid LAD, and the mid LAD there was an 80% lesion and there was an 80 to 90% lesion in the mid to distal LAD.  The circumflex artery showed 70% lesion in the proximal to mid artery.  The RCA showed nonocclusive disease.  There was normal low right and left heart filling pressures.  There was no evidence of pulm hypertension. She denies chest pain, orthopnea, PND or lower extremity edema..      Patient Active Problem List   Diagnosis   • Syncope and collapse   • Dependence on renal dialysis   • Type 2 diabetes mellitus with diabetic chronic kidney disease   • Anxiety    • Chronic gouty arthritis   • Allergic conjunctivitis and rhinitis   • Paroxysmal A-fib   • Essential hypertension   • ESRD (end stage renal disease)   • Anemia due to chronic kidney disease, on chronic dialysis   • History of shingles   • Hyperlipidemia   • Vitamin D deficiency   • Cardiomyopathy, dilated       Past Medical History:   Diagnosis Date   • Allergic conjunctivitis and rhinitis 11/06/2014   • Anemia due to chronic kidney disease, on chronic dialysis 08/25/2020   • Anxiety 07/30/2012   • Chronic gouty arthritis 11/06/2014   • Dependence on renal dialysis 07/01/2022   • ESRD (end stage renal disease) 08/25/2020   • Essential hypertension 09/16/2015   • History of shingles 01/18/2022   • Hyperlipidemia 04/08/2024   • Paroxysmal atrial fibrillation 07/01/2022   • Type 2 diabetes mellitus with diabetic chronic kidney disease 07/01/2022   • Vitamin D deficiency 07/22/2021       Past Surgical History:   Procedure Laterality Date   • ARTERIOVENOUS FISTULA Left    • CARDIAC CATHETERIZATION N/A 4/25/2024    Procedure: Right and Left Heart Cath;  Surgeon: Dilcia Lui MD;  Location: [facility] CATH INVASIVE LOCATION;  Service: Cardiology;  Laterality: N/A;   • CARDIAC CATHETERIZATION N/A 4/25/2024    Procedure: Percutaneous Coronary Intervention;  Surgeon: Jadiel Blake MD;  Location: [facility] CATH INVASIVE LOCATION;  Service: Cardiology;  Laterality: N/A;       Allergies   Allergen Reactions   • Heparin Hives         Current Outpatient Medications:   •  acetaminophen (TYLENOL) 500 MG tablet, Take 1 tablet by mouth Every 6 (Six) Hours As Needed for Mild Pain., Disp: , Rfl:   •  aspirin 81 MG EC tablet, Take 1 tablet by mouth Daily., Disp: 90 tablet, Rfl: 3  •  atorvastatin (LIPITOR) 40 MG tablet, Take 1 tablet by mouth Daily., Disp: 90 tablet, Rfl: 3  •  Cholecalciferol (Vitamin D3) 50 MCG (2000 UT) tablet, Take  by mouth Daily., Disp: , Rfl:   •  clopidogrel (PLAVIX) 75 MG tablet, Take 1 tablet by mouth Daily.,  Disp: 90 tablet, Rfl: 3  •  febuxostat (ULORIC) 40 MG tablet, Take 1 tablet by mouth Daily., Disp: , Rfl:   •  folic acid (FOLVITE) 1 MG tablet, Take 1 tablet by mouth Daily., Disp: 30 tablet, Rfl: 0  •  metoprolol succinate XL (TOPROL-XL) 25 MG 24 hr tablet, Take 0.5 tablets by mouth Daily., Disp: 30 tablet, Rfl: 0  •  NON FORMULARY, Apply 1 dose topically to the appropriate area as directed 3 (Three) Times a Week. Lidocaine 2.5% ointment -  Apply 1 application Monday, Wednesday, Friday before dialysis, Disp: , Rfl:     Social History     Socioeconomic History   • Marital status:    Tobacco Use   • Smoking status: Never   • Smokeless tobacco: Never   Vaping Use   • Vaping status: Never Used   Substance and Sexual Activity   • Alcohol use: Never   • Drug use: Never   • Sexual activity: Defer       Family History   Family history unknown: Yes     Review of Systems:  As HPI, otherwise noncontributory      Physical Exam:    Vital Signs:  Weight: 54.9 kg (121 lb)   Body mass index is 20.77 kg/m².  Temp: 96.8 °F (36 °C)   Heart Rate: 64   BP: 139/70     Constitutional:       Appearance: Well-developed.   Eyes:      Conjunctiva/sclera: Conjunctivae normal.      Pupils: Pupils are equal, round, and reactive to light.   HENT:      Head: Normocephalic and atraumatic.      Nose: Nose normal.   Neck:      Thyroid: No thyromegaly.      Vascular: No JVD.      Lymphadenopathy: No cervical adenopathy.   Pulmonary:      Effort: Pulmonary effort is normal.      Breath sounds: Normal breath sounds. No rales.   Cardiovascular:      Normal rate. Regular rhythm.      Murmurs: There is a grade 2/4 high frequency blowing decrescendo, early diastolic murmur at the URSB, radiating to the apex.      No gallop.    Pulses:     Intact distal pulses. No decreased pulses.   Edema:     Peripheral edema absent.   Abdominal:      General: Bowel sounds are normal. There is no distension.      Palpations: Abdomen is soft. There is no abdominal  mass.      Tenderness: There is no abdominal tenderness.   Musculoskeletal: Normal range of motion.         General: No tenderness or deformity.      Cervical back: Normal range of motion and neck supple. Skin:     General: Skin is warm and dry.      Findings: No erythema or rash.   Neurological:      Mental Status: Alert and oriented to person, place, and time.      Deep Tendon Reflexes: Reflexes are normal and symmetric.   Psychiatric:         Behavior: Behavior normal.          Assessment:     Problems Addressed this Visit          Cardiac and Vasculature    Essential hypertension - Primary (Chronic)    Paroxysmal A-fib    Cardiomyopathy, dilated       Endocrine and Metabolic    Type 2 diabetes mellitus with diabetic chronic kidney disease (Chronic)       Genitourinary and Reproductive     ESRD (end stage renal disease) (Chronic)    Dependence on renal dialysis       Hematology and Neoplasia    Anemia due to chronic kidney disease, on chronic dialysis (Chronic)       Symptoms and Signs    Syncope and collapse     Diagnoses         Codes Comments    Essential hypertension    -  Primary ICD-10-CM: I10  ICD-9-CM: 401.9     Paroxysmal A-fib     ICD-10-CM: I48.0  ICD-9-CM: 427.31     Cardiomyopathy, dilated     ICD-10-CM: I42.0  ICD-9-CM: 425.4     Type 2 diabetes mellitus with diabetic chronic kidney disease, unspecified CKD stage, unspecified whether long term insulin use     ICD-10-CM: E11.22  ICD-9-CM: 250.40, 585.9     ESRD (end stage renal disease)     ICD-10-CM: N18.6  ICD-9-CM: 585.6     Dependence on renal dialysis     ICD-10-CM: Z99.2  ICD-9-CM: V45.11     Anemia due to chronic kidney disease, on chronic dialysis     ICD-10-CM: N18.6, D63.1, Z99.2  ICD-9-CM: 585.6, 285.21, V45.11     Syncope and collapse     ICD-10-CM: R55  ICD-9-CM: 780.2             Assessment/recommendation:     Multivessel coronary artery disease with moderate left main stenosis and left system multiple stenosis.  Initial symptoms of  syncope and dizziness most likely related to volume status postdialysis.  She has end-stage renal disease.  Her filling pressures have been very low as measured in the cardiac cath.  Given the multiple blockages between the ostial left main and distal LAD, I recommend medical management versus complex PCI.  She is frail and she has multiple comorbidities.  I am concerned about the risk of surgery and ability to recover.  Probably the best option is to maximize fluid status, avoid hypotension and his symptoms develop related to the coronary artery disease then complex PCI may be an option.  Paroxysmal atrial fibrillation, presently in sinus.  She is not anticoagulated most likely due to avoidance of triple anticoagulation.  She is presently on Plavix and aspirin  Hypertension, well-controlled continue same regimen      Thank you for allowing me to participate in her care.    Regards,    Doron Edward M.D.    I spent over 45 minutes before, during after the office visit and reviewing the records, examining the patient, reviewing interpreting myself the cardiac cath, echocardiogram, chest CT and carotid duplex, and time discussing the findings and options and my recommendation of medical management versus PCI, discussing the importance of dialysis management and avoidance of excessive fluid removal, spent time discussing the case with the cardiology team and documenting in the electronic record

## 2024-05-13 ENCOUNTER — READMISSION MANAGEMENT (OUTPATIENT)
Dept: CALL CENTER | Facility: HOSPITAL | Age: 84
End: 2024-05-13
Payer: MEDICARE

## 2024-05-13 NOTE — OUTREACH NOTE
Medical Week 3 Survey      Flowsheet Row Responses   [facility] patient discharged from? [facility]   Does the patient have one of the following disease processes/diagnoses(primary or secondary)? Other   Week 3 attempt successful? Yes   Call start time 1918   Call end time 1923   General alerts for this patient HD MWF   Discharge diagnosis Syncope and collapse   Is the patient taking all medications as directed (includes completed medication regime)? Yes   Does the patient have a primary care provider?  Yes   Has the patient kept scheduled appointments due by today? Yes   Psychosocial issues? No   What is the patient's perception of their health status since discharge? Improving   Is the patient/caregiver able to teach back signs and symptoms related to disease process for when to call PCP? Yes   Is the patient/caregiver able to teach back signs and symptoms related to disease process for when to call 911? Yes   Is the patient/caregiver able to teach back the hierarchy of who to call/visit for symptoms/problems? PCP, Specialist, Home health nurse, Urgent Care, ED, 911 Yes   Additional teach back comments States she has sores to her heels and she will be going to wound care to follow up on them.  Dialysis told her she didn't have to go on Wed and then lengthen the times on Mon and Friday.   Week 3 Call Completed? Yes   Graduated Yes   Graduated/Revoked comments No questions or needs at this time.   Call end time 1923            Janneth ALEXANDRA - Licensed Nurse

## 2024-05-14 ENCOUNTER — OFFICE VISIT (OUTPATIENT)
Dept: WOUND CARE | Facility: HOSPITAL | Age: 84
End: 2024-05-14
Payer: MEDICARE

## 2024-05-14 ENCOUNTER — LAB REQUISITION (OUTPATIENT)
Dept: LAB | Facility: HOSPITAL | Age: 84
End: 2024-05-14
Payer: MEDICARE

## 2024-05-14 DIAGNOSIS — L89.623 PRESSURE ULCER OF LEFT HEEL, STAGE 3: ICD-10-CM

## 2024-05-14 PROCEDURE — 87070 CULTURE OTHR SPECIMN AEROBIC: CPT | Performed by: NURSE PRACTITIONER

## 2024-05-14 PROCEDURE — 87205 SMEAR GRAM STAIN: CPT | Performed by: NURSE PRACTITIONER

## 2024-05-14 PROCEDURE — 97602 WOUND(S) CARE NON-SELECTIVE: CPT

## 2024-05-14 PROCEDURE — G0463 HOSPITAL OUTPT CLINIC VISIT: HCPCS

## 2024-05-16 LAB
BACTERIA SPEC AEROBE CULT: ABNORMAL
BACTERIA SPEC AEROBE CULT: ABNORMAL
GRAM STN SPEC: ABNORMAL

## 2024-05-18 NOTE — PROGRESS NOTES
5/18/2024    Seen on 5/9/2024    Chief Complaint     Evaluation of coronary artery disease, syncope    History of Present Illness:       Dear Essence and Colleagues,  It was nice to see Deann Warren in consultation at your request. She is a 83 y.o. female with a complex medical history including end-stage renal disease on hemodialysis, diabetes, gout, paroxysmal atrial fibrillation, hypertension, hyperlipidemia who has developed dizziness and syncope.  She was evaluated initially with an echocardiogram that showed an ejection fraction of 35%, mild to moderate aortic regurgitation and mild mitral valve regurgitation.  Also there was an ASD present.  She had a cardiac cath that showed a 60% ostial left main stenosis with about 50% distal left main stenosis.  There was diffuse 60 to 70% in the proximal to mid LAD, and the mid LAD there was an 80% lesion and there was an 80 to 90% lesion in the mid to distal LAD.  The circumflex artery showed 70% lesion in the proximal to mid artery.  The RCA showed nonocclusive disease.  There was normal low right and left heart filling pressures.  There was no evidence of pulm hypertension. She denies chest pain, orthopnea, PND or lower extremity edema..      Patient Active Problem List   Diagnosis    Syncope and collapse    Dependence on renal dialysis    Type 2 diabetes mellitus with diabetic chronic kidney disease    Anxiety    Chronic gouty arthritis    Allergic conjunctivitis and rhinitis    Paroxysmal A-fib    Essential hypertension    ESRD (end stage renal disease)    Anemia due to chronic kidney disease, on chronic dialysis    History of shingles    Hyperlipidemia    Vitamin D deficiency    Cardiomyopathy, dilated       Past Medical History:   Diagnosis Date    Allergic conjunctivitis and rhinitis 11/06/2014    Anemia due to chronic kidney disease, on chronic dialysis 08/25/2020    Anxiety 07/30/2012    Chronic gouty arthritis 11/06/2014    Dependence on renal dialysis  07/01/2022    ESRD (end stage renal disease) 08/25/2020    Essential hypertension 09/16/2015    History of shingles 01/18/2022    Hyperlipidemia 04/08/2024    Paroxysmal atrial fibrillation 07/01/2022    Type 2 diabetes mellitus with diabetic chronic kidney disease 07/01/2022    Vitamin D deficiency 07/22/2021       Past Surgical History:   Procedure Laterality Date    ARTERIOVENOUS FISTULA Left     CARDIAC CATHETERIZATION N/A 4/25/2024    Procedure: Right and Left Heart Cath;  Surgeon: Dilcia Lui MD;  Location: [facility] CATH INVASIVE LOCATION;  Service: Cardiology;  Laterality: N/A;    CARDIAC CATHETERIZATION N/A 4/25/2024    Procedure: Percutaneous Coronary Intervention;  Surgeon: Jadiel Blake MD;  Location: [facility] CATH INVASIVE LOCATION;  Service: Cardiology;  Laterality: N/A;       Allergies   Allergen Reactions    Heparin Hives         Current Outpatient Medications:     acetaminophen (TYLENOL) 500 MG tablet, Take 1 tablet by mouth Every 6 (Six) Hours As Needed for Mild Pain., Disp: , Rfl:     aspirin 81 MG EC tablet, Take 1 tablet by mouth Daily., Disp: 90 tablet, Rfl: 3    atorvastatin (LIPITOR) 40 MG tablet, Take 1 tablet by mouth Daily., Disp: 90 tablet, Rfl: 3    Cholecalciferol (Vitamin D3) 50 MCG (2000 UT) tablet, Take  by mouth Daily., Disp: , Rfl:     clopidogrel (PLAVIX) 75 MG tablet, Take 1 tablet by mouth Daily., Disp: 90 tablet, Rfl: 3    febuxostat (ULORIC) 40 MG tablet, Take 1 tablet by mouth Daily., Disp: , Rfl:     folic acid (FOLVITE) 1 MG tablet, Take 1 tablet by mouth Daily., Disp: 30 tablet, Rfl: 0    metoprolol succinate XL (TOPROL-XL) 25 MG 24 hr tablet, Take 0.5 tablets by mouth Daily., Disp: 30 tablet, Rfl: 0    NON FORMULARY, Apply 1 dose topically to the appropriate area as directed 3 (Three) Times a Week. Lidocaine 2.5% ointment -  Apply 1 application Monday, Wednesday, Friday before dialysis, Disp: , Rfl:     Social History     Socioeconomic History    Marital status:     Tobacco Use    Smoking status: Never    Smokeless tobacco: Never   Vaping Use    Vaping status: Never Used   Substance and Sexual Activity    Alcohol use: Never    Drug use: Never    Sexual activity: Defer       Family History   Family history unknown: Yes     Review of Systems:  As HPI, otherwise noncontributory      Physical Exam:    Vital Signs:  Weight: 54.9 kg (121 lb)   Body mass index is 20.77 kg/m².  Temp: 96.8 °F (36 °C)   Heart Rate: 64   BP: 139/70     Constitutional:       Appearance: Well-developed.   Eyes:      Conjunctiva/sclera: Conjunctivae normal.      Pupils: Pupils are equal, round, and reactive to light.   HENT:      Head: Normocephalic and atraumatic.      Nose: Nose normal.   Neck:      Thyroid: No thyromegaly.      Vascular: No JVD.      Lymphadenopathy: No cervical adenopathy.   Pulmonary:      Effort: Pulmonary effort is normal.      Breath sounds: Normal breath sounds. No rales.   Cardiovascular:      Normal rate. Regular rhythm.      Murmurs: There is a grade 2/4 high frequency blowing decrescendo, early diastolic murmur at the URSB, radiating to the apex.      No gallop.    Pulses:     Intact distal pulses. No decreased pulses.   Edema:     Peripheral edema absent.   Abdominal:      General: Bowel sounds are normal. There is no distension.      Palpations: Abdomen is soft. There is no abdominal mass.      Tenderness: There is no abdominal tenderness.   Musculoskeletal: Normal range of motion.         General: No tenderness or deformity.      Cervical back: Normal range of motion and neck supple. Skin:     General: Skin is warm and dry.      Findings: No erythema or rash.   Neurological:      Mental Status: Alert and oriented to person, place, and time.      Deep Tendon Reflexes: Reflexes are normal and symmetric.   Psychiatric:         Behavior: Behavior normal.          Assessment:     Problems Addressed this Visit          Cardiac and Vasculature    Essential hypertension - Primary  (Chronic)    Paroxysmal A-fib    Cardiomyopathy, dilated       Endocrine and Metabolic    Type 2 diabetes mellitus with diabetic chronic kidney disease (Chronic)       Genitourinary and Reproductive     ESRD (end stage renal disease) (Chronic)    Dependence on renal dialysis       Hematology and Neoplasia    Anemia due to chronic kidney disease, on chronic dialysis (Chronic)       Symptoms and Signs    Syncope and collapse     Diagnoses         Codes Comments    Essential hypertension    -  Primary ICD-10-CM: I10  ICD-9-CM: 401.9     Paroxysmal A-fib     ICD-10-CM: I48.0  ICD-9-CM: 427.31     Cardiomyopathy, dilated     ICD-10-CM: I42.0  ICD-9-CM: 425.4     Type 2 diabetes mellitus with diabetic chronic kidney disease, unspecified CKD stage, unspecified whether long term insulin use     ICD-10-CM: E11.22  ICD-9-CM: 250.40, 585.9     ESRD (end stage renal disease)     ICD-10-CM: N18.6  ICD-9-CM: 585.6     Dependence on renal dialysis     ICD-10-CM: Z99.2  ICD-9-CM: V45.11     Anemia due to chronic kidney disease, on chronic dialysis     ICD-10-CM: N18.6, D63.1, Z99.2  ICD-9-CM: 585.6, 285.21, V45.11     Syncope and collapse     ICD-10-CM: R55  ICD-9-CM: 780.2             Assessment/recommendation:     Multivessel coronary artery disease with moderate left main stenosis and left system multiple stenosis.  Initial symptoms of syncope and dizziness most likely related to volume status postdialysis.  She has end-stage renal disease.  Her filling pressures have been very low as measured in the cardiac cath.  Given the multiple blockages between the ostial left main and distal LAD, I recommend medical management versus complex PCI.  She is frail and she has multiple comorbidities.  I am concerned about the risk of surgery and ability to recover.  Probably the best option is to maximize fluid status, avoid hypotension and his symptoms develop related to the coronary artery disease then complex PCI may be an  option.  Paroxysmal atrial fibrillation, presently in sinus.  She is not anticoagulated most likely due to avoidance of triple anticoagulation.  She is presently on Plavix and aspirin  Hypertension, well-controlled continue same regimen      Thank you for allowing me to participate in her care.    Regards,    Doron Edward M.D.    I spent over 45 minutes before, during after the office visit and reviewing the records, examining the patient, reviewing interpreting myself the cardiac cath, echocardiogram, chest CT and carotid duplex, and time discussing the findings and options and my recommendation of medical management versus PCI, discussing the importance of dialysis management and avoidance of excessive fluid removal, spent time discussing the case with the cardiology team and documenting in the electronic record

## 2024-05-21 ENCOUNTER — HOSPITAL ENCOUNTER (OUTPATIENT)
Dept: GENERAL RADIOLOGY | Facility: HOSPITAL | Age: 84
Discharge: HOME | End: 2024-05-21
Payer: MEDICARE

## 2024-05-21 ENCOUNTER — TRANSCRIBE ORDERS (OUTPATIENT)
Dept: ADMINISTRATIVE | Facility: HOSPITAL | Age: 84
End: 2024-05-21
Payer: MEDICARE

## 2024-05-21 ENCOUNTER — LAB (OUTPATIENT)
Dept: LAB | Facility: HOSPITAL | Age: 84
End: 2024-05-21
Payer: MEDICARE

## 2024-05-21 DIAGNOSIS — L89.623 STAGE III PRESSURE ULCER OF LEFT HEEL: Primary | ICD-10-CM

## 2024-05-21 DIAGNOSIS — L89.623 STAGE III PRESSURE ULCER OF LEFT HEEL: ICD-10-CM

## 2024-05-21 LAB
HBA1C MFR BLD: 4.9 % (ref 4.8–5.6)
PREALB SERPL-MCNC: 7.6 MG/DL (ref 20–40)

## 2024-05-21 PROCEDURE — 73630 X-RAY EXAM OF FOOT: CPT

## 2024-05-21 PROCEDURE — 36415 COLL VENOUS BLD VENIPUNCTURE: CPT

## 2024-05-21 PROCEDURE — 83036 HEMOGLOBIN GLYCOSYLATED A1C: CPT

## 2024-05-21 PROCEDURE — 84134 ASSAY OF PREALBUMIN: CPT

## 2024-05-30 ENCOUNTER — OFFICE VISIT (OUTPATIENT)
Dept: WOUND CARE | Facility: HOSPITAL | Age: 84
End: 2024-05-30
Payer: MEDICARE

## 2024-05-30 PROCEDURE — G0463 HOSPITAL OUTPT CLINIC VISIT: HCPCS

## 2024-06-01 ENCOUNTER — HOSPITAL ENCOUNTER (INPATIENT)
Facility: HOSPITAL | Age: 84
LOS: 1 days | Discharge: HOME | End: 2024-06-04
Attending: EMERGENCY MEDICINE | Admitting: INTERNAL MEDICINE
Payer: MEDICARE

## 2024-06-01 ENCOUNTER — APPOINTMENT (OUTPATIENT)
Dept: GENERAL RADIOLOGY | Facility: HOSPITAL | Age: 84
End: 2024-06-01
Payer: MEDICARE

## 2024-06-01 DIAGNOSIS — I49.5 SICK SINUS SYNDROME: ICD-10-CM

## 2024-06-01 DIAGNOSIS — R55 SYNCOPE, UNSPECIFIED SYNCOPE TYPE: Primary | ICD-10-CM

## 2024-06-01 DIAGNOSIS — R00.1 BRADYCARDIA: ICD-10-CM

## 2024-06-01 LAB
ALBUMIN SERPL-MCNC: 3.5 G/DL (ref 3.5–5.2)
ALBUMIN/GLOB SERPL: 1.8 G/DL
ALP SERPL-CCNC: 81 U/L (ref 39–117)
ALT SERPL W P-5'-P-CCNC: 108 U/L (ref 1–33)
ANION GAP SERPL CALCULATED.3IONS-SCNC: 13.1 MMOL/L (ref 5–15)
AST SERPL-CCNC: 155 U/L (ref 1–32)
BASOPHILS # BLD AUTO: 0.02 10*3/MM3 (ref 0–0.2)
BASOPHILS NFR BLD AUTO: 0.8 % (ref 0–1.5)
BILIRUB SERPL-MCNC: 0.7 MG/DL (ref 0–1.2)
BUN SERPL-MCNC: 29 MG/DL (ref 8–23)
BUN/CREAT SERPL: 8.8 (ref 7–25)
CALCIUM SPEC-SCNC: 8.6 MG/DL (ref 8.6–10.5)
CHLORIDE SERPL-SCNC: 100 MMOL/L (ref 98–107)
CO2 SERPL-SCNC: 22.9 MMOL/L (ref 22–29)
CREAT SERPL-MCNC: 3.3 MG/DL (ref 0.57–1)
DEPRECATED RDW RBC AUTO: 53.4 FL (ref 37–54)
EGFRCR SERPLBLD CKD-EPI 2021: 13.4 ML/MIN/1.73
EOSINOPHIL # BLD AUTO: 0.04 10*3/MM3 (ref 0–0.4)
EOSINOPHIL NFR BLD AUTO: 1.6 % (ref 0.3–6.2)
ERYTHROCYTE [DISTWIDTH] IN BLOOD BY AUTOMATED COUNT: 15.3 % (ref 12.3–15.4)
GLOBULIN UR ELPH-MCNC: 2 GM/DL
GLUCOSE BLDC GLUCOMTR-MCNC: 102 MG/DL (ref 70–105)
GLUCOSE SERPL-MCNC: 121 MG/DL (ref 65–99)
HCT VFR BLD AUTO: 34.3 % (ref 34–46.6)
HGB BLD-MCNC: 10.5 G/DL (ref 12–15.9)
IMM GRANULOCYTES # BLD AUTO: 0.01 10*3/MM3 (ref 0–0.05)
IMM GRANULOCYTES NFR BLD AUTO: 0.4 % (ref 0–0.5)
LYMPHOCYTES # BLD AUTO: 0.65 10*3/MM3 (ref 0.7–3.1)
LYMPHOCYTES NFR BLD AUTO: 26.5 % (ref 19.6–45.3)
MCH RBC QN AUTO: 29.2 PG (ref 26.6–33)
MCHC RBC AUTO-ENTMCNC: 30.6 G/DL (ref 31.5–35.7)
MCV RBC AUTO: 95.3 FL (ref 79–97)
MONOCYTES # BLD AUTO: 0.3 10*3/MM3 (ref 0.1–0.9)
MONOCYTES NFR BLD AUTO: 12.2 % (ref 5–12)
NEUTROPHILS NFR BLD AUTO: 1.43 10*3/MM3 (ref 1.7–7)
NEUTROPHILS NFR BLD AUTO: 58.5 % (ref 42.7–76)
NRBC BLD AUTO-RTO: 0 /100 WBC (ref 0–0.2)
PLATELET # BLD AUTO: 60 10*3/MM3 (ref 140–450)
PMV BLD AUTO: 10.7 FL (ref 6–12)
POTASSIUM SERPL-SCNC: 3.9 MMOL/L (ref 3.5–5.2)
PROT SERPL-MCNC: 5.5 G/DL (ref 6–8.5)
RBC # BLD AUTO: 3.6 10*6/MM3 (ref 3.77–5.28)
SODIUM SERPL-SCNC: 136 MMOL/L (ref 136–145)
WBC NRBC COR # BLD AUTO: 2.45 10*3/MM3 (ref 3.4–10.8)

## 2024-06-01 PROCEDURE — 36415 COLL VENOUS BLD VENIPUNCTURE: CPT

## 2024-06-01 PROCEDURE — G0378 HOSPITAL OBSERVATION PER HR: HCPCS

## 2024-06-01 PROCEDURE — 85025 COMPLETE CBC W/AUTO DIFF WBC: CPT | Performed by: EMERGENCY MEDICINE

## 2024-06-01 PROCEDURE — 80053 COMPREHEN METABOLIC PANEL: CPT | Performed by: EMERGENCY MEDICINE

## 2024-06-01 PROCEDURE — 82948 REAGENT STRIP/BLOOD GLUCOSE: CPT

## 2024-06-01 PROCEDURE — 71045 X-RAY EXAM CHEST 1 VIEW: CPT

## 2024-06-01 PROCEDURE — 93005 ELECTROCARDIOGRAM TRACING: CPT | Performed by: EMERGENCY MEDICINE

## 2024-06-01 PROCEDURE — 99285 EMERGENCY DEPT VISIT HI MDM: CPT

## 2024-06-01 RX ORDER — BISACODYL 10 MG
10 SUPPOSITORY, RECTAL RECTAL DAILY PRN
Status: DISCONTINUED | OUTPATIENT
Start: 2024-06-01 | End: 2024-06-04 | Stop reason: HOSPADM

## 2024-06-01 RX ORDER — SODIUM CHLORIDE 0.9 % (FLUSH) 0.9 %
10 SYRINGE (ML) INJECTION AS NEEDED
Status: DISCONTINUED | OUTPATIENT
Start: 2024-06-01 | End: 2024-06-04 | Stop reason: HOSPADM

## 2024-06-01 RX ORDER — AMOXICILLIN 250 MG
2 CAPSULE ORAL 2 TIMES DAILY PRN
Status: DISCONTINUED | OUTPATIENT
Start: 2024-06-01 | End: 2024-06-04 | Stop reason: HOSPADM

## 2024-06-01 RX ORDER — ONDANSETRON 2 MG/ML
4 INJECTION INTRAMUSCULAR; INTRAVENOUS EVERY 6 HOURS PRN
Status: DISCONTINUED | OUTPATIENT
Start: 2024-06-01 | End: 2024-06-04 | Stop reason: HOSPADM

## 2024-06-01 RX ORDER — SODIUM CHLORIDE 9 MG/ML
40 INJECTION, SOLUTION INTRAVENOUS AS NEEDED
Status: DISCONTINUED | OUTPATIENT
Start: 2024-06-01 | End: 2024-06-04 | Stop reason: HOSPADM

## 2024-06-01 RX ORDER — POLYETHYLENE GLYCOL 3350 17 G/17G
17 POWDER, FOR SOLUTION ORAL DAILY PRN
Status: DISCONTINUED | OUTPATIENT
Start: 2024-06-01 | End: 2024-06-04 | Stop reason: HOSPADM

## 2024-06-01 RX ORDER — ACETAMINOPHEN 325 MG/1
650 TABLET ORAL EVERY 4 HOURS PRN
Status: DISCONTINUED | OUTPATIENT
Start: 2024-06-01 | End: 2024-06-04 | Stop reason: HOSPADM

## 2024-06-01 RX ORDER — SODIUM CHLORIDE 0.9 % (FLUSH) 0.9 %
10 SYRINGE (ML) INJECTION EVERY 12 HOURS SCHEDULED
Status: DISCONTINUED | OUTPATIENT
Start: 2024-06-01 | End: 2024-06-04 | Stop reason: HOSPADM

## 2024-06-01 RX ORDER — BISACODYL 5 MG/1
5 TABLET, DELAYED RELEASE ORAL DAILY PRN
Status: DISCONTINUED | OUTPATIENT
Start: 2024-06-01 | End: 2024-06-04 | Stop reason: HOSPADM

## 2024-06-01 RX ORDER — ATORVASTATIN CALCIUM 40 MG/1
40 TABLET, FILM COATED ORAL NIGHTLY
Status: DISCONTINUED | OUTPATIENT
Start: 2024-06-01 | End: 2024-06-04 | Stop reason: HOSPADM

## 2024-06-01 RX ORDER — ONDANSETRON 4 MG/1
4 TABLET, ORALLY DISINTEGRATING ORAL EVERY 6 HOURS PRN
Status: DISCONTINUED | OUTPATIENT
Start: 2024-06-01 | End: 2024-06-04 | Stop reason: HOSPADM

## 2024-06-01 RX ORDER — ALUMINA, MAGNESIA, AND SIMETHICONE 2400; 2400; 240 MG/30ML; MG/30ML; MG/30ML
15 SUSPENSION ORAL EVERY 6 HOURS PRN
Status: DISCONTINUED | OUTPATIENT
Start: 2024-06-01 | End: 2024-06-02

## 2024-06-01 RX ADMIN — ACETAMINOPHEN 650 MG: 325 TABLET, FILM COATED ORAL at 22:17

## 2024-06-01 RX ADMIN — ATORVASTATIN CALCIUM 40 MG: 40 TABLET, FILM COATED ORAL at 21:28

## 2024-06-01 RX ADMIN — Medication 10 ML: at 21:28

## 2024-06-01 NOTE — Clinical Note
A 7 fr sheath was successfully inserted using micropuncture technique into the right subclavian vein. Sheath insertion not delayed.

## 2024-06-01 NOTE — ED NOTES
"Nursing report ED to floor  Deann Warren  83 y.o.  female    HPI:   Chief Complaint   Patient presents with    Loss of Consciousness       Admitting doctor:   No admitting provider for patient encounter.    Admitting diagnosis:   The primary encounter diagnosis was Syncope, unspecified syncope type. A diagnosis of Bradycardia was also pertinent to this visit.    Code status:   Current Code Status       Date Active Code Status Order ID Comments User Context       Prior            Allergies:   Heparin    Isolation:  No active isolations     Fall Risk:  Fall Risk Assessment was completed, and patient is at moderate risk for falls.   Predictive Model Details         18 (Low) Factor Value    Calculated 6/1/2024 16:22 Age 83    Risk of Fall Model Active Peripheral IV Present     Imaging order in this encounter Present     Diastolic BP 41     Magnesium not on file     Calcium 8.6 mg/dL     Creatinine 3.3 mg/dL     Harpal Scale not on file     Albumin 3.5 g/dL      U/L     Chloride 100 mmol/L     Total Bilirubin 0.7 mg/dL     Cardiac Assessment X     Respiratory Rate 16     Days after Admission 0.13     Potassium 3.9 mmol/L         Weight:       06/01/24  1313   Weight: 56.7 kg (125 lb)       Intake and Output  No intake or output data in the 24 hours ending 06/01/24 1622    Diet:        Most recent vitals:   Vitals:    06/01/24 1313 06/01/24 1400 06/01/24 1603   BP: 113/48 105/46 106/41   BP Location: Right arm     Patient Position: Lying     Pulse: (!) 48 53 (!) 49   Resp: 16  16   Temp: 97.4 °F (36.3 °C)     TempSrc: Oral     SpO2: 98% 93% 98%   Weight: 56.7 kg (125 lb)     Height: 162.6 cm (64\")         Active LDAs/IV Access:   Lines, Drains & Airways       Active LDAs       Name Placement date Placement time Site Days    Peripheral IV 06/01/24 1312 Right Antecubital 06/01/24  1312  Antecubital  less than 1                    Skin Condition:   Skin Assessments (last day)       None             Labs (abnormal " labs have a star):   Labs Reviewed   COMPREHENSIVE METABOLIC PANEL - Abnormal; Notable for the following components:       Result Value    Glucose 121 (*)     BUN 29 (*)     Creatinine 3.30 (*)     Total Protein 5.5 (*)     ALT (SGPT) 108 (*)     AST (SGOT) 155 (*)     eGFR 13.4 (*)     All other components within normal limits    Narrative:     GFR Normal >60  Chronic Kidney Disease <60  Kidney Failure <15    The GFR formula is only valid for adults with stable renal function between ages 18 and 70.   CBC WITH AUTO DIFFERENTIAL - Abnormal; Notable for the following components:    WBC 2.45 (*)     RBC 3.60 (*)     Hemoglobin 10.5 (*)     MCHC 30.6 (*)     Platelets 60 (*)     Monocyte % 12.2 (*)     Neutrophils, Absolute 1.43 (*)     Lymphocytes, Absolute 0.65 (*)     All other components within normal limits   POCT GLUCOSE FINGERSTICK - Normal   CBC AND DIFFERENTIAL    Narrative:     The following orders were created for panel order CBC & Differential.  Procedure                               Abnormality         Status                     ---------                               -----------         ------                     CBC Auto Differential[[id number]]        Abnormal            Final result               Scan Slide[[id number]]                                                                    Please view results for these tests on the individual orders.       LOC: Person, Place, Time, and Situation    Telemetry:  Observation Unit    Cardiac Monitoring Ordered: yes    EKG:   ECG 12 Lead Syncope   Preliminary Result   HEART RATE= 47  bpm   RR Interval= 1286  ms   NC Interval=   ms   P Horizontal Axis=   deg   P Front Axis=   deg   QRSD Interval= 146  ms   QT Interval= 506  ms   QTcB= 446  ms   QRS Axis= -63  deg   T Wave Axis= 127  deg   - ABNORMAL ECG -   Atrial fibrillation   Left bundle branch block   Electronically Signed By:    Date and Time of Study: 2024-06-01 13:37:49          Medications Given in the ED:    Medications   sodium chloride 0.9 % flush 10 mL (has no administration in time range)       Imaging results:  XR Chest 1 View    Result Date: 6/1/2024  Impression: Chronic changes are noted which are stable and suggest chronic changes of CHF and pulmonary edema. Electronically Signed: Nathaniel Harman MD  6/1/2024 2:05 PM EDT  Workstation ID: [id number]     Social issues:   Social History     Socioeconomic History    Marital status:    Tobacco Use    Smoking status: Never    Smokeless tobacco: Never   Vaping Use    Vaping status: Never Used   Substance and Sexual Activity    Alcohol use: Never    Drug use: Never    Sexual activity: Defer       NIH Stroke Scale:  Interval: (not recorded)  1a. Level of Consciousness: (not recorded)  1b. LOC Questions: (not recorded)  1c. LOC Commands: (not recorded)  2. Best Gaze: (not recorded)  3. Visual: (not recorded)  4. Facial Palsy: (not recorded)  5a. Motor Arm, Left: (not recorded)  5b. Motor Arm, Right: (not recorded)  6a. Motor Leg, Left: (not recorded)  6b. Motor Leg, Right: (not recorded)  7. Limb Ataxia: (not recorded)  8. Sensory: (not recorded)  9. Best Language: (not recorded)  10. Dysarthria: (not recorded)  11. Extinction and Inattention (formerly Neglect): (not recorded)    Total (NIH Stroke Scale): (not recorded)     Additional notable assessment information:     Nursing report ED to floor:  OBS Nurse    Shahla Johnson RN   06/01/24 16:22 EDT

## 2024-06-01 NOTE — ED PROVIDER NOTES
Subjective   History of Present Illness  Chief complaint passed out    History of present illness 83-year-old female states started feel funny had some pain in her side and then she got lightheaded and passed out.  This was brief there was no reported seizure activity.  No chest pain neck arm jaw pain no injury no headache vision change speech difficulty or paralysis.  Patient has a long history of multiple syncopal episodes throughout the past and has been admitted a few times she states unremarkable workup nobody knows what causes her to pass out.  She states she has A-fib they took her off blood thinners because of this.  The patient also goes to dialysis on Monday and Friday.  Had it yesterday.  No recent injury illness flus viruses vaccinations she has hospitalization back in the end of April for a similar issue with unremarkable workup.  No fever chills or night sweats.      Review of Systems   Constitutional:  Negative for chills and fever.   Eyes:  Negative for photophobia and visual disturbance.   Respiratory:  Negative for chest tightness and shortness of breath.    Cardiovascular:  Negative for chest pain.   Gastrointestinal:  Negative for abdominal pain, blood in stool and vomiting.   Genitourinary:  Negative for hematuria.   Musculoskeletal:  Negative for back pain and neck pain.   Skin:  Negative for rash.   Neurological:  Positive for syncope. Negative for seizures, facial asymmetry and speech difficulty.   Psychiatric/Behavioral:  Negative for confusion.        Past Medical History:   Diagnosis Date    Allergic conjunctivitis and rhinitis 11/06/2014    Anemia due to chronic kidney disease, on chronic dialysis 08/25/2020    Anxiety 07/30/2012    Chronic gouty arthritis 11/06/2014    Dependence on renal dialysis 07/01/2022    ESRD (end stage renal disease) 08/25/2020    Essential hypertension 09/16/2015    History of shingles 01/18/2022    Hyperlipidemia 04/08/2024    Paroxysmal atrial fibrillation  07/01/2022    Type 2 diabetes mellitus with diabetic chronic kidney disease 07/01/2022    Vitamin D deficiency 07/22/2021       Allergies   Allergen Reactions    Heparin Hives       Past Surgical History:   Procedure Laterality Date    ARTERIOVENOUS FISTULA Left     CARDIAC CATHETERIZATION N/A 4/25/2024    Procedure: Right and Left Heart Cath;  Surgeon: Dilcia Lui MD;  Location: [facility] CATH INVASIVE LOCATION;  Service: Cardiology;  Laterality: N/A;    CARDIAC CATHETERIZATION N/A 4/25/2024    Procedure: Percutaneous Coronary Intervention;  Surgeon: Jadiel Blake MD;  Location: [facility] CATH INVASIVE LOCATION;  Service: Cardiology;  Laterality: N/A;       Family History   Family history unknown: Yes       Social History     Socioeconomic History    Marital status:    Tobacco Use    Smoking status: Never    Smokeless tobacco: Never   Vaping Use    Vaping status: Never Used   Substance and Sexual Activity    Alcohol use: Never    Drug use: Never    Sexual activity: Defer     Prior to Admission medications    Medication Sig Start Date End Date Taking? Authorizing Provider   acetaminophen (TYLENOL) 500 MG tablet Take 1 tablet by mouth Every 6 (Six) Hours As Needed for Mild Pain.    Katerin Bradshaw MD   aspirin 81 MG EC tablet Take 1 tablet by mouth Daily. 4/25/24   Dilcia Lui MD   atorvastatin (LIPITOR) 40 MG tablet Take 1 tablet by mouth Daily. 4/25/24   Dilcia Lui MD   Cholecalciferol (Vitamin D3) 50 MCG (2000 UT) tablet Take  by mouth Daily.    Katerin Bradshaw MD   clopidogrel (PLAVIX) 75 MG tablet Take 1 tablet by mouth Daily. 4/25/24   Dilcia Lui MD   febuxostat (ULORIC) 40 MG tablet Take 1 tablet by mouth Daily.    Katerin Bradshaw MD   folic acid (FOLVITE) 1 MG tablet Take 1 tablet by mouth Daily. 4/13/24   Hernesto Sanchez MD   metoprolol succinate XL (TOPROL-XL) 25 MG 24 hr tablet Take 0.5 tablets by mouth Daily. 4/13/24   Hernesto Sanchez MD   NON FORMULARY Apply 1  dose topically to the appropriate area as directed 3 (Three) Times a Week. Lidocaine 2.5% ointment -  Apply 1 application Monday, Wednesday, Friday before dialysis    Provider, MD Katerin          Objective   Physical Exam  Constitutional 83-year-old awake alert no acute distress triage vital signs reviewed although heart rates A-fib she runs about 44-45 at times.  HEENT unremarkable extraocular muscles are intact pupils equal and reactive no photophobia no obvious trauma.  Back no cervical thoracic lumbar spine tenderness trachea midline no JVD no bruits lungs clear no retractions.  Heart rate regular with slight murmur no rub abdomen soft nontender good bowel sounds no peritoneal findings or pulsatile masses extremities pulses equal upper and lower extremities no edema no cords no Homans' sign no evidence of DVT skin warm and dry without rashes or cellulitic change neurologic awake alert orientated x 4 no Paci symmetry speech normal no drift the arms or legs was no focal weakness.  Toes downgoing no clonus  Procedures           ED Course      Results for orders placed or performed during the hospital encounter of 06/01/24   Comprehensive Metabolic Panel    Specimen: Blood   Result Value Ref Range    Glucose 121 (H) 65 - 99 mg/dL    BUN 29 (H) 8 - 23 mg/dL    Creatinine 3.30 (H) 0.57 - 1.00 mg/dL    Sodium 136 136 - 145 mmol/L    Potassium 3.9 3.5 - 5.2 mmol/L    Chloride 100 98 - 107 mmol/L    CO2 22.9 22.0 - 29.0 mmol/L    Calcium 8.6 8.6 - 10.5 mg/dL    Total Protein 5.5 (L) 6.0 - 8.5 g/dL    Albumin 3.5 3.5 - 5.2 g/dL    ALT (SGPT) 108 (H) 1 - 33 U/L    AST (SGOT) 155 (H) 1 - 32 U/L    Alkaline Phosphatase 81 39 - 117 U/L    Total Bilirubin 0.7 0.0 - 1.2 mg/dL    Globulin 2.0 gm/dL    A/G Ratio 1.8 g/dL    BUN/Creatinine Ratio 8.8 7.0 - 25.0    Anion Gap 13.1 5.0 - 15.0 mmol/L    eGFR 13.4 (L) >60.0 mL/min/1.73   CBC Auto Differential    Specimen: Blood   Result Value Ref Range    WBC 2.45 (L) 3.40 - 10.80  10*3/mm3    RBC 3.60 (L) 3.77 - 5.28 10*6/mm3    Hemoglobin 10.5 (L) 12.0 - 15.9 g/dL    Hematocrit 34.3 34.0 - 46.6 %    MCV 95.3 79.0 - 97.0 fL    MCH 29.2 26.6 - 33.0 pg    MCHC 30.6 (L) 31.5 - 35.7 g/dL    RDW 15.3 12.3 - 15.4 %    RDW-SD 53.4 37.0 - 54.0 fl    MPV 10.7 6.0 - 12.0 fL    Platelets 60 (L) 140 - 450 10*3/mm3    Neutrophil % 58.5 42.7 - 76.0 %    Lymphocyte % 26.5 19.6 - 45.3 %    Monocyte % 12.2 (H) 5.0 - 12.0 %    Eosinophil % 1.6 0.3 - 6.2 %    Basophil % 0.8 0.0 - 1.5 %    Immature Grans % 0.4 0.0 - 0.5 %    Neutrophils, Absolute 1.43 (L) 1.70 - 7.00 10*3/mm3    Lymphocytes, Absolute 0.65 (L) 0.70 - 3.10 10*3/mm3    Monocytes, Absolute 0.30 0.10 - 0.90 10*3/mm3    Eosinophils, Absolute 0.04 0.00 - 0.40 10*3/mm3    Basophils, Absolute 0.02 0.00 - 0.20 10*3/mm3    Immature Grans, Absolute 0.01 0.00 - 0.05 10*3/mm3    nRBC 0.0 0.0 - 0.2 /100 WBC   POC Glucose Once    Specimen: Blood   Result Value Ref Range    Glucose 102 70 - 105 mg/dL   ECG 12 Lead Syncope   Result Value Ref Range    QT Interval 506 ms    QTC Interval 446 ms     XR Chest 1 View    Result Date: 6/1/2024  Impression: Chronic changes are noted which are stable and suggest chronic changes of CHF and pulmonary edema. Electronically Signed: Nathaniel Harman MD  6/1/2024 2:05 PM EDT  Workstation ID: [id number]   Medications   sodium chloride 0.9 % flush 10 mL (has no administration in time range)                                  EKG my interpretation atrial fibrillation rate of 47 left bundle branch block QTc of 446 abnormal EKG atrial fibrillation is oh but the heart rate is much slower than previous EKG on 4/7/2024 abnormal left bundle branch is old as well            Medical Decision Making  Medical decision making.  IV established monitor placed my review atrial fibrillation at slow rate.  EKG my interpretation atrial fibrillation rate of 45 left bundle branch block QTc 446 abnormal but unchanged from previous EKG on 4/7/2024 other  than slower rate today.  Chest x-ray obtained my independent review allysine pneumonia pneumothorax or failure acute findings radiology shows chronic findings.  Labs obtained my independent review comprehensive metabolic profile remarkable and a BUN of 29 creatinine 3.3 ALT of 108 AST of 155 CBC marked for white count of 2.5 hemoglobin 11.5 platelets of 60,000 although this is somewhat chronic is a little bit lower than usual.  Patient repeat exam resting comfortably she is bradycardic at times in the mid 40s.  And she will stay in the 50s at time.  She has had no further syncopal episodes.  I do not see any evidence of any other acute dysrhythmia or acute elevation myocardial infarction stroke meningitis encephalitis pneumonia bacteremia sepsis aneurysm dissection although not a complete list of all possibilities.  Record review she was just discharged on April 24, 2024 after syncopal episode she had a heart cath which showed diffuse heavily calcified coronary arteries and she was sent to surgeon for evaluation.  She had an EF of 35% on echo at the end of April.  And 50% blockage in each carotid artery on a carotid Dopplers.  Patient was resting comfortably I talked to the provider in the observation unit.  The patient be placed observation for further care and monitoring and watching her heart rate since it stays in the 40s at times.  Stable unremarkable ER course.    Problems Addressed:  Syncope, unspecified syncope type: complicated acute illness or injury    Amount and/or Complexity of Data Reviewed  External Data Reviewed: ECG and notes.  Labs: ordered. Decision-making details documented in ED Course.  Radiology: ordered and independent interpretation performed. Decision-making details documented in ED Course.  ECG/medicine tests: ordered and independent interpretation performed. Decision-making details documented in ED Course.    Risk  Decision regarding hospitalization.        Final diagnoses:   Syncope,  unspecified syncope type   Bradycardia       ED Disposition  ED Disposition       ED Disposition   Decision to Admit    Condition   --    Comment   --               No follow-up provider specified.       Medication List      No changes were made to your prescriptions during this visit.            Americo Mcrae MD  06/01/24 [id number]

## 2024-06-02 LAB
ANION GAP SERPL CALCULATED.3IONS-SCNC: 11.4 MMOL/L (ref 5–15)
BASOPHILS # BLD AUTO: 0.02 10*3/MM3 (ref 0–0.2)
BASOPHILS NFR BLD AUTO: 1 % (ref 0–1.5)
BUN SERPL-MCNC: 31 MG/DL (ref 8–23)
BUN/CREAT SERPL: 8.9 (ref 7–25)
CALCIUM SPEC-SCNC: 8.3 MG/DL (ref 8.6–10.5)
CHLORIDE SERPL-SCNC: 100 MMOL/L (ref 98–107)
CO2 SERPL-SCNC: 26.6 MMOL/L (ref 22–29)
CREAT SERPL-MCNC: 3.47 MG/DL (ref 0.57–1)
DEPRECATED RDW RBC AUTO: 53.8 FL (ref 37–54)
EGFRCR SERPLBLD CKD-EPI 2021: 12.6 ML/MIN/1.73
EOSINOPHIL # BLD AUTO: 0.06 10*3/MM3 (ref 0–0.4)
EOSINOPHIL NFR BLD AUTO: 2.9 % (ref 0.3–6.2)
ERYTHROCYTE [DISTWIDTH] IN BLOOD BY AUTOMATED COUNT: 15.2 % (ref 12.3–15.4)
GLUCOSE SERPL-MCNC: 109 MG/DL (ref 65–99)
HCT VFR BLD AUTO: 32.1 % (ref 34–46.6)
HGB BLD-MCNC: 9.6 G/DL (ref 12–15.9)
IMM GRANULOCYTES # BLD AUTO: 0 10*3/MM3 (ref 0–0.05)
IMM GRANULOCYTES NFR BLD AUTO: 0 % (ref 0–0.5)
LYMPHOCYTES # BLD AUTO: 0.69 10*3/MM3 (ref 0.7–3.1)
LYMPHOCYTES NFR BLD AUTO: 33.7 % (ref 19.6–45.3)
MCH RBC QN AUTO: 29.1 PG (ref 26.6–33)
MCHC RBC AUTO-ENTMCNC: 29.9 G/DL (ref 31.5–35.7)
MCV RBC AUTO: 97.3 FL (ref 79–97)
MONOCYTES # BLD AUTO: 0.24 10*3/MM3 (ref 0.1–0.9)
MONOCYTES NFR BLD AUTO: 11.7 % (ref 5–12)
NEUTROPHILS NFR BLD AUTO: 1.04 10*3/MM3 (ref 1.7–7)
NEUTROPHILS NFR BLD AUTO: 50.7 % (ref 42.7–76)
NRBC BLD AUTO-RTO: 0 /100 WBC (ref 0–0.2)
PLATELET # BLD AUTO: 54 10*3/MM3 (ref 140–450)
PMV BLD AUTO: 10.4 FL (ref 6–12)
POTASSIUM SERPL-SCNC: 3.5 MMOL/L (ref 3.5–5.2)
QT INTERVAL: 506 MS
QTC INTERVAL: 446 MS
RBC # BLD AUTO: 3.3 10*6/MM3 (ref 3.77–5.28)
SODIUM SERPL-SCNC: 138 MMOL/L (ref 136–145)
WBC NRBC COR # BLD AUTO: 2.05 10*3/MM3 (ref 3.4–10.8)

## 2024-06-02 PROCEDURE — 85025 COMPLETE CBC W/AUTO DIFF WBC: CPT | Performed by: PHYSICIAN ASSISTANT

## 2024-06-02 PROCEDURE — G0378 HOSPITAL OBSERVATION PER HR: HCPCS

## 2024-06-02 PROCEDURE — 99215 OFFICE O/P EST HI 40 MIN: CPT | Performed by: INTERNAL MEDICINE

## 2024-06-02 PROCEDURE — 80048 BASIC METABOLIC PNL TOTAL CA: CPT | Performed by: PHYSICIAN ASSISTANT

## 2024-06-02 RX ORDER — CLOPIDOGREL BISULFATE 75 MG/1
75 TABLET ORAL DAILY
Status: DISCONTINUED | OUTPATIENT
Start: 2024-06-02 | End: 2024-06-04 | Stop reason: HOSPADM

## 2024-06-02 RX ORDER — SODIUM CHLORIDE 0.9 % (FLUSH) 0.9 %
10 SYRINGE (ML) INJECTION EVERY 12 HOURS SCHEDULED
Status: DISCONTINUED | OUTPATIENT
Start: 2024-06-02 | End: 2024-06-04 | Stop reason: HOSPADM

## 2024-06-02 RX ORDER — FOLIC ACID 1 MG/1
1 TABLET ORAL DAILY
Status: DISCONTINUED | OUTPATIENT
Start: 2024-06-02 | End: 2024-06-04 | Stop reason: HOSPADM

## 2024-06-02 RX ORDER — ALUMINA, MAGNESIA, AND SIMETHICONE 2400; 2400; 240 MG/30ML; MG/30ML; MG/30ML
15 SUSPENSION ORAL EVERY 6 HOURS PRN
Status: DISCONTINUED | OUTPATIENT
Start: 2024-06-02 | End: 2024-06-04 | Stop reason: HOSPADM

## 2024-06-02 RX ORDER — ACETAMINOPHEN 325 MG/1
650 TABLET ORAL EVERY 4 HOURS PRN
Status: DISCONTINUED | OUTPATIENT
Start: 2024-06-02 | End: 2024-06-02

## 2024-06-02 RX ORDER — SODIUM CHLORIDE 0.9 % (FLUSH) 0.9 %
10 SYRINGE (ML) INJECTION AS NEEDED
Status: DISCONTINUED | OUTPATIENT
Start: 2024-06-02 | End: 2024-06-02

## 2024-06-02 RX ORDER — SODIUM CHLORIDE 9 MG/ML
40 INJECTION, SOLUTION INTRAVENOUS AS NEEDED
Status: DISCONTINUED | OUTPATIENT
Start: 2024-06-02 | End: 2024-06-02

## 2024-06-02 RX ORDER — ONDANSETRON 4 MG/1
4 TABLET, ORALLY DISINTEGRATING ORAL EVERY 6 HOURS PRN
Status: DISCONTINUED | OUTPATIENT
Start: 2024-06-02 | End: 2024-06-02

## 2024-06-02 RX ORDER — LIDOCAINE 4 G/G
1 PATCH TOPICAL
Status: DISCONTINUED | OUTPATIENT
Start: 2024-06-02 | End: 2024-06-04 | Stop reason: HOSPADM

## 2024-06-02 RX ORDER — METOPROLOL SUCCINATE 25 MG/1
12.5 TABLET, EXTENDED RELEASE ORAL
Status: DISCONTINUED | OUTPATIENT
Start: 2024-06-02 | End: 2024-06-02

## 2024-06-02 RX ORDER — ONDANSETRON 2 MG/ML
4 INJECTION INTRAMUSCULAR; INTRAVENOUS EVERY 6 HOURS PRN
Status: DISCONTINUED | OUTPATIENT
Start: 2024-06-02 | End: 2024-06-02

## 2024-06-02 RX ORDER — ASPIRIN 81 MG/1
81 TABLET ORAL DAILY
Status: DISCONTINUED | OUTPATIENT
Start: 2024-06-02 | End: 2024-06-04 | Stop reason: HOSPADM

## 2024-06-02 RX ADMIN — Medication 10 ML: at 21:31

## 2024-06-02 RX ADMIN — Medication 10 ML: at 09:21

## 2024-06-02 RX ADMIN — Medication 10 ML: at 21:32

## 2024-06-02 RX ADMIN — FOLIC ACID 1 MG: 1 TABLET ORAL at 09:21

## 2024-06-02 RX ADMIN — LIDOCAINE 1 PATCH: 4 PATCH TOPICAL at 00:27

## 2024-06-02 RX ADMIN — ACETAMINOPHEN 650 MG: 325 TABLET, FILM COATED ORAL at 23:16

## 2024-06-02 RX ADMIN — ASPIRIN 81 MG: 81 TABLET, COATED ORAL at 09:20

## 2024-06-02 RX ADMIN — CLOPIDOGREL BISULFATE 75 MG: 75 TABLET ORAL at 09:20

## 2024-06-02 RX ADMIN — ATORVASTATIN CALCIUM 40 MG: 40 TABLET, FILM COATED ORAL at 21:30

## 2024-06-02 NOTE — PLAN OF CARE
Goal Outcome Evaluation:              Outcome Evaluation: Patient comfortable, no complaits of pain. Had an order for nephrology, and call was placed for dialysis order for tomorrow. Cardiology was consulted and pt will have a pacemaker place on Wednesday. Will continue to monitor.

## 2024-06-02 NOTE — H&P
[facility] H&P    Patient Name: Deann Warren  : 1940  MRN: [id number]  Primary Care Physician: Antonino Shen MD  Date of admission: 2024     Patient Care Team:  Antonino Shen MD as PCP - General (Internal Medicine)  Antonino Shen MD as PCP - Family Medicine  Kamran Figueroa MD as Consulting Physician (Cardiology)          Subjective   History Present Illness     Chief Complaint:   Chief Complaint   Patient presents with    Loss of Consciousness     syncope    History of Present Illness    Ed  83-year-old female states started feel funny had some pain in her side and then she got lightheaded and passed out. This was brief there was no reported seizure activity. No chest pain neck arm jaw pain no injury no headache vision change speech difficulty or paralysis. Patient has a long history of multiple syncopal episodes throughout the past and has been admitted a few times she states unremarkable workup nobody knows what causes her to pass out. She states she has A-fib they took her off blood thinners because of this. The patient also goes to dialysis on Monday and Friday. Had it yesterday. No recent injury illness flus viruses vaccinations she has hospitalization back in the end of April for a similar issue with unremarkable workup. No fever chills or night sweats.     Observation 24  Cardiology consulted. Pacemaker scheduled for wed      Review of Systems   Cardiovascular:  Positive for syncope.             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/01/2022    Vitamin D deficiency 07/22/2021       Surgical History:      Past Surgical History:   Procedure Laterality Date    ARTERIOVENOUS FISTULA Left     CARDIAC CATHETERIZATION N/A 4/25/2024    Procedure: Right and Left Heart Cath;  Surgeon: Dilcia Lui MD;  Location: [facility] CATH INVASIVE LOCATION;  Service: Cardiology;  Laterality: N/A;    CARDIAC CATHETERIZATION N/A 4/25/2024    Procedure: Percutaneous Coronary Intervention;  Surgeon: Jadiel Blake MD;  Location: [facility] CATH INVASIVE LOCATION;  Service: Cardiology;  Laterality: N/A;           Family History: Family history is unknown by patient. Otherwise pertinent FHx was reviewed and unremarkable.     Social History:  reports that she has never smoked. She has never used smokeless tobacco. She reports that she does not drink alcohol and does not use drugs.      Medications:  Prior to Admission medications    Medication Sig Start Date End Date Taking? Authorizing Provider   aspirin 81 MG EC tablet Take 1 tablet by mouth Daily. 4/25/24  Yes Dilcia Lui MD   atorvastatin (LIPITOR) 40 MG tablet Take 1 tablet by mouth Daily. 4/25/24  Yes Dilcia Lui MD   clopidogrel (PLAVIX) 75 MG tablet Take 1 tablet by mouth Daily. 4/25/24  Yes Dilcia Lui MD   febuxostat (ULORIC) 40 MG tablet Take 1 tablet by mouth Daily.   Yes Katerin Bradshaw MD   folic acid (FOLVITE) 1 MG tablet Take 1 tablet by mouth Daily. 4/13/24  Yes Hernesto Sanchez MD   metoprolol succinate XL (TOPROL-XL) 25 MG 24 hr tablet Take 0.5 tablets by mouth Daily. 4/13/24  Yes Hernesto Sanchez MD   acetaminophen (TYLENOL) 500 MG tablet Take 1 tablet by mouth Every 6 (Six) Hours As Needed for Mild Pain.    Katerin Bradshaw MD   Cholecalciferol (Vitamin D3) 50 MCG (2000 UT) tablet Take  by mouth Daily.    Katerin Bradshaw MD   NON FORMULARY Apply 1 dose topically to the appropriate area as  directed 3 (Three) Times a Week. Lidocaine 2.5% ointment -  Apply 1 application Monday, Wednesday, Friday before dialysis    Provider, MD Katerin       Allergies:    Allergies   Allergen Reactions    Heparin Hives       Objective   Objective     Vital Signs  Temp:  [97.5 °F (36.4 °C)-97.8 °F (36.6 °C)] 97.5 °F (36.4 °C)  Heart Rate:  [44-60] 52  Resp:  [15-24] 20  BP: (106-119)/(41-52) 113/49  SpO2:  [93 %-100 %] 100 %  on   ;   Device (Oxygen Therapy): room air  Body mass index is 21.46 kg/m².    Physical Exam  Constitutional:       Appearance: Normal appearance.   Cardiovascular:      Rate and Rhythm: Bradycardia present.      Pulses: Normal pulses.      Heart sounds: Normal heart sounds.   Pulmonary:      Effort: Pulmonary effort is normal.      Breath sounds: Normal breath sounds.   Skin:     General: Skin is warm and dry.   Neurological:      General: No focal deficit present.      Mental Status: She is alert and oriented to person, place, and time.   Psychiatric:         Mood and Affect: Mood normal.         Behavior: Behavior normal.         Results Review:  I have personally reviewed most recent cardiac tracings, lab results, and radiology images and interpretations and agree with findings, most notably: cbc, cmp, chest xray, ekg.    Results from last 7 days   Lab Units 06/02/24  0405   WBC 10*3/mm3 2.05*   HEMOGLOBIN g/dL 9.6*   HEMATOCRIT % 32.1*   PLATELETS 10*3/mm3 54*     Results from last 7 days   Lab Units 06/02/24  0405 06/01/24  1355   SODIUM mmol/L 138 136   POTASSIUM mmol/L 3.5 3.9   CHLORIDE mmol/L 100 100   CO2 mmol/L 26.6 22.9   BUN mg/dL 31* 29*   CREATININE mg/dL 3.47* 3.30*   GLUCOSE mg/dL 109* 121*   CALCIUM mg/dL 8.3* 8.6   ALK PHOS U/L  --  81   ALT (SGPT) U/L  --  108*   AST (SGOT) U/L  --  155*     Estimated Creatinine Clearance: 11 mL/min (A) (by C-G formula based on SCr of 3.47 mg/dL (H)).  Brief Urine Lab Results       None            Microbiology Results (last 10 days)       **  No results found for the last 240 hours. **            ECG/EMG Results (most recent)       Procedure Component Value Units Date/Time    ECG 12 Lead Syncope [[id number]] Collected: 06/01/24 1337     Updated: 06/02/24 0556     QT Interval 506 ms      QTC Interval 446 ms     Narrative:      HEART RATE= 47  bpm  RR Interval= 1286  ms  VA Interval=   ms  P Horizontal Axis=   deg  P Front Axis=   deg  QRSD Interval= 146  ms  QT Interval= 506  ms  QTcB= 446  ms  QRS Axis= -63  deg  T Wave Axis= 127  deg  - ABNORMAL ECG -  Atrial fibrillation  Left bundle branch block  When compared with ECG of 07-Apr-2024 10:07:46,  Significant rate decrease  Electronically Signed By: Americo Mcrae (BEATRIZ) 02-Jun-2024 05:56:19  Date and Time of Study: 2024-06-01 13:37:49            Results for orders placed during the hospital encounter of 04/07/24    Duplex Carotid Ultrasound CAR    Interpretation Summary    Right internal carotid artery demonstrates a less than 50% stenosis.    Left internal carotid artery demonstrates a less than 50% stenosis.      Results for orders placed during the hospital encounter of 04/07/24    Adult Transthoracic Echo Complete W/ Cont if Necessary Per Protocol    Interpretation Summary    Left ventricular systolic function is moderately decreased. Calculated left ventricular EF = 35%    The left ventricular cavity is mildly dilated.    The following left ventricular wall segments are hypokinetic: apical anterior.    Left ventricular diastolic function was indeterminate.    The left atrial cavity is moderately dilated.    Saline test results are positive with valsalva manuever.    Estimated right ventricular systolic pressure from tricuspid regurgitation is mildly elevated (35-45 mmHg).    There is a large left pleural effusion.    There appears to be an ASD present with left to right shunting based on color dopple. Consider YINA to further evaluate      XR Chest 1 View    Result Date: 6/1/2024  Impression: Chronic  changes are noted which are stable and suggest chronic changes of CHF and pulmonary edema. Electronically Signed: Nathaniel Harman MD  6/1/2024 2:05 PM EDT  Workstation ID: [id number]       Estimated Creatinine Clearance: 11 mL/min (A) (by C-G formula based on SCr of 3.47 mg/dL (H)).    Assessment & Plan   Assessment/Plan       There are no hospital problems to display for this patient.      Syncope  - hgb 10.5 baseline for pt  - creatinine 3.30 baseline for pt  - chest xray showing Chronic changes are noted which are stable and suggest chronic changes of CHF and pulmonary edema.   - EKG afib rate 47  - cardiology consulted  - cardiology recommends pacemaker placement    Thrombocytopenia  - plts 54  - chronic  - pt followed by hematology    ESRD on dialysis  - MWF dialysis  - will consult nephrology for orders      VTE Prophylaxis -   Mechanical Order History:        Ordered        06/01/24 1814  Place Sequential Compression Device  Once            06/01/24 1814  Maintain Sequential Compression Device  Continuous                          Pharmalogical Order History:       None            CODE STATUS:    Code Status and Medical Interventions:   Ordered at: 06/02/24 0754     Code Status (Patient has no pulse and is not breathing):    CPR (Attempt to Resuscitate)     Medical Interventions (Patient has pulse or is breathing):    Full Support       This patient has been examined wearing personal protective equipment.     I discussed the patient's findings and my recommendations with patient and nursing staff.      Signature:Electronically signed by Marsha Radford PA-C, 06/02/24, 3:08 PM EDT.

## 2024-06-02 NOTE — PLAN OF CARE
Goal Outcome Evaluation:              Outcome Evaluation: pt admitted after having a syncopal episode at home, pt had some lower back pain applied lidocaine patch to relieve pain, cardiology cosult has been called

## 2024-06-02 NOTE — NURSING NOTE
Nephrologist on call was called for a consult [phone]. Provider (Dr Cass Garcia) called when I was at the lab, and asked for me to page back. I called and left a message for the provider to order dialyses for this patient for tomorrow.

## 2024-06-02 NOTE — CONSULTS
Referring Provider: Americo Mcrae MD    Reason for Consultation:  syncope  Bradycardia      Patient Care Team:  Antonino Shen MD as PCP - General (Internal Medicine)  Antonino Shen MD as PCP - Family Medicine  Kamran Figueroa MD as Consulting Physician (Cardiology)      SUBJECTIVE     Chief Complaint:  syncope    History of present illness: Deann Warren is a 83 y.o. female with a history of end-stage renal disease on dialysis, A-fib, hypertension, hyperlipidemia and recurrent syncope who presented to [facility] with complaint of syncope.  Previously followed with [place] [facility].  During her recent hospitalization she had a repeat echocardiogram which showed that her EF was 35% which was new to her. Of note she has had bleeding issues with dialysis and is on low-dose Eliquis.  She has thrombocytopenia at baseline.  She underwent cardiac catheterization given her cardiomyopathy and was found to be euvolemic on this heart cath.  However she was found to have multivessel heavily calcified CAD involving the left main, LAD and left circumflex.  POBA of the LAD was performed but given her heavily calcified disease the procedure was aborted and cardiac surgery was consulted.  She was deemed to not be a surgical candidate.  Since then she also underwent a Holter monitor which showed two 4-second pauses with a minimum heart rate of 36 bpm and a max heart rate of 109 bpm.  Predominant rhythm was A-fib.  She now presents again with lightheadedness and dizziness followed by syncope.  In the ER lab work showed platelet count of 54.  She does have chronic thrombocytopenia.  She also has chronic anemia with a hemoglobin of 9.6 and leukopenia with a white count of 2.05.  EKG shows A-fib with slow ventricular response with a heart rate of 47 bpm and left bundle branch block which is old.      Review of systems:    Constitutional: No weakness, fatigue, fever, rigors, chills   Eyes: No vision changes, eye pain    ENT/oropharynx: No difficulty swallowing, sore throat, epistaxis, changes in hearing   Cardiovascular: No chest pain, chest tightness, palpitations, paroxysmal nocturnal dyspnea, orthopnea, diaphoresis, +dizziness / syncopal episode   Respiratory: No shortness of breath, dyspnea on exertion, cough, wheezing, hemoptysis   Gastrointestinal: No abdominal pain, nausea, vomiting, diarrhea, bloody stools   Genitourinary: No hematuria, dysuria   Neurological: No headache, tremors, numbness, one-sided weakness    Musculoskeletal: No cramps, myalgias, joint pain, joint swelling   Integument: No rash, edema        Personal History:      Past Medical History:   Diagnosis Date    Allergic conjunctivitis and rhinitis 11/06/2014    Anemia due to chronic kidney disease, on chronic dialysis 08/25/2020    Anxiety 07/30/2012    Chronic gouty arthritis 11/06/2014    Dependence on renal dialysis 07/01/2022    ESRD (end stage renal disease) 08/25/2020    Essential hypertension 09/16/2015    History of shingles 01/18/2022    Hyperlipidemia 04/08/2024    Paroxysmal atrial fibrillation 07/01/2022    Type 2 diabetes mellitus with diabetic chronic kidney disease 07/01/2022    Vitamin D deficiency 07/22/2021       Past Surgical History:   Procedure Laterality Date    ARTERIOVENOUS FISTULA Left     CARDIAC CATHETERIZATION N/A 4/25/2024    Procedure: Right and Left Heart Cath;  Surgeon: Dilcia Lui MD;  Location: [facility] CATH INVASIVE LOCATION;  Service: Cardiology;  Laterality: N/A;    CARDIAC CATHETERIZATION N/A 4/25/2024    Procedure: Percutaneous Coronary Intervention;  Surgeon: Jadiel Blake MD;  Location: [facility] CATH INVASIVE LOCATION;  Service: Cardiology;  Laterality: N/A;       Family History   Family history unknown: Yes       Social History     Tobacco Use    Smoking status: Never    Smokeless tobacco: Never   Vaping Use    Vaping status: Never Used   Substance Use Topics    Alcohol use: Never    Drug use: Never        Home  meds:  Prior to Admission medications    Medication Sig Start Date End Date Taking? Authorizing Provider   aspirin 81 MG EC tablet Take 1 tablet by mouth Daily. 4/25/24  Yes Dilcia Lui MD   atorvastatin (LIPITOR) 40 MG tablet Take 1 tablet by mouth Daily. 4/25/24  Yes Dilcia Lui MD   clopidogrel (PLAVIX) 75 MG tablet Take 1 tablet by mouth Daily. 4/25/24  Yes Dilcia Lui MD   febuxostat (ULORIC) 40 MG tablet Take 1 tablet by mouth Daily.   Yes Katerin Bradshaw MD   folic acid (FOLVITE) 1 MG tablet Take 1 tablet by mouth Daily. 4/13/24  Yes Hernesto Sanchez MD   metoprolol succinate XL (TOPROL-XL) 25 MG 24 hr tablet Take 0.5 tablets by mouth Daily. 4/13/24  Yes Hernesto Sanchez MD   acetaminophen (TYLENOL) 500 MG tablet Take 1 tablet by mouth Every 6 (Six) Hours As Needed for Mild Pain.    ProviderKaterin MD   Cholecalciferol (Vitamin D3) 50 MCG (2000 UT) tablet Take  by mouth Daily.    ProviderKaterin MD   NON FORMULARY Apply 1 dose topically to the appropriate area as directed 3 (Three) Times a Week. Lidocaine 2.5% ointment -  Apply 1 application Monday, Wednesday, Friday before dialysis    ProviderKaterin MD       Allergies:     Heparin    Scheduled Meds:aspirin, 81 mg, Oral, Daily  atorvastatin, 40 mg, Oral, Nightly  clopidogrel, 75 mg, Oral, Daily  folic acid, 1 mg, Oral, Daily  Lidocaine, 1 patch, Transdermal, Q24H  sodium chloride, 10 mL, Intravenous, Q12H  sodium chloride, 10 mL, Intravenous, Q12H      Continuous Infusions:   PRN Meds:  acetaminophen    aluminum-magnesium hydroxide-simethicone    senna-docusate sodium **AND** polyethylene glycol **AND** bisacodyl **AND** bisacodyl    ondansetron ODT **OR** ondansetron    [COMPLETED] Insert Peripheral IV **AND** sodium chloride    sodium chloride    sodium chloride      OBJECTIVE    Vital Signs  Vitals:    06/01/24 1701 06/01/24 1815 06/01/24 2226 06/02/24 1022   BP: 112/52  119/48 114/49   BP Location: Right arm   "Right arm Right arm   Patient Position: Lying  Lying Lying   Pulse: (!) 44  55 60   Resp: 24  15 16   Temp: 97.7 °F (36.5 °C)  97.8 °F (36.6 °C) 97.5 °F (36.4 °C)   TempSrc: Oral  Axillary Oral   SpO2: 99%  96% 93%   Weight:  56.7 kg (125 lb)     Height:           Flowsheet Rows      Flowsheet Row First Filed Value   Admission Height 162.6 cm (64\") Documented at 06/01/2024 1313   Admission Weight 56.7 kg (125 lb) Documented at 06/01/2024 1313              Intake/Output Summary (Last 24 hours) at 6/2/2024 1036  Last data filed at 6/2/2024 0830  Gross per 24 hour   Intake 332 ml   Output --   Net 332 ml        Telemetry: A-fib with slow ventricular response    Physical Exam:  The patient is alert, oriented and in no distress.  Vital signs as noted above.  Head and neck revealed no carotid bruits or jugular venous distention.  No thyromegaly or lymphadenopathy is present  Lungs clear.  No wheezing.  Breath sounds are normal bilaterally.  Heart: Normal first and second heart sounds. No murmur.  No precordial rub is present.  No gallop is present.  Abdomen: Soft and nontender.  No organomegaly is present.  Extremities with good peripheral pulses without any pedal edema.  Skin: Warm and dry.  Musculoskeletal system is grossly normal.  CNS grossly normal.       Results Review:  I have personally reviewed the results from the time of this admission to 6/2/2024 10:36 EDT and agree with these findings:  []  Laboratory  []  Microbiology  []  Radiology  []  EKG/Telemetry   []  Cardiology/Vascular   []  Pathology  []  Old records  []  Other:    Most notable findings include:     Lab Results (last 24 hours)       Procedure Component Value Units Date/Time    Basic Metabolic Panel [[id number]]  (Abnormal) Collected: 06/02/24 0405    Specimen: Blood Updated: 06/02/24 0446     Glucose 109 mg/dL      BUN 31 mg/dL      Creatinine 3.47 mg/dL      Sodium 138 mmol/L      Potassium 3.5 mmol/L      Chloride 100 mmol/L      CO2 26.6 mmol/L  "     Calcium 8.3 mg/dL      BUN/Creatinine Ratio 8.9     Anion Gap 11.4 mmol/L      eGFR 12.6 mL/min/1.73      Comment: <15 Indicative of kidney failure       Narrative:      GFR Normal >60  Chronic Kidney Disease <60  Kidney Failure <15    The GFR formula is only valid for adults with stable renal function between ages 18 and 70.    CBC & Differential [[id number]]  (Abnormal) Collected: 06/02/24 0405    Specimen: Blood Updated: 06/02/24 0430    Narrative:      The following orders were created for panel order CBC & Differential.  Procedure                               Abnormality         Status                     ---------                               -----------         ------                     CBC Auto Differential[[id number]]        Abnormal            Final result               Scan Slide[[id number]]                                                                    Please view results for these tests on the individual orders.    CBC Auto Differential [[id number]]  (Abnormal) Collected: 06/02/24 0405    Specimen: Blood Updated: 06/02/24 0430     WBC 2.05 10*3/mm3      RBC 3.30 10*6/mm3      Hemoglobin 9.6 g/dL      Hematocrit 32.1 %      MCV 97.3 fL      MCH 29.1 pg      MCHC 29.9 g/dL      RDW 15.2 %      RDW-SD 53.8 fl      MPV 10.4 fL      Platelets 54 10*3/mm3      Neutrophil % 50.7 %      Lymphocyte % 33.7 %      Monocyte % 11.7 %      Eosinophil % 2.9 %      Basophil % 1.0 %      Immature Grans % 0.0 %      Neutrophils, Absolute 1.04 10*3/mm3      Lymphocytes, Absolute 0.69 10*3/mm3      Monocytes, Absolute 0.24 10*3/mm3      Eosinophils, Absolute 0.06 10*3/mm3      Basophils, Absolute 0.02 10*3/mm3      Immature Grans, Absolute 0.00 10*3/mm3      nRBC 0.0 /100 WBC     POC Glucose Once [[id number]]  (Normal) Collected: 06/01/24 1333    Specimen: Blood Updated: 06/01/24 1452     Glucose 102 mg/dL      Comment: Serial Number: [id number]:  [id number]       Comprehensive Metabolic Panel [[id number]]   (Abnormal) Collected: 06/01/24 1355    Specimen: Blood Updated: 06/01/24 1431     Glucose 121 mg/dL      BUN 29 mg/dL      Creatinine 3.30 mg/dL      Sodium 136 mmol/L      Potassium 3.9 mmol/L      Chloride 100 mmol/L      CO2 22.9 mmol/L      Calcium 8.6 mg/dL      Total Protein 5.5 g/dL      Albumin 3.5 g/dL      ALT (SGPT) 108 U/L      AST (SGOT) 155 U/L      Alkaline Phosphatase 81 U/L      Total Bilirubin 0.7 mg/dL      Globulin 2.0 gm/dL      A/G Ratio 1.8 g/dL      BUN/Creatinine Ratio 8.8     Anion Gap 13.1 mmol/L      eGFR 13.4 mL/min/1.73      Comment: <15 Indicative of kidney failure       Narrative:      GFR Normal >60  Chronic Kidney Disease <60  Kidney Failure <15    The GFR formula is only valid for adults with stable renal function between ages 18 and 70.    CBC & Differential [[id number]]  (Abnormal) Collected: 06/01/24 1355    Specimen: Blood Updated: 06/01/24 1410    Narrative:      The following orders were created for panel order CBC & Differential.  Procedure                               Abnormality         Status                     ---------                               -----------         ------                     CBC Auto Differential[[id number]]        Abnormal            Final result               Scan Slide[[id number]]                                                                    Please view results for these tests on the individual orders.    CBC Auto Differential [[id number]]  (Abnormal) Collected: 06/01/24 1355    Specimen: Blood Updated: 06/01/24 1410     WBC 2.45 10*3/mm3      RBC 3.60 10*6/mm3      Hemoglobin 10.5 g/dL      Hematocrit 34.3 %      MCV 95.3 fL      MCH 29.2 pg      MCHC 30.6 g/dL      RDW 15.3 %      RDW-SD 53.4 fl      MPV 10.7 fL      Platelets 60 10*3/mm3      Neutrophil % 58.5 %      Lymphocyte % 26.5 %      Monocyte % 12.2 %      Eosinophil % 1.6 %      Basophil % 0.8 %      Immature Grans % 0.4 %      Neutrophils, Absolute 1.43 10*3/mm3      Lymphocytes,  Absolute 0.65 10*3/mm3      Monocytes, Absolute 0.30 10*3/mm3      Eosinophils, Absolute 0.04 10*3/mm3      Basophils, Absolute 0.02 10*3/mm3      Immature Grans, Absolute 0.01 10*3/mm3      nRBC 0.0 /100 WBC             Imaging Results (Last 24 Hours)       Procedure Component Value Units Date/Time    XR Chest 1 View [[id number]] Collected: 06/01/24 1358     Updated: 06/01/24 1407    Narrative:      XR CHEST 1 VW    Date of Exam: 6/1/2024 1:45 PM EDT    Indication: Syncope    Comparison: 4/9/2024 1330 hours, 4/7/2024. CT chest 4/9/2024.    Findings:  Stable mild interstitial prominence is seen throughout the lungs bilaterally with patchy airspace infiltrates. Stable small pleural effusions are seen. Cardiomegaly is again noted which is stable. The mediastinum is stable. A granuloma is again observed   within the right lower lobe. No acute osseous abnormalities are seen.      Impression:      Impression:  Chronic changes are noted which are stable and suggest chronic changes of CHF and pulmonary edema.      Electronically Signed: Nathaniel Harman MD    6/1/2024 2:05 PM EDT    Workstation ID: [id number]            LAB RESULTS (LAST 7 DAYS)    CBC  Results from last 7 days   Lab Units 06/02/24  0405 06/01/24  1355   WBC 10*3/mm3 2.05* 2.45*   RBC 10*6/mm3 3.30* 3.60*   HEMOGLOBIN g/dL 9.6* 10.5*   HEMATOCRIT % 32.1* 34.3   MCV fL 97.3* 95.3   PLATELETS 10*3/mm3 54* 60*       BMP  Results from last 7 days   Lab Units 06/02/24  0405 06/01/24  1355   SODIUM mmol/L 138 136   POTASSIUM mmol/L 3.5 3.9   CHLORIDE mmol/L 100 100   CO2 mmol/L 26.6 22.9   BUN mg/dL 31* 29*   CREATININE mg/dL 3.47* 3.30*   GLUCOSE mg/dL 109* 121*       CMP   Results from last 7 days   Lab Units 06/02/24  0405 06/01/24  1355   SODIUM mmol/L 138 136   POTASSIUM mmol/L 3.5 3.9   CHLORIDE mmol/L 100 100   CO2 mmol/L 26.6 22.9   BUN mg/dL 31* 29*   CREATININE mg/dL 3.47* 3.30*   GLUCOSE mg/dL 109* 121*   ALBUMIN g/dL  --  3.5   BILIRUBIN mg/dL  --  0.7    ALK PHOS U/L  --  81   AST (SGOT) U/L  --  155*   ALT (SGPT) U/L  --  108*       BNP        TROPONIN        CoAg        Creatinine Clearance  Estimated Creatinine Clearance: 11 mL/min (A) (by C-G formula based on SCr of 3.47 mg/dL (H)).    ABG          Radiology  XR Chest 1 View    Result Date: 6/1/2024  Impression: Chronic changes are noted which are stable and suggest chronic changes of CHF and pulmonary edema. Electronically Signed: Nathaniel Harman MD  6/1/2024 2:05 PM EDT  Workstation ID: [id number]       EKG  I personally viewed and interpreted the patient's EKG/Telemetry data:  ECG 12 Lead Syncope   Final Result   HEART RATE= 47  bpm   RR Interval= 1286  ms   FL Interval=   ms   P Horizontal Axis=   deg   P Front Axis=   deg   QRSD Interval= 146  ms   QT Interval= 506  ms   QTcB= 446  ms   QRS Axis= -63  deg   T Wave Axis= 127  deg   - ABNORMAL ECG -   Atrial fibrillation   Left bundle branch block   When compared with ECG of 07-Apr-2024 10:07:46,   Significant rate decrease   Electronically Signed By: Americo Mcrae (BEATRIZ) 02-Jun-2024 05:56:19   Date and Time of Study: 2024-06-01 13:37:49            Echocardiogram:    Results for orders placed during the hospital encounter of 04/07/24    Adult Transthoracic Echo Complete W/ Cont if Necessary Per Protocol    Interpretation Summary    Left ventricular systolic function is moderately decreased. Calculated left ventricular EF = 35%    The left ventricular cavity is mildly dilated.    The following left ventricular wall segments are hypokinetic: apical anterior.    Left ventricular diastolic function was indeterminate.    The left atrial cavity is moderately dilated.    Saline test results are positive with valsalva manuever.    Estimated right ventricular systolic pressure from tricuspid regurgitation is mildly elevated (35-45 mmHg).    There is a large left pleural effusion.    There appears to be an ASD present with left to right shunting based on color dopple.  Consider YINA to further evaluate        Stress Test:        Cardiac Catheterization:  Results for orders placed during the hospital encounter of 04/25/24    Cardiac Catheterization/Vascular Study    Conclusion  OPERATORS  Dilcia Lui M.D. (Attending Cardiologist)      PROCEDURES PERFORMED  Ultrasound guided Vascular access  Left Heart Catheterization  Coronary Angiogram  Right heart catheterization  POBA the LAD  IVUS of the LAD and left main  Moderate sedation  Abdominal aorta runoff    INDICATIONS FOR PROCEDURE  Cardiomyopathy    PROCEDURE IN DETAIL  Informed consent was obtained from the patient after explaining the risks, benefits, and alternative options of the procedure. After obtaining informed consent, the patient was brought to the cath lab and was prepped in a sterile fashion. Lidocaine 2% was used for local anesthesia into the right femoral access site. The right femoral artery and vein were accessed with a micropuncture needle via modified Seldinger technique under ultrasound guidance. A 6F sheath was inserted successfully into the artery and a 7 Vietnamese sheath was inserted into the vein.  6 Vietnamese Schaller-Dharmesh catheter was used to obtain RA, RV, PA, and pulmonary capillary wedge pressure.  PA sat was obtained.    Afterwards, 6F JR4 and JL4 diagnostic catheters were advanced over a wire into the ascending aorta and were used to engage the ostia of the left main and RCA respectively. JR4 used to cross the AV and obtain LV pressures and gradient across the AV measured via pullback technique. Images of the right and left coronary systems were obtained.    Interventional report  After giving bivalirudin due to heparin allergy and upsizing the arterial sheath to a 7 Vietnamese system, a 7 Vietnamese XB LAD 3.5 guide was used to engage the left main.  A run-through wire was advanced to the distal LAD.  I then attempted to predilate the distal LAD lesion with a NC 2.25 x 12 mm balloon.  However the balloon did not  expand.  The patient became very hypotensive at this point so the balloon was removed and the guide was disengaged.  Next I performed IVUS of the left main and LAD.  IVUS would not go past the proximal LAD lesion.  At this point the LAD measures 2.4 x 3.5 mm.  There is heavy calcification with 360 degree involvement.  There is diffuse disease in the entire LAD up to the ostium.  Also noted on pullback was that there was heavy calcification involving the ostium of the left circumflex.  There is also ostial left main disease with reference vessel diameter of 4.7 x 5.1 mm.  At this point the procedure was aborted.    A pigtail was advanced to the abdominal aorta and a runoff was performed.    All the catheters were exchanged over a wire and subsequently removed. Angiogram of the femoral access site was obtained and did not show complications. The patient tolerated the procedure well without any complications.  A Perclose suture was deployed at the arterial site.  The pictures were reviewed at the end of the procedure. A Mynx closure device was applied to the venous site.    HEMODYNAMICS    LV: 111/2/5  AO: 116/18/56  Gradient none    Right heart cath    RA 5  RV: 46/0/4  PA 45/12/26  PCW 9    Cardiac output 8.65  Cardiac index 5.72    FINDINGS  Coronary Angiogram    Right dominant circulation    Left main: Left main is a large caliber vessel which gives rise to the Left Anterior Descending and the Left circumflex.  There is a 60% ostial left main stenosis with about 40 to 50% distal left main stenosis.    Left Anterior Descending Artery: LAD is a large caliber vessel which gives rise to several septal perforators and several diagonal branches.  It is heavily calcified.  There is diffuse 60 to 70% stenosis in the proximal to mid LAD.  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  This is followed by another 80 to 90% lesion in the mid distal LAD.  There is also a 50 to 60% lesion in the  distal LAD.    Left Circumflex: Lcx is a large caliber vessel which gives rise to several OM branches.  It is heavily calcified in the proximal to midportion with nodule noted in the proximal portion.  Diffuse 70% lesions in the proximal to mid left circumflex.    Right Coronary Artery: The RCA is a large caliber vessel gives rise to PDA and PLV.  There is moderate calcification of the RCA with about 40 to 50% stenosis of the proximal segment followed by a 30% lesion in the mid RCA.    ESTIMATED BLOOD LOSS:  10 ml    COMPLICATIONS:  None    PROCEDURE DATA:  Sedation Time: 1 hour    IMPRESSIONS  Multivessel heavily calcified CAD,  low normal right and left heart filling pressures  No evidence of pulmonary hypertension    RECOMMENDATIONS  I recommend CV surgery evaluation for bypass surgery along with left atrial appendage ligation given the extent of her coronary artery disease which is very calcified and complex  If she is not a candidate for bypass surgery I will follow her clinically and consider high risk PCI with Impella support and atherectomy of the LAD left circumflex and left main.  This would obviously be a very high risk procedure given her comorbidities  We can also consider medical management if her symptoms remain stable  I have started her on aspirin and Plavix  Continue statin  GDMT for heart failure        Other:      ASSESSMENT & PLAN:    Active Problems:    * No active hospital problems. *    A-fib with slow ventricular response with syncope  Also with 4-second pauses on Holter monitor  I definitely think she has significant conduction system disease and this could be contributing to her syncope and overall fatigue  Avoid AV naheed blocking agents  These pauses occurred on her Holter monitor when she was not on a beta-blocker  I believe she would benefit from a CRT device given her bradycardia along with left bundle branch block in the setting of cardiomyopathy with prolonged QRS  duration.  Discussed procedure with patient and she is agreeable to proceed  Discussed with the EP and plan for pacemaker placement on Wednesday tentatively  Monitor on telemetry  Not on anticoagulation due to bleeding  Has been worked up for Giuseppe      Multivessel coronary artery disease  Evaluated by surgery and deemed to not be a surgical candidate  Continue with aspirin and Plavix for now but if she has further drop in her platelet count to less than 50 will stop her aspirin  Currently chest pain-free  At this time I recommend medical management of her CAD unless she has symptoms of angina and given her complex and heavy burden of calcified CAD and significant comorbidities which would make her a high risk candidate for high risk PCI.    Ischemic cardiomyopathy  EF of 35%  Hypotension limits initiation of GDMT  Not on SGLT2 inhibitor due to end-stage renal disease    Thrombocytopenia  This is chronic for her  She is followed by hematology  Currently on aspirin and Plavix  If platelet count drops below 50 stop aspirin    End-stage renal disease  On hemodialysis Monday Wednesday Friday  Last session was on Friday    Hyperlipidemia  Continue with atorvastatin    Labile hypertension  Currently not on any medications    Dilcia Lui MD  06/02/24  10:36 EDT

## 2024-06-03 ENCOUNTER — APPOINTMENT (OUTPATIENT)
Dept: NEPHROLOGY | Facility: HOSPITAL | Age: 84
End: 2024-06-03
Payer: MEDICARE

## 2024-06-03 ENCOUNTER — PREP FOR SURGERY (OUTPATIENT)
Dept: OTHER | Facility: HOSPITAL | Age: 84
End: 2024-06-03
Payer: MEDICARE

## 2024-06-03 ENCOUNTER — ANESTHESIA EVENT (OUTPATIENT)
Dept: CARDIOLOGY | Facility: HOSPITAL | Age: 84
End: 2024-06-03
Payer: MEDICARE

## 2024-06-03 ENCOUNTER — ANESTHESIA (OUTPATIENT)
Dept: CARDIOLOGY | Facility: HOSPITAL | Age: 84
End: 2024-06-03
Payer: MEDICARE

## 2024-06-03 ENCOUNTER — APPOINTMENT (OUTPATIENT)
Dept: GENERAL RADIOLOGY | Facility: HOSPITAL | Age: 84
End: 2024-06-03
Payer: MEDICARE

## 2024-06-03 DIAGNOSIS — I49.5 SICK SINUS SYNDROME: Primary | ICD-10-CM

## 2024-06-03 PROBLEM — I48.0 PAROXYSMAL A-FIB: Status: RESOLVED | Noted: 2022-07-01 | Resolved: 2024-06-03

## 2024-06-03 PROBLEM — R00.1 BRADYCARDIA BY ELECTROCARDIOGRAM: Status: ACTIVE | Noted: 2024-06-03

## 2024-06-03 PROBLEM — R55 SYNCOPAL EPISODES: Status: ACTIVE | Noted: 2024-06-03

## 2024-06-03 PROBLEM — I48.21 PERMANENT ATRIAL FIBRILLATION: Status: ACTIVE | Noted: 2024-06-03

## 2024-06-03 LAB
ANION GAP SERPL CALCULATED.3IONS-SCNC: 10 MMOL/L (ref 5–15)
BASOPHILS # BLD AUTO: 0.01 10*3/MM3 (ref 0–0.2)
BASOPHILS NFR BLD AUTO: 0.4 % (ref 0–1.5)
BUN SERPL-MCNC: 40 MG/DL (ref 8–23)
BUN/CREAT SERPL: 11 (ref 7–25)
CALCIUM SPEC-SCNC: 8.2 MG/DL (ref 8.6–10.5)
CHLORIDE SERPL-SCNC: 101 MMOL/L (ref 98–107)
CO2 SERPL-SCNC: 24 MMOL/L (ref 22–29)
CREAT SERPL-MCNC: 3.64 MG/DL (ref 0.57–1)
DEPRECATED RDW RBC AUTO: 54.3 FL (ref 37–54)
EGFRCR SERPLBLD CKD-EPI 2021: 11.9 ML/MIN/1.73
EOSINOPHIL # BLD AUTO: 0.06 10*3/MM3 (ref 0–0.4)
EOSINOPHIL NFR BLD AUTO: 2.7 % (ref 0.3–6.2)
ERYTHROCYTE [DISTWIDTH] IN BLOOD BY AUTOMATED COUNT: 15.4 % (ref 12.3–15.4)
GLUCOSE SERPL-MCNC: 78 MG/DL (ref 65–99)
HBV SURFACE AB SER RIA-ACNC: NORMAL
HBV SURFACE AG SERPL QL IA: NORMAL
HCT VFR BLD AUTO: 32.2 % (ref 34–46.6)
HGB BLD-MCNC: 9.5 G/DL (ref 12–15.9)
IMM GRANULOCYTES # BLD AUTO: 0 10*3/MM3 (ref 0–0.05)
IMM GRANULOCYTES NFR BLD AUTO: 0 % (ref 0–0.5)
LYMPHOCYTES # BLD AUTO: 0.74 10*3/MM3 (ref 0.7–3.1)
LYMPHOCYTES NFR BLD AUTO: 32.7 % (ref 19.6–45.3)
MCH RBC QN AUTO: 28.6 PG (ref 26.6–33)
MCHC RBC AUTO-ENTMCNC: 29.5 G/DL (ref 31.5–35.7)
MCV RBC AUTO: 97 FL (ref 79–97)
MONOCYTES # BLD AUTO: 0.26 10*3/MM3 (ref 0.1–0.9)
MONOCYTES NFR BLD AUTO: 11.5 % (ref 5–12)
NEUTROPHILS NFR BLD AUTO: 1.19 10*3/MM3 (ref 1.7–7)
NEUTROPHILS NFR BLD AUTO: 52.7 % (ref 42.7–76)
NRBC BLD AUTO-RTO: 0 /100 WBC (ref 0–0.2)
PLATELET # BLD AUTO: 60 10*3/MM3 (ref 140–450)
PMV BLD AUTO: 10.5 FL (ref 6–12)
POTASSIUM SERPL-SCNC: 4.1 MMOL/L (ref 3.5–5.2)
RBC # BLD AUTO: 3.32 10*6/MM3 (ref 3.77–5.28)
SODIUM SERPL-SCNC: 135 MMOL/L (ref 136–145)
WBC NRBC COR # BLD AUTO: 2.26 10*3/MM3 (ref 3.4–10.8)

## 2024-06-03 PROCEDURE — C1894 INTRO/SHEATH, NON-LASER: HCPCS | Performed by: INTERNAL MEDICINE

## 2024-06-03 PROCEDURE — 25010000002 PROPOFOL 10 MG/ML EMULSION: Performed by: ANESTHESIOLOGIST ASSISTANT

## 2024-06-03 PROCEDURE — 99222 1ST HOSP IP/OBS MODERATE 55: CPT | Performed by: INTERNAL MEDICINE

## 2024-06-03 PROCEDURE — 25810000003 SODIUM CHLORIDE 0.9 % SOLUTION 250 ML FLEX CONT: Performed by: NURSE ANESTHETIST, CERTIFIED REGISTERED

## 2024-06-03 PROCEDURE — 71045 X-RAY EXAM CHEST 1 VIEW: CPT

## 2024-06-03 PROCEDURE — 86706 HEP B SURFACE ANTIBODY: CPT | Performed by: EMERGENCY MEDICINE

## 2024-06-03 PROCEDURE — 87340 HEPATITIS B SURFACE AG IA: CPT | Performed by: EMERGENCY MEDICINE

## 2024-06-03 PROCEDURE — 25810000003 SODIUM CHLORIDE 0.9 % SOLUTION: Performed by: ANESTHESIOLOGIST ASSISTANT

## 2024-06-03 PROCEDURE — 5A1D70Z PERFORMANCE OF URINARY FILTRATION, INTERMITTENT, LESS THAN 6 HOURS PER DAY: ICD-10-PCS | Performed by: INTERNAL MEDICINE

## 2024-06-03 PROCEDURE — 33207 INSERT HEART PM VENTRICULAR: CPT | Performed by: INTERNAL MEDICINE

## 2024-06-03 PROCEDURE — 25010000002 CEFAZOLIN PER 500 MG: Performed by: INTERNAL MEDICINE

## 2024-06-03 PROCEDURE — 25010000002 PHENYLEPHRINE 10 MG/ML SOLUTION 5 ML VIAL: Performed by: NURSE ANESTHETIST, CERTIFIED REGISTERED

## 2024-06-03 PROCEDURE — 25510000001 IOPAMIDOL PER 1 ML: Performed by: INTERNAL MEDICINE

## 2024-06-03 PROCEDURE — C1769 GUIDE WIRE: HCPCS | Performed by: INTERNAL MEDICINE

## 2024-06-03 PROCEDURE — C1898 LEAD, PMKR, OTHER THAN TRANS: HCPCS | Performed by: INTERNAL MEDICINE

## 2024-06-03 PROCEDURE — 25010000002 CEFAZOLIN PER 500 MG: Performed by: ANESTHESIOLOGIST ASSISTANT

## 2024-06-03 PROCEDURE — C1786 PMKR, SINGLE, RATE-RESP: HCPCS | Performed by: INTERNAL MEDICINE

## 2024-06-03 PROCEDURE — 85025 COMPLETE CBC W/AUTO DIFF WBC: CPT | Performed by: PHYSICIAN ASSISTANT

## 2024-06-03 PROCEDURE — C1887 CATHETER, GUIDING: HCPCS | Performed by: INTERNAL MEDICINE

## 2024-06-03 PROCEDURE — 99232 SBSQ HOSP IP/OBS MODERATE 35: CPT | Performed by: INTERNAL MEDICINE

## 2024-06-03 PROCEDURE — 80048 BASIC METABOLIC PNL TOTAL CA: CPT | Performed by: PHYSICIAN ASSISTANT

## 2024-06-03 PROCEDURE — 25010000002 FENTANYL CITRATE (PF) 50 MCG/ML SOLUTION: Performed by: ANESTHESIOLOGIST ASSISTANT

## 2024-06-03 DEVICE — GEN PM AZURE XT SURESCAN SR MRI: Type: IMPLANTABLE DEVICE | Site: CHEST WALL | Status: FUNCTIONAL

## 2024-06-03 DEVICE — IMPLANTABLE DEVICE: Type: IMPLANTABLE DEVICE | Site: HEART | Status: FUNCTIONAL

## 2024-06-03 RX ORDER — NITROGLYCERIN 0.4 MG/1
0.4 TABLET SUBLINGUAL
Status: DISCONTINUED | OUTPATIENT
Start: 2024-06-03 | End: 2024-06-04 | Stop reason: HOSPADM

## 2024-06-03 RX ORDER — SODIUM CHLORIDE 9 MG/ML
INJECTION, SOLUTION INTRAVENOUS CONTINUOUS PRN
Status: DISCONTINUED | OUTPATIENT
Start: 2024-06-03 | End: 2024-06-03 | Stop reason: SURG

## 2024-06-03 RX ORDER — FENTANYL CITRATE 50 UG/ML
INJECTION, SOLUTION INTRAMUSCULAR; INTRAVENOUS AS NEEDED
Status: DISCONTINUED | OUTPATIENT
Start: 2024-06-03 | End: 2024-06-03 | Stop reason: SURG

## 2024-06-03 RX ORDER — HYDROCODONE BITARTRATE AND ACETAMINOPHEN 5; 325 MG/1; MG/1
1 TABLET ORAL EVERY 4 HOURS PRN
Status: DISCONTINUED | OUTPATIENT
Start: 2024-06-03 | End: 2024-06-04 | Stop reason: HOSPADM

## 2024-06-03 RX ORDER — LIDOCAINE HYDROCHLORIDE AND EPINEPHRINE BITARTRATE 20; .01 MG/ML; MG/ML
INJECTION, SOLUTION SUBCUTANEOUS
Status: DISCONTINUED | OUTPATIENT
Start: 2024-06-03 | End: 2024-06-03 | Stop reason: HOSPADM

## 2024-06-03 RX ORDER — CEFAZOLIN SODIUM 1 G/3ML
INJECTION, POWDER, FOR SOLUTION INTRAMUSCULAR; INTRAVENOUS AS NEEDED
Status: DISCONTINUED | OUTPATIENT
Start: 2024-06-03 | End: 2024-06-03 | Stop reason: SURG

## 2024-06-03 RX ADMIN — PROPOFOL INJECTABLE EMULSION 100 MCG/KG/MIN: 10 INJECTION, EMULSION INTRAVENOUS at 15:24

## 2024-06-03 RX ADMIN — Medication 10 ML: at 21:06

## 2024-06-03 RX ADMIN — FOLIC ACID 1 MG: 1 TABLET ORAL at 12:15

## 2024-06-03 RX ADMIN — ASPIRIN 81 MG: 81 TABLET, COATED ORAL at 12:15

## 2024-06-03 RX ADMIN — FENTANYL CITRATE 100 MCG: 50 INJECTION, SOLUTION INTRAMUSCULAR; INTRAVENOUS at 15:24

## 2024-06-03 RX ADMIN — SODIUM CHLORIDE 2000 MG: 900 INJECTION INTRAVENOUS at 23:22

## 2024-06-03 RX ADMIN — PHENYLEPHRINE HYDROCHLORIDE 0.3 MCG/KG/MIN: 10 INJECTION INTRAVENOUS at 16:30

## 2024-06-03 RX ADMIN — LIDOCAINE 1 PATCH: 4 PATCH TOPICAL at 12:15

## 2024-06-03 RX ADMIN — ATORVASTATIN CALCIUM 40 MG: 40 TABLET, FILM COATED ORAL at 21:06

## 2024-06-03 RX ADMIN — SODIUM CHLORIDE: 9 INJECTION, SOLUTION INTRAVENOUS at 15:23

## 2024-06-03 RX ADMIN — Medication 10 ML: at 12:25

## 2024-06-03 RX ADMIN — CLOPIDOGREL BISULFATE 75 MG: 75 TABLET ORAL at 12:15

## 2024-06-03 RX ADMIN — CEFAZOLIN 2 G: 1 INJECTION, POWDER, FOR SOLUTION INTRAMUSCULAR; INTRAVENOUS at 15:23

## 2024-06-03 NOTE — CONSULTS
Nephrology Consult Note                                                [facility]      Patient Identification:  Name: Deann Warren  Age: 83 y.o.  Sex: female  :  1940  MRN: [id number]               Requesting Physician: Americo Mcrae MD  Reason for Consultation: management recommendations      History of Present Illness:    Patient is an 83-year-old white female patient with a history of end-stage renal disease on hemodialysis twice a week Monday and Friday she is well-established in my practice  She was admitted to the hospital because of syncopal episode  And I been consulted to manage dialysis  Problem List:    * No active hospital problems. *     Past Medical History:  Past Medical History:   Diagnosis Date    Allergic conjunctivitis and rhinitis 2014    Anemia due to chronic kidney disease, on chronic dialysis 2020    Anxiety 2012    Chronic gouty arthritis 2014    Dependence on renal dialysis 2022    ESRD (end stage renal disease) 2020    Essential hypertension 2015    History of shingles 2022    Hyperlipidemia 2024    Paroxysmal atrial fibrillation 2022    Type 2 diabetes mellitus with diabetic chronic kidney disease 2022    Vitamin D deficiency 2021     Past Surgical History:  Past Surgical History:   Procedure Laterality Date    ARTERIOVENOUS FISTULA Left     CARDIAC CATHETERIZATION N/A 2024    Procedure: Right and Left Heart Cath;  Surgeon: Dilcia Lui MD;  Location: [facility] CATH INVASIVE LOCATION;  Service: Cardiology;  Laterality: N/A;    CARDIAC CATHETERIZATION N/A 2024    Procedure: Percutaneous Coronary Intervention;  Surgeon: Jadiel Blake MD;  Location: [facility] CATH INVASIVE LOCATION;  Service: Cardiology;  Laterality: N/A;      Home Meds:  Medications Prior to Admission   Medication Sig Dispense Refill Last Dose    aspirin 81 MG EC tablet Take 1 tablet by mouth Daily. 90 tablet 3  6/1/2024    atorvastatin (LIPITOR) 40 MG tablet Take 1 tablet by mouth Daily. 90 tablet 3 5/31/2024    clopidogrel (PLAVIX) 75 MG tablet Take 1 tablet by mouth Daily. 90 tablet 3 6/1/2024    febuxostat (ULORIC) 40 MG tablet Take 1 tablet by mouth Daily.   6/1/2024    folic acid (FOLVITE) 1 MG tablet Take 1 tablet by mouth Daily. 30 tablet 0 6/1/2024    metoprolol succinate XL (TOPROL-XL) 25 MG 24 hr tablet Take 0.5 tablets by mouth Daily. 30 tablet 0 6/1/2024    acetaminophen (TYLENOL) 500 MG tablet Take 1 tablet by mouth Every 6 (Six) Hours As Needed for Mild Pain.       Cholecalciferol (Vitamin D3) 50 MCG (2000 UT) tablet Take  by mouth Daily.       NON FORMULARY Apply 1 dose topically to the appropriate area as directed 3 (Three) Times a Week. Lidocaine 2.5% ointment -  Apply 1 application Monday, Wednesday, Friday before dialysis        Current Meds:     Current Facility-Administered Medications:     acetaminophen (TYLENOL) tablet 650 mg, 650 mg, Oral, Q4H PRN, Marsha Radford PA-C, 650 mg at 06/02/24 2316    aluminum-magnesium hydroxide-simethicone (MAALOX MAX) 400-400-40 MG/5ML suspension 15 mL, 15 mL, Oral, Q6H PRN, Marsha Radford PA-C    aspirin EC tablet 81 mg, 81 mg, Oral, Daily, Marsha Radford PA-C, 81 mg at 06/02/24 0920    atorvastatin (LIPITOR) tablet 40 mg, 40 mg, Oral, Nightly, Americo Mcrae MD, 40 mg at 06/02/24 2130    sennosides-docusate (PERICOLACE) 8.6-50 MG per tablet 2 tablet, 2 tablet, Oral, BID PRN **AND** polyethylene glycol (MIRALAX) packet 17 g, 17 g, Oral, Daily PRN **AND** bisacodyl (DULCOLAX) EC tablet 5 mg, 5 mg, Oral, Daily PRN **AND** bisacodyl (DULCOLAX) suppository 10 mg, 10 mg, Rectal, Daily PRN, Marsha Radford PA-C    clopidogrel (PLAVIX) tablet 75 mg, 75 mg, Oral, Daily, Marsha Radford PA-C, 75 mg at 06/02/24 0920    folic acid (FOLVITE) tablet 1 mg, 1 mg, Oral, Daily, Marsha Radford PA-C, 1 mg at 06/02/24 0921    Lidocaine 4 % 1 patch, 1 patch, Transdermal, Q24H,  "Prabhakar Rico MD, 1 patch at 06/02/24 0027    ondansetron ODT (ZOFRAN-ODT) disintegrating tablet 4 mg, 4 mg, Oral, Q6H PRN **OR** ondansetron (ZOFRAN) injection 4 mg, 4 mg, Intravenous, Q6H PRN, Marsha Radford PA-KENNETH    [COMPLETED] Insert Peripheral IV, , , Once **AND** sodium chloride 0.9 % flush 10 mL, 10 mL, Intravenous, PRN, Americo Mcrae MD    sodium chloride 0.9 % flush 10 mL, 10 mL, Intravenous, Q12H, Marsha Radford, PA-C, 10 mL at 06/02/24 2132    sodium chloride 0.9 % flush 10 mL, 10 mL, Intravenous, PRN, Marsha Radford PA-C    sodium chloride 0.9 % flush 10 mL, 10 mL, Intravenous, Q12H, Marsha Radford, PA-C, 10 mL at 06/02/24 2131    sodium chloride 0.9 % infusion 40 mL, 40 mL, Intravenous, PRN, Marsha Radford, PA-C    Allergies:  Allergies   Allergen Reactions    Heparin Hives     Social History:   Social History     Tobacco Use    Smoking status: Never    Smokeless tobacco: Never   Substance Use Topics    Alcohol use: Never      Family History:  Family History   Family history unknown: Yes        Review of Systems  Reviewed 12 systems were reviewed, all negative except for those mentioned in HPI    Objective:  Vitals:   /45 (BP Location: Right arm, Patient Position: Lying)   Pulse 62   Temp 97.6 °F (36.4 °C) (Oral)   Resp 16   Ht 162.6 cm (64\")   Wt 56.7 kg (125 lb)   SpO2 97%   BMI 21.46 kg/m²   I/O:     Intake/Output Summary (Last 24 hours) at 6/3/2024 0741  Last data filed at 6/2/2024 1800  Gross per 24 hour   Intake 876 ml   Output --   Net 876 ml       Exam:  General Appearance:  Alert  Head:  Normocephalic, without obvious abnormality, atraumatic  Eyes:  PERRL, conjunctiva/corneas clear     Neck:  Supple,  no adenopathy;      Lungs:  Decreased BS occasion ronchi  Heart:  Regular rate and rhythm, S1 and S2 normal  Abdomen:  Soft, non-tender, bowel sounds active   Extremities: trace edema  Pulses: 2+ and symmetric all extremities  Skin:  No rashes or lesions  Data Review:  All " labs (24hrs):   Recent Results (from the past 24 hour(s))   Basic Metabolic Panel    Collection Time: 06/03/24  6:06 AM    Specimen: Arm, Right; Blood   Result Value Ref Range    Glucose 78 65 - 99 mg/dL    BUN 40 (H) 8 - 23 mg/dL    Creatinine 3.64 (H) 0.57 - 1.00 mg/dL    Sodium 135 (L) 136 - 145 mmol/L    Potassium 4.1 3.5 - 5.2 mmol/L    Chloride 101 98 - 107 mmol/L    CO2 24.0 22.0 - 29.0 mmol/L    Calcium 8.2 (L) 8.6 - 10.5 mg/dL    BUN/Creatinine Ratio 11.0 7.0 - 25.0    Anion Gap 10.0 5.0 - 15.0 mmol/L    eGFR 11.9 (L) >60.0 mL/min/1.73   CBC Auto Differential    Collection Time: 06/03/24  6:06 AM    Specimen: Arm, Right; Blood   Result Value Ref Range    WBC 2.26 (L) 3.40 - 10.80 10*3/mm3    RBC 3.32 (L) 3.77 - 5.28 10*6/mm3    Hemoglobin 9.5 (L) 12.0 - 15.9 g/dL    Hematocrit 32.2 (L) 34.0 - 46.6 %    MCV 97.0 79.0 - 97.0 fL    MCH 28.6 26.6 - 33.0 pg    MCHC 29.5 (L) 31.5 - 35.7 g/dL    RDW 15.4 12.3 - 15.4 %    RDW-SD 54.3 (H) 37.0 - 54.0 fl    MPV 10.5 6.0 - 12.0 fL    Platelets 60 (L) 140 - 450 10*3/mm3    Neutrophil % 52.7 42.7 - 76.0 %    Lymphocyte % 32.7 19.6 - 45.3 %    Monocyte % 11.5 5.0 - 12.0 %    Eosinophil % 2.7 0.3 - 6.2 %    Basophil % 0.4 0.0 - 1.5 %    Immature Grans % 0.0 0.0 - 0.5 %    Neutrophils, Absolute 1.19 (L) 1.70 - 7.00 10*3/mm3    Lymphocytes, Absolute 0.74 0.70 - 3.10 10*3/mm3    Monocytes, Absolute 0.26 0.10 - 0.90 10*3/mm3    Eosinophils, Absolute 0.06 0.00 - 0.40 10*3/mm3    Basophils, Absolute 0.01 0.00 - 0.20 10*3/mm3    Immature Grans, Absolute 0.00 0.00 - 0.05 10*3/mm3    nRBC 0.0 0.0 - 0.2 /100 WBC   Hepatitis B Surface Antigen    Collection Time: 06/03/24  6:06 AM    Specimen: Blood   Result Value Ref Range    Hepatitis B Surface Ag Non-Reactive Non-Reactive       Current Facility-Administered Medications:     acetaminophen (TYLENOL) tablet 650 mg, 650 mg, Oral, Q4H PRN, Marsha Radford PA-C, 650 mg at 06/02/24 7149    aluminum-magnesium hydroxide-simethicone  (MAALOX MAX) 400-400-40 MG/5ML suspension 15 mL, 15 mL, Oral, Q6H PRN, Marsha Radford PA-C    aspirin EC tablet 81 mg, 81 mg, Oral, Daily, Marsha Radford PA-C, 81 mg at 06/02/24 0920    atorvastatin (LIPITOR) tablet 40 mg, 40 mg, Oral, Nightly, Americo Mcrae MD, 40 mg at 06/02/24 2130    sennosides-docusate (PERICOLACE) 8.6-50 MG per tablet 2 tablet, 2 tablet, Oral, BID PRN **AND** polyethylene glycol (MIRALAX) packet 17 g, 17 g, Oral, Daily PRN **AND** bisacodyl (DULCOLAX) EC tablet 5 mg, 5 mg, Oral, Daily PRN **AND** bisacodyl (DULCOLAX) suppository 10 mg, 10 mg, Rectal, Daily PRN, Marsha Radford PA-C    clopidogrel (PLAVIX) tablet 75 mg, 75 mg, Oral, Daily, Marsha Radford PA-C, 75 mg at 06/02/24 0920    folic acid (FOLVITE) tablet 1 mg, 1 mg, Oral, Daily, Marsha Radford PA-C, 1 mg at 06/02/24 0921    Lidocaine 4 % 1 patch, 1 patch, Transdermal, Q24H, Prabhakar Rico MD, 1 patch at 06/02/24 0027    ondansetron ODT (ZOFRAN-ODT) disintegrating tablet 4 mg, 4 mg, Oral, Q6H PRN **OR** ondansetron (ZOFRAN) injection 4 mg, 4 mg, Intravenous, Q6H PRN, Marsha Radford PA-C    [COMPLETED] Insert Peripheral IV, , , Once **AND** sodium chloride 0.9 % flush 10 mL, 10 mL, Intravenous, PRN, Americo Mcrae MD    sodium chloride 0.9 % flush 10 mL, 10 mL, Intravenous, Q12H, Marsha Radford PA-C, 10 mL at 06/02/24 2132    sodium chloride 0.9 % flush 10 mL, 10 mL, Intravenous, PRN, Marsha Radford PA-C    sodium chloride 0.9 % flush 10 mL, 10 mL, Intravenous, Q12H, Marsha Radford PA-C, 10 mL at 06/02/24 2131    sodium chloride 0.9 % infusion 40 mL, 40 mL, Intravenous, PRN, Marsha Radford PA-C    Assessment:  End-stage renal disease hemodialysis dependent  Syncope  Thrombocytopenia      Recommendations:  Hemodialysis today  Noted plans for pacemaker  Watch volume and electrolytes      Cass Garcia MD  6/3/2024  07:41 EDT

## 2024-06-03 NOTE — CONSULTS
[facility] Medicine Services  Consult Note    Patient Name: Deann Warren  : 1940  MRN: [id number]  Primary Care Physician:  Antonino Shen MD  Referring Physician: No Known Provider  Date of admission: 2024  Date and Time of Care: 6/3/2024 at 1130    Inpatient Hospitalist Consult  Consult performed by: Laura Garcia APRN  Consult ordered by: Miriam Langston APRN            Reason for Consult/ Chief Complaint: OBS to IP medical management/syncope    Consult Requested By: Miriam Langston NP    Subjective:     History of Present Illness:   Patient is 82 yo with PMH of ESRD on HD,  DM type 2, HTN, HLD, chronic thrombocytopenia paroxysmal Afib not on anticoagulation due to bleeding at HD access and recurrent syncope who presented to ED on  for syncopal episode. Lives at home with grandson, uses walker for ambulation. Reports last hemodialysis on . While in ED Found to have HR 40's, patient endorses normal rate and was admitted to OBS unit with cardiology consult. Recent halter monitoring revealed two 4-second pauses with minimum HR 36 and max 109 predominant Afib. Plans for EP to place pacemaker on . Hospitalist service consulted for IP medical management.     Upon evaluation, awake,alert, resting in bed, daughter at bedside. Current VS: 97.4-49-/40-98% room air. Dialysis completed with removal of 1L. Denies any lightheadedness, dizziness, N/V at this time.       Review of Systems:   Review of Systems   Constitutional:  Positive for fatigue.   Neurological:  Positive for weakness.       Personal History:     Past Medical History:   Diagnosis Date    Allergic conjunctivitis and rhinitis 2014    Anemia due to chronic kidney disease, on chronic dialysis 2020    Anxiety 2012    Chronic gouty arthritis 2014    Dependence on renal dialysis 2022    ESRD (end stage renal disease) 2020    Essential hypertension 2015    History of shingles  01/18/2022    Hyperlipidemia 04/08/2024    Paroxysmal atrial fibrillation 07/01/2022    Type 2 diabetes mellitus with diabetic chronic kidney disease 07/01/2022    Vitamin D deficiency 07/22/2021       Past Surgical History:   Procedure Laterality Date    ARTERIOVENOUS FISTULA Left     CARDIAC CATHETERIZATION N/A 4/25/2024    Procedure: Right and Left Heart Cath;  Surgeon: Dilcia Lui MD;  Location: [facility] CATH INVASIVE LOCATION;  Service: Cardiology;  Laterality: N/A;    CARDIAC CATHETERIZATION N/A 4/25/2024    Procedure: Percutaneous Coronary Intervention;  Surgeon: Jadiel Blake MD;  Location: [facility] CATH INVASIVE LOCATION;  Service: Cardiology;  Laterality: N/A;       Family History: Family history is unknown by patient. Otherwise pertinent FHx was reviewed and not pertinent to current issue.    Social History:  reports that she has never smoked. She has never used smokeless tobacco. She reports that she does not drink alcohol and does not use drugs.    Home Medications:   NON FORMULARY, Vitamin D3, acetaminophen, aspirin, atorvastatin, clopidogrel, febuxostat, folic acid, and metoprolol succinate XL    Allergies:  Allergies   Allergen Reactions    Heparin Hives         Objective:     Vital Signs  Temp:  [97.2 °F (36.2 °C)-97.6 °F (36.4 °C)] 97.4 °F (36.3 °C)  Heart Rate:  [50-63] 53  Resp:  [14-20] 16  BP: (111-118)/(40-58) 116/40   Body mass index is 21.46 kg/m².    Physical Exam  Physical Exam  Constitutional:       Appearance: Normal appearance.   HENT:      Head: Normocephalic and atraumatic.      Mouth/Throat:      Mouth: Mucous membranes are moist.   Eyes:      Extraocular Movements: Extraocular movements intact.      Pupils: Pupils are equal, round, and reactive to light.   Cardiovascular:      Rate and Rhythm: Bradycardia present. Rhythm irregular.      Pulses: Normal pulses.      Heart sounds: Normal heart sounds. Murmur heard.   Pulmonary:      Effort: Pulmonary effort is normal.      Breath  sounds: Normal breath sounds.   Abdominal:      General: Bowel sounds are normal. There is no distension.      Palpations: Abdomen is soft.      Tenderness: There is no abdominal tenderness.   Musculoskeletal:         General: Normal range of motion.   Skin:     General: Skin is warm and dry.      Coloration: Skin is pale.   Neurological:      Mental Status: She is alert and oriented to person, place, and time. Mental status is at baseline.      Motor: Weakness present.         Scheduled Meds   aspirin, 81 mg, Oral, Daily  atorvastatin, 40 mg, Oral, Nightly  clopidogrel, 75 mg, Oral, Daily  folic acid, 1 mg, Oral, Daily  Lidocaine, 1 patch, Transdermal, Q24H  sodium chloride, 10 mL, Intravenous, Q12H  sodium chloride, 10 mL, Intravenous, Q12H       PRN Meds     acetaminophen    aluminum-magnesium hydroxide-simethicone    senna-docusate sodium **AND** polyethylene glycol **AND** bisacodyl **AND** bisacodyl    ondansetron ODT **OR** ondansetron    [COMPLETED] Insert Peripheral IV **AND** sodium chloride    sodium chloride    sodium chloride   Infusions         Diagnostic Data    Results from last 7 days   Lab Units 06/03/24  0606 06/02/24  0405 06/01/24  1355   WBC 10*3/mm3 2.26*   < > 2.45*   HEMOGLOBIN g/dL 9.5*   < > 10.5*   HEMATOCRIT % 32.2*   < > 34.3   PLATELETS 10*3/mm3 60*   < > 60*   GLUCOSE mg/dL 78   < > 121*   CREATININE mg/dL 3.64*   < > 3.30*   BUN mg/dL 40*   < > 29*   SODIUM mmol/L 135*   < > 136   POTASSIUM mmol/L 4.1   < > 3.9   AST (SGOT) U/L  --   --  155*   ALT (SGPT) U/L  --   --  108*   ALK PHOS U/L  --   --  81   BILIRUBIN mg/dL  --   --  0.7   ANION GAP mmol/L 10.0   < > 13.1    < > = values in this interval not displayed.       XR Chest 1 View    Result Date: 6/1/2024  Impression: Chronic changes are noted which are stable and suggest chronic changes of CHF and pulmonary edema. Electronically Signed: Nathaniel Harman MD  6/1/2024 2:05 PM EDT  Workstation ID: [id number]       I reviewed the  patient's new clinical results.    Assessment/Plan:     Active and Resolved Problems  There are no hospital problems to display for this patient.    Syncope  Afib with slow ventricular response:  -cardiology on board, appreciate recommendations  -EP plans with pacemaker placement on 6/5  -telemetry  -avoid AV mode blocking medications.   -EKG: Afib with Left BBB, rate 47. , Qtc 446  -holding metoprolol.       Multivessel CAD  HLD  -cardiac cath April 2024 revealed multivessel heavily calcified CAD, no evidence pulm HTN.   -not a surgical candidate  -continue ASA, Plavix.   -continue statin.     HFrEF  Ischemic cardiomyopathy:  -echo 9/24/2023 revealed EF 35%, LV hypokinesis, LA moderately dilated   -strict I/Os, daily weight.     ESRD on HD:  -HD on M-W-F  -strict I/Os, daily weight, avoid nephrotoxins.   -daily BMP to monitor renal function  -creatinine at baseline      Thrombocytopenia:  -chronic  -admission platelets 60. Today 60.   -daily CBC.         DVT prophylaxis:  Mechanical DVT prophylaxis orders are present.         Code status is   Code Status and Medical Interventions:   Ordered at: 06/02/24 0754     Code Status (Patient has no pulse and is not breathing):    CPR (Attempt to Resuscitate)     Medical Interventions (Patient has pulse or is breathing):    Full Support       Plan for disposition:return home in 3 days    Time: 30 minutes        Signature: Electronically signed by LUCA Wallis, 06/03/24, 11:52 EDT.  [facility] Hospitalist Team

## 2024-06-03 NOTE — PLAN OF CARE
Problem: Adult Inpatient Plan of Care  Goal: Plan of Care Review  Outcome: Ongoing, Progressing  Flowsheets (Taken 6/3/2024 0620)  Progress: improving  Plan of Care Reviewed With: patient  Goal: Patient-Specific Goal (Individualized)  Outcome: Ongoing, Progressing  Goal: Absence of Hospital-Acquired Illness or Injury  Outcome: Ongoing, Progressing  Intervention: Identify and Manage Fall Risk  Recent Flowsheet Documentation  Taken 6/3/2024 0415 by Stephanie Meyers RN  Safety Promotion/Fall Prevention: safety round/check completed  Taken 6/3/2024 0226 by Stephanie Meyers RN  Safety Promotion/Fall Prevention: safety round/check completed  Taken 6/3/2024 0005 by Stephanie Meyers RN  Safety Promotion/Fall Prevention: safety round/check completed  Taken 6/2/2024 2234 by Stephanie Meyers RN  Safety Promotion/Fall Prevention: safety round/check completed  Taken 6/2/2024 2030 by Stephanie Meyers RN  Safety Promotion/Fall Prevention: safety round/check completed  Goal: Optimal Comfort and Wellbeing  Outcome: Ongoing, Progressing  Intervention: Provide Person-Centered Care  Recent Flowsheet Documentation  Taken 6/2/2024 2030 by Stephanie Meyers RN  Trust Relationship/Rapport:   care explained   questions answered  Goal: Readiness for Transition of Care  Outcome: Ongoing, Progressing     Problem: Skin Injury Risk Increased  Goal: Skin Health and Integrity  Outcome: Ongoing, Progressing     Problem: Fall Injury Risk  Goal: Absence of Fall and Fall-Related Injury  Outcome: Ongoing, Progressing  Intervention: Promote Injury-Free Environment  Recent Flowsheet Documentation  Taken 6/3/2024 0415 by Stephanie Meyers RN  Safety Promotion/Fall Prevention: safety round/check completed  Taken 6/3/2024 0226 by Stephanie Meyers RN  Safety Promotion/Fall Prevention: safety round/check completed  Taken 6/3/2024 0005 by Stephanie Meyers RN  Safety Promotion/Fall Prevention: safety round/check completed  Taken 6/2/2024 2234  by Stephanie Meyers, RN  Safety Promotion/Fall Prevention: safety round/check completed  Taken 6/2/2024 2030 by Stephanie Meyers, RN  Safety Promotion/Fall Prevention: safety round/check completed   Goal Outcome Evaluation:  Plan of Care Reviewed With: patient        Progress: improving

## 2024-06-03 NOTE — CASE MANAGEMENT/SOCIAL WORK
Discharge Planning Assessment   [facility]     Patient Name: Deann Warren  MRN: [id number]  Today's Date: 6/3/2024    Admit Date: 6/1/2024    Plan: Return home with adult grandson. Friend to transport   Discharge Needs Assessment       Row Name 06/03/24 1406       Living Environment    People in Home grandchild(vamsi)    Name(s) of People in Home Donaldo, adult grandson provides 24 hr assist    Current Living Arrangements home    Potentially Unsafe Housing Conditions none    In the past 12 months has the electric, gas, oil, or water company threatened to shut off services in your home? No    Primary Care Provided by self    Provides Primary Care For no one, unable/limited ability to care for self    Family Caregiver if Needed grandchild(vamsi), adult    Family Caregiver Names Donaldo, grandson    Quality of Family Relationships involved;helpful;supportive    Able to Return to Prior Arrangements yes       Resource/Environmental Concerns    Resource/Environmental Concerns none    Transportation Concerns none       Transportation Needs    In the past 12 months, has lack of transportation kept you from medical appointments or from getting medications? no    In the past 12 months, has lack of transportation kept you from meetings, work, or from getting things needed for daily living? No       Food Insecurity    Within the past 12 months, you worried that your food would run out before you got the money to buy more. Never true    Within the past 12 months, the food you bought just didn't last and you didn't have money to get more. Never true       Transition Planning    Patient/Family Anticipates Transition to home with family    Patient/Family Anticipated Services at Transition none    Transportation Anticipated family or friend will provide       Discharge Needs Assessment    Readmission Within the Last 30 Days no previous admission in last 30 days    Equipment Currently Used at Home walker, rolling;rollator;shower  chair;glucometer    Concerns to be Addressed discharge planning    Anticipated Changes Related to Illness none    Equipment Needed After Discharge none                   Discharge Plan       Row Name 06/03/24 1411       Plan    Plan Return home with adult grandson. Friend to transport    Patient/Family in Agreement with Plan yes    Plan Comments Barriers: Pacemaker placement for 6/05. CM met with Mrs. Warren who resides with her adult grandson. She drives occasionally but  plans to quit. She has just gotten [employer] caregiver for transport to her HD on Monday and Friday [facility]. She has a friend/ that does her shopping and takes her to appointments as needed and will pick her up from the  hospital.  She uses a rollator walker, shower chair and glucometer. She doesn't think she will need continued therapy.                  Continued Care and Services - Admitted Since 6/1/2024    No active coordination exists for this encounter.       Expected Discharge Date and Time       Expected Discharge Date Expected Discharge Time    Jun 6, 2024            Demographic Summary       Row Name 06/03/24 1405       General Information    Admission Type inpatient    Arrived From emergency department    Required Notices Provided Important Message from Medicare    Referral Source admission list    Reason for Consult discharge planning    Preferred Language English       Contact Information    Permission Granted to Share Info With                    Functional Status       Row Name 06/03/24 1400       Functional Status    Usual Activity Tolerance moderate    Current Activity Tolerance moderate       Functional Status, IADL    Medications independent    Meal Preparation assistive person    Housekeeping assistive person    Laundry assistive person    Shopping assistive person    IADL Comments grandson helps and friend helps       Mental Status    General Appearance WDL WDL       Mental Status Summary    Recent  Changes in Mental Status/Cognitive Functioning no changes       Employment/    Employment Status retired                            Patient Forms       Row Name 06/03/24 1250       Patient Forms    Important Message from Medicare (IMM) Delivered  IMM signed by patient with CM 6/03    Delivered to Patient    Method of delivery In person                      Ana GREENFIELD,RN Case Manager  [facility]  Phone: Desk- [phone]  Cell- [phone]

## 2024-06-03 NOTE — PROGRESS NOTES
Referring Provider: Americo Mcrae MD    Reason for follow-up: bradycardia, afib, syncope     Patient Care Team:  Antonino Shen MD as PCP - General (Internal Medicine)  Antonino Shen MD as PCP - Family Medicine  Kamran Figueroa MD as Consulting Physician (Cardiology)      SUBJECTIVE  No complaints. No further episodes of syncope noted     ROS  Review of all systems negative except as indicated.    Since I have last seen, the patient has been without any chest discomfort, shortness of breath, palpitations, dizziness or syncope.  Denies having any headache, abdominal pain, nausea, vomiting, diarrhea, constipation, loss of weight or loss of appetite.  Denies having any excessive bruising, hematuria or blood in the stool.        Personal History:    Past Medical History:   Diagnosis Date    Allergic conjunctivitis and rhinitis 11/06/2014    Anemia due to chronic kidney disease, on chronic dialysis 08/25/2020    Anxiety 07/30/2012    Chronic gouty arthritis 11/06/2014    Dependence on renal dialysis 07/01/2022    ESRD (end stage renal disease) 08/25/2020    Essential hypertension 09/16/2015    History of shingles 01/18/2022    Hyperlipidemia 04/08/2024    Paroxysmal atrial fibrillation 07/01/2022    Type 2 diabetes mellitus with diabetic chronic kidney disease 07/01/2022    Vitamin D deficiency 07/22/2021       Past Surgical History:   Procedure Laterality Date    ARTERIOVENOUS FISTULA Left     CARDIAC CATHETERIZATION N/A 4/25/2024    Procedure: Right and Left Heart Cath;  Surgeon: Dilcia Lui MD;  Location: [facility] CATH INVASIVE LOCATION;  Service: Cardiology;  Laterality: N/A;    CARDIAC CATHETERIZATION N/A 4/25/2024    Procedure: Percutaneous Coronary Intervention;  Surgeon: Jadiel Blake MD;  Location: [facility] CATH INVASIVE LOCATION;  Service: Cardiology;  Laterality: N/A;       Family History   Family history unknown: Yes       Social History     Tobacco Use    Smoking status: Never    Smokeless tobacco:  Never   Vaping Use    Vaping status: Never Used   Substance Use Topics    Alcohol use: Never    Drug use: Never        Home meds:  Prior to Admission medications    Medication Sig Start Date End Date Taking? Authorizing Provider   aspirin 81 MG EC tablet Take 1 tablet by mouth Daily. 4/25/24  Yes Dilcia Lui MD   atorvastatin (LIPITOR) 40 MG tablet Take 1 tablet by mouth Daily. 4/25/24  Yes Dilcia Lui MD   clopidogrel (PLAVIX) 75 MG tablet Take 1 tablet by mouth Daily. 4/25/24  Yes Dilcia Lui MD   febuxostat (ULORIC) 40 MG tablet Take 1 tablet by mouth Daily.   Yes Katerin Bradshaw MD   folic acid (FOLVITE) 1 MG tablet Take 1 tablet by mouth Daily. 4/13/24  Yes Hernesto Sanchez MD   metoprolol succinate XL (TOPROL-XL) 25 MG 24 hr tablet Take 0.5 tablets by mouth Daily. 4/13/24  Yes Hernesto Sanchez MD   acetaminophen (TYLENOL) 500 MG tablet Take 1 tablet by mouth Every 6 (Six) Hours As Needed for Mild Pain.    ProviderKaterin MD   Cholecalciferol (Vitamin D3) 50 MCG (2000 UT) tablet Take  by mouth Daily.    ProviderKaterin MD   NON FORMULARY Apply 1 dose topically to the appropriate area as directed 3 (Three) Times a Week. Lidocaine 2.5% ointment -  Apply 1 application Monday, Wednesday, Friday before dialysis    Katerin Bradshaw MD       Allergies:  Heparin    Scheduled Meds:aspirin, 81 mg, Oral, Daily  atorvastatin, 40 mg, Oral, Nightly  clopidogrel, 75 mg, Oral, Daily  folic acid, 1 mg, Oral, Daily  Lidocaine, 1 patch, Transdermal, Q24H  sodium chloride, 10 mL, Intravenous, Q12H  sodium chloride, 10 mL, Intravenous, Q12H      Continuous Infusions:   PRN Meds:.  acetaminophen    aluminum-magnesium hydroxide-simethicone    senna-docusate sodium **AND** polyethylene glycol **AND** bisacodyl **AND** bisacodyl    ondansetron ODT **OR** ondansetron    [COMPLETED] Insert Peripheral IV **AND** sodium chloride    sodium chloride    sodium chloride      OBJECTIVE    Vital  "Signs  Vitals:    06/03/24 0528 06/03/24 0811 06/03/24 0830 06/03/24 0900   BP: 112/45 117/45 114/45    BP Location: Right arm Right arm Right arm    Patient Position: Lying Lying Lying    Pulse:  50     Resp: 16 14 17 15   Temp: 97.6 °F (36.4 °C) 97.6 °F (36.4 °C)     TempSrc: Oral      SpO2:   (!) 89%    Weight:       Height:           Flowsheet Rows      Flowsheet Row First Filed Value   Admission Height 162.6 cm (64\") Documented at 06/01/2024 1313   Admission Weight 56.7 kg (125 lb) Documented at 06/01/2024 1313              Intake/Output Summary (Last 24 hours) at 6/3/2024 0943  Last data filed at 6/2/2024 1800  Gross per 24 hour   Intake 544 ml   Output --   Net 544 ml          Telemetry: A fib with slow ventricular response    Physical Exam:  The patient is alert, oriented and in no distress.  Vital signs as noted above.  Head and neck revealed no carotid bruits or jugular venous distention.  No thyromegaly or lymphadenopathy is present  Lungs clear.  No wheezing.  Breath sounds are normal bilaterally.  Heart normal first and second heart sounds.  No murmur. No precordial rub is present.  No gallop is present.  Abdomen soft and nontender.  No organomegaly is present.  Extremities with good peripheral pulses without any pedal edema.  Skin warm and dry.  Musculoskeletal system is grossly normal.  CNS grossly normal.       Results Review:  I have personally reviewed the results from the time of this admission to 6/3/2024 09:43 EDT and agree with these findings:  []  Laboratory  []  Microbiology  []  Radiology  []  EKG/Telemetry   []  Cardiology/Vascular   []  Pathology  []  Old records  []  Other:    Most notable findings include:    Lab Results (last 24 hours)       Procedure Component Value Units Date/Time    Hepatitis B Surface Antigen [[id number]]  (Normal) Collected: 06/03/24 0606    Specimen: Blood Updated: 06/03/24 0658     Hepatitis B Surface Ag Non-Reactive    Basic Metabolic Panel [[id number]]  " (Abnormal) Collected: 06/03/24 0606    Specimen: Blood from Arm, Right Updated: 06/03/24 0653     Glucose 78 mg/dL      BUN 40 mg/dL      Creatinine 3.64 mg/dL      Sodium 135 mmol/L      Potassium 4.1 mmol/L      Chloride 101 mmol/L      CO2 24.0 mmol/L      Calcium 8.2 mg/dL      BUN/Creatinine Ratio 11.0     Anion Gap 10.0 mmol/L      eGFR 11.9 mL/min/1.73      Comment: <15 Indicative of kidney failure       Narrative:      GFR Normal >60  Chronic Kidney Disease <60  Kidney Failure <15    The GFR formula is only valid for adults with stable renal function between ages 18 and 70.    CBC & Differential [[id number]]  (Abnormal) Collected: 06/03/24 0606    Specimen: Blood from Arm, Right Updated: 06/03/24 0626    Narrative:      The following orders were created for panel order CBC & Differential.  Procedure                               Abnormality         Status                     ---------                               -----------         ------                     CBC Auto Differential[[id number]]        Abnormal            Final result               Scan Slide[[id number]]                                                                    Please view results for these tests on the individual orders.    CBC Auto Differential [[id number]]  (Abnormal) Collected: 06/03/24 0606    Specimen: Blood from Arm, Right Updated: 06/03/24 0626     WBC 2.26 10*3/mm3      RBC 3.32 10*6/mm3      Hemoglobin 9.5 g/dL      Hematocrit 32.2 %      MCV 97.0 fL      MCH 28.6 pg      MCHC 29.5 g/dL      RDW 15.4 %      RDW-SD 54.3 fl      MPV 10.5 fL      Platelets 60 10*3/mm3      Neutrophil % 52.7 %      Lymphocyte % 32.7 %      Monocyte % 11.5 %      Eosinophil % 2.7 %      Basophil % 0.4 %      Immature Grans % 0.0 %      Neutrophils, Absolute 1.19 10*3/mm3      Lymphocytes, Absolute 0.74 10*3/mm3      Monocytes, Absolute 0.26 10*3/mm3      Eosinophils, Absolute 0.06 10*3/mm3      Basophils, Absolute 0.01 10*3/mm3      Immature Grans,  Absolute 0.00 10*3/mm3      nRBC 0.0 /100 WBC     Hepatitis B Surface Antibody [[id number]] Collected: 06/03/24 0606    Specimen: Blood Updated: 06/03/24 0616            Imaging Results (Last 24 Hours)       ** No results found for the last 24 hours. **            LAB RESULTS (LAST 7 DAYS)    CBC  Results from last 7 days   Lab Units 06/03/24  0606 06/02/24  0405 06/01/24  1355   WBC 10*3/mm3 2.26* 2.05* 2.45*   RBC 10*6/mm3 3.32* 3.30* 3.60*   HEMOGLOBIN g/dL 9.5* 9.6* 10.5*   HEMATOCRIT % 32.2* 32.1* 34.3   MCV fL 97.0 97.3* 95.3   PLATELETS 10*3/mm3 60* 54* 60*       BMP  Results from last 7 days   Lab Units 06/03/24  0606 06/02/24  0405 06/01/24  1355   SODIUM mmol/L 135* 138 136   POTASSIUM mmol/L 4.1 3.5 3.9   CHLORIDE mmol/L 101 100 100   CO2 mmol/L 24.0 26.6 22.9   BUN mg/dL 40* 31* 29*   CREATININE mg/dL 3.64* 3.47* 3.30*   GLUCOSE mg/dL 78 109* 121*       CMP   Results from last 7 days   Lab Units 06/03/24  0606 06/02/24 0405 06/01/24  1355   SODIUM mmol/L 135* 138 136   POTASSIUM mmol/L 4.1 3.5 3.9   CHLORIDE mmol/L 101 100 100   CO2 mmol/L 24.0 26.6 22.9   BUN mg/dL 40* 31* 29*   CREATININE mg/dL 3.64* 3.47* 3.30*   GLUCOSE mg/dL 78 109* 121*   ALBUMIN g/dL  --   --  3.5   BILIRUBIN mg/dL  --   --  0.7   ALK PHOS U/L  --   --  81   AST (SGOT) U/L  --   --  155*   ALT (SGPT) U/L  --   --  108*       BNP        TROPONIN        CoAg        Creatinine Clearance  Estimated Creatinine Clearance: 10.5 mL/min (A) (by C-G formula based on SCr of 3.64 mg/dL (H)).    ABG        Radiology  XR Chest 1 View    Result Date: 6/1/2024  Impression: Chronic changes are noted which are stable and suggest chronic changes of CHF and pulmonary edema. Electronically Signed: Nathaniel Harman MD  6/1/2024 2:05 PM EDT  Workstation ID: [id number]       EKG  I personally viewed and interpreted the patient's EKG/Telemetry data:  ECG 12 Lead Syncope   Final Result   HEART RATE= 47  bpm   RR Interval= 1286  ms   VT Interval=   ms   P  Horizontal Axis=   deg   P Front Axis=   deg   QRSD Interval= 146  ms   QT Interval= 506  ms   QTcB= 446  ms   QRS Axis= -63  deg   T Wave Axis= 127  deg   - ABNORMAL ECG -   Atrial fibrillation   Left bundle branch block   When compared with ECG of 07-Apr-2024 10:07:46,   Significant rate decrease   Electronically Signed By: Americo Mcrae ([email or web address]) 02-Jun-2024 05:56:19   Date and Time of Study: 2024-06-01 13:37:49      Telemetry Scan   Final Result      Telemetry Scan   Final Result      Telemetry Scan   Final Result      Telemetry Scan   Final Result      Telemetry Scan   Final Result      Telemetry Scan   Final Result      Telemetry Scan   Final Result      Telemetry Scan   Final Result      Telemetry Scan   Final Result            Echocardiogram:    Results for orders placed during the hospital encounter of 04/07/24    Adult Transthoracic Echo Complete W/ Cont if Necessary Per Protocol    Interpretation Summary    Left ventricular systolic function is moderately decreased. Calculated left ventricular EF = 35%    The left ventricular cavity is mildly dilated.    The following left ventricular wall segments are hypokinetic: apical anterior.    Left ventricular diastolic function was indeterminate.    The left atrial cavity is moderately dilated.    Saline test results are positive with valsalva manuever.    Estimated right ventricular systolic pressure from tricuspid regurgitation is mildly elevated (35-45 mmHg).    There is a large left pleural effusion.    There appears to be an ASD present with left to right shunting based on color dopple. Consider YINA to further evaluate        Stress Test:         Cardiac Catheterization:  Results for orders placed during the hospital encounter of 04/25/24    Cardiac Catheterization/Vascular Study    Conclusion  OPERATORS  Dilcia Lui M.D. (Attending Cardiologist)      PROCEDURES PERFORMED  Ultrasound guided Vascular access  Left Heart Catheterization  Coronary  Angiogram  Right heart catheterization  POBA the LAD  IVUS of the LAD and left main  Moderate sedation  Abdominal aorta runoff    INDICATIONS FOR PROCEDURE  Cardiomyopathy    PROCEDURE IN DETAIL  Informed consent was obtained from the patient after explaining the risks, benefits, and alternative options of the procedure. After obtaining informed consent, the patient was brought to the cath lab and was prepped in a sterile fashion. Lidocaine 2% was used for local anesthesia into the right femoral access site. The right femoral artery and vein were accessed with a micropuncture needle via modified Seldinger technique under ultrasound guidance. A 6F sheath was inserted successfully into the artery and a 7 Chinese sheath was inserted into the vein.  6 Chinese Chaumont-Dharmesh catheter was used to obtain RA, RV, PA, and pulmonary capillary wedge pressure.  PA sat was obtained.    Afterwards, 6F JR4 and JL4 diagnostic catheters were advanced over a wire into the ascending aorta and were used to engage the ostia of the left main and RCA respectively. JR4 used to cross the AV and obtain LV pressures and gradient across the AV measured via pullback technique. Images of the right and left coronary systems were obtained.    Interventional report  After giving bivalirudin due to heparin allergy and upsizing the arterial sheath to a 7 Chinese system, a 7 Chinese XB LAD 3.5 guide was used to engage the left main.  A run-through wire was advanced to the distal LAD.  I then attempted to predilate the distal LAD lesion with a NC 2.25 x 12 mm balloon.  However the balloon did not expand.  The patient became very hypotensive at this point so the balloon was removed and the guide was disengaged.  Next I performed IVUS of the left main and LAD.  IVUS would not go past the proximal LAD lesion.  At this point the LAD measures 2.4 x 3.5 mm.  There is heavy calcification with 360 degree involvement.  There is diffuse disease in the entire LAD up to  the ostium.  Also noted on pullback was that there was heavy calcification involving the ostium of the left circumflex.  There is also ostial left main disease with reference vessel diameter of 4.7 x 5.1 mm.  At this point the procedure was aborted.    A pigtail was advanced to the abdominal aorta and a runoff was performed.    All the catheters were exchanged over a wire and subsequently removed. Angiogram of the femoral access site was obtained and did not show complications. The patient tolerated the procedure well without any complications.  A Perclose suture was deployed at the arterial site.  The pictures were reviewed at the end of the procedure. A Mynx closure device was applied to the venous site.    HEMODYNAMICS    LV: 111/2/5  AO: 116/18/56  Gradient none    Right heart cath    RA 5  RV: 46/0/4  PA 45/12/26  PCW 9    Cardiac output 8.65  Cardiac index 5.72    FINDINGS  Coronary Angiogram    Right dominant circulation    Left main: Left main is a large caliber vessel which gives rise to the Left Anterior Descending and the Left circumflex.  There is a 60% ostial left main stenosis with about 40 to 50% distal left main stenosis.    Left Anterior Descending Artery: LAD is a large caliber vessel which gives rise to several septal perforators and several diagonal branches.  It is heavily calcified.  There is diffuse 60 to 70% stenosis in the proximal to mid LAD.  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  This is followed by another 80 to 90% lesion in the mid distal LAD.  There is also a 50 to 60% lesion in the distal LAD.    Left Circumflex: Lcx is a large caliber vessel which gives rise to several OM branches.  It is heavily calcified in the proximal to midportion with nodule noted in the proximal portion.  Diffuse 70% lesions in the proximal to mid left circumflex.    Right Coronary Artery: The RCA is a large caliber vessel gives rise to PDA and PLV.  There is moderate  calcification of the RCA with about 40 to 50% stenosis of the proximal segment followed by a 30% lesion in the mid RCA.    ESTIMATED BLOOD LOSS:  10 ml    COMPLICATIONS:  None    PROCEDURE DATA:  Sedation Time: 1 hour    IMPRESSIONS  Multivessel heavily calcified CAD,  low normal right and left heart filling pressures  No evidence of pulmonary hypertension    RECOMMENDATIONS  I recommend CV surgery evaluation for bypass surgery along with left atrial appendage ligation given the extent of her coronary artery disease which is very calcified and complex  If she is not a candidate for bypass surgery I will follow her clinically and consider high risk PCI with Impella support and atherectomy of the LAD left circumflex and left main.  This would obviously be a very high risk procedure given her comorbidities  We can also consider medical management if her symptoms remain stable  I have started her on aspirin and Plavix  Continue statin  GDMT for heart failure         Other:         ASSESSMENT & PLAN:    Active Problems:    * No active hospital problems. *    A-fib with slow ventricular response with syncope  Also with 4-second pauses on Holter monitor  I definitely think she has significant conduction system disease and this could be contributing to her syncope and overall fatigue  Avoid AV naheed blocking agents  These pauses occurred on her Holter monitor when she was not on a beta-blocker  I believe she would benefit from a CRT device given her bradycardia along with left bundle branch block in the setting of cardiomyopathy with prolonged QRS duration.  Discussed procedure with patient and she is agreeable to proceed  Discussed with the EP and plan for pacemaker placement on Wednesday tentatively  Monitor on telemetry  Not on anticoagulation due to bleeding  Has been worked up for Watchman        Multivessel coronary artery disease  Evaluated by surgery and deemed to not be a surgical candidate  Continue with aspirin  and Plavix for now but if she has further drop in her platelet count to less than 50 will stop her aspirin  Currently chest pain-free  At this time I recommend medical management of her CAD unless she has symptoms of angina and given her complex and heavy burden of calcified CAD and significant comorbidities which would make her a high risk candidate for high risk PCI.     Ischemic cardiomyopathy  EF of 35%  Hypotension limits initiation of GDMT  Not on SGLT2 inhibitor due to end-stage renal disease     Thrombocytopenia  This is chronic for her  She is followed by hematology  Currently on aspirin and Plavix  If platelet count drops below 50 stop aspirin     End-stage renal disease  On hemodialysis Monday Wednesday Friday  Last session was on Friday     Hyperlipidemia  Continue with atorvastatin     Labile hypertension  Currently not on any medications         Dilcia Lui MD  06/03/24  09:43 EDT

## 2024-06-03 NOTE — CONSULTS
HP      Name: Deann Warren ADMIT: 2024   : 1940  PCP: Antonino Shen MD    MRN: [id number] LOS: 0 days   AGE/SEX: 83 y.o. female  ROOM: [facility]/1     Chief Complaint   Patient presents with    Loss of Consciousness       Subjective        History of present illness  Deann Warren is an 83-year-old female patient who has end-stage renal failure on hemodialysis through a left fistula, chronic A-fib, hypertension, left bundle branch block, cardiomyopathy ejection fraction of around 35%, multivessel CAD involving the left main, LAD and circumflex, not amenable for bypass, is admitted to the hospital due to syncope.  Patient was noted to have low heart rate in the 40s.  She also complains of constant dizziness.    Past Medical History:   Diagnosis Date    Allergic conjunctivitis and rhinitis 2014    Anemia due to chronic kidney disease, on chronic dialysis 2020    Anxiety 2012    Chronic gouty arthritis 2014    Dependence on renal dialysis 2022    ESRD (end stage renal disease) 2020    Essential hypertension 2015    History of shingles 2022    Hyperlipidemia 2024    Paroxysmal atrial fibrillation 2022    Type 2 diabetes mellitus with diabetic chronic kidney disease 2022    Vitamin D deficiency 2021     Past Surgical History:   Procedure Laterality Date    ARTERIOVENOUS FISTULA Left     CARDIAC CATHETERIZATION N/A 2024    Procedure: Right and Left Heart Cath;  Surgeon: Dilcia Lui MD;  Location: [facility] CATH INVASIVE LOCATION;  Service: Cardiology;  Laterality: N/A;    CARDIAC CATHETERIZATION N/A 2024    Procedure: Percutaneous Coronary Intervention;  Surgeon: Jadiel Blake MD;  Location: [facility] CATH INVASIVE LOCATION;  Service: Cardiology;  Laterality: N/A;     Family History   Family history unknown: Yes     Social History     Tobacco Use    Smoking status: Never    Smokeless tobacco: Never   Vaping Use    Vaping status: Never  Used   Substance Use Topics    Alcohol use: Never    Drug use: Never     Medications Prior to Admission   Medication Sig Dispense Refill Last Dose    aspirin 81 MG EC tablet Take 1 tablet by mouth Daily. 90 tablet 3 6/1/2024    atorvastatin (LIPITOR) 40 MG tablet Take 1 tablet by mouth Daily. 90 tablet 3 5/31/2024    clopidogrel (PLAVIX) 75 MG tablet Take 1 tablet by mouth Daily. 90 tablet 3 6/1/2024    febuxostat (ULORIC) 40 MG tablet Take 1 tablet by mouth Daily.   6/1/2024    folic acid (FOLVITE) 1 MG tablet Take 1 tablet by mouth Daily. 30 tablet 0 6/1/2024    metoprolol succinate XL (TOPROL-XL) 25 MG 24 hr tablet Take 0.5 tablets by mouth Daily. 30 tablet 0 6/1/2024    acetaminophen (TYLENOL) 500 MG tablet Take 1 tablet by mouth Every 6 (Six) Hours As Needed for Mild Pain.       Cholecalciferol (Vitamin D3) 50 MCG (2000 UT) tablet Take  by mouth Daily.       NON FORMULARY Apply 1 dose topically to the appropriate area as directed 3 (Three) Times a Week. Lidocaine 2.5% ointment -  Apply 1 application Monday, Wednesday, Friday before dialysis        Allergies:  Heparin    Review of systems    Constitutional: Negative.    Respiratory and cardiovascular: As detailed in HPI section.  Gastrointestinal: Negative for constipation, nausea and vomiting negative for abdominal distention, abdominal pain and diarrhea.   Genitourinary: Negative for difficulty urinating and flank pain.   Musculoskeletal: Negative for arthralgias, joint swelling and myalgias.   Skin: Negative for color change, rash and wound.   Neurological: Negative for dizziness, syncope, weakness and headaches.   Hematological: Negative for adenopathy.   Psychiatric/Behavioral: Negative for confusion.   All other systems reviewed and are negative.       Physical Exam  VITALS REVIEWED    General:      well developed, in no acute distress.    Head:      normocephalic and atraumatic.    Eyes:      PERRL/EOM intact, conjunctiva and sclera clear with out  nystagmus.    Neck:      no masses, thyromegaly,  trachea central with normal respiratory effort and PMI displaced laterally  Lungs:      Clear to auscultation bilaterally  Heart:       Irregular rhythm  Msk:      no deformity or scoliosis noted of thoracic or lumbar spine.    Pulses:      pulses normal in all 4 extremities.    Extremities:       No lower extremity edema  Neurologic:      no focal deficits.   alert oriented x3  Skin:      intact without lesions or rashes.    Psych:      alert and cooperative; normal mood and affect; normal attention span and concentration.      Result Review :               Pertinent cardiac workup    Heart cath 4/25/2024 multivessel, heavily calcified CAD.  Echo 4/9/2024 ejection fraction 35%, moderately dilated left atrium  EKG 6/1/2024 atrial fibrillation ventricular rate of 47 bpm, left bundle branch block QRS duration 146 ms.  Holter monitor for 10 days and 21 hours, average heart rate of 58, minimum heart rate was 36, maximum 109, 4-second pauses seen.        Assessment and Plan         Syncopal episodes    Sick sinus syndrome    Permanent atrial fibrillation    Bradycardia by electrocardiogram      Deann Warren is an 83-year-old female patient who has end-stage renal disease on hemodialysis, has multivessel CAD not amenable for surgery, atrial fibrillation, most likely chronic, left bundle branch block, presented to the hospital due to syncopal episodes.  Patient has chronic bradycardia and feels dizzy and presented to the hospital following a syncopal event.  She is only on Toprol 12.5 mg once a day, but her bradycardia is out of proportion of her being on small dose of beta-blocker.  Patient therefore qualifies for pacemaker implantation due to symptomatic bradycardia.  She does have left bundle branch block and her ejection fraction is about 35%.  Technically she qualifies for BiV ICD, however due to multiple comorbid conditions including inoperable CAD, chronic renal  failure, small body habitus, she might in fact do better with CRT-P pacemaker either with left bundle pacing lead or BiV pacemaker.  Patient is agreeable to receive a pacemaker instead of a defibrillator.      No follow-ups on file.  Patient was given instructions and counseling regarding her condition or for health maintenance advice. Please see specific information pulled into the AVS if appropriate.        Electronically signed by Zamzam Carrillo MD, 06/03/24, 3:04 PM EDT.

## 2024-06-03 NOTE — ANESTHESIA PREPROCEDURE EVALUATION
Anesthesia Evaluation     Patient summary reviewed and Nursing notes reviewed   NPO Solid Status: > 8 hours             Airway   Mallampati: II  TM distance: >3 FB  Neck ROM: full  No difficulty expected  Dental - normal exam     Pulmonary - negative pulmonary ROS and normal exam   (-) not a smoker  Cardiovascular - negative cardio ROS and normal exam    ECG reviewed    (+) hypertension, dysrhythmias Bradycardia, Atrial Fib, hyperlipidemia  (-) angina, PIERRE      Neuro/Psych- negative ROS  (+) syncope, psychiatric history Anxiety  GI/Hepatic/Renal/Endo - negative ROS   (+) renal disease- CRI, diabetes mellitus type 2    Musculoskeletal (-) negative ROS    Abdominal  - normal exam    Bowel sounds: normal.   Substance History - negative use     OB/GYN negative ob/gyn ROS         Other   arthritis,                 Anesthesia Plan    ASA 3     general       Anesthetic plan, risks, benefits, and alternatives have been provided, discussed and informed consent has been obtained with: patient.    CODE STATUS:    Code Status (Patient has no pulse and is not breathing): CPR (Attempt to Resuscitate)  Medical Interventions (Patient has pulse or is breathing): Full Support

## 2024-06-03 NOTE — PROGRESS NOTES
[facility] Observation [facility] Progress Note    Patient Name: Deann Warren  : 1940  MRN: [id number]  Primary Care Physician: Antonino Shen MD  Date of admission: 2024     Patient Care Team:  Antonino Shen MD as PCP - General (Internal Medicine)  Antonino Shen MD as PCP - Family Medicine  Kamran Figueroa MD as Consulting Physician (Cardiology)          Subjective   History Present Illness     Chief Complaint:   Chief Complaint   Patient presents with    Loss of Consciousness     Loss of consciousness     History of Present Illness    Ed 2024  83-year-old female states started feel funny had some pain in her side and then she got lightheaded and passed out. This was brief there was no reported seizure activity. No chest pain neck arm jaw pain no injury no headache vision change speech difficulty or paralysis. Patient has a long history of multiple syncopal episodes throughout the past and has been admitted a few times she states unremarkable workup nobody knows what causes her to pass out. She states she has A-fib they took her off blood thinners because of this. The patient also goes to dialysis on Monday and Friday. Had it yesterday. No recent injury illness flus viruses vaccinations she has hospitalization back in the end of April for a similar issue with unremarkable workup. No fever chills or night sweats.      Observation 24  Cardiology consulted. Pacemaker scheduled for 2024  Patient feeling well. Currently having HD. Cardiology planning on pacemaker on Wednesday tentatively. Will consult the hospitalist for medical management.     Review of Systems   Constitutional: Positive for malaise/fatigue.   Cardiovascular:  Positive for syncope. Negative for chest pain.   Gastrointestinal:  Negative for nausea and vomiting.   All other systems reviewed and are negative.          Personal History     Past Medical History:   Past Medical History:   Diagnosis Date    Allergic  conjunctivitis and rhinitis 11/06/2014    Anemia due to chronic kidney disease, on chronic dialysis 08/25/2020    Anxiety 07/30/2012    Chronic gouty arthritis 11/06/2014    Dependence on renal dialysis 07/01/2022    ESRD (end stage renal disease) 08/25/2020    Essential hypertension 09/16/2015    History of shingles 01/18/2022    Hyperlipidemia 04/08/2024    Paroxysmal atrial fibrillation 07/01/2022    Type 2 diabetes mellitus with diabetic chronic kidney disease 07/01/2022    Vitamin D deficiency 07/22/2021       Surgical History:      Past Surgical History:   Procedure Laterality Date    ARTERIOVENOUS FISTULA Left     CARDIAC CATHETERIZATION N/A 4/25/2024    Procedure: Right and Left Heart Cath;  Surgeon: Dilcia Lui MD;  Location:  [facility] CATH INVASIVE LOCATION;  Service: Cardiology;  Laterality: N/A;    CARDIAC CATHETERIZATION N/A 4/25/2024    Procedure: Percutaneous Coronary Intervention;  Surgeon: Jadiel Blake MD;  Location:  [facility] CATH INVASIVE LOCATION;  Service: Cardiology;  Laterality: N/A;           Family History: Family history is unknown by patient. Otherwise pertinent FHx was reviewed and unremarkable.     Social History:  reports that she has never smoked. She has never used smokeless tobacco. She reports that she does not drink alcohol and does not use drugs.      Medications:  Prior to Admission medications    Medication Sig Start Date End Date Taking? Authorizing Provider   aspirin 81 MG EC tablet Take 1 tablet by mouth Daily. 4/25/24  Yes Dilcia Lui MD   atorvastatin (LIPITOR) 40 MG tablet Take 1 tablet by mouth Daily. 4/25/24  Yes Dilcia Lui MD   clopidogrel (PLAVIX) 75 MG tablet Take 1 tablet by mouth Daily. 4/25/24  Yes Dilcia Lui MD   febuxostat (ULORIC) 40 MG tablet Take 1 tablet by mouth Daily.   Yes Provider, MD Katerin   folic acid (FOLVITE) 1 MG tablet Take 1 tablet by mouth Daily. 4/13/24  Yes Hernesto Sanchez MD   metoprolol succinate XL (TOPROL-XL) 25 MG  24 hr tablet Take 0.5 tablets by mouth Daily. 4/13/24  Yes Hernesto Sanchez MD   acetaminophen (TYLENOL) 500 MG tablet Take 1 tablet by mouth Every 6 (Six) Hours As Needed for Mild Pain.    Katerin Bradshaw MD   Cholecalciferol (Vitamin D3) 50 MCG (2000 UT) tablet Take  by mouth Daily.    Katerin Bradshaw MD   NON FORMULARY Apply 1 dose topically to the appropriate area as directed 3 (Three) Times a Week. Lidocaine 2.5% ointment -  Apply 1 application Monday, Wednesday, Friday before dialysis    ProviderKaterin MD       Allergies:    Allergies   Allergen Reactions    Heparin Hives       Objective   Objective     Vital Signs  Temp:  [97.2 °F (36.2 °C)-97.6 °F (36.4 °C)] 97.4 °F (36.3 °C)  Heart Rate:  [50-63] 53  Resp:  [14-20] 16  BP: (111-118)/(40-58) 116/40  SpO2:  [89 %-100 %] 100 %  on   ;   Device (Oxygen Therapy): room air  Body mass index is 21.46 kg/m².    Physical Exam  Vitals and nursing note reviewed.   Constitutional:       Appearance: Normal appearance.   HENT:      Head: Normocephalic and atraumatic.      Right Ear: External ear normal.      Left Ear: External ear normal.      Nose: Nose normal.      Mouth/Throat:      Mouth: Mucous membranes are moist.      Pharynx: Oropharynx is clear.   Eyes:      Extraocular Movements: Extraocular movements intact.   Cardiovascular:      Rate and Rhythm: Regular rhythm. Bradycardia present.      Pulses: Normal pulses.      Heart sounds: Normal heart sounds.   Pulmonary:      Effort: Pulmonary effort is normal.      Breath sounds: Normal breath sounds.   Abdominal:      General: Abdomen is flat. Bowel sounds are normal.      Palpations: Abdomen is soft.   Musculoskeletal:         General: Normal range of motion.      Cervical back: Normal range of motion.   Skin:     General: Skin is warm.   Neurological:      General: No focal deficit present.      Mental Status: She is alert and oriented to person, place, and time.   Psychiatric:         Mood and  Affect: Mood normal.         Behavior: Behavior normal.           Results Review:  I have personally reviewed most recent cardiac tracings, lab results, and radiology images and interpretations and agree with findings, most notably: cbc, cmp, chest xray, ekg. .    Results from last 7 days   Lab Units 06/03/24  0606   WBC 10*3/mm3 2.26*   HEMOGLOBIN g/dL 9.5*   HEMATOCRIT % 32.2*   PLATELETS 10*3/mm3 60*     Results from last 7 days   Lab Units 06/03/24  0606 06/02/24  0405 06/01/24  1355   SODIUM mmol/L 135*   < > 136   POTASSIUM mmol/L 4.1   < > 3.9   CHLORIDE mmol/L 101   < > 100   CO2 mmol/L 24.0   < > 22.9   BUN mg/dL 40*   < > 29*   CREATININE mg/dL 3.64*   < > 3.30*   GLUCOSE mg/dL 78   < > 121*   CALCIUM mg/dL 8.2*   < > 8.6   ALK PHOS U/L  --   --  81   ALT (SGPT) U/L  --   --  108*   AST (SGOT) U/L  --   --  155*    < > = values in this interval not displayed.     Estimated Creatinine Clearance: 10.5 mL/min (A) (by C-G formula based on SCr of 3.64 mg/dL (H)).  Brief Urine Lab Results       None            Microbiology Results (last 10 days)       ** No results found for the last 240 hours. **            ECG/EMG Results (most recent)       Procedure Component Value Units Date/Time    ECG 12 Lead Syncope [[id number]] Collected: 06/01/24 1337     Updated: 06/02/24 0556     QT Interval 506 ms      QTC Interval 446 ms     Narrative:      HEART RATE= 47  bpm  RR Interval= 1286  ms  TN Interval=   ms  P Horizontal Axis=   deg  P Front Axis=   deg  QRSD Interval= 146  ms  QT Interval= 506  ms  QTcB= 446  ms  QRS Axis= -63  deg  T Wave Axis= 127  deg  - ABNORMAL ECG -  Atrial fibrillation  Left bundle branch block  When compared with ECG of 07-Apr-2024 10:07:46,  Significant rate decrease  Electronically Signed By: Americo Mcrae ([facility]) 02-Jun-2024 05:56:19  Date and Time of Study: 2024-06-01 13:37:49    Telemetry Scan [[id number]] Resulted: 06/01/24     Updated: 06/03/24 0413    Telemetry Scan [[id number]] Resulted:  06/01/24     Updated: 06/03/24 0413    Telemetry Scan [[id number]] Resulted: 06/01/24     Updated: 06/03/24 0440    Telemetry Scan [[id number]] Resulted: 06/01/24     Updated: 06/03/24 0526    Telemetry Scan [[id number]] Resulted: 06/01/24     Updated: 06/03/24 0614    Telemetry Scan [[id number]] Resulted: 06/01/24     Updated: 06/03/24 0627    Telemetry Scan [[id number]] Resulted: 06/01/24     Updated: 06/03/24 0653    Telemetry Scan [[id number]] Resulted: 06/01/24     Updated: 06/03/24 0738    Telemetry Scan [[id number]] Resulted: 06/01/24     Updated: 06/03/24 0908    Telemetry Scan [[id number]] Resulted: 06/01/24     Updated: 06/03/24 0959    Telemetry Scan [[id number]] Resulted: 06/01/24     Updated: 06/03/24 1026            Results for orders placed during the hospital encounter of 04/07/24    Duplex Carotid Ultrasound CAR    Interpretation Summary    Right internal carotid artery demonstrates a less than 50% stenosis.    Left internal carotid artery demonstrates a less than 50% stenosis.      Results for orders placed during the hospital encounter of 04/07/24    Adult Transthoracic Echo Complete W/ Cont if Necessary Per Protocol    Interpretation Summary    Left ventricular systolic function is moderately decreased. Calculated left ventricular EF = 35%    The left ventricular cavity is mildly dilated.    The following left ventricular wall segments are hypokinetic: apical anterior.    Left ventricular diastolic function was indeterminate.    The left atrial cavity is moderately dilated.    Saline test results are positive with valsalva manuever.    Estimated right ventricular systolic pressure from tricuspid regurgitation is mildly elevated (35-45 mmHg).    There is a large left pleural effusion.    There appears to be an ASD present with left to right shunting based on color dopple. Consider YINA to further evaluate      XR Chest 1 View    Result Date: 6/1/2024  Impression: Chronic changes are noted which are stable  and suggest chronic changes of CHF and pulmonary edema. Electronically Signed: Nathaniel Harman MD  6/1/2024 2:05 PM EDT  Workstation ID: [id number]       Estimated Creatinine Clearance: 10.5 mL/min (A) (by C-G formula based on SCr of 3.64 mg/dL (H)).    Assessment & Plan   Assessment/Plan       There are no hospital problems to display for this patient.        Syncope  - hgb 9.5 6/3 with baseline 10.5   - creatinine 3.64 which is at baseline  - chest xray showing Chronic changes are noted which are stable and suggest chronic changes of CHF and pulmonary edema.   - EKG afib rate 47  - cardiology consulted  - cardiology recommends pacemaker placement tentatively this Wednesday  -Will consult the hospitalist for medical management.      Thrombocytopenia  - plts 54  - chronic  - pt followed by hematology     ESRD on dialysis  Lab Results   Component Value Date    CREATININE 3.64 (H) 06/03/2024    BUN 40 (H) 06/03/2024    BCR 11.0 06/03/2024    EGFR 11.9 (L) 06/03/2024    -MWF dialysis  -Nephrology following and ordered HD 6/3  -Avoid nephrotoxic medication IV dye unless urgently needed  -Monitor BMP and I's and O's while admitted     CAD  -Continue aspirin and Plavix    Heart failure with reduced ejection fraction   Lab Results   Component Value Date    TROPONINT 64 (C) 04/10/2024    TROPONINT 60 (C) 04/09/2024    PROBNP 51,559.0 (H) 04/07/2024     (L) 06/03/2024   -Echocardiogram from/9/24 showed EF 35%  -Continue metoprolol  -Hypotension and end-stage renal disease limiting GDMT   -Monitor I's and O's and daily weights  -2 g sodium diet         VTE Prophylaxis -   Mechanical Order History:        Ordered        06/01/24 1814  Place Sequential Compression Device  Once            06/01/24 1814  Maintain Sequential Compression Device  Continuous                          Pharmalogical Order History:       None            CODE STATUS:    Code Status and Medical Interventions:   Ordered at: 06/02/24 3273     Code Status  (Patient has no pulse and is not breathing):    CPR (Attempt to Resuscitate)     Medical Interventions (Patient has pulse or is breathing):    Full Support       This patient has been examined wearing personal protective equipment.     I discussed the patient's findings and my recommendations with patient and nursing staff.      Signature:Electronically signed by LUCA Estevez, 06/03/24, 11:04 AM EDT.

## 2024-06-04 VITALS
HEIGHT: 64 IN | TEMPERATURE: 97.3 F | BODY MASS INDEX: 21.34 KG/M2 | HEART RATE: 98 BPM | OXYGEN SATURATION: 99 % | DIASTOLIC BLOOD PRESSURE: 49 MMHG | WEIGHT: 125 LBS | SYSTOLIC BLOOD PRESSURE: 117 MMHG | RESPIRATION RATE: 17 BRPM

## 2024-06-04 LAB
ANION GAP SERPL CALCULATED.3IONS-SCNC: 8 MMOL/L (ref 5–15)
BASOPHILS # BLD AUTO: 0.01 10*3/MM3 (ref 0–0.2)
BASOPHILS NFR BLD AUTO: 0.4 % (ref 0–1.5)
BUN SERPL-MCNC: 27 MG/DL (ref 8–23)
BUN/CREAT SERPL: 10 (ref 7–25)
CALCIUM SPEC-SCNC: 8 MG/DL (ref 8.6–10.5)
CHLORIDE SERPL-SCNC: 105 MMOL/L (ref 98–107)
CO2 SERPL-SCNC: 24 MMOL/L (ref 22–29)
CREAT SERPL-MCNC: 2.69 MG/DL (ref 0.57–1)
DEPRECATED RDW RBC AUTO: 55.3 FL (ref 37–54)
EGFRCR SERPLBLD CKD-EPI 2021: 17.1 ML/MIN/1.73
EOSINOPHIL # BLD AUTO: 0.06 10*3/MM3 (ref 0–0.4)
EOSINOPHIL NFR BLD AUTO: 2.5 % (ref 0.3–6.2)
ERYTHROCYTE [DISTWIDTH] IN BLOOD BY AUTOMATED COUNT: 15.4 % (ref 12.3–15.4)
GLUCOSE SERPL-MCNC: 85 MG/DL (ref 65–99)
HCT VFR BLD AUTO: 30.8 % (ref 34–46.6)
HGB BLD-MCNC: 9.4 G/DL (ref 12–15.9)
IMM GRANULOCYTES # BLD AUTO: 0.01 10*3/MM3 (ref 0–0.05)
IMM GRANULOCYTES NFR BLD AUTO: 0.4 % (ref 0–0.5)
LYMPHOCYTES # BLD AUTO: 0.56 10*3/MM3 (ref 0.7–3.1)
LYMPHOCYTES NFR BLD AUTO: 23.3 % (ref 19.6–45.3)
MCH RBC QN AUTO: 29.7 PG (ref 26.6–33)
MCHC RBC AUTO-ENTMCNC: 30.5 G/DL (ref 31.5–35.7)
MCV RBC AUTO: 97.5 FL (ref 79–97)
MONOCYTES # BLD AUTO: 0.23 10*3/MM3 (ref 0.1–0.9)
MONOCYTES NFR BLD AUTO: 9.6 % (ref 5–12)
NEUTROPHILS NFR BLD AUTO: 1.53 10*3/MM3 (ref 1.7–7)
NEUTROPHILS NFR BLD AUTO: 63.8 % (ref 42.7–76)
NRBC BLD AUTO-RTO: 0 /100 WBC (ref 0–0.2)
PLATELET # BLD AUTO: 55 10*3/MM3 (ref 140–450)
PMV BLD AUTO: 10.1 FL (ref 6–12)
POTASSIUM SERPL-SCNC: 4.2 MMOL/L (ref 3.5–5.2)
RBC # BLD AUTO: 3.16 10*6/MM3 (ref 3.77–5.28)
SODIUM SERPL-SCNC: 137 MMOL/L (ref 136–145)
WBC NRBC COR # BLD AUTO: 2.4 10*3/MM3 (ref 3.4–10.8)

## 2024-06-04 PROCEDURE — 99024 POSTOP FOLLOW-UP VISIT: CPT | Performed by: INTERNAL MEDICINE

## 2024-06-04 PROCEDURE — 97530 THERAPEUTIC ACTIVITIES: CPT | Performed by: PHYSICAL THERAPIST

## 2024-06-04 PROCEDURE — 85025 COMPLETE CBC W/AUTO DIFF WBC: CPT | Performed by: INTERNAL MEDICINE

## 2024-06-04 PROCEDURE — 80048 BASIC METABOLIC PNL TOTAL CA: CPT | Performed by: INTERNAL MEDICINE

## 2024-06-04 PROCEDURE — 25010000002 CEFAZOLIN PER 500 MG: Performed by: INTERNAL MEDICINE

## 2024-06-04 PROCEDURE — 97165 OT EVAL LOW COMPLEX 30 MIN: CPT

## 2024-06-04 PROCEDURE — 36415 COLL VENOUS BLD VENIPUNCTURE: CPT | Performed by: INTERNAL MEDICINE

## 2024-06-04 PROCEDURE — 97161 PT EVAL LOW COMPLEX 20 MIN: CPT | Performed by: PHYSICAL THERAPIST

## 2024-06-04 RX ORDER — CLOPIDOGREL BISULFATE 75 MG/1
75 TABLET ORAL DAILY
Start: 2024-06-07

## 2024-06-04 RX ORDER — ASPIRIN 81 MG/1
81 TABLET ORAL DAILY
Start: 2024-06-07

## 2024-06-04 RX ORDER — CEPHALEXIN 500 MG/1
500 CAPSULE ORAL 2 TIMES DAILY
Qty: 10 CAPSULE | Refills: 0 | Status: SHIPPED | OUTPATIENT
Start: 2024-06-04 | End: 2024-06-10

## 2024-06-04 RX ADMIN — LIDOCAINE 1 PATCH: 4 PATCH TOPICAL at 08:56

## 2024-06-04 RX ADMIN — SODIUM CHLORIDE 2000 MG: 900 INJECTION INTRAVENOUS at 06:02

## 2024-06-04 RX ADMIN — HYDROCODONE BITARTRATE AND ACETAMINOPHEN 1 TABLET: 5; 325 TABLET ORAL at 08:56

## 2024-06-04 RX ADMIN — FOLIC ACID 1 MG: 1 TABLET ORAL at 08:56

## 2024-06-04 RX ADMIN — HYDROCODONE BITARTRATE AND ACETAMINOPHEN 1 TABLET: 5; 325 TABLET ORAL at 00:39

## 2024-06-04 NOTE — PLAN OF CARE
Goal Outcome Evaluation:  Plan of Care Reviewed With: patient           Outcome Evaluation: Pt is an 84 y/o F admitted for pain on L side and syncope. CXR (+) for chronic CHF and pulmonary edema. EKG showing afib and bradycardia. Pt is now s/p pacemaker placement.  Pt is  A & O x 4.  Pt reports she lives in a H with grandson who is there all the time.  Pt reports being indepedent with ADLs, light IADLs, mobility, and transfers using a rollator.  Pt's  assists her with cleaning, laundry, and taking her to appts.  Pt noted to have L heel wound with off loading shoe.  Pt completed bed mobility with MIN A.  Pt completed transfers with  MIN A-CGA using RW.  Pt completed functional mobility with CGA.  Pt completed LBD tasks with MIN A.  Pt presents with deficits in self care, transfers, mobility indicating need for skilled OT services.  OT recommending home with HH and assist as needed.      Anticipated Discharge Disposition (OT): home with assist, home with home health

## 2024-06-04 NOTE — DISCHARGE SUMMARY
[facility]  Discharge Summary    Date of Service: 2024  Patient Name: Deann Warren  : 1940  MRN: [id number]    Date of Admission: 2024  Discharge Diagnosis:   Recurrent syncope  A-fib with slow ventricular response  CAD  Dyslipidemia  Chronic systolic heart failure  ESRD on HD  Chronic thrombocytopenia    Date of Discharge: 2024  Primary Care Physician: Antonino Shen MD      Presenting Problem:   Bradycardia [R00.1]  Syncope, unspecified syncope type [R55]  Syncopal episodes [R55]    Active and Resolved Hospital Problems:  Active Hospital Problems    Diagnosis POA    **Syncopal episodes [R55] Yes    Sick sinus syndrome [I49.5] Unknown    Permanent atrial fibrillation [I48.21] Unknown    Bradycardia by electrocardiogram [R00.1] Unknown      Resolved Hospital Problems   No resolved problems to display.         Hospital Course     HPI:  Patient is an 83-year-old female who presented to the hospital with complaints of recurrent syncope.  Please see H&P for details.    Hospital Course:  The patient was admitted for further workup.  She was found to have A-fib with slow ventricular response, however she has chronic A-fib.  Presentation her heart rate was in the 40s.  She was on low-dose Toprol-XL, however her bradycardia was really out of proportion to her medication use.  Cardiology did evaluate the patient and felt that she most likely has sick sinus syndrome.  As such, she underwent PPM placement on 6/3.  Postoperatively she was doing quite well and had no further symptoms related to syncope.  She does have a history of chronic systolic heart failure with ischemic cardiomyopathy and multivessel CAD that is not amenable to surgical intervention given her chronic comorbidities.  As such, although she was a candidate for a BiV ICD, it was felt that PPM would be a better option for her given her multiple comorbidities.  She will need to follow-up with cardiology in 2  weeks.  She will need to hold her aspirin and Plavix for 3 days and resume them on 6/7.  She is stable for discharge.        DISCHARGE Follow Up Recommendations for labs and diagnostics: Follow-up with cardiology in 2 weeks.      Reasons For Change In Medications and Indications for New Medications:      Day of Discharge     Vital Signs:  Temp:  [97.3 °F (36.3 °C)-97.7 °F (36.5 °C)] 97.3 °F (36.3 °C)  Heart Rate:  [61-98] 98  Resp:  [14-17] 17  BP: (101-137)/() 117/49    Physical Exam:  Physical Exam   General Appearance:  Alert, cooperative, no distress, appears stated age  Head:  Normocephalic, without obvious abnormality, atraumatic  Eyes:  PERRL, conjunctiva/corneas clear, EOM's intact, fundi benign, both eyes  Ears:  Normal TM's and external ear canals, both ears  Nose: Nares normal, septum midline, mucosa normal, no drainage or sinus tenderness  Throat: Lips, mucosa, and tongue normal; teeth and gums normal  Neck: Supple, symmetrical, trachea midline, no adenopathy, thyroid: not enlarged, symmetric, no tenderness/mass/nodules, no carotid bruit or JVD  Lungs:   Clear to auscultation bilaterally, respirations unlabored, left chest wall PPM generator site intact  Heart:  Regular rate and rhythm, S1, S2 normal, no murmur, rub or gallop  Abdomen:  Soft, non-tender, bowel sounds active all four quadrants,  no masses, no organomegaly  Extremities: Extremities normal, atraumatic, no cyanosis or edema  Pulses: 2+ and symmetric  Skin: Skin color, texture, turgor normal, no rashes or lesions  Neurologic: Normal        Pertinent  and/or Most Recent Results     LAB RESULTS:      Lab 06/04/24  0514 06/03/24  0606 06/02/24  0405 06/01/24  1355   WBC 2.40* 2.26* 2.05* 2.45*   HEMOGLOBIN 9.4* 9.5* 9.6* 10.5*   HEMATOCRIT 30.8* 32.2* 32.1* 34.3   PLATELETS 55* 60* 54* 60*   NEUTROS ABS 1.53* 1.19* 1.04* 1.43*   IMMATURE GRANS (ABS) 0.01 0.00 0.00 0.01   LYMPHS ABS 0.56* 0.74 0.69* 0.65*   MONOS ABS 0.23 0.26 0.24 0.30    EOS ABS 0.06 0.06 0.06 0.04   MCV 97.5* 97.0 97.3* 95.3         Lab 06/04/24  0514 06/03/24  0606 06/02/24  0405 06/01/24  1355   SODIUM 137 135* 138 136   POTASSIUM 4.2 4.1 3.5 3.9   CHLORIDE 105 101 100 100   CO2 24.0 24.0 26.6 22.9   ANION GAP 8.0 10.0 11.4 13.1   BUN 27* 40* 31* 29*   CREATININE 2.69* 3.64* 3.47* 3.30*   EGFR 17.1* 11.9* 12.6* 13.4*   GLUCOSE 85 78 109* 121*   CALCIUM 8.0* 8.2* 8.3* 8.6         Lab 06/01/24  1355   TOTAL PROTEIN 5.5*   ALBUMIN 3.5   GLOBULIN 2.0   ALT (SGPT) 108*   AST (SGOT) 155*   BILIRUBIN 0.7   ALK PHOS 81                     Brief Urine Lab Results       None          Microbiology Results (last 10 days)       ** No results found for the last 240 hours. **            XR Chest 1 View    Result Date: 6/3/2024  Impression: Impression: No significant change after pacemaker placement. Electronically Signed: Won Bravo MD  6/3/2024 6:34 PM EDT  Workstation ID: [id number]    XR Chest 1 View    Result Date: 6/1/2024  Impression: Impression: Chronic changes are noted which are stable and suggest chronic changes of CHF and pulmonary edema. Electronically Signed: Nathaniel Harman MD  6/1/2024 2:05 PM EDT  Workstation ID: [id number]    XR Foot 3+ View Left    Result Date: 5/22/2024  Impression: Impression: 1. Degenerative changes of the left foot with osteopenia. 2. No plain film evidence of osteomyelitis. Electronically Signed: Lucille Ham MD  5/22/2024 4:10 PM EDT  Workstation ID: [id number]     Results for orders placed during the hospital encounter of 04/07/24    Duplex Carotid Ultrasound CAR    Interpretation Summary    Right internal carotid artery demonstrates a less than 50% stenosis.    Left internal carotid artery demonstrates a less than 50% stenosis.      Results for orders placed during the hospital encounter of 04/07/24    Duplex Carotid Ultrasound CAR    Interpretation Summary    Right internal carotid artery demonstrates a less than 50% stenosis.    Left internal  carotid artery demonstrates a less than 50% stenosis.      Results for orders placed during the hospital encounter of 04/07/24    Adult Transthoracic Echo Complete W/ Cont if Necessary Per Protocol    Interpretation Summary    Left ventricular systolic function is moderately decreased. Calculated left ventricular EF = 35%    The left ventricular cavity is mildly dilated.    The following left ventricular wall segments are hypokinetic: apical anterior.    Left ventricular diastolic function was indeterminate.    The left atrial cavity is moderately dilated.    Saline test results are positive with valsalva manuever.    Estimated right ventricular systolic pressure from tricuspid regurgitation is mildly elevated (35-45 mmHg).    There is a large left pleural effusion.    There appears to be an ASD present with left to right shunting based on color dopple. Consider YINA to further evaluate      Labs Pending at Discharge:      Procedures Performed  Procedure(s):  Pacemaker DC new, [employer]tr[employer]         Consults:   Consults       Date and Time Order Name Status Description    6/3/2024 11:15 AM Inpatient Hospitalist Consult Completed     6/3/2024  8:55 AM Inpatient Nephrology Consult      6/2/2024  3:38 PM Inpatient Nephrology Consult Completed     6/1/2024  5:10 PM Inpatient Cardiology Consult Completed               Discharge Details        Discharge Medications        New Medications        Instructions Start Date   cephalexin 500 MG capsule  Commonly known as: Keflex   500 mg, Oral, 2 Times Daily             Changes to Medications        Instructions Start Date   aspirin 81 MG EC tablet  What changed: These instructions start on June 7, 2024. If you are unsure what to do until then, ask your doctor or other care provider.   81 mg, Oral, Daily   Start Date: June 7, 2024     clopidogrel 75 MG tablet  Commonly known as: PLAVIX  What changed: These instructions start on June 7, 2024. If you are unsure what to do until then,  ask your doctor or other care provider.   75 mg, Oral, Daily   Start Date: June 7, 2024            Continue These Medications        Instructions Start Date   acetaminophen 500 MG tablet  Commonly known as: TYLENOL   500 mg, Oral, Every 6 Hours PRN      atorvastatin 40 MG tablet  Commonly known as: LIPITOR   40 mg, Oral, Daily      febuxostat 40 MG tablet  Commonly known as: ULORIC   40 mg, Oral, Daily      folic acid 1 MG tablet  Commonly known as: FOLVITE   1 mg, Oral, Daily      metoprolol succinate XL 25 MG 24 hr tablet  Commonly known as: TOPROL-XL   12.5 mg, Oral, Every 24 Hours Scheduled      NON FORMULARY   1 dose, Topical, 3 Times Weekly, Lidocaine 2.5% ointment -  Apply 1 application Monday, Wednesday, Friday before dialysis       Vitamin D3 50 MCG (2000 UT) tablet   Oral, Daily               Allergies   Allergen Reactions    Heparin Hives         Discharge Disposition:     Home or Self Care    Diet:  Hospital:  Diet Order   Procedures    Diet: Cardiac, Diabetic; Healthy Heart (2-3 Na+); Consistent Carbohydrate; Fluid Consistency: Thin (IDDSI 0)         Discharge Activity:         CODE STATUS:  Code Status and Medical Interventions:   Ordered at: 06/02/24 0754     Code Status (Patient has no pulse and is not breathing):    CPR (Attempt to Resuscitate)     Medical Interventions (Patient has pulse or is breathing):    Full Support         Future Appointments   Date Time Provider Department Center   6/13/2024  9:45 AM [facility] WOUND CARE ROOM 4 [facility] W C None   6/20/2024  3:45 PM Dilcia Lui MD [facility] NA       Additional Instructions for the Follow-ups that You Need to Schedule       Discharge Follow-up with Specified Provider: Follow-up with cardiology in 2 weeks; 2 Weeks   As directed      To: Follow-up with cardiology in 2 weeks   Follow Up: 2 Weeks                Time spent on Discharge including face to face service:  >30 minutes    Signature: Electronically signed by Jose Yuan MD,  06/04/24, 12:34 EDT.  [facility] Hospitalist Team

## 2024-06-04 NOTE — ANESTHESIA POSTPROCEDURE EVALUATION
Patient: Deann Warren    Procedure Summary       Date: 06/03/24 Room / Location: [facility] CATH LAB 3 /  [facility]    Anesthesia Start: 1523 Anesthesia Stop: 1711    Procedure: Pacemaker DC new, [employer] Diagnosis:       Sick sinus syndrome      (sick sinus syndrome)    Providers: Zamzam Carrillo MD Provider: Yehuda Jaime MD    Anesthesia Type: general ASA Status: 3            Anesthesia Type: general    Vitals  Vitals Value Taken Time   /41 06/03/24 1802   Temp 97.7 °F (36.5 °C) 06/03/24 1710   Pulse 70 06/03/24 1808   Resp 14 06/03/24 1710   SpO2 98 % 06/03/24 1808   Vitals shown include unfiled device data.        Post Anesthesia Care and Evaluation    Patient location during evaluation: PACU  Patient participation: complete - patient participated  Level of consciousness: awake  Pain score: 0  Pain management: adequate  Anesthetic complications: No anesthetic complications  PONV Status: none  Cardiovascular status: acceptable  Respiratory status: acceptable  Hydration status: acceptable

## 2024-06-04 NOTE — THERAPY EVALUATION
Patient Name: Deann Warren  : 1940    MRN: [id number]                              Today's Date: 2024       Admit Date: 2024    Visit Dx:     ICD-10-CM ICD-9-CM   1. Syncope, unspecified syncope type  R55 780.2   2. Bradycardia  R00.1 427.89   3. Sick sinus syndrome  I49.5 427.81     Patient Active Problem List   Diagnosis    Syncope and collapse    Dependence on renal dialysis    Type 2 diabetes mellitus with diabetic chronic kidney disease    Anxiety    Chronic gouty arthritis    Allergic conjunctivitis and rhinitis    Essential hypertension    ESRD (end stage renal disease)    Anemia due to chronic kidney disease, on chronic dialysis    History of shingles    Hyperlipidemia    Vitamin D deficiency    Cardiomyopathy, dilated    Syncopal episodes    Sick sinus syndrome    Permanent atrial fibrillation    Bradycardia by electrocardiogram     Past Medical History:   Diagnosis Date    Allergic conjunctivitis and rhinitis 2014    Anemia due to chronic kidney disease, on chronic dialysis 2020    Anxiety 2012    Chronic gouty arthritis 2014    Dependence on renal dialysis 2022    ESRD (end stage renal disease) 2020    Essential hypertension 2015    History of shingles 2022    Hyperlipidemia 2024    Paroxysmal atrial fibrillation 2022    Type 2 diabetes mellitus with diabetic chronic kidney disease 2022    Vitamin D deficiency 2021     Past Surgical History:   Procedure Laterality Date    ARTERIOVENOUS FISTULA Left     CARDIAC CATHETERIZATION N/A 2024    Procedure: Right and Left Heart Cath;  Surgeon: Dilcia Lui MD;  Location:  [facility];  Service: Cardiology;  Laterality: N/A;    CARDIAC CATHETERIZATION N/A 2024    Procedure: Percutaneous Coronary Intervention;  Surgeon: Jadiel Blake MD;  Location:  [facility];  Service: Cardiology;  Laterality: N/A;    CARDIAC ELECTROPHYSIOLOGY  PROCEDURE N/A 6/3/2024    Procedure: Pacemaker DC new, [employer];  Surgeon: Zamzam Carrillo MD;  Location: [facility];  Service: Cardiovascular;  Laterality: N/A;      General Information       Row Name 06/04/24 1349          Physical Therapy Time and Intention    Document Type evaluation  -EJ     Mode of Treatment physical therapy  -EJ       Row Name 06/04/24 1349          General Information    Patient Profile Reviewed yes  -EJ     Prior Level of Function independent:  Pt reports she typically is independent and uses a rollator to ambulate with.  She can bathe/dress herself, and still cooks her own meals.  Pt receives assist from her haridresser with cleaning her home and some laundry.  -EJ     Existing Precautions/Restrictions fall;other (see comments);pacemaker  wound to L heel - offloading shoe in room for L foot.  -EJ     Barriers to Rehab medically complex  -EJ       Row Name 06/04/24 1349          Living Environment    People in Home grandchild(vamsi)  -EJ     Name(s) of People in Home Donaldo - adult grandson is present for 24/7 assist.  -EJ       Row Name 06/04/24 1349          Home Main Entrance    Number of Stairs, Main Entrance none  -EJ       Row Name 06/04/24 1349          Stairs Within Home, Primary    Number of Stairs, Within Home, Primary none  -EJ       Row Name 06/04/24 1349          Cognition    Orientation Status (Cognition) oriented x 4  -EJ       Row Name 06/04/24 1349          Safety Issues, Functional Mobility    Impairments Affecting Function (Mobility) endurance/activity tolerance  -EJ               User Key  (r) = Recorded By, (t) = Taken By, (c) = Cosigned By      Initials Name Provider Type    EJ Sabine Robles, PT Physical Therapist                   Mobility       Row Name 06/04/24 1410          Bed Mobility    Bed Mobility bed mobility (all) activities  -EJ     All Activities, [place] (Bed Mobility) minimum assist (75% patient effort);contact guard  -EJ      Assistive Device (Bed Mobility) bed rails  -EJ       Row Name 06/04/24 1410          Sit-Stand Transfer    Sit-Stand [place] (Transfers) minimum assist (75% patient effort);contact guard  -EJ     Assistive Device (Sit-Stand Transfers) walker, front-wheeled  -       Row Name 06/04/24 1410          Gait/Stairs (Locomotion)    [place] Level (Gait) contact guard  -EJ     Assistive Device (Gait) walker, front-wheeled  -EJ     Patient was able to Ambulate yes  -EJ     Distance in Feet (Gait) 20  -EJ               User Key  (r) = Recorded By, (t) = Taken By, (c) = Cosigned By      Initials Name Provider Type    EJ Sabine Robles, PT Physical Therapist                   Obj/Interventions       Row Name 06/04/24 1412          Range of Motion Comprehensive    General Range of Motion bilateral lower extremity ROM WFL  -[facility] Name 06/04/24 1412          Strength Comprehensive (MMT)    Comment, General Manual Muscle Testing (MMT) Assessment BLE strength grossly 4-/5.  -       Row Name 06/04/24 1412          Motor Skills    Motor Skills functional endurance  -EJ     Functional Endurance Fair  -[facility] Name 06/04/24 1412          Balance    Balance Assessment sitting static balance;sitting dynamic balance;sit to stand dynamic balance;standing static balance;standing dynamic balance  -EJ     Static Sitting Balance supervision  -EJ     Dynamic Sitting Balance standby assist;supervision  -EJ     Position, Sitting Balance unsupported;sitting edge of bed  -EJ     Sit to Stand Dynamic Balance contact guard;minimal assist  -EJ     Static Standing Balance contact guard  -EJ     Dynamic Standing Balance contact guard;minimal assist  -EJ     Position/Device Used, Standing Balance supported;walker, front-wheeled  -               User Key  (r) = Recorded By, (t) = Taken By, (c) = Cosigned By      Initials Name Provider Type    Sabine Morse, PT Physical Therapist                   Goals/Plan    No  documentation.                  Clinical Impression       Row Name 06/04/24 1414          Pain    Additional Documentation Pain Scale: FACES Pre/Post-Treatment (Group)  -EJ       Row Name 06/04/24 1414          Pain Scale: FACES Pre/Post-Treatment    Pain: FACES Scale, Pretreatment 2-->hurts little bit  -EJ     Posttreatment Pain Rating 2-->hurts little bit  -EJ     Pain Location - Side/Orientation Right  -EJ     Pain Location - chest  site of Pacemaker placement  -EJ       Row Name 06/04/24 1414          Plan of Care Review    Plan of Care Reviewed With patient  -EJ     Outcome Evaluation Patient is an 84 y/o F who was admitted to [facility] on 6/1/24 with c/o pain in her side and syncopal episode.  CXR performed and was positive for chronic CHF and pulmonary edema. EKG showing a-fib as well as bradycardia. PMHx includes A-fib, Gout, ESRD (on HD), and DM II.  On 6/3 pt had a left single lead bundle pacemaker.  At baseline pt reports independence at home with use of rollator for mobility/ambulation.  During today's evaluation pt was provided post-op Pacemaker handout and reviewed it with patient.  During bed mobility/transfers pt required cues to reduce too much strenuous use to R arm.  This date pt required CGA to min A with bed mobility, transfers, and gait using FWW.  At this time, PT recommends pt d/c home with grandson and receive  PT.  PT will sign off/complete pt's orders as she is discharging from hospital.  -       Row Name 06/04/24 1414          Therapy Assessment/Plan (PT)    Criteria for Skilled Interventions Met (PT) yes;other (see comments)  Pt meets criteria; however, she is d/cing at this time.  -EJ     Therapy Frequency (PT) evaluation only  -EJ     Predicted Duration of Therapy Intervention (PT) discharge  -EJ       Row Name 06/04/24 1414          Positioning and Restraints    Pre-Treatment Position in bed  -EJ     Post Treatment Position chair  -EJ     In Chair sitting;call light within  reach;encouraged to call for assist;exit alarm on;notified nsg  Placed bed alarm in chair and plugged into chair alarm box.  Box was not working - notified nursing assistant.  -EJ               User Key  (r) = Recorded By, (t) = Taken By, (c) = Cosigned By      Initials Name Provider Type    Sabine Morse, PT Physical Therapist                   Outcome Measures       Row Name 06/04/24 1421 06/04/24 0800       How much help from another person do you currently need...    Turning from your back to your side while in flat bed without using bedrails? 3  -EJ 3  -SS    Moving from lying on back to sitting on the side of a flat bed without bedrails? 3  -EJ 3  -SS    Moving to and from a bed to a chair (including a wheelchair)? 3  -EJ 3  -SS    Standing up from a chair using your arms (e.g., wheelchair, bedside chair)? 3  -EJ 3  -SS    Climbing 3-5 steps with a railing? 3  -EJ 2  -SS    To walk in hospital room? 3  -EJ 3  -SS    AM-PAC 6 Clicks Score (PT) 18  -EJ 17  -SS    Highest Level of Mobility Goal 6 --> Walk 10 steps or more  -EJ 5 --> Static standing  -SS      Row Name 06/04/24 1421          Functional Assessment    Outcome Measure Options AM-PAC 6 Clicks Basic Mobility (PT)  -EJ               User Key  (r) = Recorded By, (t) = Taken By, (c) = Cosigned By      Initials Name Provider Type    Sabine Morse, PT Physical Therapist    SS Margo Pate RN Registered Nurse                                 Physical Therapy Education       Title: PT OT SLP Therapies (Resolved)       Topic: Physical Therapy (Resolved)       Point: Precautions (Resolved)       Learner Progress:  Not documented in this visit.                                  PT Recommendation and Plan     Plan of Care Reviewed With: patient  Outcome Evaluation: Patient is an 82 y/o F who was admitted to [facility] on 6/1/24 with c/o pain in her side and syncopal episode.  CXR performed and was positive for chronic CHF and pulmonary edema. EKG showing  a-fib as well as bradycardia. PMHx includes A-fib, Gout, ESRD (on HD), and DM II.  On 6/3 pt had a left single lead bundle pacemaker.  At baseline pt reports independence at home with use of rollator for mobility/ambulation.  During today's evaluation pt was provided post-op Pacemaker handout and reviewed it with patient.  During bed mobility/transfers pt required cues to reduce too much strenuous use to R arm.  This date pt required CGA to min A with bed mobility, transfers, and gait using FWW.  At this time, PT recommends pt d/c home with grandson and receive  PT.  PT will sign off/complete pt's orders as she is discharging from hospital.     Time Calculation:         PT Charges       Row Name 06/04/24 1421             Time Calculation    Start Time 1043  -EJ      Stop Time 1131  -EJ      Time Calculation (min) 48 min  -EJ      PT Received On 06/04/24  -EJ         Time Calculation- PT    Total Timed Code Minutes- PT 0 minute(s)  -EJ                User Key  (r) = Recorded By, (t) = Taken By, (c) = Cosigned By      Initials Name Provider Type    Sabine Morse, PT Physical Therapist                  Therapy Charges for Today       Code Description Service Date Service Provider Modifiers Qty    46356920809 HC PT THERAPEUTIC ACT EA 15 MIN 6/4/2024 Sabine Robles, PT GP 1    04827098112 HC PT EVAL LOW COMPLEXITY 4 6/4/2024 Sabine Robles, PT GP 1            PT G-Codes  Outcome Measure Options: AM-PAC 6 Clicks Basic Mobility (PT)  AM-PAC 6 Clicks Score (PT): 18  PT Discharge Summary  Anticipated Discharge Disposition (PT): home with assist, home with home health    Sabine Robles, PT  6/4/2024

## 2024-06-04 NOTE — NURSING NOTE
Pt son worried someone is supposed to meet him at home when patient is d/c to setup box for pacemaker, this nurse called Dr. Carrillo office to clarify this for patient and family, this nurse awaiting phone call back.

## 2024-06-04 NOTE — THERAPY EVALUATION
Patient Name: Deann Warren  : 1940    MRN: [id number]                              Today's Date: 2024       Admit Date: 2024    Visit Dx:     ICD-10-CM ICD-9-CM   1. Syncope, unspecified syncope type  R55 780.2   2. Bradycardia  R00.1 427.89   3. Sick sinus syndrome  I49.5 427.81     Patient Active Problem List   Diagnosis    Syncope and collapse    Dependence on renal dialysis    Type 2 diabetes mellitus with diabetic chronic kidney disease    Anxiety    Chronic gouty arthritis    Allergic conjunctivitis and rhinitis    Essential hypertension    ESRD (end stage renal disease)    Anemia due to chronic kidney disease, on chronic dialysis    History of shingles    Hyperlipidemia    Vitamin D deficiency    Cardiomyopathy, dilated    Syncopal episodes    Sick sinus syndrome    Permanent atrial fibrillation    Bradycardia by electrocardiogram     Past Medical History:   Diagnosis Date    Allergic conjunctivitis and rhinitis 2014    Anemia due to chronic kidney disease, on chronic dialysis 2020    Anxiety 2012    Chronic gouty arthritis 2014    Dependence on renal dialysis 2022    ESRD (end stage renal disease) 2020    Essential hypertension 2015    History of shingles 2022    Hyperlipidemia 2024    Paroxysmal atrial fibrillation 2022    Type 2 diabetes mellitus with diabetic chronic kidney disease 2022    Vitamin D deficiency 2021     Past Surgical History:   Procedure Laterality Date    ARTERIOVENOUS FISTULA Left     CARDIAC CATHETERIZATION N/A 2024    Procedure: Right and Left Heart Cath;  Surgeon: Dilcia Lui MD;  Location:  [facility];  Service: Cardiology;  Laterality: N/A;    CARDIAC CATHETERIZATION N/A 2024    Procedure: Percutaneous Coronary Intervention;  Surgeon: Jadiel Blake MD;  Location:  [facility];  Service: Cardiology;  Laterality: N/A;    CARDIAC ELECTROPHYSIOLOGY  PROCEDURE N/A 6/3/2024    Procedure: Pacemaker DC new, [employer];  Surgeon: Zamzam Carrillo MD;  Location:  [facility];  Service: Cardiovascular;  Laterality: N/A;      General Information       Row Name 06/04/24 1425          OT Time and Intention    Document Type evaluation  -MM     Mode of Treatment occupational therapy  -MM       Row Name 06/04/24 1425          General Information    Prior Level of Function independent:;ADL's;home management;all household mobility  -MM     Existing Precautions/Restrictions fall;other (see comments);pacemaker  wound to L heel, off loading shoe  -MM     Barriers to Rehab medically complex  -MM       Row Name 06/04/24 1425          Living Environment    People in Home grandchild(vamsi)  -MM       Row Name 06/04/24 1425          Home Main Entrance    Number of Stairs, Main Entrance none  -MM       Row Name 06/04/24 1425          Stairs Within Home, Primary    Number of Stairs, Within Home, Primary none  -MM       Row Name 06/04/24 1425          Cognition    Orientation Status (Cognition) oriented x 4  -MM       Row Name 06/04/24 1425          Safety Issues, Functional Mobility    Impairments Affecting Function (Mobility) endurance/activity tolerance  -MM               User Key  (r) = Recorded By, (t) = Taken By, (c) = Cosigned By      Initials Name Provider Type    MM Abdiaziz Maciel OT Occupational Therapist                     Mobility/ADL's       Row Name 06/04/24 1435          Bed Mobility    Bed Mobility bed mobility (all) activities  -MM     All Activities, [place] (Bed Mobility) minimum assist (75% patient effort);contact guard  -MM       Row Name 06/04/24 1435          Transfers    Transfers sit-stand transfer  -MM       Row Name 06/04/24 1435          Sit-Stand Transfer    Sit-Stand [place] (Transfers) minimum assist (75% patient effort);contact guard  -MM     Assistive Device (Sit-Stand Transfers) walker, front-wheeled  -MM       Row Name  06/04/24 1435          Activities of Daily Living    BADL Assessment/Intervention lower body dressing  -MM       Row Name 06/04/24 1435          Lower Body Dressing Assessment/Training    [place] Level (Lower Body Dressing) lower body dressing skills;minimum assist (75% patient effort)  -MM     Position (Lower Body Dressing) edge of bed sitting  -MM               User Key  (r) = Recorded By, (t) = Taken By, (c) = Cosigned By      Initials Name Provider Type    Abdiaziz Tovar OT Occupational Therapist                   Obj/Interventions       [facility] Name 06/04/24 1435          Range of Motion Comprehensive    Comment, General Range of Motion ROM limited due to new pacemaker sx  -MM               User Key  (r) = Recorded By, (t) = Taken By, (c) = Cosigned By      Initials Name Provider Type    Abdiaziz Tovar OT Occupational Therapist                   Goals/Plan       Row Name 06/04/24 1448          Bathing Goal 1 (OT)    Activity/Device (Bathing Goal 1, OT) bathing skills, all  -MM     [place] Level/Cues Needed (Bathing Goal 1, OT) modified independence  -MM     Time Frame (Bathing Goal 1, OT) 2 weeks  -MM       Row Name 06/04/24 1448          Dressing Goal 1 (OT)    Activity/Device (Dressing Goal 1, OT) dressing skills, all  -MM     [place]/Cues Needed (Dressing Goal 1, OT) modified independence  -MM     Time Frame (Dressing Goal 1, OT) 2 weeks  -MM       Row Name 06/04/24 1448          Toileting Goal 1 (OT)    Activity/Device (Toileting Goal 1, OT) toileting skills, all  -MM     [place] Level/Cues Needed (Toileting Goal 1, OT) modified independence  -MM     Time Frame (Toileting Goal 1, OT) 2 weeks  -MM       Row Name 06/04/24 1448          Therapy Assessment/Plan (OT)    Planned Therapy Interventions (OT) activity tolerance training;functional balance retraining;BADL retraining;neuromuscular control/coordination retraining;patient/caregiver education/training;transfer/mobility  retraining;strengthening exercise;ROM/therapeutic exercise  -MM               User Key  (r) = Recorded By, (t) = Taken By, (c) = Cosigned By      Initials Name Provider Type    MM Abdiaziz Maciel, SAUMYA Occupational Therapist                   Clinical Impression       Row Name 06/04/24 1443 06/04/24 1435       Pain Assessment    Additional Documentation Pain Scale: FACES Pre/Post-Treatment (Group)  -MM Pain Scale: FACES Pre/Post-Treatment (Group)  -MM      Row Name 06/04/24 1443 06/04/24 1435       Pain Scale: FACES Pre/Post-Treatment    Pain: FACES Scale, Pretreatment 2-->hurts little bit  -MM 2-->hurts little bit  -MM    Posttreatment Pain Rating 2-->hurts little bit  -MM 2-->hurts little bit  -MM    Pain Location - chest  -MM chest  -MM      Row Name 06/04/24 1443 06/04/24 1435       Plan of Care Review    Plan of Care Reviewed With patient  -MM patient  -MM    Outcome Evaluation Pt is an 84 y/o F admitted for pain on L side and syncope. CXR (+) for chronic CHF and pulmonary edema. EKG showing afib and bradycardia. Pt is now s/p pacemaker placement.  Pt is  A & O x 4.  Pt reports she lives in a H with grandson who is there all the time.  Pt reports being indepedent with ADLs, light IADLs, mobility, and transfers using a rollator.  Pt's  assists her with cleaning, laundry, and taking her to appts.  Pt noted to have L heel wound with off loading shoe.  Pt completed bed mobility with MIN A.  Pt completed transfers with  MIN A-CGA using RW.  Pt completed functional mobility with CGA.  Pt completed LBD tasks with MIN A.  Pt presents with deficits in self care, transfers, mobility indicating need for skilled OT services.  OT recommending home with HH and assist as needed.  -MM Pt is an 84 y/o F admitted for pain on L side and syncope.  CXR (+) for chronic CHF and pulmonary edema.  EKG showing afib and bradycardia.  Pt is now s/p  -MM      Row Name 06/04/24 2975          Therapy Assessment/Plan (OT)     Criteria for Skilled Therapeutic Interventions Met (OT) yes;meets criteria  -MM     Therapy Frequency (OT) 3 times/wk  -MM     Predicted Duration of Therapy Intervention (OT) until DC  -MM       Row Name 06/04/24 1443          Therapy Plan Review/Discharge Plan (OT)    Anticipated Discharge Disposition (OT) home with assist;home with home health  -MM       Row Name 06/04/24 1443          Positioning and Restraints    Pre-Treatment Position in bed  -MM     Post Treatment Position chair  -MM     In Chair notified nsg;sitting;call light within reach;encouraged to call for assist;exit alarm on  -MM               User Key  (r) = Recorded By, (t) = Taken By, (c) = Cosigned By      Initials Name Provider Type    MM Abdiaziz Maciel, SAUMYA Occupational Therapist                   Outcome Measures       Row Name 06/04/24 1448          How much help from another is currently needed...    Putting on and taking off regular lower body clothing? 3  -MM     Bathing (including washing, rinsing, and drying) 3  -MM     Toileting (which includes using toilet bed pan or urinal) 3  -MM     Putting on and taking off regular upper body clothing 4  -MM     Taking care of personal grooming (such as brushing teeth) 4  -MM     Eating meals 4  -MM     AM-PAC 6 Clicks Score (OT) 21  -MM       Row Name 06/04/24 1421 06/04/24 0800       How much help from another person do you currently need...    Turning from your back to your side while in flat bed without using bedrails? 3  -EJ 3  -SS    Moving from lying on back to sitting on the side of a flat bed without bedrails? 3  -EJ 3  -SS    Moving to and from a bed to a chair (including a wheelchair)? 3  -EJ 3  -SS    Standing up from a chair using your arms (e.g., wheelchair, bedside chair)? 3  -EJ 3  -SS    Climbing 3-5 steps with a railing? 3  -EJ 2  -SS    To walk in hospital room? 3  -EJ 3  -SS    AM-PAC 6 Clicks Score (PT) 18  -EJ 17  -SS    Highest Level of Mobility Goal 6 --> Walk 10 steps or  more  -EJ 5 --> Static standing  -      Row Name 06/04/24 1448 06/04/24 1421       Functional Assessment    Outcome Measure Options AM-PAC 6 Clicks Daily Activity (OT)  -MM AM-PAC 6 Clicks Basic Mobility (PT)  -EJ              User Key  (r) = Recorded By, (t) = Taken By, (c) = Cosigned By      Initials Name Provider Type    EJ Sabine Robles, PT Physical Therapist    Abdiaziz Tovar, OT Occupational Therapist    Margo Buckley, RN Registered Nurse                    Occupational Therapy Education       Title: PT OT SLP Therapies (In Progress)       Topic: Occupational Therapy (In Progress)       Point: ADL training (Done)       Description:   Instruct learner(s) on proper safety adaptation and remediation techniques during self care or transfers.   Instruct in proper use of assistive devices.                  Learning Progress Summary             Patient Acceptance, E, VU by LUCRETIA at 6/4/2024 1449                         Point: Home exercise program (Not Started)       Description:   Instruct learner(s) on appropriate technique for monitoring, assisting and/or progressing therapeutic exercises/activities.                  Learner Progress:  Not documented in this visit.              Point: Precautions (Not Started)       Description:   Instruct learner(s) on prescribed precautions during self-care and functional transfers.                  Learner Progress:  Not documented in this visit.              Point: Body mechanics (Not Started)       Description:   Instruct learner(s) on proper positioning and spine alignment during self-care, functional mobility activities and/or exercises.                  Learner Progress:  Not documented in this visit.                              User Key       Initials Effective Dates Name Provider Type Discipline     12/21/23 -  Abdiaziz Maciel OT Occupational Therapist OT                  OT Recommendation and Plan  Planned Therapy Interventions (OT): activity tolerance  training, functional balance retraining, BADL retraining, neuromuscular control/coordination retraining, patient/caregiver education/training, transfer/mobility retraining, strengthening exercise, ROM/therapeutic exercise  Therapy Frequency (OT): 3 times/wk  Plan of Care Review  Plan of Care Reviewed With: patient  Outcome Evaluation: Pt is an 82 y/o F admitted for pain on L side and syncope. CXR (+) for chronic CHF and pulmonary edema. EKG showing afib and bradycardia. Pt is now s/p pacemaker placement.  Pt is  A & O x 4.  Pt reports she lives in a H with grandson who is there all the time.  Pt reports being indepedent with ADLs, light IADLs, mobility, and transfers using a rollator.  Pt's  assists her with cleaning, laundry, and taking her to appts.  Pt noted to have L heel wound with off loading shoe.  Pt completed bed mobility with MIN A.  Pt completed transfers with  MIN A-CGA using RW.  Pt completed functional mobility with CGA.  Pt completed LBD tasks with MIN A.  Pt presents with deficits in self care, transfers, mobility indicating need for skilled OT services.  OT recommending home with HH and assist as needed.     Time Calculation:         Time Calculation- OT       Row Name 06/04/24 1449             Time Calculation- OT    OT Start Time 1112  -MM      OT Stop Time 1135  -MM      OT Time Calculation (min) 23 min  -MM      Total Timed Code Minutes- OT 0 minute(s)  -MM      OT Received On 06/04/24  -MM      OT - Next Appointment 06/05/24  -MM      OT Goal Re-Cert Due Date 06/18/24  -MM                User Key  (r) = Recorded By, (t) = Taken By, (c) = Cosigned By      Initials Name Provider Type    Abdiaziz Tovar OT Occupational Therapist                  Therapy Charges for Today       Code Description Service Date Service Provider Modifiers Qty    08099379579 HC OT EVAL LOW COMPLEXITY 4 6/4/2024 Abdiaziz Maciel OT GO 1                 Abdiaziz Maciel OT  6/4/2024

## 2024-06-04 NOTE — CASE MANAGEMENT/SOCIAL WORK
Continued Stay Note  HYUN Duque     Patient Name: Deann Warren  MRN: [id number]  Today's Date: 6/4/2024    Admit Date: 6/1/2024    Plan: home with adult Grandson. Friend will provide transport at d/c   Discharge Plan       Row Name 06/04/24 0935       Plan    Plan home with adult Grandson. Friend will provide transport at d/c    Patient/Family in Agreement with Plan yes    Plan Comments Status post pasemaker 6/3/24. Cleared for D/C per Cardiology. MD notified, penidng d/c orders.               Expected Discharge Date and Time       Expected Discharge Date Expected Discharge Time    Jun 6, 2024           Zandra Mackey RN    phone [phone]  fax [phone]

## 2024-06-04 NOTE — PROGRESS NOTES
"    Reason for follow-up: Atrial fibrillation with slow ventricular response     Patient Care Team:  Antonino Shen MD as PCP - General (Internal Medicine)  Antonino Shen MD as PCP - Family Medicine  Kamran Figueroa MD as Consulting Physician (Cardiology)    Subjective .   Deann Warren is feeling better today     ROS    Heparin    Scheduled Meds:[Held by provider] aspirin, 81 mg, Oral, Daily  atorvastatin, 40 mg, Oral, Nightly  [Held by provider] clopidogrel, 75 mg, Oral, Daily  folic acid, 1 mg, Oral, Daily  Lidocaine, 1 patch, Transdermal, Q24H  sodium chloride, 10 mL, Intravenous, Q12H  sodium chloride, 10 mL, Intravenous, Q12H      Continuous Infusions:   PRN Meds:.  acetaminophen    aluminum-magnesium hydroxide-simethicone    senna-docusate sodium **AND** polyethylene glycol **AND** bisacodyl **AND** bisacodyl    HYDROcodone-acetaminophen    nitroglycerin    ondansetron ODT **OR** ondansetron    [COMPLETED] Insert Peripheral IV **AND** sodium chloride    sodium chloride    sodium chloride      VITAL SIGNS  Vitals:    06/03/24 1800 06/03/24 2318 06/04/24 0335 06/04/24 0612   BP: 137/41 101/53 120/59 118/58   BP Location:  Left leg Right leg Left arm   Patient Position:  Lying Lying Lying   Pulse: 70 70 69 70   Resp:   14 14   Temp:  97.3 °F (36.3 °C)  97.7 °F (36.5 °C)   TempSrc:  Oral  Oral   SpO2: 97% 98% 98% 98%   Weight:       Height:           Flowsheet Rows      Flowsheet Row First Filed Value   Admission Height 162.6 cm (64\") Documented at 06/01/2024 1313   Admission Weight 56.7 kg (125 lb) Documented at 06/01/2024 1313               Physical Exam  VITALS REVIEWED    General:      well developed, in no acute distress.    Head:      normocephalic and atraumatic.    Eyes:      PERRL/EOM intact, conjunctiva and sclera clear with out nystagmus.    Neck:      no masses, thyromegaly,  trachea central with normal respiratory effort and PMI displaced laterally  Lungs:      Clear  Heart:       Regular rate and " rhythm  Msk:      no deformity or scoliosis noted of thoracic or lumbar spine.    Pulses:      pulses normal in all 4 extremities.    Extremities:       No lower extremity edema  Neurologic:      no focal deficits.   alert oriented x3  Skin:      intact without lesions or rashes.    Psych:      alert and cooperative; normal mood and affect; normal attention span and concentration.          LAB RESULTS (LAST 7 DAYS)    CBC  Results from last 7 days   Lab Units 06/04/24  0514 06/03/24  0606 06/02/24 0405 06/01/24  1355   WBC 10*3/mm3 2.40* 2.26* 2.05* 2.45*   RBC 10*6/mm3 3.16* 3.32* 3.30* 3.60*   HEMOGLOBIN g/dL 9.4* 9.5* 9.6* 10.5*   HEMATOCRIT % 30.8* 32.2* 32.1* 34.3   MCV fL 97.5* 97.0 97.3* 95.3   PLATELETS 10*3/mm3 55* 60* 54* 60*       BMP  Results from last 7 days   Lab Units 06/04/24  0514 06/03/24  0606 06/02/24 0405 06/01/24  1355   SODIUM mmol/L 137 135* 138 136   POTASSIUM mmol/L 4.2 4.1 3.5 3.9   CHLORIDE mmol/L 105 101 100 100   CO2 mmol/L 24.0 24.0 26.6 22.9   BUN mg/dL 27* 40* 31* 29*   CREATININE mg/dL 2.69* 3.64* 3.47* 3.30*   GLUCOSE mg/dL 85 78 109* 121*       CMP   Results from last 7 days   Lab Units 06/04/24 0514 06/03/24 0606 06/02/24 0405 06/01/24  1355   SODIUM mmol/L 137 135* 138 136   POTASSIUM mmol/L 4.2 4.1 3.5 3.9   CHLORIDE mmol/L 105 101 100 100   CO2 mmol/L 24.0 24.0 26.6 22.9   BUN mg/dL 27* 40* 31* 29*   CREATININE mg/dL 2.69* 3.64* 3.47* 3.30*   GLUCOSE mg/dL 85 78 109* 121*   ALBUMIN g/dL  --   --   --  3.5   BILIRUBIN mg/dL  --   --   --  0.7   ALK PHOS U/L  --   --   --  81   AST (SGOT) U/L  --   --   --  155*   ALT (SGPT) U/L  --   --   --  108*         BNP        TROPONIN        CoAg        Creatinine Clearance  Estimated Creatinine Clearance: 14.2 mL/min (A) (by C-G formula based on SCr of 2.69 mg/dL (H)).    ABG          EKG    I personally reviewed the patient's EKG/Telemetry data: Atrial fibrillation with ventricular pacing      Assessment & Plan       Syncopal  episodes    Sick sinus syndrome    Permanent atrial fibrillation    Bradycardia by electrocardiogram      Deann Warren is an 83-year-old female patient who has end-stage renal disease on hemodialysis, has multivessel CAD not amenable for surgery, atrial fibrillation, most likely chronic, left bundle branch block, presented to the hospital due to syncopal episodes.  Patient has chronic bradycardia and feels dizzy and presented to the hospital following a syncopal event.  She is only on Toprol 12.5 mg once a day, but her bradycardia is out of proportion of her being on small dose of beta-blocker.  Patient therefore qualifies for pacemaker implantation due to symptomatic bradycardia.  She does have left bundle branch block and her ejection fraction is about 35%.  Technically she qualifies for BiV ICD, however due to multiple comorbid conditions including inoperable CAD, chronic renal failure, small body habitus, she might in fact do better with CRT-P pacemaker either with left bundle pacing lead or BiV pacemaker.  Patient is agreeable to receive a pacemaker instead of a defibrillator.      6/4/2024    Patient received a single lead left bundle pacemaker on 6/3/2024, resulting QRS was narrow.  Chest x-ray showed good lead positioning, device interrogation showed appropriate function.  She is set VVIR 70.  There is modest hematoma but no active bleeding.  For now we will hold aspirin and Plavix for about 3 days or so.  Patient will be scheduled for 2-week appointment for wound check and device check.  She also has chronic atrial fibrillation and is not on anticoagulation.  She has multiple comorbid conditions which would make her high risk for bleed.  Perhaps in her case 2.5 twice daily Eliquis without any antiplatelets would be a good choice for anticoagulation.    I discussed the patients findings and my recommendations with patient and agrees with outlined plan.    Zamzam Carrillo MD  06/04/24  08:34  EDT        Addendum    Okay for discharge  Hold aspirin and Plavix for 3 days.  Please prescribe Keflex 500 mg twice daily for 5 days.

## 2024-06-04 NOTE — PLAN OF CARE
Goal Outcome Evaluation:  Plan of Care Reviewed With: patient           Outcome Evaluation: Patient is an 82 y/o F who was admitted to [facility] on 6/1/24 with c/o pain in her side and syncopal episode.  CXR performed and was positive for chronic CHF and pulmonary edema. EKG showing a-fib as well as bradycardia. PMHx includes A-fib, Gout, ESRD (on HD), and DM II.  On 6/3 pt had a left single lead bundle pacemaker.  At baseline pt reports independence at home with use of rollator for mobility/ambulation.  During today's evaluation pt was provided post-op Pacemaker handout and reviewed it with patient.  During bed mobility/transfers pt required cues to reduce too much strenuous use to R arm.  This date pt required CGA to min A with bed mobility, transfers, and gait using FWW.  At this time, PT recommends pt d/c home with grandson and receive  PT.  PT will sign off/complete pt's orders as she is discharging from hospital.      Anticipated Discharge Disposition (PT): home with assist, home with home health

## 2024-06-04 NOTE — PLAN OF CARE
Problem: Adult Inpatient Plan of Care  Goal: Plan of Care Review  6/4/2024 0606 by Stephanie Meyers RN  Outcome: Ongoing, Progressing  Flowsheets (Taken 6/4/2024 0606)  Progress: improving  Plan of Care Reviewed With: patient  6/4/2024 0603 by Stephanie Meyers RN  Outcome: Ongoing, Progressing  Goal: Patient-Specific Goal (Individualized)  6/4/2024 0606 by Stephanie Meyers RN  Outcome: Ongoing, Progressing  6/4/2024 0603 by Stephanie Meyers RN  Outcome: Ongoing, Progressing  Goal: Absence of Hospital-Acquired Illness or Injury  6/4/2024 0606 by Stephanie Meyers RN  Outcome: Ongoing, Progressing  6/4/2024 0603 by Stephanie Meyers RN  Outcome: Ongoing, Progressing  Intervention: Identify and Manage Fall Risk  Recent Flowsheet Documentation  Taken 6/4/2024 0415 by Stephanie Meyers RN  Safety Promotion/Fall Prevention: safety round/check completed  Taken 6/4/2024 0201 by Stephanie Meyers RN  Safety Promotion/Fall Prevention: safety round/check completed  Taken 6/4/2024 0003 by Stephanie Meyers RN  Safety Promotion/Fall Prevention: safety round/check completed  Taken 6/3/2024 2200 by Stephanie Meyers RN  Safety Promotion/Fall Prevention: safety round/check completed  Taken 6/3/2024 2045 by Stephanie Meyers RN  Safety Promotion/Fall Prevention: safety round/check completed  Goal: Optimal Comfort and Wellbeing  6/4/2024 0606 by Stephanie Meyers RN  Outcome: Ongoing, Progressing  6/4/2024 0603 by Stephanie Meyers RN  Outcome: Ongoing, Progressing  Intervention: Provide Person-Centered Care  Recent Flowsheet Documentation  Taken 6/3/2024 2045 by Stephanie Meyers RN  Trust Relationship/Rapport:   care explained   questions answered  Goal: Readiness for Transition of Care  6/4/2024 0606 by Stephanie Meyers RN  Outcome: Ongoing, Progressing  6/4/2024 0603 by Stephanie Meyers RN  Outcome: Ongoing, Progressing     Problem: Skin Injury Risk Increased  Goal: Skin Health and Integrity  6/4/2024 0606  by Stephanie Meyers RN  Outcome: Ongoing, Progressing  6/4/2024 0603 by Stephanie Meyers RN  Outcome: Ongoing, Progressing  Intervention: Optimize Skin Protection  Recent Flowsheet Documentation  Taken 6/3/2024 2045 by Stephanie Meyers RN  Pressure Reduction Techniques: heels elevated off bed     Problem: Fall Injury Risk  Goal: Absence of Fall and Fall-Related Injury  6/4/2024 0606 by Stephanie Meyers RN  Outcome: Ongoing, Progressing  6/4/2024 0603 by Stephanie Meyers RN  Outcome: Ongoing, Progressing  Intervention: Promote Injury-Free Environment  Recent Flowsheet Documentation  Taken 6/4/2024 0415 by Stephanie Meyers RN  Safety Promotion/Fall Prevention: safety round/check completed  Taken 6/4/2024 0201 by Stephanie Meyers RN  Safety Promotion/Fall Prevention: safety round/check completed  Taken 6/4/2024 0003 by Stephanie Meyers RN  Safety Promotion/Fall Prevention: safety round/check completed  Taken 6/3/2024 2200 by Stephanie Meyers RN  Safety Promotion/Fall Prevention: safety round/check completed  Taken 6/3/2024 2045 by Stephanie Meyers RN  Safety Promotion/Fall Prevention: safety round/check completed   Goal Outcome Evaluation:  Plan of Care Reviewed With: patient        Progress: improving

## 2024-06-04 NOTE — DISCHARGE INSTR - ACTIVITY
Activity  If you were given a sedative during the procedure, it can affect you for several hours. Do not drive or operate machinery until your provider says that it is safe.  Return to your normal activities as told by your provider. Ask your provider what activities are safe for you.  You may have to avoid lifting. Ask your provider how much you can safely lift.  Avoid sudden jerking, pulling, or chopping movements that pull your upper arm far away from your body. Do not make these movements for at least 6 weeks or as long as told by your provider.  Do not lift your upper arm above your shoulders for at least 6 weeks or as long as told by your provider. This includes activities like tennis, golf, or swimming.  You may go back to work when your provider says it is okay.

## 2024-06-04 NOTE — PROGRESS NOTES
"                                                                                                                                      Nephrology  Progress Note                                        [facility]    Patient Identification    Name: Deann Warren  Age: 83 y.o.  Sex: female  :  1940  MRN: [id number]      DATE OF SERVICE:  2024        Subective    No new complaint  Status post pacemaker yesterday       Objective   Scheduled Meds:aspirin, 81 mg, Oral, Daily  atorvastatin, 40 mg, Oral, Nightly  clopidogrel, 75 mg, Oral, Daily  folic acid, 1 mg, Oral, Daily  Lidocaine, 1 patch, Transdermal, Q24H  sodium chloride, 10 mL, Intravenous, Q12H  sodium chloride, 10 mL, Intravenous, Q12H          Continuous Infusions:     PRN Meds:  acetaminophen    aluminum-magnesium hydroxide-simethicone    senna-docusate sodium **AND** polyethylene glycol **AND** bisacodyl **AND** bisacodyl    HYDROcodone-acetaminophen    nitroglycerin    ondansetron ODT **OR** ondansetron    [COMPLETED] Insert Peripheral IV **AND** sodium chloride    sodium chloride    sodium chloride     Exam:  /58 (BP Location: Left arm, Patient Position: Lying)   Pulse 70   Temp 97.7 °F (36.5 °C) (Oral)   Resp 14   Ht 162.6 cm (64\")   Wt 56.7 kg (125 lb)   SpO2 98%   BMI 21.46 kg/m²     Intake/Output last 3 shifts:  I/O last 3 completed shifts:  In: 1043.5 [P.O.:876; I.V.:167.5]  Out: 1000     Intake/Output this shift:  No intake/output data recorded.    Physical exam:  General Appearance:  Alert  Head:  Normocephalic, without obvious abnormality, atraumatic  Eyes:  PERRL, conjunctiva/corneas clear     Neck:  Supple,  no adenopathy;      Lungs:  Decreased BS occasion ronchi  Heart:  Regular rate and rhythm, S1 and S2 normal  Abdomen:  Soft, non-tender, bowel sounds active   Extremities: trace edema  Pulses: 2+ and symmetric all extremities  Skin:  No rashes or lesions       Data Review:  All labs (24hrs):   Recent " Results (from the past 24 hour(s))   Basic Metabolic Panel    Collection Time: 06/04/24  5:14 AM    Specimen: Blood   Result Value Ref Range    Glucose 85 65 - 99 mg/dL    BUN 27 (H) 8 - 23 mg/dL    Creatinine 2.69 (H) 0.57 - 1.00 mg/dL    Sodium 137 136 - 145 mmol/L    Potassium 4.2 3.5 - 5.2 mmol/L    Chloride 105 98 - 107 mmol/L    CO2 24.0 22.0 - 29.0 mmol/L    Calcium 8.0 (L) 8.6 - 10.5 mg/dL    BUN/Creatinine Ratio 10.0 7.0 - 25.0    Anion Gap 8.0 5.0 - 15.0 mmol/L    eGFR 17.1 (L) >60.0 mL/min/1.73   CBC Auto Differential    Collection Time: 06/04/24  5:14 AM    Specimen: Blood   Result Value Ref Range    WBC 2.40 (L) 3.40 - 10.80 10*3/mm3    RBC 3.16 (L) 3.77 - 5.28 10*6/mm3    Hemoglobin 9.4 (L) 12.0 - 15.9 g/dL    Hematocrit 30.8 (L) 34.0 - 46.6 %    MCV 97.5 (H) 79.0 - 97.0 fL    MCH 29.7 26.6 - 33.0 pg    MCHC 30.5 (L) 31.5 - 35.7 g/dL    RDW 15.4 12.3 - 15.4 %    RDW-SD 55.3 (H) 37.0 - 54.0 fl    MPV 10.1 6.0 - 12.0 fL    Platelets 55 (L) 140 - 450 10*3/mm3    Neutrophil % 63.8 42.7 - 76.0 %    Lymphocyte % 23.3 19.6 - 45.3 %    Monocyte % 9.6 5.0 - 12.0 %    Eosinophil % 2.5 0.3 - 6.2 %    Basophil % 0.4 0.0 - 1.5 %    Immature Grans % 0.4 0.0 - 0.5 %    Neutrophils, Absolute 1.53 (L) 1.70 - 7.00 10*3/mm3    Lymphocytes, Absolute 0.56 (L) 0.70 - 3.10 10*3/mm3    Monocytes, Absolute 0.23 0.10 - 0.90 10*3/mm3    Eosinophils, Absolute 0.06 0.00 - 0.40 10*3/mm3    Basophils, Absolute 0.01 0.00 - 0.20 10*3/mm3    Immature Grans, Absolute 0.01 0.00 - 0.05 10*3/mm3    nRBC 0.0 0.0 - 0.2 /100 WBC          Imaging:  XR Chest 1 View    Result Date: 6/3/2024  Impression: No significant change after pacemaker placement. Electronically Signed: Won Bravo MD  6/3/2024 6:34 PM EDT  Workstation ID: [id number]    XR Chest 1 View    Result Date: 6/1/2024  Impression: Chronic changes are noted which are stable and suggest chronic changes of CHF and pulmonary edema. Electronically Signed: Nathaniel Harman MD  6/1/2024  2:05 PM EDT  Workstation ID: [id number]     Assessment/Plan:     Syncopal episodes    Sick sinus syndrome    Permanent atrial fibrillation    Bradycardia by electrocardiogram         End-stage renal disease hemodialysis dependent  Syncope  Thrombocytopenia        Recommendations:  Hemodialysis yesterday  pacemaker  Watch volume and electrolytes

## 2024-06-04 NOTE — PLAN OF CARE
Goal Outcome Evaluation:      Reviewed discharge instructions with patient.

## 2024-06-04 NOTE — PLAN OF CARE
Problem: Adult Inpatient Plan of Care  Goal: Plan of Care Review  Outcome: Ongoing, Progressing  Goal: Patient-Specific Goal (Individualized)  Outcome: Ongoing, Progressing  Goal: Absence of Hospital-Acquired Illness or Injury  Outcome: Ongoing, Progressing  Intervention: Identify and Manage Fall Risk  Recent Flowsheet Documentation  Taken 6/4/2024 0415 by Stephanie Myeers RN  Safety Promotion/Fall Prevention: safety round/check completed  Taken 6/4/2024 0201 by Stephanie Meyers RN  Safety Promotion/Fall Prevention: safety round/check completed  Taken 6/4/2024 0003 by Stephanie Meyers RN  Safety Promotion/Fall Prevention: safety round/check completed  Taken 6/3/2024 2200 by Stephanie Meyers RN  Safety Promotion/Fall Prevention: safety round/check completed  Taken 6/3/2024 2045 by Stephanie Meyers RN  Safety Promotion/Fall Prevention: safety round/check completed  Goal: Optimal Comfort and Wellbeing  Outcome: Ongoing, Progressing  Intervention: Provide Person-Centered Care  Recent Flowsheet Documentation  Taken 6/3/2024 2045 by Stephanie Meyers RN  Trust Relationship/Rapport:   care explained   questions answered  Goal: Readiness for Transition of Care  Outcome: Ongoing, Progressing     Problem: Skin Injury Risk Increased  Goal: Skin Health and Integrity  Outcome: Ongoing, Progressing  Intervention: Optimize Skin Protection  Recent Flowsheet Documentation  Taken 6/3/2024 2045 by Stephanie Meyers RN  Pressure Reduction Techniques: heels elevated off bed     Problem: Fall Injury Risk  Goal: Absence of Fall and Fall-Related Injury  Outcome: Ongoing, Progressing  Intervention: Promote Injury-Free Environment  Recent Flowsheet Documentation  Taken 6/4/2024 0415 by Stephanie Meyers RN  Safety Promotion/Fall Prevention: safety round/check completed  Taken 6/4/2024 0201 by Stephanie Meyers RN  Safety Promotion/Fall Prevention: safety round/check completed  Taken 6/4/2024 0003 by Stephanie Meyers  RN  Safety Promotion/Fall Prevention: safety round/check completed  Taken 6/3/2024 2200 by Stephanie Meyers RN  Safety Promotion/Fall Prevention: safety round/check completed  Taken 6/3/2024 2045 by Stephanie Meyers, RN  Safety Promotion/Fall Prevention: safety round/check completed   Goal Outcome Evaluation:  Plan of Care Reviewed With: patient        Progress: improving

## 2024-06-05 ENCOUNTER — READMISSION MANAGEMENT (OUTPATIENT)
Dept: CALL CENTER | Facility: HOSPITAL | Age: 84
End: 2024-06-05
Payer: MEDICARE

## 2024-06-05 NOTE — OUTREACH NOTE
Prep Survey      Flowsheet Row Responses   [employer] facility patient discharged from? [facility]   Is LACE score < 7 ? No   Eligibility Readm Mgmt   Discharge diagnosis Syncopal episodes,  A-fib with slow ventricular response   Does the patient have one of the following disease processes/diagnoses(primary or secondary)? Other   Does the patient have Home health ordered? No   Is there a DME ordered? No   Prep survey completed? Yes            Kiki LEMON - Registered Nurse

## 2024-06-07 ENCOUNTER — TRANSCRIBE ORDERS (OUTPATIENT)
Dept: ADMINISTRATIVE | Facility: HOSPITAL | Age: 84
End: 2024-06-07
Payer: MEDICARE

## 2024-06-07 ENCOUNTER — READMISSION MANAGEMENT (OUTPATIENT)
Dept: CALL CENTER | Facility: HOSPITAL | Age: 84
End: 2024-06-07
Payer: MEDICARE

## 2024-06-07 DIAGNOSIS — R10.9 LEFT FLANK PAIN: Primary | ICD-10-CM

## 2024-06-07 NOTE — OUTREACH NOTE
Medical Week 1 Survey      Flowsheet Row Responses   [facility] facility patient discharged from? [facility]   Does the patient have one of the following disease processes/diagnoses(primary or secondary)? Other   Week 1 attempt successful? No   Unsuccessful attempts Attempt 1            Arely STYLES - Registered Nurse

## 2024-06-10 ENCOUNTER — READMISSION MANAGEMENT (OUTPATIENT)
Dept: CALL CENTER | Facility: HOSPITAL | Age: 84
End: 2024-06-10
Payer: MEDICARE

## 2024-06-10 ENCOUNTER — APPOINTMENT (OUTPATIENT)
Dept: GENERAL RADIOLOGY | Facility: HOSPITAL | Age: 84
DRG: 312 | End: 2024-06-10
Payer: MEDICARE

## 2024-06-10 ENCOUNTER — HOSPITAL ENCOUNTER (INPATIENT)
Facility: HOSPITAL | Age: 84
LOS: 7 days | Discharge: NURSING, REHAB OR LONG-TERM CARE | DRG: 312 | End: 2024-06-17
Attending: EMERGENCY MEDICINE | Admitting: INTERNAL MEDICINE
Payer: MEDICARE

## 2024-06-10 DIAGNOSIS — R55 SYNCOPE, UNSPECIFIED SYNCOPE TYPE: Primary | ICD-10-CM

## 2024-06-10 DIAGNOSIS — K86.89 PANCREATIC MASS: ICD-10-CM

## 2024-06-10 LAB
ANION GAP SERPL CALCULATED.3IONS-SCNC: 12 MMOL/L (ref 5–15)
ANISOCYTOSIS BLD QL: NORMAL
BASOPHILS # BLD AUTO: 0.03 10*3/MM3 (ref 0–0.2)
BASOPHILS NFR BLD AUTO: 1.3 % (ref 0–1.5)
BUN SERPL-MCNC: 41 MG/DL (ref 8–23)
BUN/CREAT SERPL: 13.9 (ref 7–25)
BURR CELLS BLD QL SMEAR: NORMAL
BURR CELLS BLD QL SMEAR: NORMAL
CALCIUM SPEC-SCNC: 7.8 MG/DL (ref 8.6–10.5)
CHLORIDE SERPL-SCNC: 106 MMOL/L (ref 98–107)
CO2 SERPL-SCNC: 17 MMOL/L (ref 22–29)
CREAT SERPL-MCNC: 2.95 MG/DL (ref 0.57–1)
DEPRECATED RDW RBC AUTO: 57.7 FL (ref 37–54)
EGFRCR SERPLBLD CKD-EPI 2021: 15.3 ML/MIN/1.73
EOSINOPHIL # BLD AUTO: 0.09 10*3/MM3 (ref 0–0.4)
EOSINOPHIL NFR BLD AUTO: 3.8 % (ref 0.3–6.2)
ERYTHROCYTE [DISTWIDTH] IN BLOOD BY AUTOMATED COUNT: 15.7 % (ref 12.3–15.4)
FLUAV RNA RESP QL NAA+PROBE: NOT DETECTED
FLUBV RNA RESP QL NAA+PROBE: NOT DETECTED
GLUCOSE SERPL-MCNC: 104 MG/DL (ref 65–99)
HCT VFR BLD AUTO: 29.6 % (ref 34–46.6)
HGB BLD-MCNC: 8.6 G/DL (ref 12–15.9)
HOLD SPECIMEN: NORMAL
IMM GRANULOCYTES # BLD AUTO: 0.02 10*3/MM3 (ref 0–0.05)
IMM GRANULOCYTES NFR BLD AUTO: 0.8 % (ref 0–0.5)
LYMPHOCYTES # BLD AUTO: 0.54 10*3/MM3 (ref 0.7–3.1)
LYMPHOCYTES NFR BLD AUTO: 22.8 % (ref 19.6–45.3)
MCH RBC QN AUTO: 29.5 PG (ref 26.6–33)
MCHC RBC AUTO-ENTMCNC: 29.1 G/DL (ref 31.5–35.7)
MCV RBC AUTO: 101.4 FL (ref 79–97)
MONOCYTES # BLD AUTO: 0.3 10*3/MM3 (ref 0.1–0.9)
MONOCYTES NFR BLD AUTO: 12.7 % (ref 5–12)
NEUTROPHILS NFR BLD AUTO: 1.39 10*3/MM3 (ref 1.7–7)
NEUTROPHILS NFR BLD AUTO: 58.6 % (ref 42.7–76)
NRBC BLD AUTO-RTO: 0 /100 WBC (ref 0–0.2)
PLATELET # BLD AUTO: 71 10*3/MM3 (ref 140–450)
PMV BLD AUTO: 11.3 FL (ref 6–12)
POIKILOCYTOSIS BLD QL SMEAR: NORMAL
POTASSIUM SERPL-SCNC: 3.9 MMOL/L (ref 3.5–5.2)
RBC # BLD AUTO: 2.92 10*6/MM3 (ref 3.77–5.28)
RSV RNA RESP QL NAA+PROBE: NOT DETECTED
SARS-COV-2 RNA RESP QL NAA+PROBE: NOT DETECTED
SMALL PLATELETS BLD QL SMEAR: ADEQUATE
SODIUM SERPL-SCNC: 135 MMOL/L (ref 136–145)
WBC MORPH BLD: NORMAL
WBC NRBC COR # BLD AUTO: 2.37 10*3/MM3 (ref 3.4–10.8)
WHOLE BLOOD HOLD COAG: NORMAL

## 2024-06-10 PROCEDURE — 93005 ELECTROCARDIOGRAM TRACING: CPT

## 2024-06-10 PROCEDURE — 82105 ALPHA-FETOPROTEIN SERUM: CPT | Performed by: NURSE PRACTITIONER

## 2024-06-10 PROCEDURE — 36415 COLL VENOUS BLD VENIPUNCTURE: CPT

## 2024-06-10 PROCEDURE — 82607 VITAMIN B-12: CPT | Performed by: NURSE PRACTITIONER

## 2024-06-10 PROCEDURE — 85025 COMPLETE CBC W/AUTO DIFF WBC: CPT | Performed by: EMERGENCY MEDICINE

## 2024-06-10 PROCEDURE — 87637 SARSCOV2&INF A&B&RSV AMP PRB: CPT | Performed by: INTERNAL MEDICINE

## 2024-06-10 PROCEDURE — 71045 X-RAY EXAM CHEST 1 VIEW: CPT

## 2024-06-10 PROCEDURE — 85007 BL SMEAR W/DIFF WBC COUNT: CPT | Performed by: EMERGENCY MEDICINE

## 2024-06-10 PROCEDURE — 99285 EMERGENCY DEPT VISIT HI MDM: CPT

## 2024-06-10 PROCEDURE — 82746 ASSAY OF FOLIC ACID SERUM: CPT | Performed by: NURSE PRACTITIONER

## 2024-06-10 PROCEDURE — 80074 ACUTE HEPATITIS PANEL: CPT | Performed by: NURSE PRACTITIONER

## 2024-06-10 PROCEDURE — 80048 BASIC METABOLIC PNL TOTAL CA: CPT | Performed by: EMERGENCY MEDICINE

## 2024-06-10 RX ORDER — SODIUM CHLORIDE 0.9 % (FLUSH) 0.9 %
10 SYRINGE (ML) INJECTION AS NEEDED
Status: DISCONTINUED | OUTPATIENT
Start: 2024-06-10 | End: 2024-06-17 | Stop reason: HOSPADM

## 2024-06-10 RX ORDER — AMOXICILLIN 250 MG
2 CAPSULE ORAL 2 TIMES DAILY PRN
Status: DISCONTINUED | OUTPATIENT
Start: 2024-06-10 | End: 2024-06-17 | Stop reason: HOSPADM

## 2024-06-10 RX ORDER — SODIUM CHLORIDE 0.9 % (FLUSH) 0.9 %
10 SYRINGE (ML) INJECTION EVERY 12 HOURS SCHEDULED
Status: DISCONTINUED | OUTPATIENT
Start: 2024-06-10 | End: 2024-06-17 | Stop reason: HOSPADM

## 2024-06-10 RX ORDER — POLYETHYLENE GLYCOL 3350 17 G/17G
17 POWDER, FOR SOLUTION ORAL DAILY PRN
Status: DISCONTINUED | OUTPATIENT
Start: 2024-06-10 | End: 2024-06-17 | Stop reason: HOSPADM

## 2024-06-10 RX ORDER — HYDROCODONE BITARTRATE AND ACETAMINOPHEN 5; 325 MG/1; MG/1
1 TABLET ORAL EVERY 6 HOURS PRN
Status: DISCONTINUED | OUTPATIENT
Start: 2024-06-10 | End: 2024-06-11

## 2024-06-10 RX ORDER — BISACODYL 10 MG
10 SUPPOSITORY, RECTAL RECTAL DAILY PRN
Status: DISCONTINUED | OUTPATIENT
Start: 2024-06-10 | End: 2024-06-17 | Stop reason: HOSPADM

## 2024-06-10 RX ORDER — SODIUM CHLORIDE 9 MG/ML
40 INJECTION, SOLUTION INTRAVENOUS AS NEEDED
Status: DISCONTINUED | OUTPATIENT
Start: 2024-06-10 | End: 2024-06-17 | Stop reason: HOSPADM

## 2024-06-10 RX ORDER — BISACODYL 5 MG/1
5 TABLET, DELAYED RELEASE ORAL DAILY PRN
Status: DISCONTINUED | OUTPATIENT
Start: 2024-06-10 | End: 2024-06-17 | Stop reason: HOSPADM

## 2024-06-10 RX ADMIN — Medication 10 ML: at 11:51

## 2024-06-10 RX ADMIN — Medication 10 ML: at 21:09

## 2024-06-10 RX ADMIN — HYDROCODONE BITARTRATE AND ACETAMINOPHEN 1 TABLET: 5; 325 TABLET ORAL at 22:10

## 2024-06-10 RX ADMIN — HYDROCODONE BITARTRATE AND ACETAMINOPHEN 1 TABLET: 5; 325 TABLET ORAL at 13:36

## 2024-06-10 NOTE — LETTER
"Physicians Statement of Medical Necessity for  Ambulance Transportation    GENERAL INFORMATION     Name: Deann Warren  YOB: 1940  Medicare #: [id number]   Transport Date: 06/17/2024    (Valid for round trips this date, or for scheduled repetitive trips for 60 days from the date signed below.)  Origin: [facility]  Destination: [place]  Is the Patient's stay covered under Medicare Part A (PPS/DRG?)Yes  Closest appropriate facility? No, reason transport to more distant facility is required:    If this a hosp-hosp transfer? No  Is this a hospice patient? No    MEDICAL NECESSITY QUESTIONAIRE    Ambulance Transportation is medically necessary only if other means of transportation are contraindicated or would be potentially harmful to the patient.  To meet this requirement, the patient must be either \"bed confined\" or suffer from a condition such that transport by means other than an ambulance is contraindicated by the patient's condition.  The following questions must be answered by the healthcare professional signing below for this form to be valid:     1) Describe the MEDICAL CONDITION (physical and/or mental) of this patient AT THE TIME OF AMBULANCE TRANSPORT that requires the patient to be transported in an ambulance, and why transport by other means is contraindicated by the patient's condition: BedBound. Coccyx Wound  Past Medical History:   Diagnosis Date   • Allergic conjunctivitis and rhinitis 11/06/2014   • Anemia due to chronic kidney disease, on chronic dialysis 08/25/2020   • Anxiety 07/30/2012   • Bradycardia by electrocardiogram 06/03/2024   • Cardiomyopathy, dilated 04/18/2024   • Chronic gouty arthritis 11/06/2014   • Coronary artery disease involving native coronary artery of native heart without angina pectoris 04/24/2024   • Dependence on renal dialysis 07/01/2022   • ESRD (end stage renal disease) 08/25/2020   • Essential hypertension 09/16/2015   • History of shingles " "01/18/2022   • Hyperlipidemia 04/08/2024   • Ischemic cardiomyopathy 04/18/2024   • Paroxysmal atrial fibrillation 07/01/2022   • Presence of cardiac pacemaker 06/03/2024   • Sick sinus syndrome 06/03/2024   • Type 2 diabetes mellitus with diabetic chronic kidney disease 07/01/2022   • Vitamin D deficiency 07/22/2021      Past Surgical History:   Procedure Laterality Date   • ARTERIOVENOUS FISTULA Left    • CARDIAC CATHETERIZATION N/A 4/25/2024    Procedure: Right and Left Heart Cath;  Surgeon: Dilcia Lui MD;  Location: [facility] CATH INVASIVE LOCATION;  Service: Cardiology;  Laterality: N/A;   • CARDIAC CATHETERIZATION N/A 4/25/2024    Procedure: Percutaneous Coronary Intervention;  Surgeon: Jadiel Blake MD;  Location: [facility] CATH INVASIVE LOCATION;  Service: Cardiology;  Laterality: N/A;   • CARDIAC ELECTROPHYSIOLOGY PROCEDURE N/A 6/3/2024    Procedure: Pacemaker DC new, [employer];  Surgeon: Zamzam Carrillo MD;  Location: [facility] CATH INVASIVE LOCATION;  Service: Cardiovascular;  Laterality: N/A;   • UPPER ENDOSCOPIC ULTRASOUND W/ FNA N/A 6/13/2024    Procedure: ESOPHAGOGASTRODUODENOSCOPY WITH ULTRASOUND AND FINE NEEDLE ASPIRATION of pancreatic cyst and forcep biopsy x2 areas;  Surgeon: Cesia Hyde MD;  Location: [facility] ENDOSCOPY;  Service: Gastroenterology;  Laterality: N/A;      2) Is this patient \"bed confined\" as defined below?Yes   To be \"bed confined\" the patient must satisfy all three of the following criteria:  (1) unable to get up from bed without assistance; AND (2) unable to ambulate;  AND (3) unable to sit in a chair or wheelchair.  3) Can this patient safely be transported by car or wheelchair van (I.e., may safely sit during transport, without an attendant or monitoring?)No   4. In addition to completing questions 1-3 above, please check any of the following conditions that apply*:          *Note: supporting documentation for any boxes checked must be maintained in the patient's medical " records Moderate/severe pain on movement, Unable to tolerate seated position for time needed to transport, and Unable to sit in a chair or wheelchair due to decubitus ulcers or other wounds      SIGNATURE OF PHYSICIAN OR OTHER AUTHORIZED HEALTHCARE PROFESSIONAL    I certify that the above information is true and correct based on my evaluation of this patient, and represent that the patient requires transport by ambulance and that other forms of transport are contraindicated.  I understand that this information will be used by the Centers for Medicare and Medicaid Services (CMS) to support the determiniation of medical necessity for ambulance services, and I represent that I have personal knowledge of the patient's condition at the time of transport.       If this box is checked, I also certify that the patient is physically or mentally incapable of signing the ambulance service's claim form and that the institution with which I am affiliated has furnished care, services or assistance to the patient.  My signature below is made on behalf of the patient pursuant to 42 .36(b)(4). In accordance with 42 .37, the specific reason(s) that the patient is physically or mentally incapable of signing the claim for is as follows:     Signature of Physician or Healthcare Professional   TORV: Dr. Jurado/ Jeferson KIM Date/Time:   06/17/2024     (For Scheduled repetitive transport, this form is not valid for transports performed more than 60 days after this date).                                                                                                                                            --------------------------------------------------------------------------------------------  Printed Name and Credentials of Physician or Authorized Healthcare Professional     *Form must be signed by patient's attending physician for scheduled, repetitive transports,.  For non-repetitive ambulance transports, if unable  to obtain the signature of the attending physician, any of the following may sign (please select below):     Physician  Clinical Nurse Specialist  Registered Nurse X    Physician Assistant  Discharge Planner  Licensed Practical Nurse     Nurse Practitioner   X

## 2024-06-10 NOTE — ED PROVIDER NOTES
Subjective   History of Present Illness  Patient is an 83-year-old female who was at dialysis this morning when she had a syncopal episode with reported bradycardia per EMS.  Patient's only complaint is generalized weakness.  She did not receive her dialysis this a.m.  She states she does not recall losing consciousness.      Review of Systems    Past Medical History:   Diagnosis Date    Allergic conjunctivitis and rhinitis 11/06/2014    Anemia due to chronic kidney disease, on chronic dialysis 08/25/2020    Anxiety 07/30/2012    Chronic gouty arthritis 11/06/2014    Dependence on renal dialysis 07/01/2022    ESRD (end stage renal disease) 08/25/2020    Essential hypertension 09/16/2015    History of shingles 01/18/2022    Hyperlipidemia 04/08/2024    Paroxysmal atrial fibrillation 07/01/2022    Type 2 diabetes mellitus with diabetic chronic kidney disease 07/01/2022    Vitamin D deficiency 07/22/2021       Allergies   Allergen Reactions    Heparin Hives       Past Surgical History:   Procedure Laterality Date    ARTERIOVENOUS FISTULA Left     CARDIAC CATHETERIZATION N/A 4/25/2024    Procedure: Right and Left Heart Cath;  Surgeon: Dilcia Lui MD;  Location: [facility] CATH INVASIVE LOCATION;  Service: Cardiology;  Laterality: N/A;    CARDIAC CATHETERIZATION N/A 4/25/2024    Procedure: Percutaneous Coronary Intervention;  Surgeon: Jadiel Blake MD;  Location: [facility] CATH INVASIVE LOCATION;  Service: Cardiology;  Laterality: N/A;    CARDIAC ELECTROPHYSIOLOGY PROCEDURE N/A 6/3/2024    Procedure: Pacemaker DC new, [employer];  Surgeon: Zamzam Carrillo MD;  Location: [facility] CATH INVASIVE LOCATION;  Service: Cardiovascular;  Laterality: N/A;       Family History   Family history unknown: Yes       Social History     Socioeconomic History    Marital status:    Tobacco Use    Smoking status: Never    Smokeless tobacco: Never   Vaping Use    Vaping status: Never Used   Substance and Sexual Activity    Alcohol use:  Never    Drug use: Never    Sexual activity: Defer           Objective   Physical Exam  Neurologic exam shows patient be alert and noted x 3.  She has no focal neurologic deficits.  Neck has no adenopathy JVD or bruits.  Lungs clear.  Heart has regular rate rhythm without murmur rub or gallop.  Chest is mildly tender patient right upper chest wall where she had recent pacemaker implant.  Abdomen is soft nontender.  Extremity exam unremarkable.  Procedures     My EKG interpretation shows a paced rhythm at a rate of 70 with no acute ST change      ED Course      Results for orders placed or performed during the hospital encounter of 06/10/24   Basic Metabolic Panel    Specimen: Blood   Result Value Ref Range    Glucose 104 (H) 65 - 99 mg/dL    BUN 41 (H) 8 - 23 mg/dL    Creatinine 2.95 (H) 0.57 - 1.00 mg/dL    Sodium 135 (L) 136 - 145 mmol/L    Potassium 3.9 3.5 - 5.2 mmol/L    Chloride 106 98 - 107 mmol/L    CO2 17.0 (L) 22.0 - 29.0 mmol/L    Calcium 7.8 (L) 8.6 - 10.5 mg/dL    BUN/Creatinine Ratio 13.9 7.0 - 25.0    Anion Gap 12.0 5.0 - 15.0 mmol/L    eGFR 15.3 (L) >60.0 mL/min/1.73   CBC Auto Differential    Specimen: Blood   Result Value Ref Range    WBC 2.37 (L) 3.40 - 10.80 10*3/mm3    RBC 2.92 (L) 3.77 - 5.28 10*6/mm3    Hemoglobin 8.6 (L) 12.0 - 15.9 g/dL    Hematocrit 29.6 (L) 34.0 - 46.6 %    .4 (H) 79.0 - 97.0 fL    MCH 29.5 26.6 - 33.0 pg    MCHC 29.1 (L) 31.5 - 35.7 g/dL    RDW 15.7 (H) 12.3 - 15.4 %    RDW-SD 57.7 (H) 37.0 - 54.0 fl    MPV 11.3 6.0 - 12.0 fL    Platelets 71 (L) 140 - 450 10*3/mm3    Neutrophil % 58.6 42.7 - 76.0 %    Lymphocyte % 22.8 19.6 - 45.3 %    Monocyte % 12.7 (H) 5.0 - 12.0 %    Eosinophil % 3.8 0.3 - 6.2 %    Basophil % 1.3 0.0 - 1.5 %    Immature Grans % 0.8 (H) 0.0 - 0.5 %    Neutrophils, Absolute 1.39 (L) 1.70 - 7.00 10*3/mm3    Lymphocytes, Absolute 0.54 (L) 0.70 - 3.10 10*3/mm3    Monocytes, Absolute 0.30 0.10 - 0.90 10*3/mm3    Eosinophils, Absolute 0.09 0.00 - 0.40  10*3/mm3    Basophils, Absolute 0.03 0.00 - 0.20 10*3/mm3    Immature Grans, Absolute 0.02 0.00 - 0.05 10*3/mm3    nRBC 0.0 0.0 - 0.2 /100 WBC   Scan Slide    Specimen: Blood   Result Value Ref Range    Anisocytosis Slight/1+ None Seen    Garrison Cells Slight/1+ None Seen    Crenated RBC's Slight/1+ None Seen    Poikilocytes Slight/1+ None Seen    WBC Morphology Normal Normal    Platelet Estimate Adequate Normal   ECG 12 Lead Syncope   Result Value Ref Range    QT Interval 436 ms    QTC Interval 473 ms   Gold Top - SST   Result Value Ref Range    Extra Tube Hold for add-ons.    Light Blue Top   Result Value Ref Range    Extra Tube Hold for add-ons.      XR Chest 1 View    Result Date: 6/10/2024  Impression: 1.Cardiomegaly with pulmonary vascular congestion. 2.Left basilar atelectasis or infiltrate. Electronically Signed: Armando Gonzalez MD  6/10/2024 10:43 AM EDT  Workstation ID: [id number]                                          Medical Decision Making  My chest x-ray interpretation is cardiomegaly with congestive failure changes.  There is no acute infiltrate or other abnormality.  BMP shows chronic renal failure unchanged from baseline.  Patient mildly acidotic with CO2 of 17.  Patient has leukopenia with a white count of 2.3 thousand.  Patient has no left shift and is chronically anemic.  I did speak the on-call hospitalist.  Patient be admitted for further cardiac evaluation and dialysis.    Amount and/or Complexity of Data Reviewed  Labs: ordered. Decision-making details documented in ED Course.  Radiology: ordered and independent interpretation performed.  ECG/medicine tests: ordered and independent interpretation performed.    Risk  Prescription drug management.  Decision regarding hospitalization.        Final diagnoses:   Syncope, unspecified syncope type       ED Disposition  ED Disposition       ED Disposition   Decision to Admit    Condition   --    Comment   Level of Care: Telemetry [5]   Diagnosis:  Syncope [551981]   Admitting Physician: ELEAZAR GRANADOS [[id number]]   Attending Physician: ELEAZAR GRANADOS [[id number]]   Isolate for COVID?: No [0]   Certification: I Certify That Inpatient Hospital Services Are Medically Necessary For Greater Than 2 Midnights                 No follow-up provider specified.       Medication List        Stop      cephalexin 500 MG capsule  Commonly known as: Cisco Lopez MD  06/10/24 [id number]

## 2024-06-10 NOTE — LETTER
EMS Transport Request  For use at [facility], [place], [place], [place], and [place] only   Patient Name: Deann Warren : 1940   Weight:56.7 kg (125 lb) Pick-up Location: [facility] ([facility]) BLS/ALS: BLS/ALS: BLS   Insurance: [employer] MEDICARE REPLACEMENT Auth End Date: 2024   Pre-Cert #: D/C Summary complete:    Destination: Other [facility]   Contact Precautions: None   Equipment (O2, Fluids, etc.): None   Arrive By Date/Time: 2024 Stretcher/WC: Stretcher   CM Requesting: Kayla Hernandez RN Ext: [phone]   Notes/Medical Necessity:      ______________________________________________________________________    *Only 2 patient bags OR 1 carry-on size bag are permitted.  Wheelchairs and walkers CANNOT transported with the patient. Acknowledge: Yes

## 2024-06-10 NOTE — CASE MANAGEMENT/SOCIAL WORK
Discharge Planning Assessment   [place]     Patient Name: Deann Warren  MRN: [id number]  Today's Date: 6/10/2024    Admit Date: 6/10/2024    Plan: From home, PT/OT pending.   Discharge Needs Assessment       Row Name 06/10/24 1616       Living Environment    People in Home grandchild(vamsi)  Lives with Grandson Donaldo    Name(s) of People in Home Donaldo Grandson    Current Living Arrangements home    Potentially Unsafe Housing Conditions none    In the past 12 months has the electric, gas, oil, or water company threatened to shut off services in your home? No    Primary Care Provided by self    Provides Primary Care For no one    Family Caregiver if Needed grandchild(vamsi), adult  Lives with Grandson Donaldo    Family Caregiver Names Donaldo, grandson    Quality of Family Relationships helpful;involved;supportive    Able to Return to Prior Arrangements yes       Resource/Environmental Concerns    Resource/Environmental Concerns none       Transportation Needs    In the past 12 months, has lack of transportation kept you from medical appointments or from getting medications? no    In the past 12 months, has lack of transportation kept you from meetings, work, or from getting things needed for daily living? No       Food Insecurity    Within the past 12 months, you worried that your food would run out before you got the money to buy more. Never true    Within the past 12 months, the food you bought just didn't last and you didn't have money to get more. Never true       Transition Planning    Patient/Family Anticipates Transition to home with family    Transportation Anticipated family or friend will provide  Grandson Donaldo can transport       Discharge Needs Assessment    Current Outpatient/Agency/Support Group outpatient hemodialysis;other (see comments)  Current with [facility]. Current with [facility]    Equipment Currently Used at Home glucometer;rollator;shower chair                Discharge Plan       Row Name  06/10/24 1625       Plan    Plan From home, PT/OT pending.    Plan Comments CM spoke to pt. at bedside. PCP and pharmacy confirmed. Pt. denies financial, transportation, or medication issues. Pt. declined M2B. Pt. lives in son Tai's house with her 40 yr old grandson Donaldo. Ana Monsivais pt's hairdresser does pt's shopping, house cleaning, and takes pt to her appt's. Pt. is current with [facility] Monday and Friday and transport there per [employer].              Demographic Summary       Row Name 06/10/24 1602       General Information    Admission Type inpatient    Arrived From home    Required Notices Provided Important Message from Medicare    Referral Source admission list    Reason for Consult discharge planning    Preferred Language English       Contact Information    Permission Granted to Share Info With     Contact Information Obtained for                 Functional Status       Row Name 06/10/24 1603       Functional Status    Usual Activity Tolerance moderate    Current Activity Tolerance fair       Physical Activity    On average, how many days per week do you engage in moderate to strenuous exercise (like a brisk walk)? 7 days    On average, how many minutes do you engage in exercise at this level? 60 min    Number of minutes of exercise per week 420       Functional Status, IADL    Medications independent    Meal Preparation assistive person  [facility] [place]    Housekeeping assistive person  [facility] [place]    Laundry assistive person  Friend Ana Monsivais    Shopping assistive person  Friend Ana Monsivais                Patient Forms       Row Name 06/10/24 1624       Patient Forms    Important Message from Medicare (IMM) Delivered  per registration 6/10/2024                      Social Determinants of Health     Tobacco Use: Low Risk  (6/1/2024)    Patient History     Smoking Tobacco Use: Never     Smokeless Tobacco Use: Never     Passive Exposure: Not on file   Alcohol Use: Not At  Risk (6/10/2024)    AUDIT-C     Frequency of Alcohol Consumption: Never     Average Number of Drinks: Patient does not drink     Frequency of Binge Drinking: Never   Financial Resource Strain: Low Risk  (6/10/2024)    Overall Financial Resource Strain (CARDIA)     Difficulty of Paying Living Expenses: Not hard at all   Food Insecurity: No Food Insecurity (6/10/2024)    Hunger Vital Sign     Worried About Running Out of Food in the Last Year: Never true     Ran Out of Food in the Last Year: Never true   Transportation Needs: No Transportation Needs (6/10/2024)    PRAPARE - Transportation     Lack of Transportation (Medical): No     Lack of Transportation (Non-Medical): No   Physical Activity: Sufficiently Active (6/10/2024)    Exercise Vital Sign     Days of Exercise per Week: 7 days     Minutes of Exercise per Session: 60 min   Stress: No Stress Concern Present (6/10/2024)    [employer] - Occupational Stress Questionnaire     Feeling of Stress : Not at all   Social Connections: Not At Risk (6/10/2024)    Family and Community Support     Help with Day-to-Day Activities: I don't need any help     Lonely or Isolated: Never   Interpersonal Safety: Not At Risk (6/10/2024)    Abuse Screen     Unsafe at Home or Work/School: no     Feels Threatened by Someone?: no     Does Anyone Keep You from Contacting Others or Doint Things Outside the Home?: no     Physical Sign of Abuse Present: no   Depression: Not at risk (6/10/2024)    PHQ-2     PHQ-2 Score: 0   Housing Stability: Not At Risk (6/10/2024)    Housing Stability     Current Living Arrangements: home     Potentially Unsafe Housing Conditions: none   Utilities: Not At Risk (6/10/2024)    [facility] Utilities     Threatened with loss of utilities: No   Health Literacy: Not At Risk (6/10/2024)    Education     Help with school or training?: No     Preferred Language: English   Employment: Not At Risk (6/10/2024)    Employment     Do you want help  finding or keeping work or a job?: I do not need or want help   Disabilities: Not At Risk (6/10/2024)    Disabilities     Concentrating, Remembering, or Making Decisions Difficulty: no     Doing Errands Independently Difficulty: no     Christina Schultz RN    Office: [phone]  Fax: [phone]  [email or web address]

## 2024-06-10 NOTE — H&P
[facility] Medicine Services  History & Physical    Patient Name: Deann Warren  : 1940  MRN: [id number]  Primary Care Physician:  Antonino Shen MD  Date of admission: 6/10/2024  Date and Time of Service: 6/10/2024 at 12:24 PM EDT     Subjective      Chief Complaint: Syncope    History of Present Illness: Deann Warren is a 83 y.o. female with a CMH of thrombocytopenia, sick sinus syndrome, atrial fibrillation, LBBB, cardiomyopathy with a EF of 35%.  Multivessel CAD involving left main, LAD and circumflex.  Pacemaker, HLD, ESRD (left fistula) who presented to [facility] on 6/10/2024 with syncope.  Patient was in route to receive dialysis, patient states that when she was walking into the door she felt weak and unsteady, patient to the seat is out of the office where office staff noted patient was not looking good and had poor head control.  No signs were obtained at the doctor's office, and were stable.  However dialysis was placed on hold, and patient was transferred here via EMS.  Of note patient had Medtronic pacemaker recently placed on 6/3/2024.  Patient states that she has been feeling generalized weakness since placement.  Patient states she takes her medicine as prescribed, good p.o. intake and urine output.  Patient has been admitted to the hospital for further cardiac syncope workup    Review of Systems   Constitutional:  Positive for activity change and fatigue. Negative for appetite change and chills.   HENT: Negative.     Respiratory: Negative.     Cardiovascular: Negative.    Gastrointestinal: Negative.    Endocrine: Positive for cold intolerance.   Genitourinary: Negative.    Musculoskeletal:  Positive for back pain.   Skin: Negative.    Allergic/Immunologic: Negative.    Neurological:  Positive for syncope and weakness.   Hematological:  Bruises/bleeds easily.   Psychiatric/Behavioral: Negative.         Personal History     Past Medical History:   Diagnosis Date    Allergic  conjunctivitis and rhinitis 11/06/2014    Anemia due to chronic kidney disease, on chronic dialysis 08/25/2020    Anxiety 07/30/2012    Chronic gouty arthritis 11/06/2014    Dependence on renal dialysis 07/01/2022    ESRD (end stage renal disease) 08/25/2020    Essential hypertension 09/16/2015    History of shingles 01/18/2022    Hyperlipidemia 04/08/2024    Paroxysmal atrial fibrillation 07/01/2022    Type 2 diabetes mellitus with diabetic chronic kidney disease 07/01/2022    Vitamin D deficiency 07/22/2021       Past Surgical History:   Procedure Laterality Date    ARTERIOVENOUS FISTULA Left     CARDIAC CATHETERIZATION N/A 4/25/2024    Procedure: Right and Left Heart Cath;  Surgeon: Dilcia Lui MD;  Location:  [facility];  Service: Cardiology;  Laterality: N/A;    CARDIAC CATHETERIZATION N/A 4/25/2024    Procedure: Percutaneous Coronary Intervention;  Surgeon: Jadiel Blake MD;  Location:  [facility] C[facility] INVASIVE LOCATION;  Service: Cardiology;  Laterality: N/A;    CARDIAC ELECTROPHYSIOLOGY PROCEDURE N/A 6/3/2024    Procedure: Pacemaker DC new, Medtronic;  Surgeon: Zamzam Carrillo MD;  Location:  [facility] C[facility] INVASIVE LOCATION;  Service: Cardiovascular;  Laterality: N/A;       Family History: Family history is unknown by patient. Otherwise pertinent FHx was reviewed and not pertinent to current issue.    Social History:  reports that she has never smoked. She has never used smokeless tobacco. She reports that she does not drink alcohol and does not use drugs.    Home Medications:  Prior to Admission Medications       Prescriptions Last Dose Informant Patient Reported? Taking?    acetaminophen (TYLENOL) 500 MG tablet   Yes No    Take 1 tablet by mouth Every 6 (Six) Hours As Needed for Mild Pain.    aspirin 81 MG EC tablet   No No    Take 1 tablet by mouth Daily.    atorvastatin (LIPITOR) 40 MG tablet   No No    Take 1 tablet by mouth Daily.    cephalexin (Keflex) 500 MG capsule   No No    Take  1 capsule by mouth 2 (Two) Times a Day for 5 days.    Cholecalciferol (Vitamin D3) 50 MCG (2000 UT) tablet   Yes No    Take  by mouth Daily.    clopidogrel (PLAVIX) 75 MG tablet   No No    Take 1 tablet by mouth Daily.    febuxostat (ULORIC) 40 MG tablet   Yes No    Take 1 tablet by mouth Daily.    folic acid (FOLVITE) 1 MG tablet   No No    Take 1 tablet by mouth Daily.    metoprolol succinate XL (TOPROL-XL) 25 MG 24 hr tablet   No No    Take 0.5 tablets by mouth Daily.    NON FORMULARY   Yes No    Apply 1 dose topically to the appropriate area as directed 3 (Three) Times a Week. Lidocaine 2.5% ointment -  Apply 1 application Monday, Wednesday, Friday before dialysis              Allergies:  Allergies   Allergen Reactions    Heparin Hives       Objective      Vitals:   Temp:  [97.4 °F (36.3 °C)] 97.4 °F (36.3 °C)  Heart Rate:  [70-81] 70  Resp:  [20] 20  BP: (104-121)/(48-65) 104/48  Body mass index is 21.46 kg/m².  Physical Exam    PHYSICAL EXAM  Constitutional:  Well developed, well nourished, no acute distress, non-toxic appearance   Eyes:  PERRL, conjunctiva normal, EOMI   HENT:  Atraumatic, external ears normal, nose normal, oropharynx moist, no pharyngeal exudates. Neck-normal range of motion, no tenderness, supple, trachea midline  Respiratory:  ctab, non-labored respirations without accessory muscle use  Cardiovascular:  Normal rate, normal rhythm, no murmurs, no gallops, no rubs   GI:  Soft, nondistended, normal bowel sounds, nontender, no organomegaly, no mass, no rebound, no guarding   :  No costovertebral angle tenderness   Musculoskeletal:  No edema, tenderness noted to T10-L1.  No deformities  Integument:  Well hydrated, no rash.  General pallor multiple bruising noted  Lymphatic:  No lymphadenopathy noted   Neurologic:  Alert & oriented x 3, CN 2-12 normal, normal motor function, normal sensory function, no focal deficits noted   Psychiatric:  Speech and behavior appropriate      Diagnostic  Data:  Lab Results (last 24 hours)       Procedure Component Value Units Date/Time    Basic Metabolic Panel [[id number]]  (Abnormal) Collected: 06/10/24 1017    Specimen: Blood Updated: 06/10/24 1054     Glucose 104 mg/dL      BUN 41 mg/dL      Creatinine 2.95 mg/dL      Sodium 135 mmol/L      Potassium 3.9 mmol/L      Comment: Specimen hemolyzed.  Result may be falsely elevated.        Chloride 106 mmol/L      CO2 17.0 mmol/L      Calcium 7.8 mg/dL      BUN/Creatinine Ratio 13.9     Anion Gap 12.0 mmol/L      eGFR 15.3 mL/min/1.73     Narrative:      GFR Normal >60  Chronic Kidney Disease <60  Kidney Failure <15    The GFR formula is only valid for adults with stable renal function between ages 18 and 70.    CBC & Differential [[id number]]  (Abnormal) Collected: 06/10/24 1017    Specimen: Blood Updated: 06/10/24 1044    Narrative:      The following orders were created for panel order CBC & Differential.  Procedure                               Abnormality         Status                     ---------                               -----------         ------                     CBC Auto Differential[[id number]]        Abnormal            Final result               Scan Slide[[id number]]                                       Final result                 Please view results for these tests on the individual orders.    CBC Auto Differential [[id number]]  (Abnormal) Collected: 06/10/24 1017    Specimen: Blood Updated: 06/10/24 1044     WBC 2.37 10*3/mm3      RBC 2.92 10*6/mm3      Hemoglobin 8.6 g/dL      Hematocrit 29.6 %      .4 fL      MCH 29.5 pg      MCHC 29.1 g/dL      RDW 15.7 %      RDW-SD 57.7 fl      MPV 11.3 fL      Platelets 71 10*3/mm3      Neutrophil % 58.6 %      Lymphocyte % 22.8 %      Monocyte % 12.7 %      Eosinophil % 3.8 %      Basophil % 1.3 %      Immature Grans % 0.8 %      Neutrophils, Absolute 1.39 10*3/mm3      Lymphocytes, Absolute 0.54 10*3/mm3      Monocytes, Absolute 0.30 10*3/mm3       Eosinophils, Absolute 0.09 10*3/mm3      Basophils, Absolute 0.03 10*3/mm3      Immature Grans, Absolute 0.02 10*3/mm3      nRBC 0.0 /100 WBC     Scan Slide [[id number]] Collected: 06/10/24 1017    Specimen: Blood Updated: 06/10/24 1044     Anisocytosis Slight/1+     Distant Cells Slight/1+     Crenated RBC's Slight/1+     Poikilocytes Slight/1+     WBC Morphology Normal     Platelet Estimate Adequate    Extra Tubes [[id number]] Collected: 06/10/24 1017    Specimen: Blood, Venous Line Updated: 06/10/24 1030    Narrative:      The following orders were created for panel order Extra Tubes.  Procedure                               Abnormality         Status                     ---------                               -----------         ------                     Gold Top - SST[[id number]]                                   Final result               Light Blue Top[[id number]]                                   Final result                 Please view results for these tests on the individual orders.    Gold Top - SST [[id number]] Collected: 06/10/24 1017    Specimen: Blood Updated: 06/10/24 1030     Extra Tube Hold for add-ons.     Comment: Auto resulted.       Light Blue Top [[id number]] Collected: 06/10/24 1017    Specimen: Blood Updated: 06/10/24 1030     Extra Tube Hold for add-ons.     Comment: Auto resulted                Imaging Results (Last 24 Hours)       Procedure Component Value Units Date/Time    XR Chest 1 View [[id number]] Collected: 06/10/24 1041     Updated: 06/10/24 1045    Narrative:      XR CHEST 1 VW    Date of Exam: 6/10/2024 10:38 AM EDT    Indication: Chest pain    Comparison: 6/3/2024    Findings:  Right chest wall pacemaker is present. Cardiac silhouette is enlarged. Perihilar and bibasilar opacities may represent edema or mild infiltrates. Left basilar atelectasis is seen. No pneumothorax is seen. No acute osseous lesion is seen. There are   senescent changes of the aorta and skeletal structures.       Impression:      Impression:  1.Cardiomegaly with pulmonary vascular congestion.  2.Left basilar atelectasis or infiltrate.      Electronically Signed: Armando Gonzalez MD    6/10/2024 10:43 AM EDT    Workstation ID: [id number]              Assessment & Plan        This is a 83 y.o. female with:    Active and Resolved Problems  Active Hospital Problems    Diagnosis  POA    **Syncope [R55]  Yes      Resolved Hospital Problems   No resolved problems to display.       Syncope  - Last echo 4/9/2024 EF 35%  - H&H 8.6/29.6  - Creatinine 2.95.  Patient baseline 3.0  - EKG noted for ventricular paced rhythm, LBBB, with significant change in rhythm from previous EKG.  - Cardiology consulted  - Cardiology EP consulted    Thrombocytopenia  - Platelets 71  - Iron study panel pending    ESRD  - Dialysis schedule Monday Wednesday Friday.  - Followed by Dr. Buchanan  - BUN/creatinine 41/2.95  - Plan is for patient to have dialysis today 6/10/2024    Atrial fibrillation  - Resume home regimen of aspirin 81 mg    HLD  - Continue home regimen    DVT prophylaxis:  Mechanical DVT prophylaxis orders are present.        The patient desires to be as follows:    CODE STATUS:    Level Of Support Discussed With: Patient  Code Status (Patient has no pulse and is not breathing): CPR (Attempt to Resuscitate)  Medical Interventions (Patient has pulse or is breathing): Full Support        Antonino Warren, who can be contacted at [phone], is the designated person to make medical decisions on the patient's behalf if She is incapable of doing so. This was clarified with patient and/or next of kin on 6/10/2024 during the course of this H&P.    Admission Status:  I believe this patient meets patient status.    Expected Length of Stay: 2 days    PDMP and Medication Dispenses via Sidebar reviewed and consistent with patient reported medications.    I discussed the patient's findings and my recommendations with patient and family.      Signature:     This  document has been electronically signed by LUCA Morales on Faina 10, 2024 12:24 EDT   [facility] Hospitalist Team

## 2024-06-10 NOTE — CASE MANAGEMENT/SOCIAL WORK
Case Management Readmission Assessment Note    Case Management Readmission Assessment (all recorded)       Readmission Interview       [facility] Name 06/10/24 1605             Readmission Indications    Is the patient and/or family able to complete the readmission assessment questions? Yes      Is this hospitalization related to the prior hospital diagnosis? No        [facility] Name 06/10/24 1605             Recommendation for rehospitalization    Did you speak with your physician prior to coming to the hospital No        [facility] Name 06/10/24 1605             Follow-up Appointments    Do you have a PCP? Yes      Did you have an appointment with PCP after your hospitalization? No      Did you have an appointment with a Specialist? Yes      When was your appointment scheduled? 06/20/24      Are you current with the Pulmonary Clinic? No      Are you current with the CHF Clinic? No        [facility] Name 06/10/24 1605             Medications    Did you have newly prescribed medications at discharge? No      Did you understand the reasons for your medications at discharge and how to take them? No  N/A      Did you understand the side effects of your medications? No  N/A      Are you taking all of you prescribed medications? No      What pharmacy was used to fill prescription(s)? N/A      Were medications picked up? No  N/A        [facility] Name 06/10/24 1605             Discharge Instructions    Did you understand your discharge instructions? Yes      Did your family/caregiver hear your instructions? Yes      Were you told to eat a special diet? Yes      Did you adhere to the diet? Yes      Were you given a number of someone to call if you had questions or concerns? Yes        [facility] Name 06/10/24 1605             Index discharge location/services    Where did you go upon discharge? Home        [facility] Name 06/10/24 1605             Discharge Readiness    On a scale of 1-5 (5 being well prepared), how ready were you for discharge 5        [facility] Name  06/10/24 1605             Palliative Care/Hospice    Are you current with Palliative Care? No      Are you current with Hospice Care? No        [facility] Name 06/10/24 1605             Advance Directives (For Healthcare)    Pre-existing AND/MOST/POLST Order No      Have you reviewed your Advance Directive and is it valid for this stay? No      Literature Provided on Advance Directives No                  Christina Schultz RN    Office: [phone]  Fax: [phone]  [email or web address]

## 2024-06-10 NOTE — CONSULTS
Nephrology Consult Note                                                [facility]      Patient Identification:  Name: Deann Warren  Age: 83 y.o.  Sex: female  :  1940  MRN: [id number]               Requesting Physician: No admitting provider for patient encounter.  Reason for Consultation: management recommendations      History of Present Illness:     Patient is an 83-year-old white female patient who presented to our dialysis unit today and was not  not looking good in and out uncontrolled she was unable to hold her head up   Blood pressure and heart rate were acceptable but she did not look stable enough to dialyze her we sent her here for evaluation  Problem List:    * No active hospital problems. *     Past Medical History:  Past Medical History:   Diagnosis Date    Allergic conjunctivitis and rhinitis 2014    Anemia due to chronic kidney disease, on chronic dialysis 2020    Anxiety 2012    Chronic gouty arthritis 2014    Dependence on renal dialysis 2022    ESRD (end stage renal disease) 2020    Essential hypertension 2015    History of shingles 2022    Hyperlipidemia 2024    Paroxysmal atrial fibrillation 2022    Type 2 diabetes mellitus with diabetic chronic kidney disease 2022    Vitamin D deficiency 2021     Past Surgical History:  Past Surgical History:   Procedure Laterality Date    ARTERIOVENOUS FISTULA Left     CARDIAC CATHETERIZATION N/A 2024    Procedure: Right and Left Heart Cath;  Surgeon: Dilcia Lui MD;  Location: [facility] CATH INVASIVE LOCATION;  Service: Cardiology;  Laterality: N/A;    CARDIAC CATHETERIZATION N/A 2024    Procedure: Percutaneous Coronary Intervention;  Surgeon: Jadiel Blake MD;  Location:  [facility] INVASIVE LOCATION;  Service: Cardiology;  Laterality: N/A;    CARDIAC ELECTROPHYSIOLOGY PROCEDURE N/A 6/3/2024    Procedure: Pacemaker DC new, [employer]tr[employer];   "Surgeon: Zamzam Carrillo MD;  Location: [facility] CATH INVASIVE LOCATION;  Service: Cardiovascular;  Laterality: N/A;      Home Meds:  (Not in a hospital admission)    Current Meds:     Current Facility-Administered Medications:     [COMPLETED] Insert Peripheral IV, , , Once **AND** sodium chloride 0.9 % flush 10 mL, 10 mL, Intravenous, PRN, Cisco Leal MD    Current Outpatient Medications:     acetaminophen (TYLENOL) 500 MG tablet, Take 1 tablet by mouth Every 6 (Six) Hours As Needed for Mild Pain., Disp: , Rfl:     aspirin 81 MG EC tablet, Take 1 tablet by mouth Daily., Disp: , Rfl:     atorvastatin (LIPITOR) 40 MG tablet, Take 1 tablet by mouth Daily., Disp: 90 tablet, Rfl: 3    Cholecalciferol (Vitamin D3) 50 MCG (2000 UT) tablet, Take  by mouth Daily., Disp: , Rfl:     clopidogrel (PLAVIX) 75 MG tablet, Take 1 tablet by mouth Daily., Disp: , Rfl:     febuxostat (ULORIC) 40 MG tablet, Take 1 tablet by mouth Daily., Disp: , Rfl:     folic acid (FOLVITE) 1 MG tablet, Take 1 tablet by mouth Daily., Disp: 30 tablet, Rfl: 0    metoprolol succinate XL (TOPROL-XL) 25 MG 24 hr tablet, Take 0.5 tablets by mouth Daily., Disp: 30 tablet, Rfl: 0    NON FORMULARY, Apply 1 dose topically to the appropriate area as directed 3 (Three) Times a Week. Lidocaine 2.5% ointment -  Apply 1 application Monday, Wednesday, Friday before dialysis, Disp: , Rfl:     Allergies:  Allergies   Allergen Reactions    Heparin Hives     Social History:   Social History     Tobacco Use    Smoking status: Never    Smokeless tobacco: Never   Substance Use Topics    Alcohol use: Never      Family History:  Family History   Family history unknown: Yes        Review of Systems   unobtainable    Objective:  Vitals:   /65   Pulse 81   Temp 97.4 °F (36.3 °C) (Oral)   Resp 20   Ht 162.6 cm (64\")   Wt 56.7 kg (125 lb)   SpO2 100%   BMI 21.46 kg/m²   I/O:   No intake or output data in the 24 hours ending 06/10/24 [facility]    Exam:  General " Appearance:   lethargic  Head:  Normocephalic, without obvious abnormality, atraumatic  Eyes:  PERRL, conjunctiva/corneas clear     Neck:  Supple,  no adenopathy;      Lungs:  Decreased BS occasion ronchi  Heart:  Regular rate and rhythm, S1 and S2 normal  Abdomen:  Soft, non-tender, bowel sounds active   Extremities: trace edema  Pulses: 2+ and symmetric all extremities  Skin:  No rashes or lesions  Data Review:  All labs (24hrs):   Recent Results (from the past 24 hour(s))   ECG 12 Lead Syncope    Collection Time: 06/10/24 10:03 AM   Result Value Ref Range    QT Interval 436 ms    QTC Interval 473 ms   Gold Top - SST    Collection Time: 06/10/24 10:17 AM   Result Value Ref Range    Extra Tube Hold for add-ons.    Light Blue Top    Collection Time: 06/10/24 10:17 AM   Result Value Ref Range    Extra Tube Hold for add-ons.        Current Facility-Administered Medications:     [COMPLETED] Insert Peripheral IV, , , Once **AND** sodium chloride 0.9 % flush 10 mL, 10 mL, Intravenous, PRN, Cisco Leal MD    Current Outpatient Medications:     acetaminophen (TYLENOL) 500 MG tablet, Take 1 tablet by mouth Every 6 (Six) Hours As Needed for Mild Pain., Disp: , Rfl:     aspirin 81 MG EC tablet, Take 1 tablet by mouth Daily., Disp: , Rfl:     atorvastatin (LIPITOR) 40 MG tablet, Take 1 tablet by mouth Daily., Disp: 90 tablet, Rfl: 3    Cholecalciferol (Vitamin D3) 50 MCG (2000 UT) tablet, Take  by mouth Daily., Disp: , Rfl:     clopidogrel (PLAVIX) 75 MG tablet, Take 1 tablet by mouth Daily., Disp: , Rfl:     febuxostat (ULORIC) 40 MG tablet, Take 1 tablet by mouth Daily., Disp: , Rfl:     folic acid (FOLVITE) 1 MG tablet, Take 1 tablet by mouth Daily., Disp: 30 tablet, Rfl: 0    metoprolol succinate XL (TOPROL-XL) 25 MG 24 hr tablet, Take 0.5 tablets by mouth Daily., Disp: 30 tablet, Rfl: 0    NON FORMULARY, Apply 1 dose topically to the appropriate area as directed 3 (Three) Times a Week. Lidocaine 2.5% ointment -  Apply 1  application Monday, Wednesday, Friday before dialysis, Disp: , Rfl:     Assessment:   End-stage renal disease   History of recent pacemaker   Syncope   Sick sinus syndrome   History of thrombocytopenia    Recommendations:   Will reassess after the labs to decide about dialysis today      Cass Garcia MD  6/10/2024  10:38 EDT

## 2024-06-10 NOTE — OUTREACH NOTE
Medical Week 1 Survey      Flowsheet Row Responses   [facility] facility patient discharged from? [facility]   Does the patient have one of the following disease processes/diagnoses(primary or secondary)? Other   Week 1 attempt successful? No   Unsuccessful attempts Attempt 2  [Patient currently in ER.]            Suzanne POLANCO - Registered Nurse

## 2024-06-10 NOTE — Clinical Note
Level of Care: Telemetry [5]   Diagnosis: Syncope [206001]   Admitting Physician: ELEAZAR GRANADOS [[id number]]   Attending Physician: ELEAZAR GRANADOS [[id number]]   Isolate for COVID?: No [0]   Certification: I Certify That Inpatient Hospital Services Are Medically Necessary For Greater Than 2 Midnights

## 2024-06-11 ENCOUNTER — APPOINTMENT (OUTPATIENT)
Dept: CT IMAGING | Facility: HOSPITAL | Age: 84
DRG: 312 | End: 2024-06-11
Payer: MEDICARE

## 2024-06-11 PROBLEM — I25.10 CORONARY ARTERY DISEASE INVOLVING NATIVE CORONARY ARTERY OF NATIVE HEART WITHOUT ANGINA PECTORIS: Chronic | Status: ACTIVE | Noted: 2024-04-24

## 2024-06-11 PROBLEM — I25.5 ISCHEMIC CARDIOMYOPATHY: Status: ACTIVE | Noted: 2024-04-18

## 2024-06-11 PROBLEM — Z95.0 PRESENCE OF CARDIAC PACEMAKER: Status: ACTIVE | Noted: 2024-06-03

## 2024-06-11 PROBLEM — R00.1 BRADYCARDIA BY ELECTROCARDIOGRAM: Status: RESOLVED | Noted: 2024-06-03 | Resolved: 2024-06-11

## 2024-06-11 PROBLEM — I48.21 PERMANENT ATRIAL FIBRILLATION: Chronic | Status: ACTIVE | Noted: 2024-06-03

## 2024-06-11 PROBLEM — I49.5 SICK SINUS SYNDROME: Status: RESOLVED | Noted: 2024-06-03 | Resolved: 2024-06-11

## 2024-06-11 PROBLEM — I42.0 CARDIOMYOPATHY, DILATED: Chronic | Status: ACTIVE | Noted: 2024-04-18

## 2024-06-11 PROBLEM — I25.10 CORONARY ARTERY DISEASE INVOLVING NATIVE CORONARY ARTERY OF NATIVE HEART WITHOUT ANGINA PECTORIS: Status: ACTIVE | Noted: 2024-04-18

## 2024-06-11 LAB
ALBUMIN SERPL-MCNC: 3.5 G/DL (ref 3.5–5.2)
ALBUMIN/GLOB SERPL: 1.8 G/DL
ALP SERPL-CCNC: 71 U/L (ref 39–117)
ALT SERPL W P-5'-P-CCNC: 27 U/L (ref 1–33)
AMMONIA BLD-SCNC: 26 UMOL/L (ref 11–51)
ANION GAP SERPL CALCULATED.3IONS-SCNC: 10.9 MMOL/L (ref 5–15)
ARTERIAL PATENCY WRIST A: ABNORMAL
AST SERPL-CCNC: 126 U/L (ref 1–32)
ATMOSPHERIC PRESS: ABNORMAL MM[HG]
BASE EXCESS BLDA CALC-SCNC: -4 MMOL/L (ref 0–3)
BDY SITE: ABNORMAL
BILIRUB SERPL-MCNC: 0.6 MG/DL (ref 0–1.2)
BUN SERPL-MCNC: 47 MG/DL (ref 8–23)
BUN/CREAT SERPL: 14.1 (ref 7–25)
CA-I BLDA-SCNC: 1.14 MMOL/L (ref 1.15–1.33)
CALCIUM SPEC-SCNC: 8.3 MG/DL (ref 8.6–10.5)
CHLORIDE SERPL-SCNC: 103 MMOL/L (ref 98–107)
CO2 BLDA-SCNC: 22.6 MMOL/L (ref 22–29)
CO2 SERPL-SCNC: 24.1 MMOL/L (ref 22–29)
CREAT SERPL-MCNC: 3.33 MG/DL (ref 0.57–1)
D-LACTATE SERPL-SCNC: 0.9 MMOL/L (ref 0.5–2)
D-LACTATE SERPL-SCNC: 1.3 MMOL/L (ref 0.2–2)
DEPRECATED RDW RBC AUTO: 57.1 FL (ref 37–54)
EGFRCR SERPLBLD CKD-EPI 2021: 13.2 ML/MIN/1.73
ERYTHROCYTE [DISTWIDTH] IN BLOOD BY AUTOMATED COUNT: 15.7 % (ref 12.3–15.4)
GEN 5 2HR TROPONIN T REFLEX: 85 NG/L
GLOBULIN UR ELPH-MCNC: 1.9 GM/DL
GLUCOSE BLDC GLUCOMTR-MCNC: 85 MG/DL (ref 70–105)
GLUCOSE SERPL-MCNC: 78 MG/DL (ref 65–99)
HCO3 BLDA-SCNC: 21.4 MMOL/L (ref 21–28)
HCT VFR BLD AUTO: 28.2 % (ref 34–46.6)
HCT VFR BLDA CALC: 25 % (ref 38–51)
HEMODILUTION: NO
HGB BLD-MCNC: 8.4 G/DL (ref 12–15.9)
HGB BLDA-MCNC: 8.4 G/DL (ref 12–17)
MCH RBC QN AUTO: 29.5 PG (ref 26.6–33)
MCHC RBC AUTO-ENTMCNC: 29.8 G/DL (ref 31.5–35.7)
MCV RBC AUTO: 98.9 FL (ref 79–97)
MODALITY: ABNORMAL
PCO2 BLDA: 39.2 MM HG (ref 35–48)
PH BLDA: 7.34 PH UNITS (ref 7.35–7.45)
PLATELET # BLD AUTO: 64 10*3/MM3 (ref 140–450)
PMV BLD AUTO: 10.1 FL (ref 6–12)
PO2 BLDA: 84.1 MM HG (ref 83–108)
POTASSIUM BLDA-SCNC: 4.1 MMOL/L (ref 3.5–4.5)
POTASSIUM SERPL-SCNC: 4.4 MMOL/L (ref 3.5–5.2)
PROCALCITONIN SERPL-MCNC: 0.14 NG/ML (ref 0–0.25)
PROT SERPL-MCNC: 5.4 G/DL (ref 6–8.5)
QT INTERVAL: 436 MS
QTC INTERVAL: 473 MS
RBC # BLD AUTO: 2.85 10*6/MM3 (ref 3.77–5.28)
SAO2 % BLDCOA: 95.7 % (ref 94–98)
SODIUM BLD-SCNC: 135 MMOL/L (ref 138–146)
SODIUM SERPL-SCNC: 138 MMOL/L (ref 136–145)
TROPONIN T DELTA: -6 NG/L
TROPONIN T SERPL HS-MCNC: 91 NG/L
WBC NRBC COR # BLD AUTO: 2.05 10*3/MM3 (ref 3.4–10.8)

## 2024-06-11 PROCEDURE — 99222 1ST HOSP IP/OBS MODERATE 55: CPT | Performed by: NURSE PRACTITIONER

## 2024-06-11 PROCEDURE — 82330 ASSAY OF CALCIUM: CPT

## 2024-06-11 PROCEDURE — 25010000002 PIPERACILLIN SOD-TAZOBACTAM PER 1 G: Performed by: FAMILY MEDICINE

## 2024-06-11 PROCEDURE — 82390 ASSAY OF CERULOPLASMIN: CPT | Performed by: NURSE PRACTITIONER

## 2024-06-11 PROCEDURE — 83605 ASSAY OF LACTIC ACID: CPT

## 2024-06-11 PROCEDURE — 97166 OT EVAL MOD COMPLEX 45 MIN: CPT

## 2024-06-11 PROCEDURE — 36600 WITHDRAWAL OF ARTERIAL BLOOD: CPT | Performed by: FAMILY MEDICINE

## 2024-06-11 PROCEDURE — 86301 IMMUNOASSAY TUMOR CA 19-9: CPT | Performed by: INTERNAL MEDICINE

## 2024-06-11 PROCEDURE — 87147 CULTURE TYPE IMMUNOLOGIC: CPT | Performed by: FAMILY MEDICINE

## 2024-06-11 PROCEDURE — 82103 ALPHA-1-ANTITRYPSIN TOTAL: CPT | Performed by: NURSE PRACTITIONER

## 2024-06-11 PROCEDURE — 82977 ASSAY OF GGT: CPT | Performed by: NURSE PRACTITIONER

## 2024-06-11 PROCEDURE — 74176 CT ABD & PELVIS W/O CONTRAST: CPT

## 2024-06-11 PROCEDURE — 83605 ASSAY OF LACTIC ACID: CPT | Performed by: FAMILY MEDICINE

## 2024-06-11 PROCEDURE — 87040 BLOOD CULTURE FOR BACTERIA: CPT | Performed by: FAMILY MEDICINE

## 2024-06-11 PROCEDURE — 80051 ELECTROLYTE PANEL: CPT

## 2024-06-11 PROCEDURE — 70450 CT HEAD/BRAIN W/O DYE: CPT

## 2024-06-11 PROCEDURE — 84145 PROCALCITONIN (PCT): CPT | Performed by: FAMILY MEDICINE

## 2024-06-11 PROCEDURE — 87154 CUL TYP ID BLD PTHGN 6+ TRGT: CPT | Performed by: FAMILY MEDICINE

## 2024-06-11 PROCEDURE — 82140 ASSAY OF AMMONIA: CPT | Performed by: FAMILY MEDICINE

## 2024-06-11 PROCEDURE — 82803 BLOOD GASES ANY COMBINATION: CPT | Performed by: FAMILY MEDICINE

## 2024-06-11 PROCEDURE — 85027 COMPLETE CBC AUTOMATED: CPT | Performed by: NURSE PRACTITIONER

## 2024-06-11 PROCEDURE — 84484 ASSAY OF TROPONIN QUANT: CPT | Performed by: FAMILY MEDICINE

## 2024-06-11 PROCEDURE — 85018 HEMOGLOBIN: CPT

## 2024-06-11 PROCEDURE — 80053 COMPREHEN METABOLIC PANEL: CPT | Performed by: NURSE PRACTITIONER

## 2024-06-11 PROCEDURE — 97162 PT EVAL MOD COMPLEX 30 MIN: CPT

## 2024-06-11 PROCEDURE — 82948 REAGENT STRIP/BLOOD GLUCOSE: CPT

## 2024-06-11 PROCEDURE — 25010000002 FUROSEMIDE PER 20 MG: Performed by: FAMILY MEDICINE

## 2024-06-11 RX ORDER — ATORVASTATIN CALCIUM 40 MG/1
40 TABLET, FILM COATED ORAL DAILY
Status: DISCONTINUED | OUTPATIENT
Start: 2024-06-11 | End: 2024-06-17 | Stop reason: HOSPADM

## 2024-06-11 RX ORDER — FUROSEMIDE 10 MG/ML
20 INJECTION INTRAMUSCULAR; INTRAVENOUS EVERY 12 HOURS
Status: DISCONTINUED | OUTPATIENT
Start: 2024-06-11 | End: 2024-06-17 | Stop reason: HOSPADM

## 2024-06-11 RX ADMIN — FUROSEMIDE 20 MG: 10 INJECTION, SOLUTION INTRAMUSCULAR; INTRAVENOUS at 17:36

## 2024-06-11 RX ADMIN — Medication 10 ML: at 08:44

## 2024-06-11 RX ADMIN — PIPERACILLIN AND TAZOBACTAM 3.38 G: 3; .375 INJECTION, POWDER, FOR SOLUTION INTRAVENOUS at 17:36

## 2024-06-11 RX ADMIN — APIXABAN 2.5 MG: 2.5 TABLET, FILM COATED ORAL at 21:01

## 2024-06-11 RX ADMIN — Medication 10 ML: at 21:01

## 2024-06-11 RX ADMIN — ATORVASTATIN CALCIUM 40 MG: 40 TABLET, FILM COATED ORAL at 13:20

## 2024-06-11 RX ADMIN — HYDROCODONE BITARTRATE AND ACETAMINOPHEN 1 TABLET: 5; 325 TABLET ORAL at 12:27

## 2024-06-11 RX ADMIN — APIXABAN 2.5 MG: 2.5 TABLET, FILM COATED ORAL at 13:20

## 2024-06-11 NOTE — CASE MANAGEMENT/SOCIAL WORK
Continued Stay Note  HYUN Duque     Patient Name: Deann Warren  MRN: [id number]  Today's Date: 6/11/2024    Admit Date: 6/10/2024    Plan: From home, PT/OT pending   Discharge Plan       Row Name 06/11/24 0832       Plan    Plan From home, PT/OT pending    Patient/Family in Agreement with Plan yes    Plan Comments Pt. from home, this with grandindira Fulton. PT/OT pending.                 Expected Discharge Date and Time       Expected Discharge Date Expected Discharge Time    Jun 13, 2024               Christina Schultz RN    Office: [phone]  Fax: [phone]  [email or web address]

## 2024-06-11 NOTE — THERAPY EVALUATION
Patient Name: Deann Warren  : 1940    MRN: [id number]                              Today's Date: 2024       Admit Date: 6/10/2024    Visit Dx:     ICD-10-CM ICD-9-CM   1. Syncope, unspecified syncope type  R55 780.2     Patient Active Problem List   Diagnosis    Syncope and collapse    Dependence on renal dialysis    Type 2 diabetes mellitus with diabetic chronic kidney disease    Anxiety    Chronic gouty arthritis    Allergic conjunctivitis and rhinitis    Essential hypertension    ESRD (end stage renal disease)    Anemia due to chronic kidney disease, on chronic dialysis    History of shingles    Hyperlipidemia    Vitamin D deficiency    Cardiomyopathy, dilated    Syncopal episodes    Permanent atrial fibrillation    Syncope    Presence of cardiac pacemaker    Coronary artery disease involving native coronary artery of native heart without angina pectoris     Past Medical History:   Diagnosis Date    Allergic conjunctivitis and rhinitis 2014    Anemia due to chronic kidney disease, on chronic dialysis 2020    Anxiety 2012    Bradycardia by electrocardiogram 2024    Cardiomyopathy, dilated 2024    Chronic gouty arthritis 2014    Coronary artery disease involving native coronary artery of native heart without angina pectoris 2024    Dependence on renal dialysis 2022    ESRD (end stage renal disease) 2020    Essential hypertension 2015    History of shingles 2022    Hyperlipidemia 2024    Paroxysmal atrial fibrillation 2022    Presence of cardiac pacemaker 2024    Sick sinus syndrome 2024    Type 2 diabetes mellitus with diabetic chronic kidney disease 2022    Vitamin D deficiency 2021     Past Surgical History:   Procedure Laterality Date    ARTERIOVENOUS FISTULA Left     CARDIAC CATHETERIZATION N/A 2024    Procedure: Right and Left Heart Cath;  Surgeon: Dilcia Lui MD;  Location:   INVASIVE LOCATION;  Service: Cardiology;  Laterality: N/A;    CARDIAC CATHETERIZATION N/A 4/25/2024    Procedure: Percutaneous Coronary Intervention;  Surgeon: Jadiel Blake MD;  Location:  [facility];  Service: Cardiology;  Laterality: N/A;    CARDIAC ELECTROPHYSIOLOGY PROCEDURE N/A 6/3/2024    Procedure: Pacemaker DC new, [employer];  Surgeon: Zamzam Carrillo MD;  Location:  [facility];  Service: Cardiovascular;  Laterality: N/A;      General Information       Row Name 06/11/24 1037          Physical Therapy Time and Intention    Document Type evaluation  -CR     Mode of Treatment physical therapy  -CR       Row Name 06/11/24 1037          General Information    Patient Profile Reviewed yes  -CR     Prior Level of Function independent:;all household mobility  -CR     Existing Precautions/Restrictions fall;pacemaker  pacemaker precautions R side until 6/17;has chronic L heel wound  -CR     Barriers to Rehab medically complex  -CR       Row Name 06/11/24 1037          Living Environment    People in Home grandchild(vamsi)  friend assists with bathing, grocery  -CR       Row Name 06/11/24 1037          Home Main Entrance    Number of Stairs, Main Entrance none  -CR       Row Name 06/11/24 1037          Stairs Within Home, Primary    Number of Stairs, Within Home, Primary none  -CR       Row Name 06/11/24 1037          Cognition    Orientation Status (Cognition) oriented x 4  -CR       Row Name 06/11/24 1037          Safety Issues, Functional Mobility    Impairments Affecting Function (Mobility) balance;endurance/activity tolerance;strength;pain;range of motion (ROM);postural/trunk control  -CR               User Key  (r) = Recorded By, (t) = Taken By, (c) = Cosigned By      Initials Name Provider Type    CR Reyes, Carmela, PT Physical Therapist                   Mobility       Row Name 06/11/24 1038          Bed Mobility    Bed Mobility supine-sit;sit-supine  -CR     Supine-Sit  Purdy (Bed Mobility) verbal cues;contact guard  -CR     Sit-Supine Purdy (Bed Mobility) minimum assist (75% patient effort)  -CR       Row Name 06/11/24 1038          Sit-Stand Transfer    Sit-Stand Purdy (Transfers) minimum assist (75% patient effort);1 person assist;2 person assist  -CR     Assistive Device (Sit-Stand Transfers) walker, front-wheeled  -CR       Row Name 06/11/24 1038          Gait/Stairs (Locomotion)    Purdy Level (Gait) minimum assist (75% patient effort);verbal cues  -CR     Assistive Device (Gait) walker, front-wheeled  -CR     Distance in Feet (Gait) 2  -CR     Deviations/Abnormal Patterns (Gait) gait speed decreased;festinating/shuffling  -CR     Bilateral Gait Deviations forward flexed posture  -CR     Left Sided Gait Deviations weight shift ability decreased  -CR               User Key  (r) = Recorded By, (t) = Taken By, (c) = Cosigned By      Initials Name Provider Type    CR Reyes, Carmela, PT Physical Therapist                   Obj/Interventions       Row Name 06/11/24 1040          Range of Motion Comprehensive    Comment, General Range of Motion AROM WFL; trunk extension min/mod limit  -CR       Row Name 06/11/24 1040          Strength Comprehensive (MMT)    Comment, General Manual Muscle Testing (MMT) Assessment BLE grossly 3/5  -CR       Row Name 06/11/24 1040          Balance    Balance Assessment sitting static balance;standing static balance;standing dynamic balance  -CR     Static Sitting Balance standby assist  -CR     Position, Sitting Balance sitting edge of bed  -CR     Static Standing Balance contact guard;minimal assist  -CR     Dynamic Standing Balance contact guard;minimal assist  -CR     Position/Device Used, Standing Balance walker, front-wheeled  -CR               User Key  (r) = Recorded By, (t) = Taken By, (c) = Cosigned By      Initials Name Provider Type    CR Reyes, Carmela, PT Physical Therapist                   Goals/Plan       Row  Name 06/11/24 1046          Transfer Goal 1 (PT)    Activity/Assistive Device (Transfer Goal 1, PT) transfers, all;walker, rolling  -CR     Baker Level/Cues Needed (Transfer Goal 1, PT) standby assist  -CR     Time Frame (Transfer Goal 1, PT) long term goal (LTG);2 weeks  -CR       Row Name 06/11/24 1046          Gait Training Goal 1 (PT)    Activity/Assistive Device (Gait Training Goal 1, PT) gait (walking locomotion);assistive device use;decrease fall risk;diminish gait deviation;forward stepping;improve balance and speed;increase endurance/gait distance;walker, rolling  -CR     Baker Level (Gait Training Goal 1, PT) contact guard required  -CR     Distance (Gait Training Goal 1, PT) 50 ft x 2  -CR     Time Frame (Gait Training Goal 1, PT) long term goal (LTG);2 weeks  -CR       Row Name 06/11/24 1046          Therapy Assessment/Plan (PT)    Planned Therapy Interventions (PT) balance training;bed mobility training;gait training;home exercise program;patient/family education;postural re-education;transfer training;neuromuscular re-education;strengthening;ROM (range of motion)  -CR               User Key  (r) = Recorded By, (t) = Taken By, (c) = Cosigned By      Initials Name Provider Type    CR Reyes, Carmela, PT Physical Therapist                   Clinical Impression       Row Name 06/11/24 1040          Pain    Additional Documentation Pain Scale: FACES Pre/Post-Treatment (Group)  -CR       Row Name 06/11/24 1040          Pain Scale: FACES Pre/Post-Treatment    Pain: FACES Scale, Pretreatment 4-->hurts little more  -CR     Posttreatment Pain Rating 4-->hurts little more  -CR     Pain Location generalized  -CR     Pre/Posttreatment Pain Comment patient has multiple bruises all over  -CR       Row Name 06/11/24 1040          Plan of Care Review    Plan of Care Reviewed With patient;friend  -CR     Outcome Evaluation 82 y/o female admitted on 6/10 due to syncopal epiosde. Patient is s/p pacemaker  placement on 6/3/2024. PMH Includes ESRD on dialysis, thrombocytopenia, L heel wound. Patient resides with adult grandson and has friend that assists with bathing and household tasks. Patient is aware of her pacemaker precautions. Patient was ambulatory at baseline using rw. At time of eval, patient needed CGA for supine to sit. No dizziness reported upon sitting/standing nor have change in BP or HR. Patient needed min/mod A of 1-2 to come to standing and bed needed to be elevated. Patient only managed to ambulate 2 ft fwd with min/mod A and had to return to sitting. Flexed trunk and head down posture noted, can correct with cues but unable to maintain. Patient will need SNF at discharge for strength, mobility and balance training.  -CR       Row Name 06/11/24 1040          Therapy Assessment/Plan (PT)    Patient/Family Therapy Goals Statement (PT) agreeable to rehab  -CR     Rehab Potential (PT) good, to achieve stated therapy goals  -CR     Criteria for Skilled Interventions Met (PT) yes;skilled treatment is necessary  -CR     Therapy Frequency (PT) 5 times/wk  -CR     Predicted Duration of Therapy Intervention (PT) dc  -CR               User Key  (r) = Recorded By, (t) = Taken By, (c) = Cosigned By      Initials Name Provider Type    CR Reyes, Carmela, PT Physical Therapist                   Outcome Measures       Row Name 06/11/24 1046 06/11/24 0800       How much help from another person do you currently need...    Turning from your back to your side while in flat bed without using bedrails? 3  -CR 3  -JH    Moving from lying on back to sitting on the side of a flat bed without bedrails? 3  -CR 3  -JH    Moving to and from a bed to a chair (including a wheelchair)? 2  -CR 3  -JH    Standing up from a chair using your arms (e.g., wheelchair, bedside chair)? 2  -CR 2  -JH    Climbing 3-5 steps with a railing? 2  -CR 2  -JH    To walk in hospital room? 2  -CR 3  -JH    AM-PAC 6 Clicks Score (PT) 14  -CR 16  -JH     Highest Level of Mobility Goal 4 --> Transfer to chair/commode  -CR 5 --> Static standing  -              User Key  (r) = Recorded By, (t) = Taken By, (c) = Cosigned By      Initials Name Provider Type    CR Reyes, Carmela, VAL Physical Therapist    Yuli Wyatt, RN Registered Nurse                                 Physical Therapy Education       Title: PT OT SLP Therapies (In Progress)       Topic: Physical Therapy (In Progress)       Point: Mobility training (Done)       Learning Progress Summary             Patient Acceptance, E, VU,NR by CR at 6/11/2024 1047                         Point: Home exercise program (Not Started)       Learner Progress:  Not documented in this visit.              Point: Precautions (Done)       Learning Progress Summary             Patient Acceptance, E, VU,NR by CR at 6/11/2024 1047                                         User Key       Initials Effective Dates Name Provider Type Discipline     06/16/21 -  Reyes, Carmela, PT Physical Therapist PT                  PT Recommendation and Plan  Planned Therapy Interventions (PT): balance training, bed mobility training, gait training, home exercise program, patient/family education, postural re-education, transfer training, neuromuscular re-education, strengthening, ROM (range of motion)  Plan of Care Reviewed With: patient, friend  Outcome Evaluation: 82 y/o female admitted on 6/10 due to syncopal epiosde. Patient is s/p pacemaker placement on 6/3/2024. PMH Includes ESRD on dialysis, thrombocytopenia, L heel wound. Patient resides with adult grandson and has friend that assists with bathing and household tasks. Patient is aware of her pacemaker precautions. Patient was ambulatory at baseline using rw. At time of eval, patient needed CGA for supine to sit. No dizziness reported upon sitting/standing nor have change in BP or HR. Patient needed min/mod A of 1-2 to come to standing and bed needed to be elevated. Patient only managed  to ambulate 2 ft fwd with min/mod A and had to return to sitting. Flexed trunk and head down posture noted, can correct with cues but unable to maintain. Patient will need SNF at discharge for strength, mobility and balance training.     Time Calculation:         PT Charges       Row Name 06/11/24 1047             Time Calculation    Start Time 0916  -CR      Stop Time 0941  -CR      Time Calculation (min) 25 min  -CR      PT Received On 06/11/24  -CR      PT - Next Appointment 06/12/24  -CR      PT Goal Re-Cert Due Date 06/25/24  -CR         Time Calculation- PT    Total Timed Code Minutes- PT 0 minute(s)  -CR                User Key  (r) = Recorded By, (t) = Taken By, (c) = Cosigned By      Initials Name Provider Type    CR Reyes, Carmela, PT Physical Therapist                  Therapy Charges for Today       Code Description Service Date Service Provider Modifiers Qty    [id number] HC PT EVAL MOD COMPLEXITY 4 6/11/2024 Reyes, Carmela, PT GP 1            PT G-Codes  AM-PAC 6 Clicks Score (PT): 14  PT Discharge Summary  Anticipated Discharge Disposition (PT): skilled nursing facility    Carmela Reyes, PT  6/11/2024

## 2024-06-11 NOTE — PLAN OF CARE
Goal Outcome Evaluation:  Plan of Care Reviewed With: patient           Outcome Evaluation: Deann Warren is a 83 y.o. female with a CMH of thrombocytopenia, sick sinus syndrome, atrial fibrillation, LBBB, cardiomyopathy with a EF of 35%, CAD, Pacemaker (placed on 6/3/24), HLD, ESRD (left fistula) who presented to [facility] on 6/10/2024 with syncope.  Patient was in route to receive dialysis, patient states that when she was walking into the door she felt weak and unsteady, patient to the seat is out of the office.  Pt live at home with her grandson (24 hour assist) as well as assist from her son and a close friend.  She typically walks using rwx.  This date pt reqiured min to mod asisst for bed mobility and transfers and depenent for lower body ADLs.  Recommend SNF at discharge.      Anticipated Discharge Disposition (OT): skilled nursing facility

## 2024-06-11 NOTE — CASE MANAGEMENT/SOCIAL WORK
Case Management Readmission Assessment Note    Case Management Readmission Assessment (all recorded)       Readmission Interview       [facility] Name 06/11/24 1533 06/10/24 1605          Readmission Indications    Is the patient and/or family able to complete the readmission assessment questions? Yes Yes     Is this hospitalization related to the prior hospital diagnosis? No No       [facility] Name 06/11/24 1533 06/10/24 1605          Recommendation for rehospitalization    Did you speak with your physician prior to coming to the hospital No No       [facility] Name 06/11/24 1533 06/10/24 1605          Follow-up Appointments    Do you have a PCP? Yes Yes     Did you have an appointment with PCP after your hospitalization? No No     Did you have an appointment with a Specialist? Yes Yes     When was your appointment scheduled? 06/20/24 06/20/24     Are you current with the Pulmonary Clinic? No No     Are you current with the CHF Clinic? No No       [facility] Name 06/11/24 1533 06/10/24 1605          Medications    Did you have newly prescribed medications at discharge? No No     Did you understand the reasons for your medications at discharge and how to take them? --  N/A No  N/A     Did you understand the side effects of your medications? Yes No  N/A     Are you taking all of you prescribed medications? Yes No     What pharmacy was used to fill prescription(s)? N/A N/A     Were medications picked up? --  No new RX. No  N/A       [facility] Name 06/11/24 1533 06/10/24 1605          Discharge Instructions    Did you understand your discharge instructions? Yes Yes     Did your family/caregiver hear your instructions? Yes Yes     Were you told to eat a special diet? Yes Yes     Did you adhere to the diet? Yes Yes     Were you given a number of someone to call if you had questions or concerns? Yes Yes       [facility] Name 06/11/24 1533 06/10/24 1605          Index discharge location/services    Where did you go upon discharge? Home Home     Do you have  supportive family or friends in the home? No --     What services were arranged at discharge? Other (comment)  N/A --       Row Name 06/11/24 1533 06/10/24 1605          Discharge Readiness    On a scale of 1-5 (5 being well prepared), how ready were you for discharge 5 5     Recommendation based on interview Education on diagnosis/self management --       Row Name 06/11/24 1533 06/10/24 1605          Palliative Care/Hospice    Are you current with Palliative Care? No No     Are you current with Hospice Care? No No       Row Name 06/11/24 1533 06/10/24 2212 06/10/24 1605       Advance Directives (For Healthcare)    Pre-existing AND/MOST/POLST Order No No No    Advance Directive Status Patient does not have advance directive Patient does not have advance directive --    Have you reviewed your Advance Directive and is it valid for this stay? Not applicable Not applicable No    Literature Provided on Advance Directives No No No    Patient Requests Assistance on Advance Directives Patient Declined Patient Declined --      Row Name 06/11/24 1533             Readmission Assessment Final Comments    Final Comments Previous Stay 6/1-6/4 (LOS 3days) ED w/ syncope and Afib, 6/1CXR, 6/2 CXR, 6/3 pacemaker placed. Current stay 6/10 ED from dialysis w/ bradycardia. Nephrology consult, cardiology consult, electrophysiology consult, EKG 6/10, CXR 6/10.

## 2024-06-11 NOTE — OUTREACH NOTE
Medical Week 1 Survey      Flowsheet Row Responses   [facility] facility patient discharged from? [facility]   Does the patient have one of the following disease processes/diagnoses(primary or secondary)? Other   Week 1 attempt successful? No   Unsuccessful attempts Attempt 3   Revoke Readmitted            Martha POLANCO - Registered Nurse

## 2024-06-11 NOTE — CONSULTS
Referring Provider: Jayda Nicole MD    Reason for Consultation:  syncope      Patient Care Team:  Antonino Shen MD as PCP - Family Medicine  Dilcia Lui MD as Consulting Physician (Interventional Cardiology)  Zamzam Carrillo MD as Consulting Physician (Cardiac Electrophysiology)      SUBJECTIVE     Chief Complaint:  syncope    History of present illness:  Deann Warren is a 83 y.o. female with a history of coronary artery disease, hypertension, hyperlipidemia, dilated cardiomyopathy, atrial fibrillation, sick sinus syndrome status post permanent pacemaker placement (6/3/24), Type 2 diabetes, and ESRD on hemodialysis who presented to [facility] on 6/10/2024 after a syncopal episode. She states she was walking into dialysis yesterday when she began to feel weak all over. She states she sat down in a chair in the waiting room and couldn't lift her head or arms. She states she could hear people talking to her, but she could not open her eyes or talk to them. She states her blood sugar and vital signs were normal. She denies any dizziness, chest pain, shortness of breath, palpitations, nausea or vomiting. She denies any exacerbating or alleviating factors. She was sent to the ER for further evaluation.     Workup in the ER was unremarkable. The patient's EKG shows a ventricular paced rhythm. She was admitted for further evaluation. Nephrology was consulted for dialysis management. Cardiology/Cardiac Electrophysiology was consulted for further evaluation of syncope.       Review of systems:    Constitutional: No weakness, fatigue, fever, rigors, chills   Eyes: No vision changes, eye pain   ENT/oropharynx: No difficulty swallowing, sore throat, epistaxis, changes in hearing   Cardiovascular: + syncope.  No chest pain, chest tightness, palpitations, paroxysmal nocturnal dyspnea, orthopnea, diaphoresis, dizziness    Respiratory: No shortness of breath, dyspnea on exertion, cough, wheezing, hemoptysis    Gastrointestinal: No abdominal pain, nausea, vomiting, diarrhea, bloody stools   Genitourinary: No hematuria, dysuria   Neurological: No headache, tremors, numbness, one-sided weakness    Musculoskeletal: + weak all over, unable to speak.  No cramps, myalgias, joint pain, joint swelling   Integument: No rash, edema        Personal History:      Past Medical History:   Diagnosis Date    Allergic conjunctivitis and rhinitis 11/06/2014    Anemia due to chronic kidney disease, on chronic dialysis 08/25/2020    Anxiety 07/30/2012    Bradycardia by electrocardiogram 06/03/2024    Cardiomyopathy, dilated 04/18/2024    Chronic gouty arthritis 11/06/2014    Coronary artery disease involving native coronary artery of native heart without angina pectoris 04/24/2024    Dependence on renal dialysis 07/01/2022    ESRD (end stage renal disease) 08/25/2020    Essential hypertension 09/16/2015    History of shingles 01/18/2022    Hyperlipidemia 04/08/2024    Paroxysmal atrial fibrillation 07/01/2022    Presence of cardiac pacemaker 06/03/2024    Sick sinus syndrome 06/03/2024    Type 2 diabetes mellitus with diabetic chronic kidney disease 07/01/2022    Vitamin D deficiency 07/22/2021       Past Surgical History:   Procedure Laterality Date    ARTERIOVENOUS FISTULA Left     CARDIAC CATHETERIZATION N/A 4/25/2024    Procedure: Right and Left Heart Cath;  Surgeon: Dilcia Lui MD;  Location: [facility] CATH INVASIVE LOCATION;  Service: Cardiology;  Laterality: N/A;    CARDIAC CATHETERIZATION N/A 4/25/2024    Procedure: Percutaneous Coronary Intervention;  Surgeon: Jadiel Blake MD;  Location: [facility] CATH INVASIVE LOCATION;  Service: Cardiology;  Laterality: N/A;    CARDIAC ELECTROPHYSIOLOGY PROCEDURE N/A 6/3/2024    Procedure: Pacemaker DC new, [employer]tr[employer];  Surgeon: Zamzam Carrillo MD;  Location: [facility] CATH INVASIVE LOCATION;  Service: Cardiovascular;  Laterality: N/A;       Family History   Family history unknown: Yes        Social History     Tobacco Use    Smoking status: Never    Smokeless tobacco: Never   Vaping Use    Vaping status: Never Used   Substance Use Topics    Alcohol use: Never    Drug use: Never        Home meds:  Prior to Admission medications    Medication Sig Start Date End Date Taking? Authorizing Provider   acetaminophen (TYLENOL) 500 MG tablet Take 1 tablet by mouth Every 6 (Six) Hours As Needed for Mild Pain.   Yes Katerin Bradshaw MD   aspirin 81 MG EC tablet Take 1 tablet by mouth Daily. 6/7/24  Yes Jose Yuan MD   atorvastatin (LIPITOR) 40 MG tablet Take 1 tablet by mouth Daily. 4/25/24  Yes Dilcia Lui MD   Cholecalciferol (Vitamin D3) 50 MCG (2000 UT) tablet Take 1 tablet by mouth Daily.   Yes Katerin Bradshaw MD   clopidogrel (PLAVIX) 75 MG tablet Take 1 tablet by mouth Daily. 6/7/24  Yes Jose Yuan MD   febuxostat (ULORIC) 40 MG tablet Take 1 tablet by mouth Daily.   Yes ProviderKaterin MD   metoprolol succinate XL (TOPROL-XL) 25 MG 24 hr tablet Take 0.5 tablets by mouth Daily. 4/13/24  Yes Hernesto Sanchez MD   folic acid (FOLVITE) 1 MG tablet Take 1 tablet by mouth Daily. 4/13/24   Hernesto Sanchez MD   NON FORMULARY Apply 1 dose topically to the appropriate area as directed 3 (Three) Times a Week. Lidocaine 2.5% ointment -  Apply 1 application Monday, Wednesday, Friday before dialysis    ProviderKaterin MD       Allergies:     Heparin    Scheduled Meds:sodium chloride, 10 mL, Intravenous, Q12H      Continuous Infusions:   PRN Meds:  senna-docusate sodium **AND** polyethylene glycol **AND** bisacodyl **AND** bisacodyl    Calcium Replacement - Follow Nurse / BPA Driven Protocol    HYDROcodone-acetaminophen    Magnesium Standard Dose Replacement - Follow Nurse / BPA Driven Protocol    Phosphorus Replacement - Follow Nurse / BPA Driven Protocol    Potassium Replacement - Follow Nurse / BPA Driven Protocol    [COMPLETED] Insert Peripheral IV **AND** sodium chloride    " sodium chloride    sodium chloride      OBJECTIVE    Vital Signs  Vitals:    06/11/24 0545 06/11/24 0600 06/11/24 0615 06/11/24 0754   BP: 117/59 115/57 114/57 121/61   BP Location:   Left arm    Patient Position:   Lying    Pulse: 70 70 70 70   Resp:   18    Temp:   97.5 °F (36.4 °C)    TempSrc:   Axillary    SpO2: 100% 100% 99% 100%   Weight:       Height:           Flowsheet Rows      Flowsheet Row First Filed Value   Admission Height 162.6 cm (64\") Documented at 06/10/2024 0950   Admission Weight 56.7 kg (125 lb) Documented at 06/10/2024 0950            No intake or output data in the 24 hours ending 06/11/24 1014     Telemetry:  v-paced rhythm     Physical Exam:  The patient is alert, oriented and in no distress.  Vital signs as noted above.  Head and neck revealed no carotid bruits or jugular venous distention.  No thyromegaly or lymphadenopathy is present  Lungs clear.  No wheezing.  Breath sounds are normal bilaterally. On room air.  Heart: Normal first and second heart sounds. No murmur.  No precordial rub is present.  No gallop is present.  Abdomen: Soft and nontender.  No organomegaly is present.  Extremities with good peripheral pulses without any pedal edema.  Skin: Warm and dry.  Right upper chest surgical incision approximated with steri strips and dry. Site bruised with minimal swelling.   Musculoskeletal system is grossly normal.  CNS grossly normal.       Results Review:  I have personally reviewed the results from the time of this admission to 6/11/2024 10:14 EDT and agree with these findings:  [x]  Laboratory  [x]  Microbiology  [x]  Radiology  [x]  EKG/Telemetry   [x]  Cardiology/Vascular   []  Pathology  [x]  Old records  []  Other:    Most notable findings include:     Lab Results (last 24 hours)       Procedure Component Value Units Date/Time    Comprehensive Metabolic Panel [[id number]]  (Abnormal) Collected: 06/11/24 0352    Specimen: Blood Updated: 06/11/24 0437     Glucose 78 mg/dL      " BUN 47 mg/dL      Creatinine 3.33 mg/dL      Sodium 138 mmol/L      Potassium 4.4 mmol/L      Comment: Slight hemolysis detected by analyzer. Result may be falsely elevated.        Chloride 103 mmol/L      CO2 24.1 mmol/L      Calcium 8.3 mg/dL      Total Protein 5.4 g/dL      Albumin 3.5 g/dL      ALT (SGPT) 27 U/L      AST (SGOT) 126 U/L      Alkaline Phosphatase 71 U/L      Total Bilirubin 0.6 mg/dL      Globulin 1.9 gm/dL      A/G Ratio 1.8 g/dL      BUN/Creatinine Ratio 14.1     Anion Gap 10.9 mmol/L      eGFR 13.2 mL/min/1.73      Comment: <15 Indicative of kidney failure       Narrative:      GFR Normal >60  Chronic Kidney Disease <60  Kidney Failure <15    The GFR formula is only valid for adults with stable renal function between ages 18 and 70.    CBC (No Diff) [[id number]]  (Abnormal) Collected: 06/11/24 0352    Specimen: Blood Updated: 06/11/24 0359     WBC 2.05 10*3/mm3      RBC 2.85 10*6/mm3      Hemoglobin 8.4 g/dL      Hematocrit 28.2 %      MCV 98.9 fL      MCH 29.5 pg      MCHC 29.8 g/dL      RDW 15.7 %      RDW-SD 57.1 fl      MPV 10.1 fL      Platelets 64 10*3/mm3     COVID-19, FLU A/B, RSV PCR 1 HR TAT - Swab, Nasopharynx [[id number]]  (Normal) Collected: 06/10/24 1821    Specimen: Swab from Nasopharynx Updated: 06/10/24 1906     COVID19 Not Detected     Influenza A PCR Not Detected     Influenza B PCR Not Detected     RSV, PCR Not Detected    Narrative:      Fact sheet for providers: https://www.fda.gov/media/119704/download    Fact sheet for patients: https://www.fda.gov/media/914324/download    Test performed by PCR.    Basic Metabolic Panel [[id number]]  (Abnormal) Collected: 06/10/24 1017    Specimen: Blood Updated: 06/10/24 1054     Glucose 104 mg/dL      BUN 41 mg/dL      Creatinine 2.95 mg/dL      Sodium 135 mmol/L      Potassium 3.9 mmol/L      Comment: Specimen hemolyzed.  Result may be falsely elevated.        Chloride 106 mmol/L      CO2 17.0 mmol/L      Calcium 7.8 mg/dL       BUN/Creatinine Ratio 13.9     Anion Gap 12.0 mmol/L      eGFR 15.3 mL/min/1.73     Narrative:      GFR Normal >60  Chronic Kidney Disease <60  Kidney Failure <15    The GFR formula is only valid for adults with stable renal function between ages 18 and 70.    CBC & Differential [[id number]]  (Abnormal) Collected: 06/10/24 1017    Specimen: Blood Updated: 06/10/24 1044    Narrative:      The following orders were created for panel order CBC & Differential.  Procedure                               Abnormality         Status                     ---------                               -----------         ------                     CBC Auto Differential[[id number]]        Abnormal            Final result               Scan Slide[[id number]]                                       Final result                 Please view results for these tests on the individual orders.    CBC Auto Differential [[id number]]  (Abnormal) Collected: 06/10/24 1017    Specimen: Blood Updated: 06/10/24 1044     WBC 2.37 10*3/mm3      RBC 2.92 10*6/mm3      Hemoglobin 8.6 g/dL      Hematocrit 29.6 %      .4 fL      MCH 29.5 pg      MCHC 29.1 g/dL      RDW 15.7 %      RDW-SD 57.7 fl      MPV 11.3 fL      Platelets 71 10*3/mm3      Neutrophil % 58.6 %      Lymphocyte % 22.8 %      Monocyte % 12.7 %      Eosinophil % 3.8 %      Basophil % 1.3 %      Immature Grans % 0.8 %      Neutrophils, Absolute 1.39 10*3/mm3      Lymphocytes, Absolute 0.54 10*3/mm3      Monocytes, Absolute 0.30 10*3/mm3      Eosinophils, Absolute 0.09 10*3/mm3      Basophils, Absolute 0.03 10*3/mm3      Immature Grans, Absolute 0.02 10*3/mm3      nRBC 0.0 /100 WBC     Scan Slide [[id number]] Collected: 06/10/24 1017    Specimen: Blood Updated: 06/10/24 1044     Anisocytosis Slight/1+     Alin Cells Slight/1+     Crenated RBC's Slight/1+     Poikilocytes Slight/1+     WBC Morphology Normal     Platelet Estimate Adequate    Extra Tubes [[id number]] Collected: 06/10/24 1017     Specimen: Blood, Venous Line Updated: 06/10/24 1030    Narrative:      The following orders were created for panel order Extra Tubes.  Procedure                               Abnormality         Status                     ---------                               -----------         ------                     Gold Top - SST[[id number]]                                   Final result               Light Blue Top[[id number]]                                   Final result                 Please view results for these tests on the individual orders.    Gold Top - SST [[id number]] Collected: 06/10/24 1017    Specimen: Blood Updated: 06/10/24 1030     Extra Tube Hold for add-ons.     Comment: Auto resulted.       Light Blue Top [[id number]] Collected: 06/10/24 1017    Specimen: Blood Updated: 06/10/24 1030     Extra Tube Hold for add-ons.     Comment: Auto resulted               Imaging Results (Last 24 Hours)       Procedure Component Value Units Date/Time    XR Chest 1 View [[id number]] Collected: 06/10/24 1041     Updated: 06/10/24 1045    Narrative:      XR CHEST 1 VW    Date of Exam: 6/10/2024 10:38 AM EDT    Indication: Chest pain    Comparison: 6/3/2024    Findings:  Right chest wall pacemaker is present. Cardiac silhouette is enlarged. Perihilar and bibasilar opacities may represent edema or mild infiltrates. Left basilar atelectasis is seen. No pneumothorax is seen. No acute osseous lesion is seen. There are   senescent changes of the aorta and skeletal structures.      Impression:      Impression:  1.Cardiomegaly with pulmonary vascular congestion.  2.Left basilar atelectasis or infiltrate.      Electronically Signed: Armando Gonzalez MD    6/10/2024 10:43 AM EDT    Workstation ID: [id number]            LAB RESULTS (LAST 7 DAYS)    CBC  Results from last 7 days   Lab Units 06/11/24  0352 06/10/24  1017   WBC 10*3/mm3 2.05* 2.37*   RBC 10*6/mm3 2.85* 2.92*   HEMOGLOBIN g/dL 8.4* 8.6*   HEMATOCRIT % 28.2* 29.6*   MCV fL  98.9* 101.4*   PLATELETS 10*3/mm3 64* 71*       BMP  Results from last 7 days   Lab Units 06/11/24  0352 06/10/24  1017   SODIUM mmol/L 138 135*   POTASSIUM mmol/L 4.4 3.9   CHLORIDE mmol/L 103 106   CO2 mmol/L 24.1 17.0*   BUN mg/dL 47* 41*   CREATININE mg/dL 3.33* 2.95*   GLUCOSE mg/dL 78 104*       CMP   Results from last 7 days   Lab Units 06/11/24  0352 06/10/24  1017   SODIUM mmol/L 138 135*   POTASSIUM mmol/L 4.4 3.9   CHLORIDE mmol/L 103 106   CO2 mmol/L 24.1 17.0*   BUN mg/dL 47* 41*   CREATININE mg/dL 3.33* 2.95*   GLUCOSE mg/dL 78 104*   ALBUMIN g/dL 3.5  --    BILIRUBIN mg/dL 0.6  --    ALK PHOS U/L 71  --    AST (SGOT) U/L 126*  --    ALT (SGPT) U/L 27  --        BNP        TROPONIN        CoAg        Creatinine Clearance  Estimated Creatinine Clearance: 11.5 mL/min (A) (by C-G formula based on SCr of 3.33 mg/dL (H)).    ABG          Radiology  XR Chest 1 View    Result Date: 6/10/2024  Impression: 1.Cardiomegaly with pulmonary vascular congestion. 2.Left basilar atelectasis or infiltrate. Electronically Signed: Armando Gonzalez MD  6/10/2024 10:43 AM EDT  Workstation ID: [id number]       EKG  I personally viewed and interpreted the patient's EKG/Telemetry data:  ECG 12 Lead Syncope   Final Result   HEART RATE= 70  bpm   RR Interval= 851  ms   GA Interval= 128  ms   P Horizontal Axis=   deg   P Front Axis= 0  deg   QRSD Interval= 142  ms   QT Interval= 436  ms   QTcB= 473  ms   QRS Axis= 3  deg   T Wave Axis= 189  deg   - ABNORMAL ECG -   Ventricular-paced rhythm   Left bundle branch block   When compared with ECG of 01-Jun-2024 13:37:49,   Significant change in rhythm   Electronically Signed By: Cisco Leal ([facility]) 11-Jun-2024 07:23:22   Date and Time of Study: 2024-06-10 10:03:15      Telemetry Scan   Final Result      Telemetry Scan   Final Result      Telemetry Scan   Final Result      Telemetry Scan   Final Result            Echocardiogram:    Results for orders placed during the hospital encounter of  04/07/24    Adult Transthoracic Echo Complete W/ Cont if Necessary Per Protocol    Interpretation Summary    Left ventricular systolic function is moderately decreased. Calculated left ventricular EF = 35%    The left ventricular cavity is mildly dilated.    The following left ventricular wall segments are hypokinetic: apical anterior.    Left ventricular diastolic function was indeterminate.    The left atrial cavity is moderately dilated.    Saline test results are positive with valsalva manuever.    Estimated right ventricular systolic pressure from tricuspid regurgitation is mildly elevated (35-45 mmHg).    There is a large left pleural effusion.    There appears to be an ASD present with left to right shunting based on color dopple. Consider YINA to further evaluate        Stress Test:        Cardiac Catheterization:  Results for orders placed during the hospital encounter of 04/25/24    Cardiac Catheterization/Vascular Study    Conclusion  OPERATORS  Dilcia Lui M.D. (Attending Cardiologist)      PROCEDURES PERFORMED  Ultrasound guided Vascular access  Left Heart Catheterization  Coronary Angiogram  Right heart catheterization  POBA the LAD  IVUS of the LAD and left main  Moderate sedation  Abdominal aorta runoff    INDICATIONS FOR PROCEDURE  Cardiomyopathy    PROCEDURE IN DETAIL  Informed consent was obtained from the patient after explaining the risks, benefits, and alternative options of the procedure. After obtaining informed consent, the patient was brought to the cath lab and was prepped in a sterile fashion. Lidocaine 2% was used for local anesthesia into the right femoral access site. The right femoral artery and vein were accessed with a micropuncture needle via modified Seldinger technique under ultrasound guidance. A 6F sheath was inserted successfully into the artery and a 7 Guinean sheath was inserted into the vein.  6 [place]-Dharmesh catheter was used to obtain RA, RV, PA, and pulmonary  capillary wedge pressure.  PA sat was obtained.    Afterwards, 6F JR4 and JL4 diagnostic catheters were advanced over a wire into the ascending aorta and were used to engage the ostia of the left main and RCA respectively. JR4 used to cross the AV and obtain LV pressures and gradient across the AV measured via pullback technique. Images of the right and left coronary systems were obtained.    Interventional report  After giving bivalirudin due to heparin allergy and upsizing the arterial sheath to a 7 Romanian system, a 7 Romanian XB LAD 3.5 guide was used to engage the left main.  A run-through wire was advanced to the distal LAD.  I then attempted to predilate the distal LAD lesion with a NC 2.25 x 12 mm balloon.  However the balloon did not expand.  The patient became very hypotensive at this point so the balloon was removed and the guide was disengaged.  Next I performed IVUS of the left main and LAD.  IVUS would not go past the proximal LAD lesion.  At this point the LAD measures 2.4 x 3.5 mm.  There is heavy calcification with 360 degree involvement.  There is diffuse disease in the entire LAD up to the ostium.  Also noted on pullback was that there was heavy calcification involving the ostium of the left circumflex.  There is also ostial left main disease with reference vessel diameter of 4.7 x 5.1 mm.  At this point the procedure was aborted.    A pigtail was advanced to the abdominal aorta and a runoff was performed.    All the catheters were exchanged over a wire and subsequently removed. Angiogram of the femoral access site was obtained and did not show complications. The patient tolerated the procedure well without any complications.  A Perclose suture was deployed at the arterial site.  The pictures were reviewed at the end of the procedure. A Mynx closure device was applied to the venous site.    HEMODYNAMICS    LV: 111/2/5  AO: 116/18/56  Gradient none    Right heart cath    RA 5  RV: 46/0/4  PA  45/12/26  PCW 9    Cardiac output 8.65  Cardiac index 5.72    FINDINGS  Coronary Angiogram    Right dominant circulation    Left main: Left main is a large caliber vessel which gives rise to the Left Anterior Descending and the Left circumflex.  There is a 60% ostial left main stenosis with about 40 to 50% distal left main stenosis.    Left Anterior Descending Artery: LAD is a large caliber vessel which gives rise to several septal perforators and several diagonal branches.  It is heavily calcified.  There is diffuse 60 to 70% stenosis in the proximal to mid LAD.  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  This is followed by another 80 to 90% lesion in the mid distal LAD.  There is also a 50 to 60% lesion in the distal LAD.    Left Circumflex: Lcx is a large caliber vessel which gives rise to several OM branches.  It is heavily calcified in the proximal to midportion with nodule noted in the proximal portion.  Diffuse 70% lesions in the proximal to mid left circumflex.    Right Coronary Artery: The RCA is a large caliber vessel gives rise to PDA and PLV.  There is moderate calcification of the RCA with about 40 to 50% stenosis of the proximal segment followed by a 30% lesion in the mid RCA.    ESTIMATED BLOOD LOSS:  10 ml    COMPLICATIONS:  None    PROCEDURE DATA:  Sedation Time: 1 hour    IMPRESSIONS  Multivessel heavily calcified CAD,  low normal right and left heart filling pressures  No evidence of pulmonary hypertension    RECOMMENDATIONS  I recommend CV surgery evaluation for bypass surgery along with left atrial appendage ligation given the extent of her coronary artery disease which is very calcified and complex  If she is not a candidate for bypass surgery I will follow her clinically and consider high risk PCI with Impella support and atherectomy of the LAD left circumflex and left main.  This would obviously be a very high risk procedure given her comorbidities  We  can also consider medical management if her symptoms remain stable  I have started her on aspirin and Plavix  Continue statin  GDMT for heart failure        Other:      ASSESSMENT & PLAN:    Principal Problem:    Syncope  Active Problems:    Type 2 diabetes mellitus with diabetic chronic kidney disease    Essential hypertension    ESRD (end stage renal disease)    Anemia due to chronic kidney disease, on chronic dialysis    Hyperlipidemia    Ischemic cardiomyopathy    Permanent atrial fibrillation    Presence of cardiac pacemaker    Coronary artery disease involving native coronary artery of native heart without angina pectoris      Syncope  Etiology unclear  Recent echocardiogram showed LVEF 35%  Recent cardiac catheterization showed multivessel CAD  Interrogate pacemaker  Obtain CT head  Unable to do MRI for 6 weeks post pacemaker placement   PT/OT to eval    Permanent atrial fibrillation  Sick sinus syndrome, status post permanent pacemaker placement  Medtronic single-chamber pacemaker implanted on 6/3/24  Patient had a hematoma post-procedure so her DAPT and anticoagulation were held  Currently v-paced rhythm  Interrogate pacemaker  Restart beta blocker if device interrogation okay  [id number] score 6  Restart low-dose Eliquis 2.5 mg twice daily due to history of bleeding issues    Coronary artery disease involving native coronary artery of native heart without angina pectoris  Multivessel disease per cardiac cath done in April 2024  Patient evaluated by CTS at that time and deemed to not be a surgical candidate.   Patient is currently pain-free.  Discontinue DAPT and restart low-dose Eliquis as above due to history of a-fib and bleeding issues  Continue high intensity statin     Ischemic Cardiomyopathy  Recent echocardiogram showed LVEF 35%  Recent cardiac cath showed multivessel CAD  On beta blocker   Unable to add GDMT due to hypotension  Not on SGLT2-I due to ESRD    Hyperlipidemia  Continue high intensity  statin     Essential hypertension, chronic  Blood pressure well controlled on beta blocker    ESRD (end stage renal disease)  On hemodialysis Mondays and Fridays  Nephrology following     Anemia due to chronic kidney disease, on chronic dialysis  Hemoglobin 8.4 today  Monitor H&H    Type 2 diabetes mellitus with diabetic chronic kidney disease  Recent A1c 4.90%  Diet-controlled     Electronically signed by LUCA Herron, 06/11/24, 1:02 PM EDT.      Further assessment and recommendations per Dr. Carrillo.

## 2024-06-11 NOTE — PROGRESS NOTES
[facility] MEDICINE SERVICE  DAILY PROGRESS NOTE    NAME: Deann Warren  : 1940  MRN: [id number]      LOS: 1 day     PROVIDER OF SERVICE: LUCA Cain    Chief Complaint: Syncope    Subjective:     Interval History:  History taken from: patient  Patient Complaints: None   Patient Denies:  Current chest pain, dizziness, fatigue    Patient had pacemaker placed on 6/3/24, sees cardiology regularly. Patient is okay with going back home if cardiology signs off. Awaiting their input.     Review of Systems:   Review of Systems   Constitutional:  Negative for chills and fever.   Respiratory:  Negative for cough, chest tightness and shortness of breath.    Cardiovascular:  Negative for chest pain and palpitations.   Gastrointestinal:  Negative for abdominal pain, constipation, diarrhea and vomiting.   Genitourinary:  Negative for urgency.   Musculoskeletal:  Negative for arthralgias and myalgias.   Neurological:  Negative for dizziness, syncope and light-headedness.       Objective:     Vital Signs  Temp:  [97.5 °F (36.4 °C)] 97.5 °F (36.4 °C)  Heart Rate:  [69-71] 70  Resp:  [17-18] 18  BP: ()/(46-66) 121/61  Flow (L/min):  [2] 2   Body mass index is 21.46 kg/m².    Physical Exam  Physical Exam  Vitals and nursing note reviewed.   Constitutional:       Appearance: Normal appearance.   HENT:      Head: Normocephalic and atraumatic.   Eyes:      Extraocular Movements: Extraocular movements intact.      Pupils: Pupils are equal, round, and reactive to light.   Cardiovascular:      Rate and Rhythm: Normal rate and regular rhythm.      Pulses: Normal pulses.   Pulmonary:      Effort: Pulmonary effort is normal.      Breath sounds: Normal breath sounds.   Abdominal:      General: Bowel sounds are normal.      Palpations: Abdomen is soft.   Musculoskeletal:      Cervical back: Normal range of motion and neck supple.   Skin:     General: Skin is warm.      Capillary Refill: Capillary refill takes less than  2 seconds.   Neurological:      Mental Status: She is alert and oriented to person, place, and time.         Scheduled Meds   sodium chloride, 10 mL, Intravenous, Q12H       PRN Meds     senna-docusate sodium **AND** polyethylene glycol **AND** bisacodyl **AND** bisacodyl    Calcium Replacement - Follow Nurse / BPA Driven Protocol    HYDROcodone-acetaminophen    Magnesium Standard Dose Replacement - Follow Nurse / BPA Driven Protocol    Phosphorus Replacement - Follow Nurse / BPA Driven Protocol    Potassium Replacement - Follow Nurse / BPA Driven Protocol    [COMPLETED] Insert Peripheral IV **AND** sodium chloride    sodium chloride    sodium chloride   Infusions         Diagnostic Data    Results from last 7 days   Lab Units 06/11/24  0352   WBC 10*3/mm3 2.05*   HEMOGLOBIN g/dL 8.4*   HEMATOCRIT % 28.2*   PLATELETS 10*3/mm3 64*   GLUCOSE mg/dL 78   CREATININE mg/dL 3.33*   BUN mg/dL 47*   SODIUM mmol/L 138   POTASSIUM mmol/L 4.4   AST (SGOT) U/L 126*   ALT (SGPT) U/L 27   ALK PHOS U/L 71   BILIRUBIN mg/dL 0.6   ANION GAP mmol/L 10.9       XR Chest 1 View    Result Date: 6/10/2024  Impression: 1.Cardiomegaly with pulmonary vascular congestion. 2.Left basilar atelectasis or infiltrate. Electronically Signed: Armando Gonzalez MD  6/10/2024 10:43 AM EDT  Workstation ID: [id number]       I reviewed the patient's new clinical results.    Assessment/Plan:     Active and Resolved Problems  Active Hospital Problems    Diagnosis  POA    **Syncope [R55]  Yes    Presence of cardiac pacemaker [Z95.0]  Yes    Permanent atrial fibrillation [I48.21]  Yes    Coronary artery disease involving native coronary artery of native heart without angina pectoris [I25.10]  Yes    Cardiomyopathy, dilated [I42.0]  Yes    Hyperlipidemia [E78.5]  Yes    Type 2 diabetes mellitus with diabetic chronic kidney disease [E11.22]  Yes    ESRD (end stage renal disease) [N18.6]  Yes    Anemia due to chronic kidney disease, on chronic dialysis [N18.6,  D63.1, Z99.2]  Not Applicable    Essential hypertension [I10]  Yes      Resolved Hospital Problems   No resolved problems to display.       Syncope  - Last echo 4/9/2024 EF 35%  - Hgb 8.4 Hct 28.2  - Creatinine 3.33 - on dialysis outpatient  - EKG noted for ventricular paced rhythm  - Cardiology consulted  - Cardiology EP consulted     Thrombocytopenia  - Platelets 64  - Iron study panel pending     ESRD  - Dialysis schedule Monday Wednesday Friday.  - Followed by Dr. Buchanan  - BUN/creatinine 47/3.33       Atrial fibrillation  - dual antiplatelet therapy at home of plavix and aspirin     HLD  - Continue home regime of lipitor 40  mg daily       VTE Prophylaxis:  Mechanical VTE prophylaxis orders are present.         Code status is   Code Status and Medical Interventions:   Ordered at: 06/10/24 1150     Level Of Support Discussed With:    Patient     Code Status (Patient has no pulse and is not breathing):    CPR (Attempt to Resuscitate)     Medical Interventions (Patient has pulse or is breathing):    Full Support       Plan for disposition:Home in 0-2 days    Time: 30 minutes    Signature: Electronically signed by LUCA Cain, 06/11/24, 11:11 EDT.  [facility] Hospitalist Team

## 2024-06-11 NOTE — PLAN OF CARE
Goal Outcome Evaluation:  Plan of Care Reviewed With: patient        Progress: no change  Outcome Evaluation: Patient came from the ER for syncope and generalized weakness. Just had a pacemaker placed on June 3rd and was interrogated down in the ER. Patient is ESRD and does dialysis twice a week with Dr. Garcia. ÁNGEL AVF. 2+ pitting edema in bilateral LE's. Today, at 1420, at RR was called. The aid came and got RN. RN saw patient very lethargic, not able to keep her head up, and not responding very well. Patient would open her eyes and mumble. See MAR for meds given before RR. VS were completely stable. Dr. Nicole notified. Blood cultures, procal, lactic, CT head and A/P ordered. Family notified. Patient is stable at this time. Is alert and oriented but just fatigued. Continuing to monitor.

## 2024-06-11 NOTE — PLAN OF CARE
Goal Outcome Evaluation:  Plan of Care Reviewed With: patient, friend           Outcome Evaluation: 84 y/o female admitted on 6/10 due to syncopal epiosde. Patient is s/p pacemaker placement on 6/3/2024. PMH Includes ESRD on dialysis, thrombocytopenia, L heel wound. Patient resides with adult grandson and has friend that assists with bathing and household tasks. Patient is aware of her pacemaker precautions. Patient was ambulatory at baseline using rw. At time of eval, patient needed CGA for supine to sit. No dizziness reported upon sitting/standing nor have change in BP or HR. Patient needed min/mod A of 1-2 to come to standing and bed needed to be elevated. Patient only managed to ambulate 2 ft fwd with min/mod A and had to return to sitting. Flexed trunk and head down posture noted, can correct with cues but unable to maintain. Patient will need SNF at discharge for strength, mobility and balance training.      Anticipated Discharge Disposition (PT): skilled nursing facility

## 2024-06-11 NOTE — PROGRESS NOTES
"                                                                                                                                      Nephrology  Progress Note                                        [facility]    Patient Identification    Name: Deann Warren  Age: 83 y.o.  Sex: female  :  1940  MRN: [id number]      DATE OF SERVICE:  2024        Subective    Chart reviewed     Objective   Scheduled Meds:apixaban, 2.5 mg, Oral, Q12H  atorvastatin, 40 mg, Oral, Daily  sodium chloride, 10 mL, Intravenous, Q12H          Continuous Infusions:     PRN Meds:  senna-docusate sodium **AND** polyethylene glycol **AND** bisacodyl **AND** bisacodyl    Calcium Replacement - Follow Nurse / BPA Driven Protocol    Magnesium Standard Dose Replacement - Follow Nurse / BPA Driven Protocol    Phosphorus Replacement - Follow Nurse / BPA Driven Protocol    Potassium Replacement - Follow Nurse / BPA Driven Protocol    [COMPLETED] Insert Peripheral IV **AND** sodium chloride    sodium chloride    sodium chloride     Exam:  /59 (BP Location: Right arm, Patient Position: Lying)   Pulse 70   Temp 96.2 °F (35.7 °C) (Oral)   Resp 16   Ht 162.6 cm (64\")   Wt 56.7 kg (125 lb)   SpO2 99%   BMI 21.46 kg/m²     Intake/Output last 3 shifts:  No intake/output data recorded.    Intake/Output this shift:  No intake/output data recorded.       Data Review:  All labs (24hrs):   Recent Results (from the past 24 hour(s))   COVID-19, FLU A/B, RSV PCR 1 HR TAT - Swab, Nasopharynx    Collection Time: 06/10/24  6:21 PM    Specimen: Nasopharynx; Swab   Result Value Ref Range    COVID19 Not Detected Not Detected - Ref. Range    Influenza A PCR Not Detected Not Detected    Influenza B PCR Not Detected Not Detected    RSV, PCR Not Detected Not Detected   CBC (No Diff)    Collection Time: 24  3:52 AM    Specimen: Blood   Result Value Ref Range    WBC 2.05 (L) 3.40 - 10.80 10*3/mm3    RBC 2.85 (L) 3.77 - 5.28 10*6/mm3 "    Hemoglobin 8.4 (L) 12.0 - 15.9 g/dL    Hematocrit 28.2 (L) 34.0 - 46.6 %    MCV 98.9 (H) 79.0 - 97.0 fL    MCH 29.5 26.6 - 33.0 pg    MCHC 29.8 (L) 31.5 - 35.7 g/dL    RDW 15.7 (H) 12.3 - 15.4 %    RDW-SD 57.1 (H) 37.0 - 54.0 fl    MPV 10.1 6.0 - 12.0 fL    Platelets 64 (L) 140 - 450 10*3/mm3   Comprehensive Metabolic Panel    Collection Time: 06/11/24  3:52 AM    Specimen: Blood   Result Value Ref Range    Glucose 78 65 - 99 mg/dL    BUN 47 (H) 8 - 23 mg/dL    Creatinine 3.33 (H) 0.57 - 1.00 mg/dL    Sodium 138 136 - 145 mmol/L    Potassium 4.4 3.5 - 5.2 mmol/L    Chloride 103 98 - 107 mmol/L    CO2 24.1 22.0 - 29.0 mmol/L    Calcium 8.3 (L) 8.6 - 10.5 mg/dL    Total Protein 5.4 (L) 6.0 - 8.5 g/dL    Albumin 3.5 3.5 - 5.2 g/dL    ALT (SGPT) 27 1 - 33 U/L    AST (SGOT) 126 (H) 1 - 32 U/L    Alkaline Phosphatase 71 39 - 117 U/L    Total Bilirubin 0.6 0.0 - 1.2 mg/dL    Globulin 1.9 gm/dL    A/G Ratio 1.8 g/dL    BUN/Creatinine Ratio 14.1 7.0 - 25.0    Anion Gap 10.9 5.0 - 15.0 mmol/L    eGFR 13.2 (L) >60.0 mL/min/1.73   POC Glucose Once    Collection Time: 06/11/24  2:21 PM    Specimen: Blood   Result Value Ref Range    Glucose 85 70 - 105 mg/dL          Imaging:  XR Chest 1 View    Result Date: 6/10/2024  Impression: 1.Cardiomegaly with pulmonary vascular congestion. 2.Left basilar atelectasis or infiltrate. Electronically Signed: Armando Gonzalez MD  6/10/2024 10:43 AM EDT  Workstation ID: [id number]    XR Chest 1 View    Result Date: 6/3/2024  Impression: No significant change after pacemaker placement. Electronically Signed: Won Bravo MD  6/3/2024 6:34 PM EDT  Workstation ID: [id number]     Assessment/Plan:     Syncope    Type 2 diabetes mellitus with diabetic chronic kidney disease    Essential hypertension    ESRD (end stage renal disease)    Anemia due to chronic kidney disease, on chronic dialysis    Hyperlipidemia    Ischemic cardiomyopathy    Permanent atrial fibrillation    Presence of cardiac  pacemaker    Coronary artery disease involving native coronary artery of native heart without angina pectoris        End-stage renal disease   History of recent pacemaker   Syncope   Sick sinus syndrome   History of thrombocytopenia       Pt gets HD 2x a week  Skipped yesterday due to syncope/ BP issues  Will do HD tomorrow  Next will likely be Friday then YEE and F

## 2024-06-11 NOTE — PROGRESS NOTES
Called and spoke to son Antonino by phone. Informed him patient had a rapid event, explained generally about rapid events. Son reports his brother was in earlier today and plans to visit later this afternoon around 6:30pm. We are to call him if any new updates.    Chaplain Tristian Bowens

## 2024-06-11 NOTE — THERAPY EVALUATION
Patient Name: Deann Warren  : 1940    MRN: [id number]                              Today's Date: 2024       Admit Date: 6/10/2024    Visit Dx:     ICD-10-CM ICD-9-CM   1. Syncope, unspecified syncope type  R55 780.2     Patient Active Problem List   Diagnosis    Syncope and collapse    Dependence on renal dialysis    Type 2 diabetes mellitus with diabetic chronic kidney disease    Anxiety    Chronic gouty arthritis    Allergic conjunctivitis and rhinitis    Essential hypertension    ESRD (end stage renal disease)    Anemia due to chronic kidney disease, on chronic dialysis    History of shingles    Hyperlipidemia    Vitamin D deficiency    Ischemic cardiomyopathy    Syncopal episodes    Permanent atrial fibrillation    Syncope    Presence of cardiac pacemaker    Coronary artery disease involving native coronary artery of native heart without angina pectoris     Past Medical History:   Diagnosis Date    Allergic conjunctivitis and rhinitis 2014    Anemia due to chronic kidney disease, on chronic dialysis 2020    Anxiety 2012    Bradycardia by electrocardiogram 2024    Cardiomyopathy, dilated 2024    Chronic gouty arthritis 2014    Coronary artery disease involving native coronary artery of native heart without angina pectoris 2024    Dependence on renal dialysis 2022    ESRD (end stage renal disease) 2020    Essential hypertension 2015    History of shingles 2022    Hyperlipidemia 2024    Ischemic cardiomyopathy 2024    Paroxysmal atrial fibrillation 2022    Presence of cardiac pacemaker 2024    Sick sinus syndrome 2024    Type 2 diabetes mellitus with diabetic chronic kidney disease 2022    Vitamin D deficiency 2021     Past Surgical History:   Procedure Laterality Date    ARTERIOVENOUS FISTULA Left     CARDIAC CATHETERIZATION N/A 2024    Procedure: Right and Left Heart Cath;  Surgeon:  Dilcia Lui MD;  Location: [facility] CATH INVASIVE LOCATION;  Service: Cardiology;  Laterality: N/A;    CARDIAC CATHETERIZATION N/A 4/25/2024    Procedure: Percutaneous Coronary Intervention;  Surgeon: Jadiel Blake MD;  Location: [facility] CATH INVASIVE LOCATION;  Service: Cardiology;  Laterality: N/A;    CARDIAC ELECTROPHYSIOLOGY PROCEDURE N/A 6/3/2024    Procedure: Pacemaker DC new, [employer];  Surgeon: Zamzam Carrillo MD;  Location: [facility] CATH INVASIVE LOCATION;  Service: Cardiovascular;  Laterality: N/A;      General Information       Row Name 06/11/24 1550          OT Time and Intention    Document Type evaluation  -SR     Mode of Treatment occupational therapy  -SR       Row Name 06/11/24 1550          General Information    Existing Precautions/Restrictions fall;pacemaker  -SR       Row Name 06/11/24 1550          Occupational Profile    Reason for Services/Referral (Occupational Profile) Deann Warren is a 83 y.o. female with a CMH of thrombocytopenia, sick sinus syndrome, atrial fibrillation, LBBB, cardiomyopathy with a EF of 35%, CAD, Pacemaker (placed on 6/3/24), HLD, ESRD (left fistula) who presented to [facility] on 6/10/2024 with syncope.  Patient was in route to receive dialysis, patient states that when she was walking into the door she felt weak and unsteady, patient to the seat is out of the office.  Pt live at home with her grandson (24 hour assist) as well as assist from her son and a close friend.  She typically walks using rwx.  -SR       Row Name 06/11/24 1550          Living Environment    People in Home grandchild(vamsi)  -SR       Row Name 06/11/24 1550          Cognition    Orientation Status (Cognition) oriented x 4  -SR       Row Name 06/11/24 1550          Safety Issues, Functional Mobility    Impairments Affecting Function (Mobility) balance;endurance/activity tolerance;strength;pain;range of motion (ROM);postural/trunk control  -SR               User Key  (r) = Recorded  By, (t) = Taken By, (c) = Cosigned By      Initials Name Provider Type    SR Lary Yancey OT Occupational Therapist                     Mobility/ADL's       Row Name 06/11/24 1558          Bed Mobility    Bed Mobility supine-sit;sit-supine  -SR     Supine-Sit [place] (Bed Mobility) verbal cues;contact guard  -SR     Sit-Supine [place] (Bed Mobility) minimum assist (75% patient effort)  -SR       Row Name 06/11/24 1558          Sit-Stand Transfer    Sit-Stand [place] (Transfers) minimum assist (75% patient effort);1 person assist;2 person assist  -SR     Assistive Device (Sit-Stand Transfers) walker, front-wheeled  -SR       Row Name 06/11/24 1558          Activities of Daily Living    BADL Assessment/Intervention lower body dressing  -SR       Row Name 06/11/24 1558          Lower Body Dressing Assessment/Training    [place] Level (Lower Body Dressing) don;shoes/slippers;dependent (less than 25% patient effort)  -SR               User Key  (r) = Recorded By, (t) = Taken By, (c) = Cosigned By      Initials Name Provider Type    SR Lary Yancey OT Occupational Therapist                   Obj/Interventions       Row Name 06/11/24 1558          Range of Motion Comprehensive    Comment, General Range of Motion R shoulder ROM limited due to pacemaker.  L shoulder impaired due to stiffness.  Distal UE ROM WFL.  -SR       Row Name 06/11/24 1558          Strength Comprehensive (MMT)    Comment, General Manual Muscle Testing (MMT) Assessment L shoulder 3-/5, distal UE 3+/5  -SR       Row Name 06/11/24 1558          Balance    Balance Interventions sitting;standing;sit to stand;supported;static;dynamic;minimal challenge  -SR               User Key  (r) = Recorded By, (t) = Taken By, (c) = Cosigned By      Initials Name Provider Type    SR Lary Yancey OT Occupational Therapist                   Goals/Plan       Row Name 06/11/24 1601          Toileting Goal 1 (OT)     Activity/Device (Toileting Goal 1, OT) toileting skills, all  -SR     [place] Level/Cues Needed (Toileting Goal 1, OT) moderate assist (50-74% patient effort)  -SR     Time Frame (Toileting Goal 1, OT) 2 weeks  -SR       Row Name 06/11/24 1601          Grooming Goal 1 (OT)    Activity/Device (Grooming Goal 1, OT) grooming skills, all  -SR     [place] (Grooming Goal 1, OT) contact guard required  -SR     Time Frame (Grooming Goal 1, OT) 2 weeks  -SR       Row Name 06/11/24 1601          Therapy Assessment/Plan (OT)    Planned Therapy Interventions (OT) activity tolerance training;BADL retraining;IADL retraining;functional balance retraining;neuromuscular control/coordination retraining;ROM/therapeutic exercise;transfer/mobility retraining;strengthening exercise;occupation/activity based interventions  -SR               User Key  (r) = Recorded By, (t) = Taken By, (c) = Cosigned By      Initials Name Provider Type    SR Lary Yancey, OT Occupational Therapist                   Clinical Impression       Row Name 06/11/24 4680          Pain Scale: FACES Pre/Post-Treatment    Pain: FACES Scale, Pretreatment 4-->hurts little more  -SR     Posttreatment Pain Rating 4-->hurts little more  -SR     Pain Location generalized  -SR       Row Name 06/11/24 5780          Plan of Care Review    Plan of Care Reviewed With patient  -SR     Outcome Evaluation Deann Warren is a 83 y.o. female with a CMH of thrombocytopenia, sick sinus syndrome, atrial fibrillation, LBBB, cardiomyopathy with a EF of 35%, CAD, Pacemaker (placed on 6/3/24), HLD, ESRD (left fistula) who presented to [facility] on 6/10/2024 with syncope.  Patient was in route to receive dialysis, patient states that when she was walking into the door she felt weak and unsteady, patient to the seat is out of the office.  Pt live at home with her grandson (24 hour assist) as well as assist from her son and a close friend.  She typically walks  using rwx.  This date pt reqiured min to mod asisst for bed mobility and transfers and depenent for lower body ADLs.  Recommend SNF at discharge.  -SR       Row Name 06/11/24 1559          Therapy Assessment/Plan (OT)    Rehab Potential (OT) good, to achieve stated therapy goals  -SR     Criteria for Skilled Therapeutic Interventions Met (OT) yes  -SR     Therapy Frequency (OT) 3 times/wk  -SR       Row Name 06/11/24 1559          Therapy Plan Review/Discharge Plan (OT)    Anticipated Discharge Disposition (OT) skilled nursing facility  -SR       Row Name 06/11/24 1559          Positioning and Restraints    Pre-Treatment Position in bed  -SR     Post Treatment Position bed  -SR     In Bed call light within reach;encouraged to call for assist;exit alarm on  -SR               User Key  (r) = Recorded By, (t) = Taken By, (c) = Cosigned By      Initials Name Provider Type    SR Lary Yancey, OT Occupational Therapist                   Outcome Measures       Row Name 06/11/24 1227 06/11/24 1046       How much help from another person do you currently need...    Turning from your back to your side while in flat bed without using bedrails? 3  -ES 3  -CR    Moving from lying on back to sitting on the side of a flat bed without bedrails? 3  -ES 3  -CR    Moving to and from a bed to a chair (including a wheelchair)? 2  -ES 2  -CR    Standing up from a chair using your arms (e.g., wheelchair, bedside chair)? 2  -ES 2  -CR    Climbing 3-5 steps with a railing? 2  -ES 2  -CR    To walk in hospital room? 2  -ES 2  -CR    AM-PAC 6 Clicks Score (PT) 14  -ES 14  -CR    Highest Level of Mobility Goal 4 --> Transfer to chair/commode  -ES 4 --> Transfer to chair/commode  -CR      Row Name 06/11/24 0800          How much help from another person do you currently need...    Turning from your back to your side while in flat bed without using bedrails? 3  -JH     Moving from lying on back to sitting on the side of a flat bed  without bedrails? 3  -JH     Moving to and from a bed to a chair (including a wheelchair)? 3  -JH     Standing up from a chair using your arms (e.g., wheelchair, bedside chair)? 2  -JH     Climbing 3-5 steps with a railing? 2  -JH     To walk in hospital room? 3  -JH     AM-PAC 6 Clicks Score (PT) 16  -JH     Highest Level of Mobility Goal 5 --> Static standing  -JH               User Key  (r) = Recorded By, (t) = Taken By, (c) = Cosigned By      Initials Name Provider Type    CR Reyes, Carmela, PT Physical Therapist    Kori Wayne, RN Registered Nurse    Yuli Wyatt, RN Registered Nurse                    Occupational Therapy Education       Title: PT OT SLP Therapies (In Progress)       Topic: Occupational Therapy (Done)       Point: ADL training (Done)       Description:   Instruct learner(s) on proper safety adaptation and remediation techniques during self care or transfers.   Instruct in proper use of assistive devices.                  Learning Progress Summary             Patient Eager, E, VU by TN at 6/10/2024 2214                         Point: Home exercise program (Done)       Description:   Instruct learner(s) on appropriate technique for monitoring, assisting and/or progressing therapeutic exercises/activities.                  Learning Progress Summary             Patient Eager, E, VU by TN at 6/10/2024 2214                         Point: Precautions (Done)       Description:   Instruct learner(s) on prescribed precautions during self-care and functional transfers.                  Learning Progress Summary             Patient Acceptance, E,TB, VU by SR at 6/11/2024 1601    Eager, E, VU by TN at 6/10/2024 2214                         Point: Body mechanics (Done)       Description:   Instruct learner(s) on proper positioning and spine alignment during self-care, functional mobility activities and/or exercises.                  Learning Progress Summary             Patient Acceptance, E,TB,  VU by SR at 6/11/2024 1601    WINTER Sharp, VU by TN at 6/10/2024 2211                                         User Key       Initials Effective Dates Name Provider Type Discipline     06/16/21 -  Lary Yancey OT Occupational Therapist OT    TN 06/21/23 -  Tony Rodrigues, RN Registered Nurse Nurse                  OT Recommendation and Plan  Planned Therapy Interventions (OT): activity tolerance training, BADL retraining, IADL retraining, functional balance retraining, neuromuscular control/coordination retraining, ROM/therapeutic exercise, transfer/mobility retraining, strengthening exercise, occupation/activity based interventions  Therapy Frequency (OT): 3 times/wk  Plan of Care Review  Plan of Care Reviewed With: patient  Outcome Evaluation: Deann Warren is a 83 y.o. female with a CMH of thrombocytopenia, sick sinus syndrome, atrial fibrillation, LBBB, cardiomyopathy with a EF of 35%, CAD, Pacemaker (placed on 6/3/24), HLD, ESRD (left fistula) who presented to [facility] on 6/10/2024 with syncope.  Patient was in route to receive dialysis, patient states that when she was walking into the door she felt weak and unsteady, patient to the seat is out of the office.  Pt live at home with her grandson (24 hour assist) as well as assist from her son and a close friend.  She typically walks using rwx.  This date pt reqiured min to mod asisst for bed mobility and transfers and depenent for lower body ADLs.  Recommend SNF at discharge.     Time Calculation:         Time Calculation- OT       Row Name 06/11/24 1602             Time Calculation- OT    OT Start Time 0924  -SR      OT Stop Time 0941  -SR      OT Time Calculation (min) 17 min  -SR      Total Timed Code Minutes- OT 0 minute(s)  -SR      OT Received On 06/11/24  -SR      OT - Next Appointment 06/13/24  -                User Key  (r) = Recorded By, (t) = Taken By, (c) = Cosigned By      Initials Name Provider Type    SR Lary Yancey  IBRAHIMA, OT Occupational Therapist                  Therapy Charges for Today       Code Description Service Date Service Provider Modifiers Qty    30265121816 HC OT EVAL MOD COMPLEXITY 4 6/11/2024 Lary Yancey, OT GO 1                 Lary Yancey OT  6/11/2024

## 2024-06-11 NOTE — CASE MANAGEMENT/SOCIAL WORK
Continued Stay Note  HYUN Duque     Patient Name: Deann Warren  MRN: [id number]  Today's Date: 6/11/2024    Admit Date: 6/10/2024  Plan: From home. SNF choices pending.   Discharge Plan       Row Name 06/11/24 1332       Plan    Plan From home. SNF choices pending.    Patient/Family in Agreement with Plan yes    Plan Comments CM met with son Tai to discuss discharge plan. Son took SNF choices list and said he would speak with his brother and make some choices, stated he would call CM with choices once decided. Son is planning to meet with an  to obtain POA. CM will follow up.               Expected Discharge Date and Time       Expected Discharge Date Expected Discharge Time    Jun 13, 2024        Judith Coreas RN     Office Phone [phone]

## 2024-06-12 ENCOUNTER — INPATIENT HOSPITAL (OUTPATIENT)
Dept: URBAN - METROPOLITAN AREA HOSPITAL 84 | Facility: HOSPITAL | Age: 84
End: 2024-06-12
Payer: MEDICAID

## 2024-06-12 ENCOUNTER — APPOINTMENT (OUTPATIENT)
Dept: CARDIOLOGY | Facility: HOSPITAL | Age: 84
DRG: 312 | End: 2024-06-12
Payer: MEDICARE

## 2024-06-12 ENCOUNTER — APPOINTMENT (OUTPATIENT)
Dept: ULTRASOUND IMAGING | Facility: HOSPITAL | Age: 84
DRG: 312 | End: 2024-06-12
Payer: MEDICARE

## 2024-06-12 DIAGNOSIS — R93.3 ABNORMAL FINDINGS ON DIAGNOSTIC IMAGING OF OTHER PARTS OF DI: ICD-10-CM

## 2024-06-12 DIAGNOSIS — R97.8 OTHER ABNORMAL TUMOR MARKERS: ICD-10-CM

## 2024-06-12 DIAGNOSIS — K86.89 OTHER SPECIFIED DISEASES OF PANCREAS: ICD-10-CM

## 2024-06-12 DIAGNOSIS — D61.818 OTHER PANCYTOPENIA: ICD-10-CM

## 2024-06-12 DIAGNOSIS — R74.01 ELEVATION OF LEVELS OF LIVER TRANSAMINASE LEVELS: ICD-10-CM

## 2024-06-12 LAB
ALBUMIN SERPL-MCNC: 3.3 G/DL (ref 3.5–5.2)
ALBUMIN/GLOB SERPL: 1.8 G/DL
ALP SERPL-CCNC: 70 U/L (ref 39–117)
ALPHA-FETOPROTEIN: <2 NG/ML (ref 0–8.3)
ALPHA1 GLOB MFR UR ELPH: 147 MG/DL (ref 90–200)
ALT SERPL W P-5'-P-CCNC: 33 U/L (ref 1–33)
ANION GAP SERPL CALCULATED.3IONS-SCNC: 9 MMOL/L (ref 5–15)
AORTIC DIMENSIONLESS INDEX: 0.41 (DI)
AST SERPL-CCNC: 129 U/L (ref 1–32)
BASOPHILS # BLD AUTO: 0.02 10*3/MM3 (ref 0–0.2)
BASOPHILS NFR BLD AUTO: 1.1 % (ref 0–1.5)
BH CV ECHO LEFT VENTRICLE GLOBAL LONGITUDINAL STRAIN: -9.5 %
BH CV ECHO MEAS - AI P1/2T: 682.7 MSEC
BH CV ECHO MEAS - AO MAX PG: 13.7 MMHG
BH CV ECHO MEAS - AO MEAN PG: 7 MMHG
BH CV ECHO MEAS - AO V2 MAX: 185 CM/SEC
BH CV ECHO MEAS - AO V2 VTI: 37.3 CM
BH CV ECHO MEAS - AVA(I,D): 2.23 CM2
BH CV ECHO MEAS - EDV(CUBED): 91.1 ML
BH CV ECHO MEAS - EDV(MOD-SP2): 102 ML
BH CV ECHO MEAS - EDV(MOD-SP4): 94.5 ML
BH CV ECHO MEAS - EF(MOD-BP): 25.4 %
BH CV ECHO MEAS - EF(MOD-SP2): 22.7 %
BH CV ECHO MEAS - EF(MOD-SP4): 26.2 %
BH CV ECHO MEAS - ESV(CUBED): 39.3 ML
BH CV ECHO MEAS - ESV(MOD-SP2): 78.8 ML
BH CV ECHO MEAS - ESV(MOD-SP4): 69.7 ML
BH CV ECHO MEAS - FS: 24.4 %
BH CV ECHO MEAS - IVS/LVPW: 1.08 CM
BH CV ECHO MEAS - IVSD: 1.3 CM
BH CV ECHO MEAS - LA DIMENSION: 4.3 CM
BH CV ECHO MEAS - LAT PEAK E' VEL: 9.1 CM/SEC
BH CV ECHO MEAS - LV DIASTOLIC VOL/BSA (35-75): 59 CM2
BH CV ECHO MEAS - LV MASS(C)D: 210.2 GRAMS
BH CV ECHO MEAS - LV MAX PG: 8.6 MMHG
BH CV ECHO MEAS - LV MEAN PG: 4 MMHG
BH CV ECHO MEAS - LV SYSTOLIC VOL/BSA (12-30): 43.5 CM2
BH CV ECHO MEAS - LV V1 MAX: 147 CM/SEC
BH CV ECHO MEAS - LV V1 VTI: 26.5 CM
BH CV ECHO MEAS - LVIDD: 4.5 CM
BH CV ECHO MEAS - LVIDS: 3.4 CM
BH CV ECHO MEAS - LVOT AREA: 3.1 CM2
BH CV ECHO MEAS - LVOT DIAM: 2 CM
BH CV ECHO MEAS - LVPWD: 1.2 CM
BH CV ECHO MEAS - MED PEAK E' VEL: 3.6 CM/SEC
BH CV ECHO MEAS - MR MAX PG: 68.2 MMHG
BH CV ECHO MEAS - MR MAX VEL: 413 CM/SEC
BH CV ECHO MEAS - MV A DUR: 0.1 SEC
BH CV ECHO MEAS - MV A MAX VEL: 57.6 CM/SEC
BH CV ECHO MEAS - MV DEC SLOPE: 290 CM/SEC2
BH CV ECHO MEAS - MV DEC TIME: 0.2 SEC
BH CV ECHO MEAS - MV E MAX VEL: 87.5 CM/SEC
BH CV ECHO MEAS - MV E/A: 1.52
BH CV ECHO MEAS - MV MAX PG: 5 MMHG
BH CV ECHO MEAS - MV MEAN PG: 3 MMHG
BH CV ECHO MEAS - MV P1/2T: 117.2 MSEC
BH CV ECHO MEAS - MV V2 VTI: 29.6 CM
BH CV ECHO MEAS - MVA(P1/2T): 1.88 CM2
BH CV ECHO MEAS - MVA(VTI): 2.8 CM2
BH CV ECHO MEAS - PA ACC TIME: 0.11 SEC
BH CV ECHO MEAS - PA V2 MAX: 106 CM/SEC
BH CV ECHO MEAS - PULM A REVS DUR: 0.1 SEC
BH CV ECHO MEAS - PULM A REVS VEL: 18.4 CM/SEC
BH CV ECHO MEAS - PULM DIAS VEL: 60.9 CM/SEC
BH CV ECHO MEAS - PULM S/D: 0.71
BH CV ECHO MEAS - PULM SYS VEL: 43 CM/SEC
BH CV ECHO MEAS - RV MAX PG: 2.02 MMHG
BH CV ECHO MEAS - RV V1 MAX: 71.1 CM/SEC
BH CV ECHO MEAS - RV V1 VTI: 15.2 CM
BH CV ECHO MEAS - RVSP: 55 MMHG
BH CV ECHO MEAS - SV(LVOT): 83.3 ML
BH CV ECHO MEAS - SV(MOD-SP2): 23.2 ML
BH CV ECHO MEAS - SV(MOD-SP4): 24.8 ML
BH CV ECHO MEAS - SVI(LVOT): 52 ML/M2
BH CV ECHO MEAS - SVI(MOD-SP2): 14.5 ML/M2
BH CV ECHO MEAS - SVI(MOD-SP4): 15.5 ML/M2
BH CV ECHO MEAS - TAPSE (>1.6): 1.35 CM
BH CV ECHO MEAS - TR MAX PG: 47.6 MMHG
BH CV ECHO MEAS - TR MAX VEL: 345 CM/SEC
BH CV ECHO MEASUREMENTS AVERAGE E/E' RATIO: 13.78
BH CV XLRA - TDI S': 9.9 CM/SEC
BILIRUB SERPL-MCNC: 0.7 MG/DL (ref 0–1.2)
BUN SERPL-MCNC: 52 MG/DL (ref 8–23)
BUN/CREAT SERPL: 15.1 (ref 7–25)
CALCIUM SPEC-SCNC: 8 MG/DL (ref 8.6–10.5)
CANCER AG19-9 SERPL-ACNC: 44.2 U/ML
CERULOPLASMIN SERPL-MCNC: 18 MG/DL (ref 19–39)
CHLORIDE SERPL-SCNC: 104 MMOL/L (ref 98–107)
CO2 SERPL-SCNC: 23 MMOL/L (ref 22–29)
CREAT SERPL-MCNC: 3.44 MG/DL (ref 0.57–1)
DEPRECATED RDW RBC AUTO: 56.3 FL (ref 37–54)
EGFRCR SERPLBLD CKD-EPI 2021: 12.7 ML/MIN/1.73
EOSINOPHIL # BLD AUTO: 0.08 10*3/MM3 (ref 0–0.4)
EOSINOPHIL NFR BLD AUTO: 4.4 % (ref 0.3–6.2)
ERYTHROCYTE [DISTWIDTH] IN BLOOD BY AUTOMATED COUNT: 15.7 % (ref 12.3–15.4)
FERRITIN SERPL-MCNC: 1145 NG/ML (ref 13–150)
FIBRINOGEN PPP-MCNC: 241 MG/DL (ref 210–450)
FOLATE SERPL-MCNC: 8.56 NG/ML (ref 4.78–24.2)
GGT SERPL-CCNC: 14 U/L (ref 5–36)
GLOBULIN UR ELPH-MCNC: 1.8 GM/DL
GLUCOSE BLDC GLUCOMTR-MCNC: 108 MG/DL (ref 70–105)
GLUCOSE BLDC GLUCOMTR-MCNC: 222 MG/DL (ref 70–105)
GLUCOSE BLDC GLUCOMTR-MCNC: 58 MG/DL (ref 70–105)
GLUCOSE BLDC GLUCOMTR-MCNC: 75 MG/DL (ref 70–105)
GLUCOSE BLDC GLUCOMTR-MCNC: 76 MG/DL (ref 70–105)
GLUCOSE BLDC GLUCOMTR-MCNC: 80 MG/DL (ref 70–105)
GLUCOSE BLDC GLUCOMTR-MCNC: 89 MG/DL (ref 70–105)
GLUCOSE SERPL-MCNC: 66 MG/DL (ref 65–99)
HAPTOGLOB SERPL-MCNC: 75 MG/DL (ref 30–200)
HAV IGM SERPL QL IA: NORMAL
HBV CORE IGM SERPL QL IA: NORMAL
HBV SURFACE AG SERPL QL IA: NORMAL
HCT VFR BLD AUTO: 28.7 % (ref 34–46.6)
HCV AB SER QL: NORMAL
HGB BLD-MCNC: 8.5 G/DL (ref 12–15.9)
IMM GRANULOCYTES # BLD AUTO: 0 10*3/MM3 (ref 0–0.05)
IMM GRANULOCYTES NFR BLD AUTO: 0 % (ref 0–0.5)
IRON 24H UR-MRATE: 190 MCG/DL (ref 37–145)
IRON SATN MFR SERPL: 81 % (ref 20–50)
LDH SERPL-CCNC: 237 U/L (ref 135–214)
LYMPHOCYTES # BLD AUTO: 0.55 10*3/MM3 (ref 0.7–3.1)
LYMPHOCYTES NFR BLD AUTO: 30.2 % (ref 19.6–45.3)
MCH RBC QN AUTO: 29.1 PG (ref 26.6–33)
MCHC RBC AUTO-ENTMCNC: 29.6 G/DL (ref 31.5–35.7)
MCV RBC AUTO: 98.3 FL (ref 79–97)
MONOCYTES # BLD AUTO: 0.22 10*3/MM3 (ref 0.1–0.9)
MONOCYTES NFR BLD AUTO: 12.1 % (ref 5–12)
NEUTROPHILS NFR BLD AUTO: 0.95 10*3/MM3 (ref 1.7–7)
NEUTROPHILS NFR BLD AUTO: 52.2 % (ref 42.7–76)
NRBC BLD AUTO-RTO: 0 /100 WBC (ref 0–0.2)
PLATELET # BLD AUTO: 66 10*3/MM3 (ref 140–450)
PMV BLD AUTO: 10.6 FL (ref 6–12)
POTASSIUM SERPL-SCNC: 4.7 MMOL/L (ref 3.5–5.2)
PROT SERPL-MCNC: 5.1 G/DL (ref 6–8.5)
RBC # BLD AUTO: 2.92 10*6/MM3 (ref 3.77–5.28)
RETICS # AUTO: 0.04 10*6/MM3 (ref 0.02–0.13)
RETICS/RBC NFR AUTO: 1.55 % (ref 0.7–1.9)
SINUS: 3.3 CM
SODIUM SERPL-SCNC: 136 MMOL/L (ref 136–145)
STJ: 2.3 CM
TIBC SERPL-MCNC: 234 MCG/DL (ref 298–536)
TRANSFERRIN SERPL-MCNC: 157 MG/DL (ref 200–360)
VIT B12 BLD-MCNC: 566 PG/ML (ref 211–946)
WBC NRBC COR # BLD AUTO: 1.82 10*3/MM3 (ref 3.4–10.8)

## 2024-06-12 PROCEDURE — 92610 EVALUATE SWALLOWING FUNCTION: CPT

## 2024-06-12 PROCEDURE — 0 DEXTROSE 5 % SOLUTION: Performed by: STUDENT IN AN ORGANIZED HEALTH CARE EDUCATION/TRAINING PROGRAM

## 2024-06-12 PROCEDURE — 76705 ECHO EXAM OF ABDOMEN: CPT

## 2024-06-12 PROCEDURE — 82948 REAGENT STRIP/BLOOD GLUCOSE: CPT

## 2024-06-12 PROCEDURE — 85384 FIBRINOGEN ACTIVITY: CPT | Performed by: PHYSICIAN ASSISTANT

## 2024-06-12 PROCEDURE — 93306 TTE W/DOPPLER COMPLETE: CPT | Performed by: INTERNAL MEDICINE

## 2024-06-12 PROCEDURE — 83540 ASSAY OF IRON: CPT | Performed by: NURSE PRACTITIONER

## 2024-06-12 PROCEDURE — 82948 REAGENT STRIP/BLOOD GLUCOSE: CPT | Performed by: STUDENT IN AN ORGANIZED HEALTH CARE EDUCATION/TRAINING PROGRAM

## 2024-06-12 PROCEDURE — 86381 MITOCHONDRIAL ANTIBODY EACH: CPT | Performed by: NURSE PRACTITIONER

## 2024-06-12 PROCEDURE — 99222 1ST HOSP IP/OBS MODERATE 55: CPT | Performed by: NURSE PRACTITIONER

## 2024-06-12 PROCEDURE — 25010000002 NALOXONE PER 1 MG

## 2024-06-12 PROCEDURE — 93356 MYOCRD STRAIN IMG SPCKL TRCK: CPT

## 2024-06-12 PROCEDURE — 86015 ACTIN ANTIBODY EACH: CPT | Performed by: NURSE PRACTITIONER

## 2024-06-12 PROCEDURE — 93356 MYOCRD STRAIN IMG SPCKL TRCK: CPT | Performed by: INTERNAL MEDICINE

## 2024-06-12 PROCEDURE — 25010000002 MORPHINE PER 10 MG: Performed by: FAMILY MEDICINE

## 2024-06-12 PROCEDURE — 25010000002 PIPERACILLIN SOD-TAZOBACTAM PER 1 G: Performed by: FAMILY MEDICINE

## 2024-06-12 PROCEDURE — 25010000002 FUROSEMIDE PER 20 MG: Performed by: FAMILY MEDICINE

## 2024-06-12 PROCEDURE — 5A1D70Z PERFORMANCE OF URINARY FILTRATION, INTERMITTENT, LESS THAN 6 HOURS PER DAY: ICD-10-PCS | Performed by: INTERNAL MEDICINE

## 2024-06-12 PROCEDURE — 93306 TTE W/DOPPLER COMPLETE: CPT

## 2024-06-12 PROCEDURE — 25010000002 CEFTRIAXONE PER 250 MG

## 2024-06-12 PROCEDURE — 85025 COMPLETE CBC W/AUTO DIFF WBC: CPT | Performed by: FAMILY MEDICINE

## 2024-06-12 PROCEDURE — 85045 AUTOMATED RETICULOCYTE COUNT: CPT | Performed by: PHYSICIAN ASSISTANT

## 2024-06-12 PROCEDURE — 83010 ASSAY OF HAPTOGLOBIN QUANT: CPT | Performed by: NURSE PRACTITIONER

## 2024-06-12 PROCEDURE — 83615 LACTATE (LD) (LDH) ENZYME: CPT | Performed by: NURSE PRACTITIONER

## 2024-06-12 PROCEDURE — 82728 ASSAY OF FERRITIN: CPT | Performed by: NURSE PRACTITIONER

## 2024-06-12 PROCEDURE — 86038 ANTINUCLEAR ANTIBODIES: CPT | Performed by: NURSE PRACTITIONER

## 2024-06-12 PROCEDURE — 80053 COMPREHEN METABOLIC PANEL: CPT | Performed by: FAMILY MEDICINE

## 2024-06-12 PROCEDURE — 84466 ASSAY OF TRANSFERRIN: CPT | Performed by: NURSE PRACTITIONER

## 2024-06-12 RX ORDER — NALOXONE HYDROCHLORIDE 1 MG/ML
0.4 INJECTION INTRAMUSCULAR; INTRAVENOUS; SUBCUTANEOUS ONCE
Status: DISCONTINUED | OUTPATIENT
Start: 2024-06-12 | End: 2024-06-17 | Stop reason: HOSPADM

## 2024-06-12 RX ORDER — IBUPROFEN 600 MG/1
1 TABLET ORAL
Status: DISCONTINUED | OUTPATIENT
Start: 2024-06-12 | End: 2024-06-17 | Stop reason: HOSPADM

## 2024-06-12 RX ORDER — MORPHINE SULFATE 2 MG/ML
2 INJECTION, SOLUTION INTRAMUSCULAR; INTRAVENOUS EVERY 4 HOURS PRN
Status: DISCONTINUED | OUTPATIENT
Start: 2024-06-12 | End: 2024-06-12

## 2024-06-12 RX ORDER — METRONIDAZOLE 500 MG/100ML
500 INJECTION, SOLUTION INTRAVENOUS EVERY 8 HOURS
Qty: 1500 ML | Refills: 0 | Status: DISCONTINUED | OUTPATIENT
Start: 2024-06-12 | End: 2024-06-12

## 2024-06-12 RX ORDER — INSULIN LISPRO 100 [IU]/ML
2-7 INJECTION, SOLUTION INTRAVENOUS; SUBCUTANEOUS EVERY 6 HOURS
Status: DISCONTINUED | OUTPATIENT
Start: 2024-06-12 | End: 2024-06-12

## 2024-06-12 RX ORDER — NALOXONE HYDROCHLORIDE 0.4 MG/ML
INJECTION, SOLUTION INTRAMUSCULAR; INTRAVENOUS; SUBCUTANEOUS
Status: COMPLETED
Start: 2024-06-12 | End: 2024-06-12

## 2024-06-12 RX ORDER — NALOXONE HYDROCHLORIDE 1 MG/ML
1 INJECTION INTRAMUSCULAR; INTRAVENOUS; SUBCUTANEOUS ONCE
Status: DISCONTINUED | OUTPATIENT
Start: 2024-06-12 | End: 2024-06-12

## 2024-06-12 RX ORDER — DEXTROSE MONOHYDRATE 50 MG/ML
75 INJECTION, SOLUTION INTRAVENOUS CONTINUOUS
Status: DISCONTINUED | OUTPATIENT
Start: 2024-06-12 | End: 2024-06-13

## 2024-06-12 RX ORDER — METRONIDAZOLE 500 MG/1
500 TABLET ORAL EVERY 8 HOURS SCHEDULED
Status: DISCONTINUED | OUTPATIENT
Start: 2024-06-12 | End: 2024-06-14

## 2024-06-12 RX ORDER — DEXTROSE MONOHYDRATE 25 G/50ML
25 INJECTION, SOLUTION INTRAVENOUS
Status: DISCONTINUED | OUTPATIENT
Start: 2024-06-12 | End: 2024-06-17 | Stop reason: HOSPADM

## 2024-06-12 RX ORDER — NICOTINE POLACRILEX 4 MG
15 LOZENGE BUCCAL
Status: DISCONTINUED | OUTPATIENT
Start: 2024-06-12 | End: 2024-06-17 | Stop reason: HOSPADM

## 2024-06-12 RX ORDER — ACETAMINOPHEN 325 MG/1
650 TABLET ORAL EVERY 6 HOURS PRN
Status: DISCONTINUED | OUTPATIENT
Start: 2024-06-12 | End: 2024-06-16

## 2024-06-12 RX ORDER — MORPHINE SULFATE 2 MG/ML
2 INJECTION, SOLUTION INTRAMUSCULAR; INTRAVENOUS EVERY 4 HOURS PRN
Status: CANCELLED | OUTPATIENT
Start: 2024-06-12 | End: 2024-06-17

## 2024-06-12 RX ADMIN — METRONIDAZOLE 500 MG: 500 TABLET ORAL at 13:39

## 2024-06-12 RX ADMIN — FUROSEMIDE 20 MG: 10 INJECTION, SOLUTION INTRAMUSCULAR; INTRAVENOUS at 05:52

## 2024-06-12 RX ADMIN — Medication 10 ML: at 08:42

## 2024-06-12 RX ADMIN — METRONIDAZOLE 500 MG: 500 TABLET ORAL at 21:51

## 2024-06-12 RX ADMIN — FUROSEMIDE 20 MG: 10 INJECTION, SOLUTION INTRAMUSCULAR; INTRAVENOUS at 17:55

## 2024-06-12 RX ADMIN — ATORVASTATIN CALCIUM 40 MG: 40 TABLET, FILM COATED ORAL at 08:42

## 2024-06-12 RX ADMIN — PIPERACILLIN AND TAZOBACTAM 3.38 G: 3; .375 INJECTION, POWDER, FOR SOLUTION INTRAVENOUS at 02:18

## 2024-06-12 RX ADMIN — CEFTRIAXONE 1000 MG: 1 INJECTION, POWDER, FOR SOLUTION INTRAMUSCULAR; INTRAVENOUS at 11:26

## 2024-06-12 RX ADMIN — DEXTROSE MONOHYDRATE 75 ML/HR: 50 INJECTION, SOLUTION INTRAVENOUS at 08:42

## 2024-06-12 RX ADMIN — NALOXONE HYDROCHLORIDE 0.4 MG: 0.4 INJECTION, SOLUTION INTRAMUSCULAR; INTRAVENOUS; SUBCUTANEOUS at 16:09

## 2024-06-12 RX ADMIN — MORPHINE SULFATE 2 MG: 2 INJECTION, SOLUTION INTRAMUSCULAR; INTRAVENOUS at 14:52

## 2024-06-12 RX ADMIN — Medication 10 ML: at 21:52

## 2024-06-12 RX ADMIN — DEXTROSE MONOHYDRATE 25 G: 25 INJECTION, SOLUTION INTRAVENOUS at 03:40

## 2024-06-12 NOTE — CASE MANAGEMENT/SOCIAL WORK
Continued Stay Note  HYUN Duque     Patient Name: Deann Warren  MRN: [id number]  Today's Date: 6/12/2024    Admit Date: 6/10/2024    Plan: SNF [facility] (accepting- bed available Sat.) PASRR approved. Will need precert.   Discharge Plan       Row Name 06/12/24 1328       [facility] PASRR [facility]. Will need precert.    Plan Comments --               Ashly Salas RN      Office phone: [phone]  Office fax: [phone]

## 2024-06-12 NOTE — DISCHARGE PLACEMENT REQUEST
"Timothy Warren (83 y.o. Female)       Date of Birth   1940    Social Security Number       Address   [address] [place] [address]    Home Phone   [phone]    MRN   [id number]       Zoroastrian   None    Marital Status                               Admission Date   6/10/24    Admission Type   Emergency    Admitting Provider   Thomas Cordova MD    Attending Provider   Jayda Nicole MD    Department, Room/Bed   [facility] 3C MEDICAL INPATIENT, 378/1       Discharge Date       Discharge Disposition       Discharge Destination                                 Attending Provider: Jayda Nicole MD    Allergies: Heparin    Isolation: None   Infection: None   Code Status: CPR    Ht: 162.6 cm (64\")   Wt: 56.7 kg (125 lb)    Admission Cmt: None   Principal Problem: Syncope [R55]                   Active Insurance as of 6/10/2024       Primary Coverage       Payor Plan Insurance Group Employer/Plan Group    ANTHEM MEDICARE REPLACEMENT ANTHEM MEDICARE ADVANTAGE [id number]       Payor Plan Address Payor Plan Phone Number Payor Plan Fax Number Effective Dates    [address] [phone]  1/1/2024 - None Entered    [facility] [address]         Subscriber Name Subscriber Birth Date Member ID       TIMOTHY WARREN 1940 [id number]               Secondary Coverage       Payor Plan Insurance Group Employer/Plan Group    [place]        Payor Plan Address Payor Plan Phone Number Payor Plan Fax Number Effective Dates    [address]   4/7/2024 - None Entered    [place] [address]         Subscriber Name Subscriber Birth Date Member ID       TIMOTHY WARREN 1940 [id number]                     Emergency Contacts        (Rel.) Home Phone Work Phone Mobile Phone    ROB WARREN (Son) -- -- [phone]    SANDRA RAIN (Friend) -- -- [phone]                "

## 2024-06-12 NOTE — NURSING NOTE
Treatment not completed, Treatment time: 2 hrs, 12 min. BVP 39.8L. UF = 600mL. Patient complained of 10/10 flank pain. Patient has altered consciousness after being given pain medication, ranging from responding to vigorous stimulation, to verbal stimulation and to pain stimuli.  Providers were notified. Narcan given. Blood returned without issue. Patient was opening eyes spontaneously after treatment, with stable vital signs. Report given to JULIO Kimbrough.

## 2024-06-12 NOTE — PROGRESS NOTES
"                                                                                                                                      Nephrology  Progress Note                                        [facility]    Patient Identification    Name: Deann Warren  Age: 83 y.o.  Sex: female  :  1940  MRN: [id number]      DATE OF SERVICE:  2024        Subective    Follow-up ESRD  No chest pain, has some shortness of air  Has not had dialysis for 5 days.     Objective   Scheduled Meds:[Held by provider] apixaban, 2.5 mg, Oral, Q12H  atorvastatin, 40 mg, Oral, Daily  cefTRIAXone, 1,000 mg, Intravenous, Q24H  furosemide, 20 mg, Intravenous, Q12H  metroNIDAZOLE, 500 mg, Oral, Q8H  sodium chloride, 10 mL, Intravenous, Q12H          Continuous Infusions:dextrose, 75 mL/hr, Last Rate: 75 mL/hr (24 0842)        PRN Meds:  senna-docusate sodium **AND** polyethylene glycol **AND** bisacodyl **AND** bisacodyl    Calcium Replacement - Follow Nurse / BPA Driven Protocol    dextrose    dextrose    glucagon (human recombinant)    Magnesium Standard Dose Replacement - Follow Nurse / BPA Driven Protocol    Phosphorus Replacement - Follow Nurse / BPA Driven Protocol    Potassium Replacement - Follow Nurse / BPA Driven Protocol    [COMPLETED] Insert Peripheral IV **AND** sodium chloride    sodium chloride    sodium chloride     Exam:  /59 (BP Location: Right arm, Patient Position: Lying)   Pulse 70   Temp 97.8 °F (36.6 °C) (Oral)   Resp 17   Ht 162.6 cm (64\")   Wt 56.7 kg (125 lb)   SpO2 99%   BMI 21.46 kg/m²     Intake/Output last 3 shifts:  I/O last 3 completed shifts:  In: 240 [P.O.:240]  Out: 200 [Urine:200]    Intake/Output this shift:  No intake/output data recorded.       Data Review:  All labs (24hrs):   Recent Results (from the past 24 hour(s))   POC Glucose Once    Collection Time: 24  2:21 PM    Specimen: Blood   Result Value Ref Range    Glucose 85 70 - 105 mg/dL   Blood " Gas, Arterial -    Collection Time: 06/11/24  2:44 PM    Specimen: Arterial Blood   Result Value Ref Range    Site Right Brachial     Dat's Test N/A     pH, Arterial 7.344 (L) 7.350 - 7.450 pH units    pCO2, Arterial 39.2 35.0 - 48.0 mm Hg    pO2, Arterial 84.1 83.0 - 108.0 mm Hg    HCO3, Arterial 21.4 21.0 - 28.0 mmol/L    Base Excess, Arterial -4.0 (L) 0.0 - 3.0 mmol/L    O2 Saturation, Arterial 95.7 94.0 - 98.0 %    CO2 Content 22.6 22 - 29 mmol/L    Barometric Pressure for Blood Gas      Modality Room Air     Hemodilution No    POCT Electrolytes +HGB +HCT    Collection Time: 06/11/24  2:44 PM    Specimen: Arterial Blood   Result Value Ref Range    Sodium 135 (L) 138 - 146 mmol/L    POC Potassium 4.1 3.5 - 4.5 mmol/L    Ionized Calcium 1.14 (L) 1.15 - 1.33 mmol/L    Hematocrit 25 (L) 38 - 51 %    Hemoglobin 8.4 (L) 12.0 - 17.0 g/dL   POC Lactate    Collection Time: 06/11/24  2:44 PM    Specimen: Arterial Blood   Result Value Ref Range    Lactate 1.3 0.2 - 2.0 mmol/L   Procalcitonin    Collection Time: 06/11/24  3:04 PM    Specimen: Blood   Result Value Ref Range    Procalcitonin 0.14 0.00 - 0.25 ng/mL   Ammonia    Collection Time: 06/11/24  3:04 PM    Specimen: Blood   Result Value Ref Range    Ammonia 26 11 - 51 umol/L   High Sensitivity Troponin T    Collection Time: 06/11/24  5:34 PM    Specimen: Blood   Result Value Ref Range    HS Troponin T 91 (C) <14 ng/L   Cancer Antigen 19-9    Collection Time: 06/11/24  5:34 PM    Specimen: Blood   Result Value Ref Range    CA 19-9 44.2 (H) <=35.0 U/mL   High Sensitivity Troponin T 2Hr    Collection Time: 06/11/24  8:01 PM    Specimen: Arm, Right; Blood   Result Value Ref Range    HS Troponin T 85 (C) <14 ng/L    Troponin T Delta -6 (L) >=-4 - <+4 ng/L   Lactic Acid, Plasma    Collection Time: 06/11/24  9:00 PM    Specimen: Arm, Right; Blood   Result Value Ref Range    Lactate 0.9 0.5 - 2.0 mmol/L   Comprehensive Metabolic Panel    Collection Time: 06/12/24  2:20 AM     Specimen: Arm, Right; Blood   Result Value Ref Range    Glucose 66 65 - 99 mg/dL    BUN 52 (H) 8 - 23 mg/dL    Creatinine 3.44 (H) 0.57 - 1.00 mg/dL    Sodium 136 136 - 145 mmol/L    Potassium 4.7 3.5 - 5.2 mmol/L    Chloride 104 98 - 107 mmol/L    CO2 23.0 22.0 - 29.0 mmol/L    Calcium 8.0 (L) 8.6 - 10.5 mg/dL    Total Protein 5.1 (L) 6.0 - 8.5 g/dL    Albumin 3.3 (L) 3.5 - 5.2 g/dL    ALT (SGPT) 33 1 - 33 U/L    AST (SGOT) 129 (H) 1 - 32 U/L    Alkaline Phosphatase 70 39 - 117 U/L    Total Bilirubin 0.7 0.0 - 1.2 mg/dL    Globulin 1.8 gm/dL    A/G Ratio 1.8 g/dL    BUN/Creatinine Ratio 15.1 7.0 - 25.0    Anion Gap 9.0 5.0 - 15.0 mmol/L    eGFR 12.7 (L) >60.0 mL/min/1.73   CBC Auto Differential    Collection Time: 06/12/24  2:20 AM    Specimen: Arm, Right; Blood   Result Value Ref Range    WBC 1.82 (C) 3.40 - 10.80 10*3/mm3    RBC 2.92 (L) 3.77 - 5.28 10*6/mm3    Hemoglobin 8.5 (L) 12.0 - 15.9 g/dL    Hematocrit 28.7 (L) 34.0 - 46.6 %    MCV 98.3 (H) 79.0 - 97.0 fL    MCH 29.1 26.6 - 33.0 pg    MCHC 29.6 (L) 31.5 - 35.7 g/dL    RDW 15.7 (H) 12.3 - 15.4 %    RDW-SD 56.3 (H) 37.0 - 54.0 fl    MPV 10.6 6.0 - 12.0 fL    Platelets 66 (L) 140 - 450 10*3/mm3    Neutrophil % 52.2 42.7 - 76.0 %    Lymphocyte % 30.2 19.6 - 45.3 %    Monocyte % 12.1 (H) 5.0 - 12.0 %    Eosinophil % 4.4 0.3 - 6.2 %    Basophil % 1.1 0.0 - 1.5 %    Immature Grans % 0.0 0.0 - 0.5 %    Neutrophils, Absolute 0.95 (L) 1.70 - 7.00 10*3/mm3    Lymphocytes, Absolute 0.55 (L) 0.70 - 3.10 10*3/mm3    Monocytes, Absolute 0.22 0.10 - 0.90 10*3/mm3    Eosinophils, Absolute 0.08 0.00 - 0.40 10*3/mm3    Basophils, Absolute 0.02 0.00 - 0.20 10*3/mm3    Immature Grans, Absolute 0.00 0.00 - 0.05 10*3/mm3    nRBC 0.0 0.0 - 0.2 /100 WBC   Ferritin    Collection Time: 06/12/24  2:20 AM    Specimen: Arm, Right; Blood   Result Value Ref Range    Ferritin 1,145.00 (H) 13.00 - 150.00 ng/mL   Iron Profile    Collection Time: 06/12/24  2:20 AM    Specimen: Arm,  Right; Blood   Result Value Ref Range    Iron 190 (H) 37 - 145 mcg/dL    Iron Saturation (TSAT) 81 (H) 20 - 50 %    Transferrin 157 (L) 200 - 360 mg/dL    TIBC 234 (L) 298 - 536 mcg/dL   POC Glucose Once    Collection Time: 06/12/24  3:30 AM    Specimen: Blood   Result Value Ref Range    Glucose 58 (L) 70 - 105 mg/dL   POC Glucose Once    Collection Time: 06/12/24  3:57 AM    Specimen: Blood   Result Value Ref Range    Glucose 222 (H) 70 - 105 mg/dL   POC Glucose 4x Daily Before Meals & at Bedtime    Collection Time: 06/12/24  7:34 AM    Specimen: Blood   Result Value Ref Range    Glucose 76 70 - 105 mg/dL   POC Glucose 4x Daily Before Meals & at Bedtime    Collection Time: 06/12/24 11:17 AM    Specimen: Blood   Result Value Ref Range    Glucose 80 70 - 105 mg/dL          Imaging:  CT Abdomen Pelvis Without Contrast    Result Date: 6/11/2024  Impression: 1.Volume overload/CHF features as detailed above. Superimposed left lower lobe pneumonia is not excluded. Correlate with symptoms. 2.There is a nonspecific 3.4 cm cystic mass involving the pancreatic head. Follow-up dedicated pancreatic MRI with and without contrast recommended. 3.Increased density urine consistent with hematuria. 4.Other incidental findings as detailed above. Electronically Signed: Flip Lee MD  6/11/2024 4:32 PM EDT  Workstation ID: [id number]    CT Head Without Contrast    Result Date: 6/11/2024  Impression: No acute intracranial pathology. Electronically Signed: Tristian Boone MD  6/11/2024 4:27 PM EDT  Workstation ID: [id number]    XR Chest 1 View    Result Date: 6/10/2024  Impression: 1.Cardiomegaly with pulmonary vascular congestion. 2.Left basilar atelectasis or infiltrate. Electronically Signed: Armando Gonzalez MD  6/10/2024 10:43 AM EDT  Workstation ID: [id number]     Assessment/Plan:     Syncope    Type 2 diabetes mellitus with diabetic chronic kidney disease    Essential hypertension    ESRD (end stage renal disease)    Anemia due to chronic  kidney disease, on chronic dialysis    Hyperlipidemia    Ischemic cardiomyopathy    Permanent atrial fibrillation    Presence of cardiac pacemaker    Coronary artery disease involving native coronary artery of native heart without angina pectoris        End-stage renal disease   History of recent pacemaker   Syncope   Sick sinus syndrome   History of thrombocytopenia       Pt gets HD 2x a week  Dialysis today  Next will likely be Friday then Patricia Steele MD

## 2024-06-12 NOTE — PROGRESS NOTES
[facility] MEDICINE SERVICE  DAILY PROGRESS NOTE    NAME: Deann Warren  : 1940  MRN: [id number]      LOS: 2 days     PROVIDER OF SERVICE: LUCA Cain    Chief Complaint: Syncope    Subjective:     Interval History:  History taken from: patient  Patient Complaints: None   Patient Denies:  Current chest pain, dizziness, fatigue    Rapid Response called on patient yesterday due to lethargy. CT head normal. CT abdomen shows cystic pancreatic mass, and possible left lower lobe pneumonia. SLP consulted to eval for aspiration.     Review of Systems:   Review of Systems   Constitutional:  Negative for chills and fever.   Respiratory:  Negative for cough, chest tightness and shortness of breath.    Cardiovascular:  Negative for chest pain and palpitations.   Gastrointestinal:  Negative for abdominal pain, constipation, diarrhea and vomiting.   Genitourinary:  Negative for urgency.   Musculoskeletal:  Negative for arthralgias and myalgias.   Neurological:  Negative for dizziness, syncope and light-headedness.       Objective:     Vital Signs  Temp:  [96.2 °F (35.7 °C)-97.8 °F (36.6 °C)] 97.5 °F (36.4 °C)  Heart Rate:  [70] 70  Resp:  [13-16] 13  BP: (106-114)/(48-63) 112/63  Flow (L/min):  [2] 2   Body mass index is 21.46 kg/m².    Physical Exam  Physical Exam  Vitals and nursing note reviewed.   Constitutional:       General: She is not in acute distress.     Appearance: She is ill-appearing.   HENT:      Head: Normocephalic and atraumatic.   Eyes:      Extraocular Movements: Extraocular movements intact.      Pupils: Pupils are equal, round, and reactive to light.   Cardiovascular:      Rate and Rhythm: Normal rate and regular rhythm.      Pulses: Normal pulses.   Pulmonary:      Effort: Pulmonary effort is normal.      Breath sounds: Normal breath sounds.   Abdominal:      General: Bowel sounds are normal.      Palpations: Abdomen is soft.   Skin:     General: Skin is warm.      Capillary Refill:  Capillary refill takes less than 2 seconds.   Neurological:      Mental Status: She is alert and oriented to person, place, and time.         Scheduled Meds   [Held by provider] apixaban, 2.5 mg, Oral, Q12H  atorvastatin, 40 mg, Oral, Daily  cefTRIAXone, 1,000 mg, Intravenous, Q24H  furosemide, 20 mg, Intravenous, Q12H  metroNIDAZOLE, 500 mg, Oral, Q8H  sodium chloride, 10 mL, Intravenous, Q12H       PRN Meds     senna-docusate sodium **AND** polyethylene glycol **AND** bisacodyl **AND** bisacodyl    Calcium Replacement - Follow Nurse / BPA Driven Protocol    dextrose    dextrose    glucagon (human recombinant)    Magnesium Standard Dose Replacement - Follow Nurse / BPA Driven Protocol    Phosphorus Replacement - Follow Nurse / BPA Driven Protocol    Potassium Replacement - Follow Nurse / BPA Driven Protocol    [COMPLETED] Insert Peripheral IV **AND** sodium chloride    sodium chloride    sodium chloride   Infusions  dextrose, 75 mL/hr, Last Rate: 75 mL/hr (06/12/24 0842)          Diagnostic Data    Results from last 7 days   Lab Units 06/12/24  0220   WBC 10*3/mm3 1.82*   HEMOGLOBIN g/dL 8.5*   HEMATOCRIT % 28.7*   PLATELETS 10*3/mm3 66*   GLUCOSE mg/dL 66   CREATININE mg/dL 3.44*   BUN mg/dL 52*   SODIUM mmol/L 136   POTASSIUM mmol/L 4.7   AST (SGOT) U/L 129*   ALT (SGPT) U/L 33   ALK PHOS U/L 70   BILIRUBIN mg/dL 0.7   ANION GAP mmol/L 9.0       CT Abdomen Pelvis Without Contrast    Result Date: 6/11/2024  Impression: 1.Volume overload/CHF features as detailed above. Superimposed left lower lobe pneumonia is not excluded. Correlate with symptoms. 2.There is a nonspecific 3.4 cm cystic mass involving the pancreatic head. Follow-up dedicated pancreatic MRI with and without contrast recommended. 3.Increased density urine consistent with hematuria. 4.Other incidental findings as detailed above. Electronically Signed: Flip Lee MD  6/11/2024 4:32 PM EDT  Workstation ID: [id number]    CT Head Without  Contrast    Result Date: 6/11/2024  Impression: No acute intracranial pathology. Electronically Signed: Tristian Boone MD  6/11/2024 4:27 PM EDT  Workstation ID: [id number]    XR Chest 1 View    Result Date: 6/10/2024  Impression: 1.Cardiomegaly with pulmonary vascular congestion. 2.Left basilar atelectasis or infiltrate. Electronically Signed: Armando Gonzalez MD  6/10/2024 10:43 AM EDT  Workstation ID: [id number]       I reviewed the patient's new clinical results.    Assessment/Plan:     Active and Resolved Problems  Active Hospital Problems    Diagnosis  POA    **Syncope [R55]  Yes    Presence of cardiac pacemaker [Z95.0]  Yes    Permanent atrial fibrillation [I48.21]  Yes    Coronary artery disease involving native coronary artery of native heart without angina pectoris [I25.10]  Yes    Ischemic cardiomyopathy [I25.5]  Yes    Hyperlipidemia [E78.5]  Yes    Type 2 diabetes mellitus with diabetic chronic kidney disease [E11.22]  Yes    ESRD (end stage renal disease) [N18.6]  Yes    Anemia due to chronic kidney disease, on chronic dialysis [N18.6, D63.1, Z99.2]  Not Applicable    Essential hypertension [I10]  Yes      Resolved Hospital Problems   No resolved problems to display.       Syncope  - Last echo 4/9/2024 EF 35%  - Hgb 8.5  Hct 28.7  - Creatinine 3.44 - on dialysis   - EKG noted for ventricular paced rhythm  - Cardiology consulted  - Cardiology EP consulted - awaiting device interrogation     Fluid Overload/CHF  -Lasix started yesterday     Pancytopenia   - Platelets 66  - WBC 1.82 today after starting zosyn yesterday  - switched zosyn to rocephin and flagyl for concern of neutropenia   - Hematology consulted     ESRD  - Dialysis schedule Monday Wednesday Friday.  - Followed by Dr. Buchanan  - BUN/creatinine 52/3.44       Atrial fibrillation  - dual antiplatelet therapy at home of plavix and aspirin     HLD  - Continue home regime of lipitor 40  mg daily     Pneumonia   - left lower lobe pna not excluded on CT  -  SLP consulted to assess for aspiration  - Rocephin and flagyl       VTE Prophylaxis:  Pharmacologic & mechanical VTE prophylaxis orders are present.         Code status is   Code Status and Medical Interventions:   Ordered at: 06/10/24 1150     Level Of Support Discussed With:    Patient     Code Status (Patient has no pulse and is not breathing):    CPR (Attempt to Resuscitate)     Medical Interventions (Patient has pulse or is breathing):    Full Support       Plan for disposition: SNF in 2-3 days    Time: 30 minutes    Signature: Electronically signed by LUCA Cain, 06/12/24, 10:23 EDT.  [facility] Hospitalist Team

## 2024-06-12 NOTE — PLAN OF CARE
Goal Outcome Evaluation:     Clinical swallow evaluation completed. Upon entry, pt was supine in bed, which was brought up at a 90 degree angle prior to being given PO.  Family at bedside.  [facility] examination revealed facial symmetry and no abnormality. Pt reported regular and thins is her baseline diet and denied swallowing difficulty.  Pt self fed all trials.  Pt has been cleared by GI for a clear liquid diet, therefore only thin liquid (water) trials were provided and assessed.  No difficulty using cup or straw.  No labial spillage occurred.  Oral transit on thin was unremarkable. Digital palpation suggests timely swallow.  After the swallow, pt had clear vocal quality w/ no cough, throat clear, or any sign of respiratory distress. O2 remained at 100% during and after the swallow of all trials provided.  Per findings, pt presents w/ functional oral and pharyngeal phase of swallow on thin liquids.  It is recommended pt remain on clear liquid diet per GI order.  ST will continue to complete meal assessment(s) to ensure tolerance and safety of diet per GI clearance and closely monitor for possible overt s/s of aspiration during PO intake.  If pt begins to show increased overt s/s of aspiration or difficulty during PO, VFSS is available if indicated to objectively assess swallow function.     Recommendations:     - Remain on clear liquid diet per GI order     - Safe swallow strategies: HOB at a 90 degree angle, slow rate of intake, small bites/sips, alternate liquid and solid    - ST will continue to complete meal assessment(s) to ensure tolerance and safety of diet per GI clearance and closely monitor for possible overt s/s of aspiration during PO intake.    - Discussed plan w/ pt and family.  All in agreement w/ plan.  -PF                          SLP Swallowing Diagnosis: functional pharyngeal phase (06/12/24 1300)

## 2024-06-12 NOTE — PLAN OF CARE
Problem: Syncope  Goal: Absence of Syncopal Symptoms  Outcome: Ongoing, Progressing     Problem: Adult Inpatient Plan of Care  Goal: Plan of Care Review  Outcome: Ongoing, Progressing  Goal: Patient-Specific Goal (Individualized)  Outcome: Ongoing, Progressing  Goal: Absence of Hospital-Acquired Illness or Injury  Outcome: Ongoing, Progressing  Intervention: Identify and Manage Fall Risk  Recent Flowsheet Documentation  Taken 6/12/2024 1600 by Kaylan Butler RN  Safety Promotion/Fall Prevention: safety round/check completed  Taken 6/12/2024 1400 by Kaylan Butler RN  Safety Promotion/Fall Prevention: safety round/check completed  Taken 6/12/2024 1200 by Kaylan Butler RN  Safety Promotion/Fall Prevention: safety round/check completed  Taken 6/12/2024 1000 by Kaylan Butler RN  Safety Promotion/Fall Prevention: safety round/check completed  Taken 6/12/2024 0800 by Kaylan Butler RN  Safety Promotion/Fall Prevention: safety round/check completed  Goal: Optimal Comfort and Wellbeing  Outcome: Ongoing, Progressing  Goal: Readiness for Transition of Care  Outcome: Ongoing, Progressing     Problem: Diabetes Comorbidity  Goal: Blood Glucose Level Within Targeted Range  Outcome: Ongoing, Progressing     Problem: Hypertension Comorbidity  Goal: Blood Pressure in Desired Range  Outcome: Ongoing, Progressing     Problem: Skin Injury Risk Increased  Goal: Skin Health and Integrity  Outcome: Ongoing, Progressing     Problem: Fall Injury Risk  Goal: Absence of Fall and Fall-Related Injury  Outcome: Ongoing, Progressing  Intervention: Promote Injury-Free Environment  Recent Flowsheet Documentation  Taken 6/12/2024 1600 by Kaylan Butler RN  Safety Promotion/Fall Prevention: safety round/check completed  Taken 6/12/2024 1400 by Kaylan Butler RN  Safety Promotion/Fall Prevention: safety round/check completed  Taken 6/12/2024 1200 by Kaylan Butler RN  Safety  Promotion/Fall Prevention: safety round/check completed  Taken 6/12/2024 1000 by Kaylan Butler, RN  Safety Promotion/Fall Prevention: safety round/check completed  Taken 6/12/2024 0800 by Kaylan Butler, RN  Safety Promotion/Fall Prevention: safety round/check completed   Goal Outcome Evaluation:

## 2024-06-12 NOTE — CONSULTS
GI CONSULT  NOTE:    Referring Provider:  Dr. Nicole    Chief complaint: Abnormal CT pancreas, abdominal pain    Subjective .     History of present illness: Deann Warren is a 83 y.o. female with history of sick sinus syndrome, A-fib on Eliquis, pacemaker, cardiomyopathy, CAD, hyperlipidemia, ESRD on HD, and DMII who presents with complaints of syncope.  The patient recently underwent pacemaker placement on 6/3/2024 for sick sinus syndrome.  Cardiology is following as patient did have a syncopal episode.  They plan to interrogate pacemaker.  Unable to proceed with MRI for 6 weeks from pacemaker placement.  GI has been asked to consult due to abnormal CT showing pancreatic lesion.  The patient does report left upper quadrant pain, but large bruise is noted at the site of her pain.  She denies any nausea/vomiting, heartburn, or dysphagia.  No bright red blood per rectum or melena.  Denies recent fever.      Endo History:  4/2010 ERCP (Dr. Hyde) -choledocholithiasis s/p sphincterotomy and balloon clearance of the bile duct    Past Medical History:  Past Medical History:   Diagnosis Date    Allergic conjunctivitis and rhinitis 11/06/2014    Anemia due to chronic kidney disease, on chronic dialysis 08/25/2020    Anxiety 07/30/2012    Bradycardia by electrocardiogram 06/03/2024    Cardiomyopathy, dilated 04/18/2024    Chronic gouty arthritis 11/06/2014    Coronary artery disease involving native coronary artery of native heart without angina pectoris 04/24/2024    Dependence on renal dialysis 07/01/2022    ESRD (end stage renal disease) 08/25/2020    Essential hypertension 09/16/2015    History of shingles 01/18/2022    Hyperlipidemia 04/08/2024    Ischemic cardiomyopathy 04/18/2024    Paroxysmal atrial fibrillation 07/01/2022    Presence of cardiac pacemaker 06/03/2024    Sick sinus syndrome 06/03/2024    Type 2 diabetes mellitus with diabetic chronic kidney disease 07/01/2022    Vitamin D deficiency 07/22/2021        Past Surgical History:  Past Surgical History:   Procedure Laterality Date    ARTERIOVENOUS FISTULA Left     CARDIAC CATHETERIZATION N/A 4/25/2024    Procedure: Right and Left Heart Cath;  Surgeon: Dilcia Lui MD;  Location:  [facility];  Service: Cardiology;  Laterality: N/A;    CARDIAC CATHETERIZATION N/A 4/25/2024    Procedure: Percutaneous Coronary Intervention;  Surgeon: Jadiel Blake MD;  Location:  [facility];  Service: Cardiology;  Laterality: N/A;    CARDIAC ELECTROPHYSIOLOGY PROCEDURE N/A 6/3/2024    Procedure: Pacemaker DC new, [employer];  Surgeon: Zamzam Carrillo MD;  Location:  [facility];  Service: Cardiovascular;  Laterality: N/A;       Social History:  Social History     Tobacco Use    Smoking status: Never    Smokeless tobacco: Never   Vaping Use    Vaping status: Never Used   Substance Use Topics    Alcohol use: Never    Drug use: Never       Family History:  Family History   Family history unknown: Yes       Medications:  Medications Prior to Admission   Medication Sig Dispense Refill Last Dose    acetaminophen (TYLENOL) 500 MG tablet Take 1 tablet by mouth Every 6 (Six) Hours As Needed for Mild Pain.   6/10/2024    atorvastatin (LIPITOR) 40 MG tablet Take 1 tablet by mouth Daily. 90 tablet 3 6/9/2024    Cholecalciferol (Vitamin D3) 50 MCG (2000 UT) tablet Take 1 tablet by mouth Daily.   6/10/2024    febuxostat (ULORIC) 40 MG tablet Take 1 tablet by mouth Daily.   6/10/2024    metoprolol succinate XL (TOPROL-XL) 25 MG 24 hr tablet Take 0.5 tablets by mouth Daily. 30 tablet 0 6/10/2024    folic acid (FOLVITE) 1 MG tablet Take 1 tablet by mouth Daily. 30 tablet 0     NON FORMULARY Apply 1 dose topically to the appropriate area as directed 3 (Three) Times a Week. Lidocaine 2.5% ointment -  Apply 1 application Monday, Wednesday, Friday before dialysis          Scheduled Meds:[Held by provider] apixaban, 2.5 mg, Oral,  Q12H  atorvastatin, 40 mg, Oral, Daily  cefTRIAXone, 1,000 mg, Intravenous, Q24H  furosemide, 20 mg, Intravenous, Q12H  metroNIDAZOLE, 500 mg, Oral, Q8H  sodium chloride, 10 mL, Intravenous, Q12H      Continuous Infusions:dextrose, 75 mL/hr, Last Rate: 75 mL/hr (06/12/24 0842)      PRN Meds:.  senna-docusate sodium **AND** polyethylene glycol **AND** bisacodyl **AND** bisacodyl    Calcium Replacement - Follow Nurse / BPA Driven Protocol    dextrose    dextrose    glucagon (human recombinant)    Magnesium Standard Dose Replacement - Follow Nurse / BPA Driven Protocol    Phosphorus Replacement - Follow Nurse / BPA Driven Protocol    Potassium Replacement - Follow Nurse / BPA Driven Protocol    [COMPLETED] Insert Peripheral IV **AND** sodium chloride    sodium chloride    sodium chloride    ALLERGIES:  Heparin    ROS:  The following systems were reviewed and negative;   Constitution:  No fevers, chills, no unintentional weight loss  Skin: no rash, no jaundice  Eyes:  No blurry vision, no eye pain  HENT:  No change in hearing or smell  Resp:  No dyspnea or cough  CV:  No chest pain or palpitations  :  No dysuria, hematuria  Musculoskeletal:  No leg cramps or arthralgias  Neuro:  No tremor, no numbness  Psych:  No depression or confusion    Objective     Vital Signs:   Vitals:    06/11/24 2123 06/11/24 2352 06/12/24 0358 06/12/24 0735   BP: 106/58 107/48 114/57 112/63   BP Location: Right arm Right arm Right arm Right arm   Patient Position: Lying Lying Lying Lying   Pulse: 70 70 70 70   Resp: 16 14 16 13   Temp: 97.4 °F (36.3 °C) 97.8 °F (36.6 °C) 97.5 °F (36.4 °C)    TempSrc: Oral Oral Oral    SpO2: 99% 100% 98% 95%   Weight:       Height:           Physical Exam:       General Appearance:    Awake and alert, in no acute distress   Head:    Normocephalic, without obvious abnormality, atraumatic   Throat:   No oral lesions, no thrush, oral mucosa moist   Lungs:     Respirations regular, even and unlabored   Chest  Wall:    No abnormalities observed   Abdomen:     Soft, left upper quadrant tenderness at site of large bruise, no rebound or guarding, non-distended   Rectal:     Deferred   Extremities:   Moves all extremities, no edema, no cyanosis   Pulses:   Pulses palpable and equal bilaterally   Skin:   No rash, no jaundice, normal palpation, large bruise to the left trunk/breast   Lymph nodes:   No cervical, supraclavicular or submandibular palpable adenopathy   Neurologic:   Cranial nerves 2 - 12 grossly intact, no asterixis       Results Review:   I reviewed the patient's labs and imaging.  CBC    Results from last 7 days   Lab Units 06/12/24  0220 06/11/24  1444 06/11/24  0352 06/10/24  1017   WBC 10*3/mm3 1.82*  --  2.05* 2.37*   HEMOGLOBIN g/dL 8.5*  --  8.4* 8.6*   HEMOGLOBIN, POC g/dL  --  8.4*  --   --    PLATELETS 10*3/mm3 66*  --  64* 71*     CMP   Results from last 7 days   Lab Units 06/12/24  0220 06/11/24  1504 06/11/24  0352 06/10/24  1017   SODIUM mmol/L 136  --  138 135*   POTASSIUM mmol/L 4.7  --  4.4 3.9   CHLORIDE mmol/L 104  --  103 106   CO2 mmol/L 23.0  --  24.1 17.0*   BUN mg/dL 52*  --  47* 41*   CREATININE mg/dL 3.44*  --  3.33* 2.95*   GLUCOSE mg/dL 66  --  78 104*   ALBUMIN g/dL 3.3*  --  3.5  --    BILIRUBIN mg/dL 0.7  --  0.6  --    ALK PHOS U/L 70  --  71  --    AST (SGOT) U/L 129*  --  126*  --    ALT (SGPT) U/L 33  --  27  --    AMMONIA umol/L  --  26  --   --      Cr Clearance Estimated Creatinine Clearance: 11.1 mL/min (A) (by C-G formula based on SCr of 3.44 mg/dL (H)).  Coag     HbA1C   Lab Results   Component Value Date    HGBA1C 4.90 05/21/2024    HGBA1C 4.8 07/12/2022    HGBA1C 5.0 01/11/2022     Blood Glucose   Glucose   Date/Time Value Ref Range Status   06/12/2024 0734 76 70 - 105 mg/dL Final     Comment:     Serial Number: [id number]:  [id number]   06/12/2024 0357 222 (H) 70 - 105 mg/dL Final     Comment:     Serial Number: [id number]:  [id number]   06/12/2024 0330 58  (L) 70 - 105 mg/dL Final     Comment:     Serial Number: [id number]:  [id number]   06/11/2024 1421 85 70 - 105 mg/dL Final     Comment:     Serial Number: [id number]:  [id number]     Infection   Results from last 7 days   Lab Units 06/11/24  1504   PROCALCITONIN ng/mL 0.14     UA      Imaging Results (Last 72 Hours)       Procedure Component Value Units Date/Time    CT Abdomen Pelvis Without Contrast [[id number]] Collected: 06/11/24 1626     Updated: 06/11/24 1635    Narrative:      CT ABDOMEN PELVIS WO CONTRAST    Date of Exam: 6/11/2024 4:18 PM EDT    Indication: abd pain.    Comparison: Chest CT 4/9/2024    Technique: Axial CT images were obtained of the abdomen and pelvis without the administration of contrast. Sagittal and coronal reconstructions were performed.  Automated exposure control and iterative reconstruction methods were used.      Findings:  Examination is limited by arms down positioning. LUNG BASES: Moderate left and small right pleural effusions with lower lung consolidation, likely atelectasis though pneumonia not excluded. The heart is enlarged.    LIVER:  Unremarkable parenchyma without focal lesion.  BILIARY/GALLBLADDER: Cholecystectomy  SPLEEN:  Unremarkable  PANCREAS: There is a 3.4 cm cystic lesion in the region of the pancreatic head (image 57, series 3).  ADRENAL:  Unremarkable  KIDNEYS: Moderate bilateral renal cortical atrophy with no solid mass identified. No obstruction.  No calculus identified.  GASTROINTESTINAL/MESENTERY:  No evidence of obstruction nor inflammation.    AORTA/IVC:  Normal caliber. There is advanced atherosclerotic disease throughout.    RETROPERITONEUM/LYMPH NODES:  Unremarkable    REPRODUCTIVE:  Unremarkable  BLADDER: Increased density urine within the urinary bladder consistent with hematuria. Correlate with urinalysis.    OSSEUS STRUCTURES: Chronic L1 superior end plate compression fracture similar to previous chest CT    There is small volume ascites.  There is generalized subcutaneous edema throughout.      Impression:      Impression:  1.Volume overload/CHF features as detailed above. Superimposed left lower lobe pneumonia is not excluded. Correlate with symptoms.  2.There is a nonspecific 3.4 cm cystic mass involving the pancreatic head. Follow-up dedicated pancreatic MRI with and without contrast recommended.  3.Increased density urine consistent with hematuria.  4.Other incidental findings as detailed above.            Electronically Signed: Flip Lee MD    6/11/2024 4:32 PM EDT    Workstation ID: [id number]    CT Head Without Contrast [[id number]] Collected: 06/11/24 1626     Updated: 06/11/24 1629    Narrative:      CT HEAD WO CONTRAST    Date of Exam: 6/11/2024 4:18 PM EDT    Indication: syncope.    Comparison: None available.    Technique: Axial CT images were obtained of the head without contrast administration.  Coronal reconstructions were performed.  Automated exposure control and iterative reconstruction methods were used.    Findings: No intracranial hemorrhage. 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 Gray-white matter differentiation is maintained without evidence of an acute infarction. Multiple foci of decreased attenuation are present within the subcortical, deep cerebral, and   periventricular white matter consistent with chronic small vessel/microangiopathic ischemic changes. No extra-axial mass or collection. The ventricles and sulci are prominent commensurate with involutional changes. The posterior fossa appears grossly   normal. Sellar and suprasellar structures are normal.    Orbital and periorbital soft tissues are normal. The paranasal sinuses, ethmoid air cells, and mastoid air cells are aerated. The bony calvarium is intact.      Impression:      Impression: No acute intracranial pathology.          Electronically Signed: Tristian Boone MD    6/11/2024 4:27 PM EDT    Workstation ID: [id number]    XR Chest 1 View [[id number]] Collected: 06/10/24  1041     Updated: 06/10/24 1045    Narrative:      XR CHEST 1 VW    Date of Exam: 6/10/2024 10:38 AM EDT    Indication: Chest pain    Comparison: 6/3/2024    Findings:  Right chest wall pacemaker is present. Cardiac silhouette is enlarged. Perihilar and bibasilar opacities may represent edema or mild infiltrates. Left basilar atelectasis is seen. No pneumothorax is seen. No acute osseous lesion is seen. There are   senescent changes of the aorta and skeletal structures.      Impression:      Impression:  1.Cardiomegaly with pulmonary vascular congestion.  2.Left basilar atelectasis or infiltrate.      Electronically Signed: Armando Gonzalez MD    6/10/2024 10:43 AM EDT    Workstation ID: [id number]            ASSESSMENT:  -Abnormal CT showing 3.4 cm cystic mass in the pancreatic head  -Elevated CA 19-9  -Pancytopenia  -Mildly elevated LFTs  -Syncopal episode  -SSS s/p pacemaker placement on 6/3/2024  -A-fib on Eliquis  -Cardiomyopathy  -CAD  -Hyperlipidemia  -ESRD on HD  -DMII    PLAN:  Patient is an 83-year-old female with history of SSS s/p pacemaker placement on 6/3/2024, A-fib on Eliquis, and ESRD on HD who presented on 6/10 with complaints of syncopal episode.  Cardiology has been consulted and are planning pacemaker interrogation.  GI asked to consult due to abnormal CT findings.    CT abdomen/pelvis WO on admission shows changes of volume overload along with a 3.4 cm cystic mass in the pancreatic head.  CA 19-9 44.2.  Could consider EUS with biopsy, but patient is severely pancytopenic.  Will ask hematology to evaluate and optimize patient's status prior to proceeding with endoscopy.  Hold Eliquis in preparation for potential EUS.  Hemoglobin 8.5, MCV 98.3.  Continue to monitor H/H and transfuse as needed.  Elevated AST noted.  Send liver serologies.  Check RUQ US.  Supportive care.      I discussed the patients findings and my recommendations with the patient.  I will discuss the case with Dr. Hyde and change  plan accordingly.    We appreciate the referral.    Electronically signed by LUCA Agustin, 06/12/24, 9:39 AM EDT.

## 2024-06-12 NOTE — NURSING NOTE
Patient is drowsy, gives verbal answer to questions. Stated her left flank pain is 8/10. Vital signs are stable at this time. Will continue to monitor.

## 2024-06-12 NOTE — CONSULTS
Hematology/Oncology Inpatient Consultation    Patient name: Deann Warren  : 1940  MRN: [id number]  Referring Provider: Dr. Hyde  Reason for Consultation: Pancytopenia    Chief complaint: Syncope    History of present illness:    Deann Warren is a 83 y.o. female with past medical history significant for coronary artery disease, hypertension, hyperlipidemia, dilated cardiomyopathy, atrial fibrillation, sick sinus syndrome status post permanent pacemaker placement on 6/3/2024, type 2 diabetes, end-stage renal disease on hemodialysis, chronic thrombocytopenia and chronic anemiawho presented to [facility] with complaints of syncopal episode.  She states she was walking into dialysis when she began to feel weak.  She states she sat down in a chair in the waiting room could not lift her head or arms she denied dizziness, chest pain, shortness of breath.  She was sent to the ED for further evaluation.  Workup in the ED was unremarkable.  Patient's EKG showed a ventricular paced rhythm.  Neurology was consulted for dialysis management and cardiology/cardiac EP was consulted for further evaluation of syncope.    Of note, patient recently admitted to [facility] from -24 due to syncopal episodes.  She was found to have A-fib with slow ventricular response Vega with heart rate in the 40s.  She was on low-dose Toprol however her bradycardia was out of proportion to medication use.  Cardiology did evaluate and felt she most likely had sick sinus syndrome.  As such, she underwent PPM placement on 6/3/2024.  She was discharged in stable condition with radiology follow-up in 2 weeks.    Patient had chest x-ray on 6/10/2023 that showed cardiomegaly with pulmonary vascular congestion as well as basilar atelectasis/infiltrate.    24 CT head without contrast completed due to syncope showed no acute intracranial pathology    2024 CT abdomen pelvis without  contrast  Impression:  1.Volume overload/CHF features as detailed above. Superimposed left lower lobe pneumonia is not excluded. Correlate with symptoms.  2.There is a nonspecific 3.4 cm cystic mass involving the pancreatic head. Follow-up dedicated pancreatic MRI with and without contrast recommended.  3.Increased density urine consistent with hematuria.  4.Other incidental findings as detailed above.       06/12/24  Hematology/Oncology was consulted for pancytopenia.      Patient was seen in April 2024 during a previous hospitalizations for her chronic thrombocytopenia.    Her platelet count remained stable, with her baseline between 50,000 and 90,000.  She also has chronic anemia, with baseline between 10.5 and 11.5 g/dL.  She is now noted to have a hemoglobin of 8.5 g/dL, slightly macrocytic.  She also has newly noted leukopenia (neutropenia and lymphopenia) since April 2024.  There is also been an incidental finding of a 3.4 cm cystic mass within the pancreas.  She is unable to undergo MRI for at least 6 weeks due to her pacemaker placement.    He/She  has a past medical history of Allergic conjunctivitis and rhinitis (11/06/2014), Anemia due to chronic kidney disease, on chronic dialysis (08/25/2020), Anxiety (07/30/2012), Bradycardia by electrocardiogram (06/03/2024), Cardiomyopathy, dilated (04/18/2024), Chronic gouty arthritis (11/06/2014), Coronary artery disease involving native coronary artery of native heart without angina pectoris (04/24/2024), Dependence on renal dialysis (07/01/2022), ESRD (end stage renal disease) (08/25/2020), Essential hypertension (09/16/2015), History of shingles (01/18/2022), Hyperlipidemia (04/08/2024), Ischemic cardiomyopathy (04/18/2024), Paroxysmal atrial fibrillation (07/01/2022), Presence of cardiac pacemaker (06/03/2024), Sick sinus syndrome (06/03/2024), Type 2 diabetes mellitus with diabetic chronic kidney disease (07/01/2022), and Vitamin D deficiency  (07/22/2021).    PCP: No primary care provider on file.    History:  Past Medical History:   Diagnosis Date    Allergic conjunctivitis and rhinitis 11/06/2014    Anemia due to chronic kidney disease, on chronic dialysis 08/25/2020    Anxiety 07/30/2012    Bradycardia by electrocardiogram 06/03/2024    Cardiomyopathy, dilated 04/18/2024    Chronic gouty arthritis 11/06/2014    Coronary artery disease involving native coronary artery of native heart without angina pectoris 04/24/2024    Dependence on renal dialysis 07/01/2022    ESRD (end stage renal disease) 08/25/2020    Essential hypertension 09/16/2015    History of shingles 01/18/2022    Hyperlipidemia 04/08/2024    Ischemic cardiomyopathy 04/18/2024    Paroxysmal atrial fibrillation 07/01/2022    Presence of cardiac pacemaker 06/03/2024    Sick sinus syndrome 06/03/2024    Type 2 diabetes mellitus with diabetic chronic kidney disease 07/01/2022    Vitamin D deficiency 07/22/2021   ,   Past Surgical History:   Procedure Laterality Date    ARTERIOVENOUS FISTULA Left     CARDIAC CATHETERIZATION N/A 4/25/2024    Procedure: Right and Left Heart Cath;  Surgeon: Dilcia Lui MD;  Location: [facility] CATH INVASIVE LOCATION;  Service: Cardiology;  Laterality: N/A;    CARDIAC CATHETERIZATION N/A 4/25/2024    Procedure: Percutaneous Coronary Intervention;  Surgeon: Jadiel Blake MD;  Location: [facility] CATH INVASIVE LOCATION;  Service: Cardiology;  Laterality: N/A;    CARDIAC ELECTROPHYSIOLOGY PROCEDURE N/A 6/3/2024    Procedure: Pacemaker DC new, [employer];  Surgeon: Zamzam Carrillo MD;  Location: [facility] CATH INVASIVE LOCATION;  Service: Cardiovascular;  Laterality: N/A;   ,   Family History   Family history unknown: Yes   ,   Social History     Tobacco Use    Smoking status: Never    Smokeless tobacco: Never   Vaping Use    Vaping status: Never Used   Substance Use Topics    Alcohol use: Never    Drug use: Never   ,   Medications Prior to Admission   Medication Sig  Dispense Refill Last Dose    acetaminophen (TYLENOL) 500 MG tablet Take 1 tablet by mouth Every 6 (Six) Hours As Needed for Mild Pain.   6/10/2024    atorvastatin (LIPITOR) 40 MG tablet Take 1 tablet by mouth Daily. 90 tablet 3 6/9/2024    Cholecalciferol (Vitamin D3) 50 MCG (2000 UT) tablet Take 1 tablet by mouth Daily.   6/10/2024    febuxostat (ULORIC) 40 MG tablet Take 1 tablet by mouth Daily.   6/10/2024    metoprolol succinate XL (TOPROL-XL) 25 MG 24 hr tablet Take 0.5 tablets by mouth Daily. 30 tablet 0 6/10/2024    folic acid (FOLVITE) 1 MG tablet Take 1 tablet by mouth Daily. 30 tablet 0     NON FORMULARY Apply 1 dose topically to the appropriate area as directed 3 (Three) Times a Week. Lidocaine 2.5% ointment -  Apply 1 application Monday, Wednesday, Friday before dialysis      , Scheduled Meds:  [Held by provider] apixaban, 2.5 mg, Oral, Q12H  atorvastatin, 40 mg, Oral, Daily  furosemide, 20 mg, Intravenous, Q12H  piperacillin-tazobactam, 3.375 g, Intravenous, Q12H  sodium chloride, 10 mL, Intravenous, Q12H    , Continuous Infusions:  dextrose, 75 mL/hr    , PRN Meds:    senna-docusate sodium **AND** polyethylene glycol **AND** bisacodyl **AND** bisacodyl    Calcium Replacement - Follow Nurse / BPA Driven Protocol    dextrose    dextrose    glucagon (human recombinant)    Magnesium Standard Dose Replacement - Follow Nurse / BPA Driven Protocol    Phosphorus Replacement - Follow Nurse / BPA Driven Protocol    Potassium Replacement - Follow Nurse / BPA Driven Protocol    [COMPLETED] Insert Peripheral IV **AND** sodium chloride    sodium chloride    sodium chloride   Allergies:  Heparin    Subjective     ROS:  Review of Systems   Constitutional:  Positive for fatigue. Negative for chills and fever.   HENT:  Negative for ear pain, mouth sores, nosebleeds and sore throat.    Eyes:  Negative for photophobia and visual disturbance.   Respiratory:  Negative for wheezing and stridor.    Cardiovascular:  Negative  "for chest pain and palpitations.   Gastrointestinal:  Negative for abdominal pain, diarrhea, nausea and vomiting.   Endocrine: Negative for cold intolerance and heat intolerance.   Genitourinary:  Negative for dysuria and hematuria.   Musculoskeletal:  Negative for joint swelling and neck stiffness.   Skin:  Negative for color change and rash.   Neurological:  Positive for weakness. Negative for seizures and syncope.   Hematological:  Negative for adenopathy.   Psychiatric/Behavioral:  Negative for agitation, confusion and hallucinations.         Objective   Vital Signs:   /63 (BP Location: Right arm, Patient Position: Lying)   Pulse 70   Temp 97.5 °F (36.4 °C) (Oral)   Resp 13   Ht 162.6 cm (64\")   Wt 56.7 kg (125 lb)   SpO2 95%   BMI 21.46 kg/m²     Physical Exam: (performed by MD)  Physical Exam  Constitutional:       Appearance: Normal appearance.   HENT:      Head: Normocephalic and atraumatic.   Eyes:      Pupils: Pupils are equal, round, and reactive to light.   Cardiovascular:      Rate and Rhythm: Normal rate and regular rhythm.      Pulses: Normal pulses.      Heart sounds: No murmur heard.  Pulmonary:      Effort: Pulmonary effort is normal.      Breath sounds: Normal breath sounds.   Abdominal:      General: There is no distension.      Palpations: Abdomen is soft. There is no mass.      Tenderness: There is no abdominal tenderness.   Musculoskeletal:         General: Normal range of motion.      Cervical back: Normal range of motion.   Skin:     General: Skin is warm.   Neurological:      General: No focal deficit present.      Mental Status: She is alert.   Psychiatric:         Mood and Affect: Mood normal.         Results Review:  Lab Results (last 48 hours)       Procedure Component Value Units Date/Time    POC Glucose 4x Daily Before Meals & at Bedtime [[id number]]  (Normal) Collected: 06/12/24 0734    Specimen: Blood Updated: 06/12/24 0736     Glucose 76 mg/dL      Comment: Serial " Number: [id number]:  [id number]       POC Glucose Once [[id number]]  (Abnormal) Collected: 06/12/24 0357    Specimen: Blood Updated: 06/12/24 0359     Glucose 222 mg/dL      Comment: Serial Number: [id number]:  [id number]       POC Glucose Once [[id number]]  (Abnormal) Collected: 06/12/24 0330    Specimen: Blood Updated: 06/12/24 0332     Glucose 58 mg/dL      Comment: Serial Number: [id number]:  [id number]       Comprehensive Metabolic Panel [[id number]]  (Abnormal) Collected: 06/12/24 0220    Specimen: Blood from Arm, Right Updated: 06/12/24 0327     Glucose 66 mg/dL      BUN 52 mg/dL      Creatinine 3.44 mg/dL      Sodium 136 mmol/L      Potassium 4.7 mmol/L      Chloride 104 mmol/L      CO2 23.0 mmol/L      Calcium 8.0 mg/dL      Total Protein 5.1 g/dL      Albumin 3.3 g/dL      ALT (SGPT) 33 U/L      AST (SGOT) 129 U/L      Alkaline Phosphatase 70 U/L      Total Bilirubin 0.7 mg/dL      Globulin 1.8 gm/dL      A/G Ratio 1.8 g/dL      BUN/Creatinine Ratio 15.1     Anion Gap 9.0 mmol/L      eGFR 12.7 mL/min/1.73      Comment: <15 Indicative of kidney failure       Narrative:      GFR Normal >60  Chronic Kidney Disease <60  Kidney Failure <15    The GFR formula is only valid for adults with stable renal function between ages 18 and 70.    CBC & Differential [[id number]]  (Abnormal) Collected: 06/12/24 0220    Specimen: Blood from Arm, Right Updated: 06/12/24 0259    Narrative:      The following orders were created for panel order CBC & Differential.  Procedure                               Abnormality         Status                     ---------                               -----------         ------                     CBC Auto Differential[[id number]]        Abnormal            Final result               Scan Slide[[id number]]                                                                    Please view results for these tests on the individual orders.    CBC Auto Differential [[id number]]   (Abnormal) Collected: 06/12/24 0220    Specimen: Blood from Arm, Right Updated: 06/12/24 0259     WBC 1.82 10*3/mm3      RBC 2.92 10*6/mm3      Hemoglobin 8.5 g/dL      Hematocrit 28.7 %      MCV 98.3 fL      MCH 29.1 pg      MCHC 29.6 g/dL      RDW 15.7 %      RDW-SD 56.3 fl      MPV 10.6 fL      Platelets 66 10*3/mm3      Neutrophil % 52.2 %      Lymphocyte % 30.2 %      Monocyte % 12.1 %      Eosinophil % 4.4 %      Basophil % 1.1 %      Immature Grans % 0.0 %      Neutrophils, Absolute 0.95 10*3/mm3      Lymphocytes, Absolute 0.55 10*3/mm3      Monocytes, Absolute 0.22 10*3/mm3      Eosinophils, Absolute 0.08 10*3/mm3      Basophils, Absolute 0.02 10*3/mm3      Immature Grans, Absolute 0.00 10*3/mm3      nRBC 0.0 /100 WBC     Cancer Antigen 19-9 [[id number]]  (Abnormal) Collected: 06/11/24 1734    Specimen: Blood Updated: 06/12/24 0031     CA 19-9 44.2 U/mL     Narrative:      Results may be falsely decreased if patient taking Biotin.    Testing Method: Roche [employer] Electrochemiluminescence Immunoassay(ECLIA)  Values obtained with different assay methods or kits cannot be used interchangeably.    High Sensitivity Troponin T 2Hr [[id number]]  (Abnormal) Collected: 06/11/24 2001    Specimen: Blood from Arm, Right Updated: 06/11/24 2142     HS Troponin T 85 ng/L      Troponin T Delta -6 ng/L     Narrative:      High Sensitive Troponin T Reference Range:  <14.0 ng/L- Negative Female for AMI  <22.0 ng/L- Negative Male for AMI  >=14 - Abnormal Female indicating possible myocardial injury.  >=22 - Abnormal Male indicating possible myocardial injury.   Clinicians would have to utilize clinical acumen, EKG, Troponin, and serial changes to determine if it is an Acute Myocardial Infarction or myocardial injury due to an underlying chronic condition.         Lactic Acid, Plasma [[id number]]  (Normal) Collected: 06/11/24 2100    Specimen: Blood from Arm, Right Updated: 06/11/24 2139     Lactate 0.9 mmol/L     High  "Sensitivity Troponin T [[id number]]  (Abnormal) Collected: 06/11/24 1734    Specimen: Blood Updated: 06/11/24 1825     HS Troponin T 91 ng/L      Comment: Specimen hemolyzed.  Results may be falsely decreased.       Narrative:      High Sensitive Troponin T Reference Range:  <14.0 ng/L- Negative Female for AMI  <22.0 ng/L- Negative Male for AMI  >=14 - Abnormal Female indicating possible myocardial injury.  >=22 - Abnormal Male indicating possible myocardial injury.   Clinicians would have to utilize clinical acumen, EKG, Troponin, and serial changes to determine if it is an Acute Myocardial Infarction or myocardial injury due to an underlying chronic condition.         Blood Culture - Blood, Blood, PICC Line [[id number]] Collected: 06/11/24 1733    Specimen: Blood, PICC Line Updated: 06/11/24 1743    Procalcitonin [[id number]]  (Normal) Collected: 06/11/24 1504    Specimen: Blood Updated: 06/11/24 1553     Procalcitonin 0.14 ng/mL     Narrative:      As a Marker for Sepsis (Non-Neonates):    1. <0.5 ng/mL represents a low risk of severe sepsis and/or septic shock.  2. >2 ng/mL represents a high risk of severe sepsis and/or septic shock.    As a Marker for Lower Respiratory Tract Infections that require antibiotic therapy:    PCT on Admission    Antibiotic Therapy       6-12 Hrs later    >0.5                Strongly Recommended  >0.25 - <0.5        Recommended   0.1 - 0.25          Discouraged              Remeasure/reassess PCT  <0.1                Strongly Discouraged     Remeasure/reassess PCT    As 28 day mortality risk marker: \"Change in Procalcitonin Result\" (>80% or <=80%) if Day 0 (or Day 1) and Day 4 values are available. Refer to http://www.[facility]-pct-calculator.com    Change in PCT <=80%  A decrease of PCT levels below or equal to 80% defines a positive change in PCT test result representing a higher risk for 28-day all-cause mortality of patients diagnosed with severe sepsis for septic shock.    Change in " PCT >80%  A decrease of PCT levels of more than 80% defines a negative change in PCT result representing a lower risk for 28-day all-cause mortality of patients diagnosed with severe sepsis or septic shock.       Blood Gas, Arterial - [[id number]]  (Abnormal) Collected: 06/11/24 1444    Specimen: Arterial Blood Updated: 06/11/24 1552     Site Right Brachial     Dat's Test N/A     pH, Arterial 7.344 pH units      pCO2, Arterial 39.2 mm Hg      pO2, Arterial 84.1 mm Hg      HCO3, Arterial 21.4 mmol/L      Base Excess, Arterial -4.0 mmol/L      Comment: Serial Number: [id number]:  [id number]        O2 Saturation, Arterial 95.7 %      CO2 Content 22.6 mmol/L      Barometric Pressure for Blood Gas --     Comment: N/A        Modality Room Air     Hemodilution No    Ammonia [[id number]]  (Normal) Collected: 06/11/24 1504    Specimen: Blood Updated: 06/11/24 1547     Ammonia 26 umol/L     Blood Culture - Blood, Arm, Right [[id number]] Collected: 06/11/24 1504    Specimen: Blood from Arm, Right Updated: 06/11/24 1518    POCT Electrolytes +HGB +HCT [[id number]]  (Abnormal) Collected: 06/11/24 1444    Specimen: Arterial Blood Updated: 06/11/24 1450     Sodium 135 mmol/L      POC Potassium 4.1 mmol/L      Ionized Calcium 1.14 mmol/L      Comment: Serial Number: [id number]:  [id number]        Hematocrit 25 %      Hemoglobin 8.4 g/dL     POC Lactate [[id number]]  (Normal) Collected: 06/11/24 1444    Specimen: Arterial Blood Updated: 06/11/24 1450     Lactate 1.3 mmol/L      Comment: Serial Number: [id number]:  [id number]       POC Glucose Once [[id number]]  (Normal) Collected: 06/11/24 1421    Specimen: Blood Updated: 06/11/24 1422     Glucose 85 mg/dL      Comment: Serial Number: [id number]:  [id number]       Comprehensive Metabolic Panel [[id number]]  (Abnormal) Collected: 06/11/24 0352    Specimen: Blood Updated: 06/11/24 0437     Glucose 78 mg/dL      BUN 47 mg/dL      Creatinine 3.33 mg/dL      Sodium 138 mmol/L      Potassium  4.4 mmol/L      Comment: Slight hemolysis detected by analyzer. Result may be falsely elevated.        Chloride 103 mmol/L      CO2 24.1 mmol/L      Calcium 8.3 mg/dL      Total Protein 5.4 g/dL      Albumin 3.5 g/dL      ALT (SGPT) 27 U/L      AST (SGOT) 126 U/L      Alkaline Phosphatase 71 U/L      Total Bilirubin 0.6 mg/dL      Globulin 1.9 gm/dL      A/G Ratio 1.8 g/dL      BUN/Creatinine Ratio 14.1     Anion Gap 10.9 mmol/L      eGFR 13.2 mL/min/1.73      Comment: <15 Indicative of kidney failure       Narrative:      GFR Normal >60  Chronic Kidney Disease <60  Kidney Failure <15    The GFR formula is only valid for adults with stable renal function between ages 18 and 70.    CBC (No Diff) [[id number]]  (Abnormal) Collected: 06/11/24 0352    Specimen: Blood Updated: 06/11/24 0359     WBC 2.05 10*3/mm3      RBC 2.85 10*6/mm3      Hemoglobin 8.4 g/dL      Hematocrit 28.2 %      MCV 98.9 fL      MCH 29.5 pg      MCHC 29.8 g/dL      RDW 15.7 %      RDW-SD 57.1 fl      MPV 10.1 fL      Platelets 64 10*3/mm3     COVID-19, FLU A/B, RSV PCR 1 HR TAT - Swab, Nasopharynx [[id number]]  (Normal) Collected: 06/10/24 1821    Specimen: Swab from Nasopharynx Updated: 06/10/24 1906     COVID19 Not Detected     Influenza A PCR Not Detected     Influenza B PCR Not Detected     RSV, PCR Not Detected    Narrative:      Fact sheet for providers: https://www.fda.gov/media/004487/download    Fact sheet for patients: https://www.fda.gov/media/663844/download    Test performed by PCR.    Basic Metabolic Panel [[id number]]  (Abnormal) Collected: 06/10/24 1017    Specimen: Blood Updated: 06/10/24 1054     Glucose 104 mg/dL      BUN 41 mg/dL      Creatinine 2.95 mg/dL      Sodium 135 mmol/L      Potassium 3.9 mmol/L      Comment: Specimen hemolyzed.  Result may be falsely elevated.        Chloride 106 mmol/L      CO2 17.0 mmol/L      Calcium 7.8 mg/dL      BUN/Creatinine Ratio 13.9     Anion Gap 12.0 mmol/L      eGFR 15.3 mL/min/1.73      Narrative:      GFR Normal >60  Chronic Kidney Disease <60  Kidney Failure <15    The GFR formula is only valid for adults with stable renal function between ages 18 and 70.    CBC & Differential [[id number]]  (Abnormal) Collected: 06/10/24 1017    Specimen: Blood Updated: 06/10/24 1044    Narrative:      The following orders were created for panel order CBC & Differential.  Procedure                               Abnormality         Status                     ---------                               -----------         ------                     CBC Auto Differential[[id number]]        Abnormal            Final result               Scan Slide[[id number]]                                       Final result                 Please view results for these tests on the individual orders.    CBC Auto Differential [[id number]]  (Abnormal) Collected: 06/10/24 1017    Specimen: Blood Updated: 06/10/24 1044     WBC 2.37 10*3/mm3      RBC 2.92 10*6/mm3      Hemoglobin 8.6 g/dL      Hematocrit 29.6 %      .4 fL      MCH 29.5 pg      MCHC 29.1 g/dL      RDW 15.7 %      RDW-SD 57.7 fl      MPV 11.3 fL      Platelets 71 10*3/mm3      Neutrophil % 58.6 %      Lymphocyte % 22.8 %      Monocyte % 12.7 %      Eosinophil % 3.8 %      Basophil % 1.3 %      Immature Grans % 0.8 %      Neutrophils, Absolute 1.39 10*3/mm3      Lymphocytes, Absolute 0.54 10*3/mm3      Monocytes, Absolute 0.30 10*3/mm3      Eosinophils, Absolute 0.09 10*3/mm3      Basophils, Absolute 0.03 10*3/mm3      Immature Grans, Absolute 0.02 10*3/mm3      nRBC 0.0 /100 WBC     Scan Slide [[id number]] Collected: 06/10/24 1017    Specimen: Blood Updated: 06/10/24 1044     Anisocytosis Slight/1+     Alin Cells Slight/1+     Crenated RBC's Slight/1+     Poikilocytes Slight/1+     WBC Morphology Normal     Platelet Estimate Adequate    Extra Tubes [[id number]] Collected: 06/10/24 1017    Specimen: Blood, Venous Line Updated: 06/10/24 1030    Narrative:      The following  orders were created for panel order Extra Tubes.  Procedure                               Abnormality         Status                     ---------                               -----------         ------                     Gold Top - SST[[id number]]                                   Final result               Light Blue Top[[id number]]                                   Final result                 Please view results for these tests on the individual orders.    Gold Top - SST [[id number]] Collected: 06/10/24 1017    Specimen: Blood Updated: 06/10/24 1030     Extra Tube Hold for add-ons.     Comment: Auto resulted.       Light Blue Top [[id number]] Collected: 06/10/24 1017    Specimen: Blood Updated: 06/10/24 1030     Extra Tube Hold for add-ons.     Comment: Auto resulted                Pending Results:     Imaging Reviewed:   CT Abdomen Pelvis Without Contrast    Result Date: 6/11/2024  Impression: 1.Volume overload/CHF features as detailed above. Superimposed left lower lobe pneumonia is not excluded. Correlate with symptoms. 2.There is a nonspecific 3.4 cm cystic mass involving the pancreatic head. Follow-up dedicated pancreatic MRI with and without contrast recommended. 3.Increased density urine consistent with hematuria. 4.Other incidental findings as detailed above. Electronically Signed: Flip Lee MD  6/11/2024 4:32 PM EDT  Workstation ID: [id number]    CT Head Without Contrast    Result Date: 6/11/2024  Impression: No acute intracranial pathology. Electronically Signed: Tristian Boone MD  6/11/2024 4:27 PM EDT  Workstation ID: [id number]    XR Chest 1 View    Result Date: 6/10/2024  Impression: 1.Cardiomegaly with pulmonary vascular congestion. 2.Left basilar atelectasis or infiltrate. Electronically Signed: Armando Gonzalez MD  6/10/2024 10:43 AM EDT  Workstation ID: [id number]          Assessment & Plan     Deann Warren is a 83 y.o. with history of sick sinus syndrome and recently placed cardiac pacemaker and has  been admitted under a principal diagnosis of syncope.  Hematology oncology has been consulted for pancytopenia.    Pancytopenia  -Patient has chronic thrombocytopenia and chronic anemia at baseline.  Anemia is likely due to chronic kidney disease.  -May be secondary to acute infection and antibiotic use.    -Will assess further for nutritional deficiencies, hemolysis  -Continue to monitor CBC    Pancreatic lesion  -CT imaging of the abdomen and pelvis showed a 3.4 cm cystic mass within the pancreatic head that is indeterminant.  Recommendations for pancreatic MRI.  Patient cannot undergo MRI due to recent peacemaker placement    End-stage renal disease on hemodialysis  -She receives dialysis on Mondays and Fridays.  -She is followed by nephrology    Syncopal episode  -Etiology unclear  -Recent echocardiogram showed LVEF 35%.  And recent cardiac catheterization showed multivessel CAD.  -CT head without contrast was unremarkable  -Cardiology is following    Permanent atrial fibrillation  Sick sinus syndrome   -status post pacemaker placement on 6/3/2024.  -She has been restarted on low-dose Eliquis 2.5 mg twice daily, history of bleeding issues  -Cardiology is following    Further recommendations per Dr. 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  She has end-stage renal disease and is on hemodialysis.  She also has had a syncopal episode with unclear etiology.  We did above workup and ruled out hemolysis with haptoglobin normal and reticulocyte count not significantly elevated.  Given that retic count is not  elevated based on her hemoglobin this means that her bone marrow is not adequately responding which could be due to antibiotic or infection causing suppression.  Her iron saturation is very high as his ferritin she will need to stop iron infusion with her dialysis.  No DIC with normal fibrinogen.  So the most likely explanation for her pancytopenia is a possible underlying bone marrow hypoproliferative disorder such as myelodysplastic syndrome now acutely worsened by infection antibiotic use.  Ideally I would recommend a bone marrow biopsy but given that she has been treated with antibiotics and has had infection this could give an inaccurate result.  I would like to wait for her CBC to get more towards her baseline and possibly consider doing a bone marrow outpatient.  She is getting an EUS will wait on biopsy results as well.  I am still waiting on B12 folate copper and zinc levels to rule out nutritional causes.  These could also mimic MDS    Thank you for this consult. We will be happy to follow along with you.     I have obtained complete history and perform physical exam along with review of labs, imaging.  I have completed the majority and substantive portion of medical decision making    Sonya Barahona MD

## 2024-06-12 NOTE — PLAN OF CARE
Goal Outcome Evaluation:  Plan of Care Reviewed With: patient        Progress: no change  Outcome Evaluation: pt has slept on and off throughout the night, no complaints of lethargy at this time, pt reminded to turn throughout the night, pt remains on iv abx, NPO since 12 cardioEP to see in am, BS low this morning-treated per MAR, will D/C to SNF when appropriate

## 2024-06-12 NOTE — NURSING NOTE
Report obtained from floor RN. Verbal consent obtained from patient. Water & machine checks passed. Orders verified. Access is intact, +bruit, +thrill. Cannulated x2. Treatment started without difficulty. Education given.

## 2024-06-12 NOTE — NURSING NOTE
Patient is moaning, complaining of 10/10 pain at her left flank. Floor RN and Charge RN are aware, communicated with provider. /65, HR 70, RR 23, 100% O2 sat in room air. No hemodialysis issue at this time.

## 2024-06-12 NOTE — SIGNIFICANT NOTE
06/12/24 1531   OTHER   Discipline physical therapist   Rehab Time/Intention   Session Not Performed patient unavailable for treatment  (in dialysis at time of attempt, will follow up for PT treatment 6/13)   Recommendation   PT - Next Appointment 06/13/24

## 2024-06-12 NOTE — CASE MANAGEMENT/SOCIAL WORK
5/11/2021  Julius Naveen    1955     Dear Dr Zaina Araujo,     Thank you for referring Julius Hodge to the Outpatient Nutrition Program at [facility] [place]  Medical Nutrition Therapy was delivered on 5/11/21and included individualized nutritional diagnostic therapy and counseling for the purposes of managing the following dx/disease:   1  Coronary artery disease involving native coronary artery of native heart without angina pectoris  Ambulatory referral to Nutrition Services     Nutrition Diagnosis and Intervention:  Nutrition Diagnosis:   Overweight/obesity  related to Excess energy intake as evidenced by  BMI more than normative standard for age and sex (overweight 25-29  9)     Therapy Goals:  Goals:  1  Pauline Lal will prepare his lunch at home daily through next follow up    2    3        Follow up planned for monitoring and evaluation: 6/15/21    Please contact me with questions/concerns  Thank you for supporting Medical Nutrition Therapy  Rebecca De La Rosa MHSc RDN LDN [address] CLINICAL NUTRITION SERVICES  [address] [facility] [address] [phone] Continued Stay Note  HYUN Duque     Patient Name: Deann Warren  MRN: [id number]  Today's Date: 6/12/2024    Admit Date: 6/10/2024    Plan: SNF [facility] (pending acceptance.) PASRR approved. Will need precert.   Discharge Plan       Row Name 06/12/24 1328       Plan    Plan SNF [facility] (pending acceptance.) PASRR approved. Will need precert.    Plan Comments Cm met with pt to discuss SNF and choices obtained; 1) [facility] referral placed and wicho Adler contacted.                 Ashly Salas RN     Office phone: [phone]  Office fax: [phone]

## 2024-06-13 ENCOUNTER — ANESTHESIA EVENT (OUTPATIENT)
Dept: GASTROENTEROLOGY | Facility: HOSPITAL | Age: 84
End: 2024-06-13
Payer: MEDICARE

## 2024-06-13 ENCOUNTER — INPATIENT HOSPITAL (OUTPATIENT)
Dept: URBAN - METROPOLITAN AREA HOSPITAL 84 | Facility: HOSPITAL | Age: 84
End: 2024-06-13
Payer: MEDICAID

## 2024-06-13 ENCOUNTER — ANESTHESIA (OUTPATIENT)
Dept: GASTROENTEROLOGY | Facility: HOSPITAL | Age: 84
End: 2024-06-13
Payer: MEDICARE

## 2024-06-13 DIAGNOSIS — K22.89 OTHER SPECIFIED DISEASE OF ESOPHAGUS: ICD-10-CM

## 2024-06-13 DIAGNOSIS — K44.9 DIAPHRAGMATIC HERNIA WITHOUT OBSTRUCTION OR GANGRENE: ICD-10-CM

## 2024-06-13 DIAGNOSIS — K86.2 CYST OF PANCREAS: ICD-10-CM

## 2024-06-13 DIAGNOSIS — R93.5 ABNORMAL FINDINGS ON DIAGNOSTIC IMAGING OF OTHER ABDOMINAL R: ICD-10-CM

## 2024-06-13 DIAGNOSIS — K86.89 OTHER SPECIFIED DISEASES OF PANCREAS: ICD-10-CM

## 2024-06-13 DIAGNOSIS — K29.50 UNSPECIFIED CHRONIC GASTRITIS WITHOUT BLEEDING: ICD-10-CM

## 2024-06-13 LAB
ALBUMIN SERPL-MCNC: 3.2 G/DL (ref 3.5–5.2)
ALBUMIN/GLOB SERPL: 1.7 G/DL
ALP SERPL-CCNC: 57 U/L (ref 39–117)
ALT SERPL W P-5'-P-CCNC: 35 U/L (ref 1–33)
AMYLASE FLD-CCNC: 35 U/L
ANION GAP SERPL CALCULATED.3IONS-SCNC: 9.6 MMOL/L (ref 5–15)
AST SERPL-CCNC: 131 U/L (ref 1–32)
BACTERIA BLD CULT: ABNORMAL
BASOPHILS # BLD AUTO: 0.03 10*3/MM3 (ref 0–0.2)
BASOPHILS NFR BLD AUTO: 1.6 % (ref 0–1.5)
BILIRUB SERPL-MCNC: 0.7 MG/DL (ref 0–1.2)
BOTTLE TYPE: ABNORMAL
BUN SERPL-MCNC: 34 MG/DL (ref 8–23)
BUN/CREAT SERPL: 13.8 (ref 7–25)
CALCIUM SPEC-SCNC: 7.8 MG/DL (ref 8.6–10.5)
CHLORIDE SERPL-SCNC: 103 MMOL/L (ref 98–107)
CO2 SERPL-SCNC: 24.4 MMOL/L (ref 22–29)
CREAT SERPL-MCNC: 2.46 MG/DL (ref 0.57–1)
CRP SERPL-MCNC: <0.3 MG/DL (ref 0–0.5)
DEPRECATED RDW RBC AUTO: 55.6 FL (ref 37–54)
EGFRCR SERPLBLD CKD-EPI 2021: 19 ML/MIN/1.73
EOSINOPHIL # BLD AUTO: 0.09 10*3/MM3 (ref 0–0.4)
EOSINOPHIL NFR BLD AUTO: 4.7 % (ref 0.3–6.2)
ERYTHROCYTE [DISTWIDTH] IN BLOOD BY AUTOMATED COUNT: 15.9 % (ref 12.3–15.4)
GLOBULIN UR ELPH-MCNC: 1.9 GM/DL
GLUCOSE BLDC GLUCOMTR-MCNC: 106 MG/DL (ref 70–105)
GLUCOSE BLDC GLUCOMTR-MCNC: 139 MG/DL (ref 70–105)
GLUCOSE BLDC GLUCOMTR-MCNC: 143 MG/DL (ref 70–105)
GLUCOSE BLDC GLUCOMTR-MCNC: 64 MG/DL (ref 70–105)
GLUCOSE BLDC GLUCOMTR-MCNC: 65 MG/DL (ref 70–105)
GLUCOSE BLDC GLUCOMTR-MCNC: 69 MG/DL (ref 70–105)
GLUCOSE BLDC GLUCOMTR-MCNC: 82 MG/DL (ref 70–105)
GLUCOSE FLD-MCNC: <2 MG/DL
GLUCOSE SERPL-MCNC: 69 MG/DL (ref 65–99)
HCT VFR BLD AUTO: 27.8 % (ref 34–46.6)
HGB BLD-MCNC: 8.5 G/DL (ref 12–15.9)
IMM GRANULOCYTES # BLD AUTO: 0.01 10*3/MM3 (ref 0–0.05)
IMM GRANULOCYTES NFR BLD AUTO: 0.5 % (ref 0–0.5)
INR PPP: 1.39 (ref 0.93–1.1)
LIPASE SERPL-CCNC: 17 U/L (ref 13–60)
LYMPHOCYTES # BLD AUTO: 0.55 10*3/MM3 (ref 0.7–3.1)
LYMPHOCYTES NFR BLD AUTO: 28.9 % (ref 19.6–45.3)
MCH RBC QN AUTO: 29.5 PG (ref 26.6–33)
MCHC RBC AUTO-ENTMCNC: 30.6 G/DL (ref 31.5–35.7)
MCV RBC AUTO: 96.5 FL (ref 79–97)
MITOCHONDRIA M2 IGG SER-ACNC: <20 UNITS (ref 0–20)
MONOCYTES # BLD AUTO: 0.2 10*3/MM3 (ref 0.1–0.9)
MONOCYTES NFR BLD AUTO: 10.5 % (ref 5–12)
NEUTROPHILS NFR BLD AUTO: 1.02 10*3/MM3 (ref 1.7–7)
NEUTROPHILS NFR BLD AUTO: 53.8 % (ref 42.7–76)
NRBC BLD AUTO-RTO: 0 /100 WBC (ref 0–0.2)
PLATELET # BLD AUTO: 71 10*3/MM3 (ref 140–450)
PMV BLD AUTO: 10.5 FL (ref 6–12)
POTASSIUM SERPL-SCNC: 4.2 MMOL/L (ref 3.5–5.2)
PROT SERPL-MCNC: 5.1 G/DL (ref 6–8.5)
PROTHROMBIN TIME: 14.8 SECONDS (ref 9.6–11.7)
RBC # BLD AUTO: 2.88 10*6/MM3 (ref 3.77–5.28)
SMA IGG SER-ACNC: 2 UNITS (ref 0–19)
SODIUM SERPL-SCNC: 137 MMOL/L (ref 136–145)
WBC NRBC COR # BLD AUTO: 1.9 10*3/MM3 (ref 3.4–10.8)

## 2024-06-13 PROCEDURE — 86140 C-REACTIVE PROTEIN: CPT | Performed by: NURSE PRACTITIONER

## 2024-06-13 PROCEDURE — 82378 CARCINOEMBRYONIC ANTIGEN: CPT | Performed by: INTERNAL MEDICINE

## 2024-06-13 PROCEDURE — 25010000002 VANCOMYCIN HCL 1.25 G RECONSTITUTED SOLUTION 1 EACH VIAL

## 2024-06-13 PROCEDURE — 25010000002 METRONIDAZOLE 500 MG/100ML SOLUTION: Performed by: NURSE ANESTHETIST, CERTIFIED REGISTERED

## 2024-06-13 PROCEDURE — 0F9G8ZX DRAINAGE OF PANCREAS, VIA NATURAL OR ARTIFICIAL OPENING ENDOSCOPIC, DIAGNOSTIC: ICD-10-PCS | Performed by: INTERNAL MEDICINE

## 2024-06-13 PROCEDURE — 25010000002 FILGRASTIM 300 MCG/0.5ML SOLUTION PREFILLED SYRINGE: Performed by: NURSE PRACTITIONER

## 2024-06-13 PROCEDURE — 82150 ASSAY OF AMYLASE: CPT | Performed by: INTERNAL MEDICINE

## 2024-06-13 PROCEDURE — 84630 ASSAY OF ZINC: CPT | Performed by: PHYSICIAN ASSISTANT

## 2024-06-13 PROCEDURE — 43239 EGD BIOPSY SINGLE/MULTIPLE: CPT | Mod: 59 | Performed by: INTERNAL MEDICINE

## 2024-06-13 PROCEDURE — 87040 BLOOD CULTURE FOR BACTERIA: CPT

## 2024-06-13 PROCEDURE — 82525 ASSAY OF COPPER: CPT | Performed by: PHYSICIAN ASSISTANT

## 2024-06-13 PROCEDURE — 82945 GLUCOSE OTHER FLUID: CPT | Performed by: INTERNAL MEDICINE

## 2024-06-13 PROCEDURE — BF47ZZZ ULTRASONOGRAPHY OF PANCREAS: ICD-10-PCS | Performed by: INTERNAL MEDICINE

## 2024-06-13 PROCEDURE — 80053 COMPREHEN METABOLIC PANEL: CPT | Performed by: NURSE PRACTITIONER

## 2024-06-13 PROCEDURE — 25810000003 SODIUM CHLORIDE 0.9 % SOLUTION 250 ML FLEX CONT

## 2024-06-13 PROCEDURE — 85610 PROTHROMBIN TIME: CPT | Performed by: NURSE PRACTITIONER

## 2024-06-13 PROCEDURE — 85025 COMPLETE CBC W/AUTO DIFF WBC: CPT | Performed by: NURSE PRACTITIONER

## 2024-06-13 PROCEDURE — 88108 CYTOPATH CONCENTRATE TECH: CPT | Performed by: INTERNAL MEDICINE

## 2024-06-13 PROCEDURE — 25010000002 PROPOFOL 1000 MG/100ML EMULSION: Performed by: NURSE ANESTHETIST, CERTIFIED REGISTERED

## 2024-06-13 PROCEDURE — 82948 REAGENT STRIP/BLOOD GLUCOSE: CPT

## 2024-06-13 PROCEDURE — 0DB78ZX EXCISION OF STOMACH, PYLORUS, VIA NATURAL OR ARTIFICIAL OPENING ENDOSCOPIC, DIAGNOSTIC: ICD-10-PCS | Performed by: INTERNAL MEDICINE

## 2024-06-13 PROCEDURE — 25010000002 CEFTRIAXONE PER 250 MG: Performed by: NURSE ANESTHETIST, CERTIFIED REGISTERED

## 2024-06-13 PROCEDURE — 83690 ASSAY OF LIPASE: CPT | Performed by: NURSE PRACTITIONER

## 2024-06-13 PROCEDURE — 83690 ASSAY OF LIPASE: CPT | Performed by: INTERNAL MEDICINE

## 2024-06-13 PROCEDURE — 0FBG8ZX EXCISION OF PANCREAS, VIA NATURAL OR ARTIFICIAL OPENING ENDOSCOPIC, DIAGNOSTIC: ICD-10-PCS | Performed by: INTERNAL MEDICINE

## 2024-06-13 PROCEDURE — 25010000002 FUROSEMIDE PER 20 MG: Performed by: FAMILY MEDICINE

## 2024-06-13 PROCEDURE — 25810000003 SODIUM CHLORIDE 0.9 % SOLUTION: Performed by: NURSE ANESTHETIST, CERTIFIED REGISTERED

## 2024-06-13 PROCEDURE — 43242 EGD US FINE NEEDLE BX/ASPIR: CPT | Performed by: INTERNAL MEDICINE

## 2024-06-13 PROCEDURE — 99222 1ST HOSP IP/OBS MODERATE 55: CPT | Performed by: INTERNAL MEDICINE

## 2024-06-13 PROCEDURE — 0DB58ZX EXCISION OF ESOPHAGUS, VIA NATURAL OR ARTIFICIAL OPENING ENDOSCOPIC, DIAGNOSTIC: ICD-10-PCS | Performed by: INTERNAL MEDICINE

## 2024-06-13 PROCEDURE — 88305 TISSUE EXAM BY PATHOLOGIST: CPT | Performed by: INTERNAL MEDICINE

## 2024-06-13 RX ORDER — DIPHENHYDRAMINE HYDROCHLORIDE 50 MG/ML
12.5 INJECTION INTRAMUSCULAR; INTRAVENOUS
Status: DISCONTINUED | OUTPATIENT
Start: 2024-06-13 | End: 2024-06-13 | Stop reason: HOSPADM

## 2024-06-13 RX ORDER — ONDANSETRON 2 MG/ML
4 INJECTION INTRAMUSCULAR; INTRAVENOUS ONCE AS NEEDED
Status: DISCONTINUED | OUTPATIENT
Start: 2024-06-13 | End: 2024-06-13 | Stop reason: HOSPADM

## 2024-06-13 RX ORDER — LIDOCAINE HYDROCHLORIDE 20 MG/ML
INJECTION, SOLUTION INFILTRATION; PERINEURAL AS NEEDED
Status: DISCONTINUED | OUTPATIENT
Start: 2024-06-13 | End: 2024-06-13 | Stop reason: SURG

## 2024-06-13 RX ORDER — IPRATROPIUM BROMIDE AND ALBUTEROL SULFATE 2.5; .5 MG/3ML; MG/3ML
3 SOLUTION RESPIRATORY (INHALATION) ONCE AS NEEDED
Status: DISCONTINUED | OUTPATIENT
Start: 2024-06-13 | End: 2024-06-13 | Stop reason: HOSPADM

## 2024-06-13 RX ORDER — METRONIDAZOLE 500 MG/100ML
INJECTION, SOLUTION INTRAVENOUS AS NEEDED
Status: DISCONTINUED | OUTPATIENT
Start: 2024-06-13 | End: 2024-06-13 | Stop reason: SURG

## 2024-06-13 RX ORDER — ALBUMIN (HUMAN) 12.5 G/50ML
12.5 SOLUTION INTRAVENOUS AS NEEDED
Status: CANCELLED | OUTPATIENT
Start: 2024-06-13 | End: 2024-06-14

## 2024-06-13 RX ORDER — LIDOCAINE AND PRILOCAINE 25; 25 MG/G; MG/G
CREAM TOPICAL ONCE
Status: CANCELLED | OUTPATIENT
Start: 2024-06-13 | End: 2024-06-13

## 2024-06-13 RX ORDER — EPHEDRINE SULFATE 5 MG/ML
5 INJECTION INTRAVENOUS ONCE AS NEEDED
Status: DISCONTINUED | OUTPATIENT
Start: 2024-06-13 | End: 2024-06-13 | Stop reason: HOSPADM

## 2024-06-13 RX ORDER — PANTOPRAZOLE SODIUM 40 MG/1
40 TABLET, DELAYED RELEASE ORAL
Status: DISCONTINUED | OUTPATIENT
Start: 2024-06-14 | End: 2024-06-14

## 2024-06-13 RX ORDER — LABETALOL HYDROCHLORIDE 5 MG/ML
5 INJECTION, SOLUTION INTRAVENOUS
Status: DISCONTINUED | OUTPATIENT
Start: 2024-06-13 | End: 2024-06-13 | Stop reason: HOSPADM

## 2024-06-13 RX ORDER — HYDRALAZINE HYDROCHLORIDE 20 MG/ML
5 INJECTION INTRAMUSCULAR; INTRAVENOUS
Status: DISCONTINUED | OUTPATIENT
Start: 2024-06-13 | End: 2024-06-13 | Stop reason: HOSPADM

## 2024-06-13 RX ORDER — PHENYLEPHRINE HCL IN 0.9% NACL 1 MG/10 ML
SYRINGE (ML) INTRAVENOUS AS NEEDED
Status: DISCONTINUED | OUTPATIENT
Start: 2024-06-13 | End: 2024-06-13 | Stop reason: SURG

## 2024-06-13 RX ORDER — SODIUM CHLORIDE 9 MG/ML
INJECTION, SOLUTION INTRAVENOUS CONTINUOUS PRN
Status: DISCONTINUED | OUTPATIENT
Start: 2024-06-13 | End: 2024-06-13 | Stop reason: SURG

## 2024-06-13 RX ORDER — PROPOFOL 10 MG/ML
INJECTION, EMULSION INTRAVENOUS AS NEEDED
Status: DISCONTINUED | OUTPATIENT
Start: 2024-06-13 | End: 2024-06-13 | Stop reason: SURG

## 2024-06-13 RX ADMIN — Medication 100 MCG: at 09:52

## 2024-06-13 RX ADMIN — LIDOCAINE HYDROCHLORIDE 40 MG: 20 INJECTION, SOLUTION INFILTRATION; PERINEURAL at 09:47

## 2024-06-13 RX ADMIN — METRONIDAZOLE 500 MG: 500 INJECTION, SOLUTION INTRAVENOUS at 09:54

## 2024-06-13 RX ADMIN — DEXTROSE 15 G: 15 GEL ORAL at 12:27

## 2024-06-13 RX ADMIN — SODIUM CHLORIDE 1250 MG: 9 INJECTION, SOLUTION INTRAVENOUS at 18:50

## 2024-06-13 RX ADMIN — METRONIDAZOLE 500 MG: 500 TABLET ORAL at 15:25

## 2024-06-13 RX ADMIN — PROPOFOL INJECTABLE EMULSION 30 MG: 10 INJECTION, EMULSION INTRAVENOUS at 09:47

## 2024-06-13 RX ADMIN — FILGRASTIM 300 MCG: 300 INJECTION, SOLUTION INTRAVENOUS; SUBCUTANEOUS at 14:33

## 2024-06-13 RX ADMIN — FUROSEMIDE 20 MG: 10 INJECTION, SOLUTION INTRAMUSCULAR; INTRAVENOUS at 06:06

## 2024-06-13 RX ADMIN — METRONIDAZOLE 500 MG: 500 TABLET ORAL at 20:58

## 2024-06-13 RX ADMIN — CEFTRIAXONE 2 G: 2 INJECTION, POWDER, FOR SOLUTION INTRAMUSCULAR; INTRAVENOUS at 09:52

## 2024-06-13 RX ADMIN — Medication 100 MCG: at 09:47

## 2024-06-13 RX ADMIN — Medication 10 ML: at 20:58

## 2024-06-13 RX ADMIN — SODIUM CHLORIDE: 9 INJECTION, SOLUTION INTRAVENOUS at 09:43

## 2024-06-13 RX ADMIN — Medication 100 MCG: at 09:56

## 2024-06-13 RX ADMIN — PROPOFOL INJECTABLE EMULSION 50 MCG/KG/MIN: 10 INJECTION, EMULSION INTRAVENOUS at 09:49

## 2024-06-13 RX ADMIN — Medication: at 18:30

## 2024-06-13 RX ADMIN — ATORVASTATIN CALCIUM 40 MG: 40 TABLET, FILM COATED ORAL at 12:03

## 2024-06-13 RX ADMIN — ACETAMINOPHEN 650 MG: 325 TABLET, FILM COATED ORAL at 15:27

## 2024-06-13 RX ADMIN — Medication 10 ML: at 12:07

## 2024-06-13 NOTE — ANESTHESIA POSTPROCEDURE EVALUATION
Patient: Deann Warren    Procedure Summary       Date: 06/13/24 Room / Location: [facility] ENDOSCOPY 2 / [facility] ENDOSCOPY    Anesthesia Start: 0943 Anesthesia Stop: 1016    Procedure: ESOPHAGOGASTRODUODENOSCOPY WITH ULTRASOUND AND FINE NEEDLE ASPIRATION of pancreatic cyst and forcep biopsy x2 areas Diagnosis:       Pancreatic mass      (Pancreatic mass [K86.89])    Surgeons: Cesia Hyde MD Provider: Francois Gaytan MD    Anesthesia Type: MAC, general ASA Status: 4            Anesthesia Type: MAC, general    Vitals  Vitals Value Taken Time   /50 06/13/24 1038   Temp     Pulse 70 06/13/24 1044   Resp     SpO2 99 % 06/13/24 1044   Vitals shown include unfiled device data.        Post Anesthesia Care and Evaluation    Patient location during evaluation: PACU  Patient participation: complete - patient participated  Level of consciousness: awake  Pain scale: See nurse's notes for pain score.  Pain management: adequate    Airway patency: patent  Anesthetic complications: No anesthetic complications  PONV Status: none  Cardiovascular status: acceptable  Respiratory status: acceptable and spontaneous ventilation  Hydration status: acceptable    Comments: Patient seen and examined postoperatively; vital signs stable; SpO2 greater than or equal to 90%; cardiopulmonary status stable; nausea/vomiting adequately controlled; pain adequately controlled; no apparent anesthesia complications; patient discharged from anesthesia care when discharge criteria were met

## 2024-06-13 NOTE — SIGNIFICANT NOTE
Case Management/Social Work    Patient Name:  Deann Warren  YOB: 1940  MRN: [id number]  Admit Date:  6/10/2024           06/13/24 1021   Post Acute Pre-Cert Documentation   Request Submitted by Facility - Type: Hospital   Post-Acute Authorization Type Submitted: SNF   Date Post Acute Pre-Cert Inititated per Facility 06/13/24   Verification from Payer Yes   Date Post Acute Pre-Cert Completed 06/13/24   Accepting Facility [facility] Discharge Date Requested 06/14/24   All Clinicals Submitted? Yes   Had Accepting Facility at Time of Submission Yes   Response Received from Insurance? Approval   Response Communicated to:    Authorization Number: [id number]   Post Acute Pre-Cert Initiated Comment ROXANNE submitted SNF precert for [facility] via [employer]. Authorization ID [id number]. SNF precert auto approved. Valid 6/14-6/21. Approval letter indexed to media. CM made aware.           Electronically signed by:  Abdiaziz Campbell CMA  06/13/24 10:26 EDT    Abdiaziz Campbell  Case Management Associate  [address] [address]  P: [phone]  F: [phone]

## 2024-06-13 NOTE — NURSING NOTE
WOCN note:    83 yr old female admitted 6/10/24 after a syncopal episode at dialysis. Patient has a hx of thrombocytopenia, SSS, afib, LBBB, cardiomyopathy, ESRD with HD, DM and CAD. WOCN consult received for pressure injuries to the coccyx and left heel that were present on admission. Patient is being followed by [facility] wound center for these wounds.     Patient presents with an unstageable pressure injury/diabetic foot ulcer to her left posterior heel. She states it has been present for several months. The wound measures approximately 2x1cm and is covered with a dry brown eschar. A silicone foam dressing was applied and patient was instructed on pressure reliefs. She states she has a foam off-loading boot at home that she does not wear. Instructed on floating the heels off the bed surface.     Patient is also noted to have a stage 1 pressure injury to her coccyx as well as some scabbing from friction or shearing. There is a silicone foam dressing in place which is appropriate. She reports chronic pain to her coccyx after a fall many years ago and shifts her weight frequently. She was provided a waffle cushion and instructed to use at dialysis and at home.   Recommend care as above and continue pressure injury prevention measures. Patient to resume care at [facility] wound center after discharge. We will follow as needed.

## 2024-06-13 NOTE — PROGRESS NOTES
[facility] MEDICINE SERVICE  DAILY PROGRESS NOTE    NAME: Deann Warren  : 1940  MRN: [id number]      LOS: 3 days     PROVIDER OF SERVICE: LUCA Cain    Chief Complaint: Syncope    Subjective:     Interval History:  History taken from: patient  Patient Complaints: None   Patient Denies:  Current chest pain, dizziness, fatigue    Patient dialysis ended early yesterday due to another near syncopal episode. Patient had to be given narcan. Patient alert, stable, and has no complaints this am. EUS today.     Review of Systems:   Review of Systems   Constitutional:  Negative for chills and fever.   Respiratory:  Negative for cough, chest tightness and shortness of breath.    Cardiovascular:  Negative for chest pain and palpitations.   Gastrointestinal:  Negative for abdominal pain, constipation, diarrhea and vomiting.   Genitourinary:  Negative for urgency.   Musculoskeletal:  Negative for arthralgias and myalgias.   Neurological:  Negative for dizziness, syncope and light-headedness.       Objective:     Vital Signs  Temp:  [96.5 °F (35.8 °C)-97.8 °F (36.6 °C)] 96.5 °F (35.8 °C)  Heart Rate:  [70-75] 71  Resp:  [12-24] 24  BP: ()/(48-84) 124/53  Flow (L/min):  [2-4] 4   Body mass index is 21.46 kg/m².    Physical Exam  Physical Exam  Vitals and nursing note reviewed.   Constitutional:       General: She is not in acute distress.     Appearance: She is ill-appearing.   HENT:      Head: Normocephalic and atraumatic.   Eyes:      Extraocular Movements: Extraocular movements intact.      Pupils: Pupils are equal, round, and reactive to light.   Cardiovascular:      Rate and Rhythm: Normal rate and regular rhythm.      Pulses: Normal pulses.   Pulmonary:      Effort: Pulmonary effort is normal.      Breath sounds: Normal breath sounds.   Abdominal:      General: Bowel sounds are normal.      Palpations: Abdomen is soft.   Skin:     General: Skin is warm.      Capillary Refill: Capillary refill takes  less than 2 seconds.   Neurological:      Mental Status: She is alert and oriented to person, place, and time.         Scheduled Meds   [Held by provider] apixaban, 2.5 mg, Oral, Q12H  atorvastatin, 40 mg, Oral, Daily  cefTRIAXone, 1,000 mg, Intravenous, Q24H  filgrastim (NEUPOGEN) injection, 300 mcg, Subcutaneous, Once  [Held by provider] furosemide, 20 mg, Intravenous, Q12H  metroNIDAZOLE, 500 mg, Oral, Q8H  Naloxone HCl, 0.4 mg, Intravenous, Once  sodium chloride, 10 mL, Intravenous, Q12H       PRN Meds     acetaminophen    senna-docusate sodium **AND** polyethylene glycol **AND** bisacodyl **AND** bisacodyl    Calcium Replacement - Follow Nurse / BPA Driven Protocol    dextrose    dextrose    glucagon (human recombinant)    Magnesium Standard Dose Replacement - Follow Nurse / BPA Driven Protocol    Phosphorus Replacement - Follow Nurse / BPA Driven Protocol    Potassium Replacement - Follow Nurse / BPA Driven Protocol    [COMPLETED] Insert Peripheral IV **AND** sodium chloride    sodium chloride    sodium chloride   Infusions           Diagnostic Data    Results from last 7 days   Lab Units 06/13/24  0022   WBC 10*3/mm3 1.90*   HEMOGLOBIN g/dL 8.5*   HEMATOCRIT % 27.8*   PLATELETS 10*3/mm3 71*   GLUCOSE mg/dL 69   CREATININE mg/dL 2.46*   BUN mg/dL 34*   SODIUM mmol/L 137   POTASSIUM mmol/L 4.2   AST (SGOT) U/L 131*   ALT (SGPT) U/L 35*   ALK PHOS U/L 57   BILIRUBIN mg/dL 0.7   ANION GAP mmol/L 9.6       US Liver    Result Date: 6/12/2024  Impression: 1. Liver parenchyma is normal. 2. Trace ascitic fluid adjacent to the liver. There is also a partially imaged right pleural effusion. Electronically Signed: Donaldo Cantu MD  6/12/2024 12:27 PM EDT  Workstation ID: [id number]    CT Abdomen Pelvis Without Contrast    Result Date: 6/11/2024  Impression: 1.Volume overload/CHF features as detailed above. Superimposed left lower lobe pneumonia is not excluded. Correlate with symptoms. 2.There is a nonspecific 3.4 cm cystic  mass involving the pancreatic head. Follow-up dedicated pancreatic MRI with and without contrast recommended. 3.Increased density urine consistent with hematuria. 4.Other incidental findings as detailed above. Electronically Signed: Flip Lee MD  6/11/2024 4:32 PM EDT  Workstation ID: [id number]    CT Head Without Contrast    Result Date: 6/11/2024  Impression: No acute intracranial pathology. Electronically Signed: Tristian Boone MD  6/11/2024 4:27 PM EDT  Workstation ID: [id number]       I reviewed the patient's new clinical results.    Assessment/Plan:     Active and Resolved Problems  Active Hospital Problems    Diagnosis  POA    **Syncope [R55]  Yes    Pancreatic mass [K86.89]  Unknown    Presence of cardiac pacemaker [Z95.0]  Yes    Permanent atrial fibrillation [I48.21]  Yes    Coronary artery disease involving native coronary artery of native heart without angina pectoris [I25.10]  Yes    Ischemic cardiomyopathy [I25.5]  Yes    Hyperlipidemia [E78.5]  Yes    Type 2 diabetes mellitus with diabetic chronic kidney disease [E11.22]  Yes    ESRD (end stage renal disease) [N18.6]  Yes    Anemia due to chronic kidney disease, on chronic dialysis [N18.6, D63.1, Z99.2]  Not Applicable    Essential hypertension [I10]  Yes      Resolved Hospital Problems   No resolved problems to display.       Syncope  - Last echo 4/9/2024 EF 35%  - Hgb 8.5  Hct 27.8  - Creatinine 3.46 - on dialysis   - EKG noted for ventricular paced rhythm  - Cardiology consulted  - Cardiology EP consulted    Fluid Overload/CHF  -Fluids discontinued      Pancytopenia   - Platelets 71  - WBC 1.90 today  - switched zosyn to rocephin and flagyl for concern of neutropenia yesterday   - Hematology consulted     ESRD  - Dialysis schedule Monday Wednesday Friday.  - Followed by Dr. Buchanan  - BUN/creatinine 34/3.46       Atrial fibrillation  - dual antiplatelet therapy at home of plavix and aspirin  - eliquis on hold      HLD  - Continue home regime of lipitor  40 mg daily     Pneumonia   - left lower lobe pna   - SLP consulted - no aspiration per swallow study   - Rocephin and flagyl     Pancreatic cyst  - EUS performed today  - No significant alarming feature of nodule but high risk of malignant transformation  - Dx with severe erosive gastritis, biopsy taken   - Hiatal hernia present  - Will restart anticoag in 48 hours per GI if no bleeding   - PPI added        VTE Prophylaxis:  Pharmacologic & mechanical VTE prophylaxis orders are present.         Code status is   Code Status and Medical Interventions:   Ordered at: 06/10/24 1150     Level Of Support Discussed With:    Patient     Code Status (Patient has no pulse and is not breathing):    CPR (Attempt to Resuscitate)     Medical Interventions (Patient has pulse or is breathing):    Full Support       Plan for disposition: SNF in 2-3 days    Time: 30 minutes    Signature: Electronically signed by LUCA Cain, 06/13/24, 13:26 EDT.  [facility] Hospitalist Team

## 2024-06-13 NOTE — PLAN OF CARE
Goal Outcome Evaluation:  Plan of Care Reviewed With: patient        Progress: no change  Outcome Evaluation: Pt has slept on and off tonight, no new complaints at this time, pt reminded to turn throughout th night, NPO since 12 for possible EUS this am, possible D/C to [facility] when appropriate

## 2024-06-13 NOTE — DISCHARGE PLACEMENT REQUEST
"Timothy Warren (83 y.o. Female)       Date of Birth   1940    Social Security Number       Address   [address] [place] [address]    Home Phone   [phone]    MRN   [id number]       Evangelical   None    Marital Status                               Admission Date   6/10/24    Admission Type   Emergency    Admitting Provider   Thomas Cordova MD    Attending Provider   Jayda Nicole MD    Department, Room/Bed   [facility] ENDO SUITES, ENDO/ENDO       Discharge Date       Discharge Disposition       Discharge Destination                                 Attending Provider: Jayda Nicole MD    Allergies: Heparin    Isolation: None   Infection: None   Code Status: CPR    Ht: 162.6 cm (64\")   Wt: 56.7 kg (125 lb)    Admission Cmt: None   Principal Problem: Syncope [R55]                   Active Insurance as of 6/10/2024       Primary Coverage       Payor Plan Insurance Group Employer/Plan Group    ANTHEM MEDICARE REPLACEMENT ANTHEM MEDICARE ADVANTAGE [id number]       Payor Plan Address Payor Plan Phone Number Payor Plan Fax Number Effective Dates    [address] [phone]  1/1/2024 - None Entered    [facility] [id number]         Subscriber Name Subscriber Birth Date Member ID       TIMOTHY WARREN 1940 [id number]               Secondary Coverage       Payor Plan Insurance Group Employer/Plan Group    [place]        Payor Plan Address Payor Plan Phone Number Payor Plan Fax Number Effective Dates    [address]   4/7/2024 - None Entered    [place] [address]         Subscriber Name Subscriber Birth Date Member ID       TIMOTHY WARREN 1940 [id number]                     Emergency Contacts        (Rel.) Home Phone Work Phone Mobile Phone    ROB WARREN (Son) -- -- [phone]    SANDRA RAIN (Friend) -- -- [phone]                 History & Physical        Oliva Shaw, APRN at 06/10/24 1223       Attestation signed by " Thomas Cordova MD at 06/10/24 1359    Patient is admitted for principal diagnosis of syncope.  EKG with ventricular paced rhythm left bundle branch block.  Echo in 2024 with ejection fraction of 35%.  No need to repeat for now.  Cardiology is consulted.  Patient has thrombocytopenia of 71 monitor CBC daily.  Patient has ESRD on hemodialysis continue dialysis while in-house.  I have reviewed this documentation and agree.                      [facility]  History & Physical    Patient Name: Deann Warren  : 1940  MRN: [id number]  Primary Care Physician:  Antonino Shen MD  Date of admission: 6/10/2024  Date and Time of Service: 6/10/2024 at 12:24 PM EDT     Subjective      Chief Complaint: Syncope    History of Present Illness: Deann Warren is a 83 y.o. female with a CMH of thrombocytopenia, sick sinus syndrome, atrial fibrillation, LBBB, cardiomyopathy with a EF of 35%.  Multivessel CAD involving left main, LAD and circumflex.  Pacemaker, HLD, ESRD (left fistula) who presented to [facility] on 6/10/2024 with syncope.  Patient was in route to receive dialysis, patient states that when she was walking into the door she felt weak and unsteady, patient to the seat is out of the office where office staff noted patient was not looking good and had poor head control.  No signs were obtained at the doctor's office, and were stable.  However dialysis was placed on hold, and patient was transferred here via EMS.  Of note patient had Medtronic pacemaker recently placed on 6/3/2024.  Patient states that she has been feeling generalized weakness since placement.  Patient states she takes her medicine as prescribed, good p.o. intake and urine output.  Patient has been admitted to the hospital for further cardiac syncope workup    Review of Systems   Constitutional:  Positive for activity change and fatigue. Negative for appetite change and chills.   HENT: Negative.     Respiratory:  Negative.     Cardiovascular: Negative.    Gastrointestinal: Negative.    Endocrine: Positive for cold intolerance.   Genitourinary: Negative.    Musculoskeletal:  Positive for back pain.   Skin: Negative.    Allergic/Immunologic: Negative.    Neurological:  Positive for syncope and weakness.   Hematological:  Bruises/bleeds easily.   Psychiatric/Behavioral: Negative.         Personal History     Past Medical History:   Diagnosis Date    Allergic conjunctivitis and rhinitis 11/06/2014    Anemia due to chronic kidney disease, on chronic dialysis 08/25/2020    Anxiety 07/30/2012    Chronic gouty arthritis 11/06/2014    Dependence on renal dialysis 07/01/2022    ESRD (end stage renal disease) 08/25/2020    Essential hypertension 09/16/2015    History of shingles 01/18/2022    Hyperlipidemia 04/08/2024    Paroxysmal atrial fibrillation 07/01/2022    Type 2 diabetes mellitus with diabetic chronic kidney disease 07/01/2022    Vitamin D deficiency 07/22/2021       Past Surgical History:   Procedure Laterality Date    ARTERIOVENOUS FISTULA Left     CARDIAC CATHETERIZATION N/A 4/25/2024    Procedure: Right and Left Heart Cath;  Surgeon: Dilcia Lui MD;  Location: [facility] CATH INVASIVE LOCATION;  Service: Cardiology;  Laterality: N/A;    CARDIAC CATHETERIZATION N/A 4/25/2024    Procedure: Percutaneous Coronary Intervention;  Surgeon: Jadiel Blake MD;  Location: [facility] CATH INVASIVE LOCATION;  Service: Cardiology;  Laterality: N/A;    CARDIAC ELECTROPHYSIOLOGY PROCEDURE N/A 6/3/2024    Procedure: Pacemaker DC new, Medtronic;  Surgeon: Zamzam Carrillo MD;  Location: [facility] CATH INVASIVE LOCATION;  Service: Cardiovascular;  Laterality: N/A;       Family History: Family history is unknown by patient. Otherwise pertinent FHx was reviewed and not pertinent to current issue.    Social History:  reports that she has never smoked. She has never used smokeless tobacco. She reports that she does not drink alcohol and does not  use drugs.    Home Medications:  Prior to Admission Medications       Prescriptions Last Dose Informant Patient Reported? Taking?    acetaminophen (TYLENOL) 500 MG tablet   Yes No    Take 1 tablet by mouth Every 6 (Six) Hours As Needed for Mild Pain.    aspirin 81 MG EC tablet   No No    Take 1 tablet by mouth Daily.    atorvastatin (LIPITOR) 40 MG tablet   No No    Take 1 tablet by mouth Daily.    cephalexin (Keflex) 500 MG capsule   No No    Take 1 capsule by mouth 2 (Two) Times a Day for 5 days.    Cholecalciferol (Vitamin D3) 50 MCG (2000 UT) tablet   Yes No    Take  by mouth Daily.    clopidogrel (PLAVIX) 75 MG tablet   No No    Take 1 tablet by mouth Daily.    febuxostat (ULORIC) 40 MG tablet   Yes No    Take 1 tablet by mouth Daily.    folic acid (FOLVITE) 1 MG tablet   No No    Take 1 tablet by mouth Daily.    metoprolol succinate XL (TOPROL-XL) 25 MG 24 hr tablet   No No    Take 0.5 tablets by mouth Daily.    NON FORMULARY   Yes No    Apply 1 dose topically to the appropriate area as directed 3 (Three) Times a Week. Lidocaine 2.5% ointment -  Apply 1 application Monday, Wednesday, Friday before dialysis              Allergies:  Allergies   Allergen Reactions    Heparin Hives       Objective      Vitals:   Temp:  [97.4 °F (36.3 °C)] 97.4 °F (36.3 °C)  Heart Rate:  [70-81] 70  Resp:  [20] 20  BP: (104-121)/(48-65) 104/48  Body mass index is 21.46 kg/m².  Physical Exam    PHYSICAL EXAM  Constitutional:  Well developed, well nourished, no acute distress, non-toxic appearance   Eyes:  PERRL, conjunctiva normal, EOMI   HENT:  Atraumatic, external ears normal, nose normal, oropharynx moist, no pharyngeal exudates. Neck-normal range of motion, no tenderness, supple, trachea midline  Respiratory:  ctab, non-labored respirations without accessory muscle use  Cardiovascular:  Normal rate, normal rhythm, no murmurs, no gallops, no rubs   GI:  Soft, nondistended, normal bowel sounds, nontender, no organomegaly, no mass,  no rebound, no guarding   :  No costovertebral angle tenderness   Musculoskeletal:  No edema, tenderness noted to T10-L1.  No deformities  Integument:  Well hydrated, no rash.  General pallor multiple bruising noted  Lymphatic:  No lymphadenopathy noted   Neurologic:  Alert & oriented x 3, CN 2-12 normal, normal motor function, normal sensory function, no focal deficits noted   Psychiatric:  Speech and behavior appropriate      Diagnostic Data:  Lab Results (last 24 hours)       Procedure Component Value Units Date/Time    Basic Metabolic Panel [[id number]]  (Abnormal) Collected: 06/10/24 1017    Specimen: Blood Updated: 06/10/24 1054     Glucose 104 mg/dL      BUN 41 mg/dL      Creatinine 2.95 mg/dL      Sodium 135 mmol/L      Potassium 3.9 mmol/L      Comment: Specimen hemolyzed.  Result may be falsely elevated.        Chloride 106 mmol/L      CO2 17.0 mmol/L      Calcium 7.8 mg/dL      BUN/Creatinine Ratio 13.9     Anion Gap 12.0 mmol/L      eGFR 15.3 mL/min/1.73     Narrative:      GFR Normal >60  Chronic Kidney Disease <60  Kidney Failure <15    The GFR formula is only valid for adults with stable renal function between ages 18 and 70.    CBC & Differential [[id number]]  (Abnormal) Collected: 06/10/24 1017    Specimen: Blood Updated: 06/10/24 1044    Narrative:      The following orders were created for panel order CBC & Differential.  Procedure                               Abnormality         Status                     ---------                               -----------         ------                     CBC Auto Differential[[id number]]        Abnormal            Final result               Scan Slide[[id number]]                                       Final result                 Please view results for these tests on the individual orders.    CBC Auto Differential [[id number]]  (Abnormal) Collected: 06/10/24 1017    Specimen: Blood Updated: 06/10/24 1044     WBC 2.37 10*3/mm3      RBC 2.92 10*6/mm3       Hemoglobin 8.6 g/dL      Hematocrit 29.6 %      .4 fL      MCH 29.5 pg      MCHC 29.1 g/dL      RDW 15.7 %      RDW-SD 57.7 fl      MPV 11.3 fL      Platelets 71 10*3/mm3      Neutrophil % 58.6 %      Lymphocyte % 22.8 %      Monocyte % 12.7 %      Eosinophil % 3.8 %      Basophil % 1.3 %      Immature Grans % 0.8 %      Neutrophils, Absolute 1.39 10*3/mm3      Lymphocytes, Absolute 0.54 10*3/mm3      Monocytes, Absolute 0.30 10*3/mm3      Eosinophils, Absolute 0.09 10*3/mm3      Basophils, Absolute 0.03 10*3/mm3      Immature Grans, Absolute 0.02 10*3/mm3      nRBC 0.0 /100 WBC     Scan Slide [[id number]] Collected: 06/10/24 1017    Specimen: Blood Updated: 06/10/24 1044     Anisocytosis Slight/1+     Kilbourne Cells Slight/1+     Crenated RBC's Slight/1+     Poikilocytes Slight/1+     WBC Morphology Normal     Platelet Estimate Adequate    Extra Tubes [[id number]] Collected: 06/10/24 1017    Specimen: Blood, Venous Line Updated: 06/10/24 1030    Narrative:      The following orders were created for panel order Extra Tubes.  Procedure                               Abnormality         Status                     ---------                               -----------         ------                     Gold Top - SST[[id number]]                                   Final result               Light Blue Top[[id number]]                                   Final result                 Please view results for these tests on the individual orders.    Gold Top - SST [[id number]] Collected: 06/10/24 1017    Specimen: Blood Updated: 06/10/24 1030     Extra Tube Hold for add-ons.     Comment: Auto resulted.       Light Blue Top [[id number]] Collected: 06/10/24 1017    Specimen: Blood Updated: 06/10/24 1030     Extra Tube Hold for add-ons.     Comment: Auto resulted                Imaging Results (Last 24 Hours)       Procedure Component Value Units Date/Time    XR Chest 1 View [[id number]] Collected: 06/10/24 1041     Updated: 06/10/24 1045     Narrative:      XR CHEST 1 VW    Date of Exam: 6/10/2024 10:38 AM EDT    Indication: Chest pain    Comparison: 6/3/2024    Findings:  Right chest wall pacemaker is present. Cardiac silhouette is enlarged. Perihilar and bibasilar opacities may represent edema or mild infiltrates. Left basilar atelectasis is seen. No pneumothorax is seen. No acute osseous lesion is seen. There are   senescent changes of the aorta and skeletal structures.      Impression:      Impression:  1.Cardiomegaly with pulmonary vascular congestion.  2.Left basilar atelectasis or infiltrate.      Electronically Signed: Armando Gonzalez MD    6/10/2024 10:43 AM EDT    Workstation ID: [id number]              Assessment & Plan        This is a 83 y.o. female with:    Active and Resolved Problems  Active Hospital Problems    Diagnosis  POA    **Syncope [R55]  Yes      Resolved Hospital Problems   No resolved problems to display.       Syncope  - Last echo 4/9/2024 EF 35%  - H&H 8.6/29.6  - Creatinine 2.95.  Patient baseline 3.0  - EKG noted for ventricular paced rhythm, LBBB, with significant change in rhythm from previous EKG.  - Cardiology consulted  - Cardiology EP consulted    Thrombocytopenia  - Platelets 71  - Iron study panel pending    ESRD  - Dialysis schedule Monday Wednesday Friday.  - Followed by Dr. Buchanan  - BUN/creatinine 41/2.95  - Plan is for patient to have dialysis today 6/10/2024    Atrial fibrillation  - Resume home regimen of aspirin 81 mg    HLD  - Continue home regimen    DVT prophylaxis:  Mechanical DVT prophylaxis orders are present.        The patient desires to be as follows:    CODE STATUS:    Level Of Support Discussed With: Patient  Code Status (Patient has no pulse and is not breathing): CPR (Attempt to Resuscitate)  Medical Interventions (Patient has pulse or is breathing): Full Support        Antonino Warren, who can be contacted at [phone], is the designated person to make medical decisions on the patient's behalf  if She is incapable of doing so. This was clarified with patient and/or next of kin on 6/10/2024 during the course of this H&P.    Admission Status:  I believe this patient meets patient status.    Expected Length of Stay: 2 days    PDMP and Medication Dispenses via Sidebar reviewed and consistent with patient reported medications.    I discussed the patient's findings and my recommendations with patient and family.      Signature:     This document has been electronically signed by LUCA Morales on Faina 10, 2024 12:24 EDT   [facility] Hospitalist Team    Electronically signed by Thomas Cordova MD at 06/10/24 3207       Operative/Procedure Notes (all)    No notes of this type exist for this encounter.          Physician Progress Notes (last 48 hours)        Sanchez Steele MD at 24 1144                                                                                                                                                Nephrology  Progress Note                                        [facility]    Patient Identification    Name: Deann Warren  Age: 83 y.o.  Sex: female  :  1940  MRN: [id number]      DATE OF SERVICE:  2024        Subective    Follow-up ESRD  No chest pain, has some shortness of air  Has not had dialysis for 5 days.     Objective   Scheduled Meds:[Held by provider] apixaban, 2.5 mg, Oral, Q12H  atorvastatin, 40 mg, Oral, Daily  cefTRIAXone, 1,000 mg, Intravenous, Q24H  furosemide, 20 mg, Intravenous, Q12H  metroNIDAZOLE, 500 mg, Oral, Q8H  sodium chloride, 10 mL, Intravenous, Q12H          Continuous Infusions:dextrose, 75 mL/hr, Last Rate: 75 mL/hr (24 0842)        PRN Meds:  senna-docusate sodium **AND** polyethylene glycol **AND** bisacodyl **AND** bisacodyl    Calcium Replacement - Follow Nurse / BPA Driven Protocol    dextrose    dextrose    glucagon (human recombinant)    Magnesium Standard Dose Replacement - Follow Nurse / BPA Driven  "Protocol    Phosphorus Replacement - Follow Nurse / BPA Driven Protocol    Potassium Replacement - Follow Nurse / BPA Driven Protocol    [COMPLETED] Insert Peripheral IV **AND** sodium chloride    sodium chloride    sodium chloride     Exam:  /59 (BP Location: Right arm, Patient Position: Lying)   Pulse 70   Temp 97.8 °F (36.6 °C) (Oral)   Resp 17   Ht 162.6 cm (64\")   Wt 56.7 kg (125 lb)   SpO2 99%   BMI 21.46 kg/m²     Intake/Output last 3 shifts:  I/O last 3 completed shifts:  In: 240 [P.O.:240]  Out: 200 [Urine:200]    Intake/Output this shift:  No intake/output data recorded.       Data Review:  All labs (24hrs):   Recent Results (from the past 24 hour(s))   POC Glucose Once    Collection Time: 06/11/24  2:21 PM    Specimen: Blood   Result Value Ref Range    Glucose 85 70 - 105 mg/dL   Blood Gas, Arterial -    Collection Time: 06/11/24  2:44 PM    Specimen: Arterial Blood   Result Value Ref Range    Site Right Brachial     Dat's Test N/A     pH, Arterial 7.344 (L) 7.350 - 7.450 pH units    pCO2, Arterial 39.2 35.0 - 48.0 mm Hg    pO2, Arterial 84.1 83.0 - 108.0 mm Hg    HCO3, Arterial 21.4 21.0 - 28.0 mmol/L    Base Excess, Arterial -4.0 (L) 0.0 - 3.0 mmol/L    O2 Saturation, Arterial 95.7 94.0 - 98.0 %    CO2 Content 22.6 22 - 29 mmol/L    Barometric Pressure for Blood Gas      Modality Room Air     Hemodilution No    POCT Electrolytes +HGB +HCT    Collection Time: 06/11/24  2:44 PM    Specimen: Arterial Blood   Result Value Ref Range    Sodium 135 (L) 138 - 146 mmol/L    POC Potassium 4.1 3.5 - 4.5 mmol/L    Ionized Calcium 1.14 (L) 1.15 - 1.33 mmol/L    Hematocrit 25 (L) 38 - 51 %    Hemoglobin 8.4 (L) 12.0 - 17.0 g/dL   POC Lactate    Collection Time: 06/11/24  2:44 PM    Specimen: Arterial Blood   Result Value Ref Range    Lactate 1.3 0.2 - 2.0 mmol/L   Procalcitonin    Collection Time: 06/11/24  3:04 PM    Specimen: Blood   Result Value Ref Range    Procalcitonin 0.14 0.00 - 0.25 ng/mL "   Ammonia    Collection Time: 06/11/24  3:04 PM    Specimen: Blood   Result Value Ref Range    Ammonia 26 11 - 51 umol/L   High Sensitivity Troponin T    Collection Time: 06/11/24  5:34 PM    Specimen: Blood   Result Value Ref Range    HS Troponin T 91 (C) <14 ng/L   Cancer Antigen 19-9    Collection Time: 06/11/24  5:34 PM    Specimen: Blood   Result Value Ref Range    CA 19-9 44.2 (H) <=35.0 U/mL   High Sensitivity Troponin T 2Hr    Collection Time: 06/11/24  8:01 PM    Specimen: Arm, Right; Blood   Result Value Ref Range    HS Troponin T 85 (C) <14 ng/L    Troponin T Delta -6 (L) >=-4 - <+4 ng/L   Lactic Acid, Plasma    Collection Time: 06/11/24  9:00 PM    Specimen: Arm, Right; Blood   Result Value Ref Range    Lactate 0.9 0.5 - 2.0 mmol/L   Comprehensive Metabolic Panel    Collection Time: 06/12/24  2:20 AM    Specimen: Arm, Right; Blood   Result Value Ref Range    Glucose 66 65 - 99 mg/dL    BUN 52 (H) 8 - 23 mg/dL    Creatinine 3.44 (H) 0.57 - 1.00 mg/dL    Sodium 136 136 - 145 mmol/L    Potassium 4.7 3.5 - 5.2 mmol/L    Chloride 104 98 - 107 mmol/L    CO2 23.0 22.0 - 29.0 mmol/L    Calcium 8.0 (L) 8.6 - 10.5 mg/dL    Total Protein 5.1 (L) 6.0 - 8.5 g/dL    Albumin 3.3 (L) 3.5 - 5.2 g/dL    ALT (SGPT) 33 1 - 33 U/L    AST (SGOT) 129 (H) 1 - 32 U/L    Alkaline Phosphatase 70 39 - 117 U/L    Total Bilirubin 0.7 0.0 - 1.2 mg/dL    Globulin 1.8 gm/dL    A/G Ratio 1.8 g/dL    BUN/Creatinine Ratio 15.1 7.0 - 25.0    Anion Gap 9.0 5.0 - 15.0 mmol/L    eGFR 12.7 (L) >60.0 mL/min/1.73   CBC Auto Differential    Collection Time: 06/12/24  2:20 AM    Specimen: Arm, Right; Blood   Result Value Ref Range    WBC 1.82 (C) 3.40 - 10.80 10*3/mm3    RBC 2.92 (L) 3.77 - 5.28 10*6/mm3    Hemoglobin 8.5 (L) 12.0 - 15.9 g/dL    Hematocrit 28.7 (L) 34.0 - 46.6 %    MCV 98.3 (H) 79.0 - 97.0 fL    MCH 29.1 26.6 - 33.0 pg    MCHC 29.6 (L) 31.5 - 35.7 g/dL    RDW 15.7 (H) 12.3 - 15.4 %    RDW-SD 56.3 (H) 37.0 - 54.0 fl    MPV 10.6 6.0  - 12.0 fL    Platelets 66 (L) 140 - 450 10*3/mm3    Neutrophil % 52.2 42.7 - 76.0 %    Lymphocyte % 30.2 19.6 - 45.3 %    Monocyte % 12.1 (H) 5.0 - 12.0 %    Eosinophil % 4.4 0.3 - 6.2 %    Basophil % 1.1 0.0 - 1.5 %    Immature Grans % 0.0 0.0 - 0.5 %    Neutrophils, Absolute 0.95 (L) 1.70 - 7.00 10*3/mm3    Lymphocytes, Absolute 0.55 (L) 0.70 - 3.10 10*3/mm3    Monocytes, Absolute 0.22 0.10 - 0.90 10*3/mm3    Eosinophils, Absolute 0.08 0.00 - 0.40 10*3/mm3    Basophils, Absolute 0.02 0.00 - 0.20 10*3/mm3    Immature Grans, Absolute 0.00 0.00 - 0.05 10*3/mm3    nRBC 0.0 0.0 - 0.2 /100 WBC   Ferritin    Collection Time: 06/12/24  2:20 AM    Specimen: Arm, Right; Blood   Result Value Ref Range    Ferritin 1,145.00 (H) 13.00 - 150.00 ng/mL   Iron Profile    Collection Time: 06/12/24  2:20 AM    Specimen: Arm, Right; Blood   Result Value Ref Range    Iron 190 (H) 37 - 145 mcg/dL    Iron Saturation (TSAT) 81 (H) 20 - 50 %    Transferrin 157 (L) 200 - 360 mg/dL    TIBC 234 (L) 298 - 536 mcg/dL   POC Glucose Once    Collection Time: 06/12/24  3:30 AM    Specimen: Blood   Result Value Ref Range    Glucose 58 (L) 70 - 105 mg/dL   POC Glucose Once    Collection Time: 06/12/24  3:57 AM    Specimen: Blood   Result Value Ref Range    Glucose 222 (H) 70 - 105 mg/dL   POC Glucose 4x Daily Before Meals & at Bedtime    Collection Time: 06/12/24  7:34 AM    Specimen: Blood   Result Value Ref Range    Glucose 76 70 - 105 mg/dL   POC Glucose 4x Daily Before Meals & at Bedtime    Collection Time: 06/12/24 11:17 AM    Specimen: Blood   Result Value Ref Range    Glucose 80 70 - 105 mg/dL          Imaging:  CT Abdomen Pelvis Without Contrast    Result Date: 6/11/2024  Impression: 1.Volume overload/CHF features as detailed above. Superimposed left lower lobe pneumonia is not excluded. Correlate with symptoms. 2.There is a nonspecific 3.4 cm cystic mass involving the pancreatic head. Follow-up dedicated pancreatic MRI with and without  contrast recommended. 3.Increased density urine consistent with hematuria. 4.Other incidental findings as detailed above. Electronically Signed: Flip Lee MD  6/11/2024 4:32 PM EDT  Workstation ID: [id number]    CT Head Without Contrast    Result Date: 6/11/2024  Impression: No acute intracranial pathology. Electronically Signed: Tristian Boone MD  6/11/2024 4:27 PM EDT  Workstation ID: [id number]    XR Chest 1 View    Result Date: 6/10/2024  Impression: 1.Cardiomegaly with pulmonary vascular congestion. 2.Left basilar atelectasis or infiltrate. Electronically Signed: Armando Gonzalez MD  6/10/2024 10:43 AM EDT  Workstation ID: [id number]     Assessment/Plan:     Syncope    Type 2 diabetes mellitus with diabetic chronic kidney disease    Essential hypertension    ESRD (end stage renal disease)    Anemia due to chronic kidney disease, on chronic dialysis    Hyperlipidemia    Ischemic cardiomyopathy    Permanent atrial fibrillation    Presence of cardiac pacemaker    Coronary artery disease involving native coronary artery of native heart without angina pectoris        End-stage renal disease   History of recent pacemaker   Syncope   Sick sinus syndrome   History of thrombocytopenia       Pt gets HD 2x a week  Dialysis today  Next will likely be Friday then M and F     Sanchez Steele MD        Electronically signed by Sanchez Steele MD at 06/12/24 1228       Diann Herndon APRN at 06/12/24 1023       Attestation signed by Jayda Nicole MD at 06/12/24 1255    I have reviewed this documentation and agree.  Noted decrease in WBC count and overall pancytopenia.  Hematology consulted.  Patient denies any prior history of pancytopenia and/or prior history of bone marrow biopsy.  Noted possible ADR to Zosyn.  Zosyn has now been changed to alternative antibiotics.  Further fluid management and/or volume management as per nephrology.  Patient remains on IV Lasix for now.  Overall she does feel much better this morning.  Etiology  remains unclear for syncopal events and/or episodes.  Cardiology continues to follow as well.                    [facility] MEDICINE SERVICE  DAILY PROGRESS NOTE    NAME: Deann Warren  : 1940  MRN: [id number]      LOS: 2 days     PROVIDER OF SERVICE: LUCA Cain    Chief Complaint: Syncope    Subjective:     Interval History:  History taken from: patient  Patient Complaints: None   Patient Denies:  Current chest pain, dizziness, fatigue    Rapid Response called on patient yesterday due to lethargy. CT head normal. CT abdomen shows cystic pancreatic mass, and possible left lower lobe pneumonia. SLP consulted to eval for aspiration.     Review of Systems:   Review of Systems   Constitutional:  Negative for chills and fever.   Respiratory:  Negative for cough, chest tightness and shortness of breath.    Cardiovascular:  Negative for chest pain and palpitations.   Gastrointestinal:  Negative for abdominal pain, constipation, diarrhea and vomiting.   Genitourinary:  Negative for urgency.   Musculoskeletal:  Negative for arthralgias and myalgias.   Neurological:  Negative for dizziness, syncope and light-headedness.       Objective:     Vital Signs  Temp:  [96.2 °F (35.7 °C)-97.8 °F (36.6 °C)] 97.5 °F (36.4 °C)  Heart Rate:  [70] 70  Resp:  [13-16] 13  BP: (106-114)/(48-63) 112/63  Flow (L/min):  [2] 2   Body mass index is 21.46 kg/m².    Physical Exam  Physical Exam  Vitals and nursing note reviewed.   Constitutional:       General: She is not in acute distress.     Appearance: She is ill-appearing.   HENT:      Head: Normocephalic and atraumatic.   Eyes:      Extraocular Movements: Extraocular movements intact.      Pupils: Pupils are equal, round, and reactive to light.   Cardiovascular:      Rate and Rhythm: Normal rate and regular rhythm.      Pulses: Normal pulses.   Pulmonary:      Effort: Pulmonary effort is normal.      Breath sounds: Normal breath sounds.   Abdominal:      General: Bowel sounds  are normal.      Palpations: Abdomen is soft.   Skin:     General: Skin is warm.      Capillary Refill: Capillary refill takes less than 2 seconds.   Neurological:      Mental Status: She is alert and oriented to person, place, and time.         Scheduled Meds   [Held by provider] apixaban, 2.5 mg, Oral, Q12H  atorvastatin, 40 mg, Oral, Daily  cefTRIAXone, 1,000 mg, Intravenous, Q24H  furosemide, 20 mg, Intravenous, Q12H  metroNIDAZOLE, 500 mg, Oral, Q8H  sodium chloride, 10 mL, Intravenous, Q12H       PRN Meds     senna-docusate sodium **AND** polyethylene glycol **AND** bisacodyl **AND** bisacodyl    Calcium Replacement - Follow Nurse / BPA Driven Protocol    dextrose    dextrose    glucagon (human recombinant)    Magnesium Standard Dose Replacement - Follow Nurse / BPA Driven Protocol    Phosphorus Replacement - Follow Nurse / BPA Driven Protocol    Potassium Replacement - Follow Nurse / BPA Driven Protocol    [COMPLETED] Insert Peripheral IV **AND** sodium chloride    sodium chloride    sodium chloride   Infusions  dextrose, 75 mL/hr, Last Rate: 75 mL/hr (06/12/24 0842)          Diagnostic Data    Results from last 7 days   Lab Units 06/12/24  0220   WBC 10*3/mm3 1.82*   HEMOGLOBIN g/dL 8.5*   HEMATOCRIT % 28.7*   PLATELETS 10*3/mm3 66*   GLUCOSE mg/dL 66   CREATININE mg/dL 3.44*   BUN mg/dL 52*   SODIUM mmol/L 136   POTASSIUM mmol/L 4.7   AST (SGOT) U/L 129*   ALT (SGPT) U/L 33   ALK PHOS U/L 70   BILIRUBIN mg/dL 0.7   ANION GAP mmol/L 9.0       CT Abdomen Pelvis Without Contrast    Result Date: 6/11/2024  Impression: 1.Volume overload/CHF features as detailed above. Superimposed left lower lobe pneumonia is not excluded. Correlate with symptoms. 2.There is a nonspecific 3.4 cm cystic mass involving the pancreatic head. Follow-up dedicated pancreatic MRI with and without contrast recommended. 3.Increased density urine consistent with hematuria. 4.Other incidental findings as detailed above. Electronically  Signed: Flip Lee MD  6/11/2024 4:32 PM EDT  Workstation ID: [id number]    CT Head Without Contrast    Result Date: 6/11/2024  Impression: No acute intracranial pathology. Electronically Signed: Tristian Boone MD  6/11/2024 4:27 PM EDT  Workstation ID: [id number]    XR Chest 1 View    Result Date: 6/10/2024  Impression: 1.Cardiomegaly with pulmonary vascular congestion. 2.Left basilar atelectasis or infiltrate. Electronically Signed: Armando Gonzalez MD  6/10/2024 10:43 AM EDT  Workstation ID: [id number]       I reviewed the patient's new clinical results.    Assessment/Plan:     Active and Resolved Problems  Active Hospital Problems    Diagnosis  POA    **Syncope [R55]  Yes    Presence of cardiac pacemaker [Z95.0]  Yes    Permanent atrial fibrillation [I48.21]  Yes    Coronary artery disease involving native coronary artery of native heart without angina pectoris [I25.10]  Yes    Ischemic cardiomyopathy [I25.5]  Yes    Hyperlipidemia [E78.5]  Yes    Type 2 diabetes mellitus with diabetic chronic kidney disease [E11.22]  Yes    ESRD (end stage renal disease) [N18.6]  Yes    Anemia due to chronic kidney disease, on chronic dialysis [N18.6, D63.1, Z99.2]  Not Applicable    Essential hypertension [I10]  Yes      Resolved Hospital Problems   No resolved problems to display.       Syncope  - Last echo 4/9/2024 EF 35%  - Hgb 8.5  Hct 28.7  - Creatinine 3.44 - on dialysis   - EKG noted for ventricular paced rhythm  - Cardiology consulted  - Cardiology EP consulted - awaiting device interrogation     Fluid Overload/CHF  -Lasix started yesterday     Pancytopenia   - Platelets 66  - WBC 1.82 today after starting zosyn yesterday  - switched zosyn to rocephin and flagyl for concern of neutropenia   - Hematology consulted     ESRD  - Dialysis schedule Monday Wednesday Friday.  - Followed by Dr. Buchanan  - BUN/creatinine 52/3.44       Atrial fibrillation  - dual antiplatelet therapy at home of plavix and aspirin     HLD  - Continue home  regime of lipitor 40  mg daily     Pneumonia   - left lower lobe pna not excluded on CT  - SLP consulted to assess for aspiration  - Rocephin and flagyl       VTE Prophylaxis:  Pharmacologic & mechanical VTE prophylaxis orders are present.         Code status is   Code Status and Medical Interventions:   Ordered at: 06/10/24 1150     Level Of Support Discussed With:    Patient     Code Status (Patient has no pulse and is not breathing):    CPR (Attempt to Resuscitate)     Medical Interventions (Patient has pulse or is breathing):    Full Support       Plan for disposition: SNF in 2-3 days    Time: 30 minutes    Signature: Electronically signed by LUCA Cain, 24, 10:23 EDT.  [facility] Hospitalist Team     Electronically signed by Jayda Nicole MD at 24 1255       Cass Garcia MD at 24 1437                                                                                                                                                Nephrology  Progress Note                                        [facility]    Patient Identification    Name: Deann Warren  Age: 83 y.o.  Sex: female  :  1940  MRN: [id number]      DATE OF SERVICE:  2024        Subective    Chart reviewed     Objective   Scheduled Meds:apixaban, 2.5 mg, Oral, Q12H  atorvastatin, 40 mg, Oral, Daily  sodium chloride, 10 mL, Intravenous, Q12H          Continuous Infusions:     PRN Meds:  senna-docusate sodium **AND** polyethylene glycol **AND** bisacodyl **AND** bisacodyl    Calcium Replacement - Follow Nurse / BPA Driven Protocol    Magnesium Standard Dose Replacement - Follow Nurse / BPA Driven Protocol    Phosphorus Replacement - Follow Nurse / BPA Driven Protocol    Potassium Replacement - Follow Nurse / BPA Driven Protocol    [COMPLETED] Insert Peripheral IV **AND** sodium chloride    sodium chloride    sodium chloride     Exam:  /59 (BP Location: Right arm, Patient Position: Lying)    "Pulse 70   Temp 96.2 °F (35.7 °C) (Oral)   Resp 16   Ht 162.6 cm (64\")   Wt 56.7 kg (125 lb)   SpO2 99%   BMI 21.46 kg/m²     Intake/Output last 3 shifts:  No intake/output data recorded.    Intake/Output this shift:  No intake/output data recorded.       Data Review:  All labs (24hrs):   Recent Results (from the past 24 hour(s))   COVID-19, FLU A/B, RSV PCR 1 HR TAT - Swab, Nasopharynx    Collection Time: 06/10/24  6:21 PM    Specimen: Nasopharynx; Swab   Result Value Ref Range    COVID19 Not Detected Not Detected - Ref. Range    Influenza A PCR Not Detected Not Detected    Influenza B PCR Not Detected Not Detected    RSV, PCR Not Detected Not Detected   CBC (No Diff)    Collection Time: 06/11/24  3:52 AM    Specimen: Blood   Result Value Ref Range    WBC 2.05 (L) 3.40 - 10.80 10*3/mm3    RBC 2.85 (L) 3.77 - 5.28 10*6/mm3    Hemoglobin 8.4 (L) 12.0 - 15.9 g/dL    Hematocrit 28.2 (L) 34.0 - 46.6 %    MCV 98.9 (H) 79.0 - 97.0 fL    MCH 29.5 26.6 - 33.0 pg    MCHC 29.8 (L) 31.5 - 35.7 g/dL    RDW 15.7 (H) 12.3 - 15.4 %    RDW-SD 57.1 (H) 37.0 - 54.0 fl    MPV 10.1 6.0 - 12.0 fL    Platelets 64 (L) 140 - 450 10*3/mm3   Comprehensive Metabolic Panel    Collection Time: 06/11/24  3:52 AM    Specimen: Blood   Result Value Ref Range    Glucose 78 65 - 99 mg/dL    BUN 47 (H) 8 - 23 mg/dL    Creatinine 3.33 (H) 0.57 - 1.00 mg/dL    Sodium 138 136 - 145 mmol/L    Potassium 4.4 3.5 - 5.2 mmol/L    Chloride 103 98 - 107 mmol/L    CO2 24.1 22.0 - 29.0 mmol/L    Calcium 8.3 (L) 8.6 - 10.5 mg/dL    Total Protein 5.4 (L) 6.0 - 8.5 g/dL    Albumin 3.5 3.5 - 5.2 g/dL    ALT (SGPT) 27 1 - 33 U/L    AST (SGOT) 126 (H) 1 - 32 U/L    Alkaline Phosphatase 71 39 - 117 U/L    Total Bilirubin 0.6 0.0 - 1.2 mg/dL    Globulin 1.9 gm/dL    A/G Ratio 1.8 g/dL    BUN/Creatinine Ratio 14.1 7.0 - 25.0    Anion Gap 10.9 5.0 - 15.0 mmol/L    eGFR 13.2 (L) >60.0 mL/min/1.73   POC Glucose Once    Collection Time: 06/11/24  2:21 PM    Specimen: " Blood   Result Value Ref Range    Glucose 85 70 - 105 mg/dL          Imaging:  XR Chest 1 View    Result Date: 6/10/2024  Impression: 1.Cardiomegaly with pulmonary vascular congestion. 2.Left basilar atelectasis or infiltrate. Electronically Signed: Armando Gonzalez MD  6/10/2024 10:43 AM EDT  Workstation ID: [id number]    XR Chest 1 View    Result Date: 6/3/2024  Impression: No significant change after pacemaker placement. Electronically Signed: Won Bravo MD  6/3/2024 6:34 PM EDT  Workstation ID: [id number]     Assessment/Plan:     Syncope    Type 2 diabetes mellitus with diabetic chronic kidney disease    Essential hypertension    ESRD (end stage renal disease)    Anemia due to chronic kidney disease, on chronic dialysis    Hyperlipidemia    Ischemic cardiomyopathy    Permanent atrial fibrillation    Presence of cardiac pacemaker    Coronary artery disease involving native coronary artery of native heart without angina pectoris        End-stage renal disease   History of recent pacemaker   Syncope   Sick sinus syndrome   History of thrombocytopenia       Pt gets HD 2x a week  Skipped yesterday due to syncope/ BP issues  Will do HD tomorrow  Next will likely be Friday then M and F           Electronically signed by Cass Garcia MD at 24 1438       Diann Herndon APRN at 24 1111       Attestation signed by Jayda Nicole MD at 24 1033    I have reviewed this documentation and agree.  Otherwise feels well this morning.  Wishes to be discharged home.  Await cardiac input                    [facility] MEDICINE SERVICE  DAILY PROGRESS NOTE    NAME: Deann Warren  : 1940  MRN: [id number]      LOS: 1 day     PROVIDER OF SERVICE: LUCA Cani    Chief Complaint: Syncope    Subjective:     Interval History:  History taken from: patient  Patient Complaints: None   Patient Denies:  Current chest pain, dizziness, fatigue    Patient had pacemaker placed on 6/3/24, sees cardiology regularly. Patient  is okay with going back home if cardiology signs off. Awaiting their input.     Review of Systems:   Review of Systems   Constitutional:  Negative for chills and fever.   Respiratory:  Negative for cough, chest tightness and shortness of breath.    Cardiovascular:  Negative for chest pain and palpitations.   Gastrointestinal:  Negative for abdominal pain, constipation, diarrhea and vomiting.   Genitourinary:  Negative for urgency.   Musculoskeletal:  Negative for arthralgias and myalgias.   Neurological:  Negative for dizziness, syncope and light-headedness.       Objective:     Vital Signs  Temp:  [97.5 °F (36.4 °C)] 97.5 °F (36.4 °C)  Heart Rate:  [69-71] 70  Resp:  [17-18] 18  BP: ()/(46-66) 121/61  Flow (L/min):  [2] 2   Body mass index is 21.46 kg/m².    Physical Exam  Physical Exam  Vitals and nursing note reviewed.   Constitutional:       Appearance: Normal appearance.   HENT:      Head: Normocephalic and atraumatic.   Eyes:      Extraocular Movements: Extraocular movements intact.      Pupils: Pupils are equal, round, and reactive to light.   Cardiovascular:      Rate and Rhythm: Normal rate and regular rhythm.      Pulses: Normal pulses.   Pulmonary:      Effort: Pulmonary effort is normal.      Breath sounds: Normal breath sounds.   Abdominal:      General: Bowel sounds are normal.      Palpations: Abdomen is soft.   Musculoskeletal:      Cervical back: Normal range of motion and neck supple.   Skin:     General: Skin is warm.      Capillary Refill: Capillary refill takes less than 2 seconds.   Neurological:      Mental Status: She is alert and oriented to person, place, and time.         Scheduled Meds   sodium chloride, 10 mL, Intravenous, Q12H       PRN Meds     senna-docusate sodium **AND** polyethylene glycol **AND** bisacodyl **AND** bisacodyl    Calcium Replacement - Follow Nurse / BPA Driven Protocol    HYDROcodone-acetaminophen    Magnesium Standard Dose Replacement - Follow Nurse / BPA  Driven Protocol    Phosphorus Replacement - Follow Nurse / BPA Driven Protocol    Potassium Replacement - Follow Nurse / BPA Driven Protocol    [COMPLETED] Insert Peripheral IV **AND** sodium chloride    sodium chloride    sodium chloride   Infusions         Diagnostic Data    Results from last 7 days   Lab Units 06/11/24  0352   WBC 10*3/mm3 2.05*   HEMOGLOBIN g/dL 8.4*   HEMATOCRIT % 28.2*   PLATELETS 10*3/mm3 64*   GLUCOSE mg/dL 78   CREATININE mg/dL 3.33*   BUN mg/dL 47*   SODIUM mmol/L 138   POTASSIUM mmol/L 4.4   AST (SGOT) U/L 126*   ALT (SGPT) U/L 27   ALK PHOS U/L 71   BILIRUBIN mg/dL 0.6   ANION GAP mmol/L 10.9       XR Chest 1 View    Result Date: 6/10/2024  Impression: 1.Cardiomegaly with pulmonary vascular congestion. 2.Left basilar atelectasis or infiltrate. Electronically Signed: Armando Gonzalez MD  6/10/2024 10:43 AM EDT  Workstation ID: [id number]       I reviewed the patient's new clinical results.    Assessment/Plan:     Active and Resolved Problems  Active Hospital Problems    Diagnosis  POA    **Syncope [R55]  Yes    Presence of cardiac pacemaker [Z95.0]  Yes    Permanent atrial fibrillation [I48.21]  Yes    Coronary artery disease involving native coronary artery of native heart without angina pectoris [I25.10]  Yes    Cardiomyopathy, dilated [I42.0]  Yes    Hyperlipidemia [E78.5]  Yes    Type 2 diabetes mellitus with diabetic chronic kidney disease [E11.22]  Yes    ESRD (end stage renal disease) [N18.6]  Yes    Anemia due to chronic kidney disease, on chronic dialysis [N18.6, D63.1, Z99.2]  Not Applicable    Essential hypertension [I10]  Yes      Resolved Hospital Problems   No resolved problems to display.       Syncope  - Last echo 4/9/2024 EF 35%  - Hgb 8.4 Hct 28.2  - Creatinine 3.33 - on dialysis outpatient  - EKG noted for ventricular paced rhythm  - Cardiology consulted  - Cardiology EP consulted     Thrombocytopenia  - Platelets 64  - Iron study panel pending     ESRD  - Dialysis  schedule .  - Followed by Dr. Buchanan  - BUN/creatinine 47/3.33       Atrial fibrillation  - dual antiplatelet therapy at home of plavix and aspirin     HLD  - Continue home regime of lipitor 40  mg daily       VTE Prophylaxis:  Mechanical VTE prophylaxis orders are present.         Code status is   Code Status and Medical Interventions:   Ordered at: 06/10/24 1150     Level Of Support Discussed With:    Patient     Code Status (Patient has no pulse and is not breathing):    CPR (Attempt to Resuscitate)     Medical Interventions (Patient has pulse or is breathing):    Full Support       Plan for disposition:Home in 0-2 days    Time: 30 minutes    Signature: Electronically signed by LUCA Cain, 24, 11:11 EDT.  [facility] Hospitalist Team     Electronically signed by Jayda Nicole MD at 24 1033          Consult Notes (last 48 hours)        Sonya Barahona MD at 24 0823        Consult Orders    1. IP Consult to Hematology and Oncology [[id number]] ordered by Cesia Hyde MD at 24 0741                         Hematology/Oncology Inpatient Consultation    Patient name: Deann Warren  : 1940  MRN: [id number]  Referring Provider: Dr. Hyde  Reason for Consultation: Pancytopenia    Chief complaint: Syncope    History of present illness:    Deann Warren is a 83 y.o. female with past medical history significant for coronary artery disease, hypertension, hyperlipidemia, dilated cardiomyopathy, atrial fibrillation, sick sinus syndrome status post permanent pacemaker placement on 6/3/2024, type 2 diabetes, end-stage renal disease on hemodialysis, chronic thrombocytopenia and chronic anemiawho presented to [facility] on 6[facility]10/2024 with complaints of syncopal episode.  She states she was walking into dialysis when she began to feel weak.  She states she sat down in a chair in the waiting room could not lift her head or arms she denied dizziness, chest pain,  shortness of breath.  She was sent to the ED for further evaluation.  Workup in the ED was unremarkable.  Patient's EKG showed a ventricular paced rhythm.  Neurology was consulted for dialysis management and cardiology/cardiac EP was consulted for further evaluation of syncope.    Of note, patient recently admitted to [facility] from 66/1/24-6/4/24 due to syncopal episodes.  She was found to have A-fib with slow ventricular response Vega with heart rate in the 40s.  She was on low-dose Toprol however her bradycardia was out of proportion to medication use.  Cardiology did evaluate and felt she most likely had sick sinus syndrome.  As such, she underwent PPM placement on 6/3/2024.  She was discharged in stable condition with radiology follow-up in 2 weeks.    Patient had chest x-ray on 6/10/2023 that showed cardiomegaly with pulmonary vascular congestion as well as basilar atelectasis/infiltrate.    6/11/24 CT head without contrast completed due to syncope showed no acute intracranial pathology    6/11/2024 CT abdomen pelvis without contrast  Impression:  1.Volume overload/CHF features as detailed above. Superimposed left lower lobe pneumonia is not excluded. Correlate with symptoms.  2.There is a nonspecific 3.4 cm cystic mass involving the pancreatic head. Follow-up dedicated pancreatic MRI with and without contrast recommended.  3.Increased density urine consistent with hematuria.  4.Other incidental findings as detailed above.       06/12/24  Hematology/Oncology was consulted for pancytopenia.      Patient was seen in April 2024 during a previous hospitalizations for her chronic thrombocytopenia.    Her platelet count remained stable, with her baseline between 50,000 and 90,000.  She also has chronic anemia, with baseline between 10.5 and 11.5 g/dL.  She is now noted to have a hemoglobin of 8.5 g/dL, slightly macrocytic.  She also has newly noted leukopenia (neutropenia and lymphopenia) since April 2024.  There is also  been an incidental finding of a 3.4 cm cystic mass within the pancreas.  She is unable to undergo MRI for at least 6 weeks due to her pacemaker placement.    He/She  has a past medical history of Allergic conjunctivitis and rhinitis (11/06/2014), Anemia due to chronic kidney disease, on chronic dialysis (08/25/2020), Anxiety (07/30/2012), Bradycardia by electrocardiogram (06/03/2024), Cardiomyopathy, dilated (04/18/2024), Chronic gouty arthritis (11/06/2014), Coronary artery disease involving native coronary artery of native heart without angina pectoris (04/24/2024), Dependence on renal dialysis (07/01/2022), ESRD (end stage renal disease) (08/25/2020), Essential hypertension (09/16/2015), History of shingles (01/18/2022), Hyperlipidemia (04/08/2024), Ischemic cardiomyopathy (04/18/2024), Paroxysmal atrial fibrillation (07/01/2022), Presence of cardiac pacemaker (06/03/2024), Sick sinus syndrome (06/03/2024), Type 2 diabetes mellitus with diabetic chronic kidney disease (07/01/2022), and Vitamin D deficiency (07/22/2021).    PCP: No primary care provider on file.    History:  Past Medical History:   Diagnosis Date    Allergic conjunctivitis and rhinitis 11/06/2014    Anemia due to chronic kidney disease, on chronic dialysis 08/25/2020    Anxiety 07/30/2012    Bradycardia by electrocardiogram 06/03/2024    Cardiomyopathy, dilated 04/18/2024    Chronic gouty arthritis 11/06/2014    Coronary artery disease involving native coronary artery of native heart without angina pectoris 04/24/2024    Dependence on renal dialysis 07/01/2022    ESRD (end stage renal disease) 08/25/2020    Essential hypertension 09/16/2015    History of shingles 01/18/2022    Hyperlipidemia 04/08/2024    Ischemic cardiomyopathy 04/18/2024    Paroxysmal atrial fibrillation 07/01/2022    Presence of cardiac pacemaker 06/03/2024    Sick sinus syndrome 06/03/2024    Type 2 diabetes mellitus with diabetic chronic kidney disease 07/01/2022    Vitamin D  deficiency 07/22/2021   ,   Past Surgical History:   Procedure Laterality Date    ARTERIOVENOUS FISTULA Left     CARDIAC CATHETERIZATION N/A 4/25/2024    Procedure: Right and Left Heart Cath;  Surgeon: Dilcia Lui MD;  Location: [facility] CATH INVASIVE LOCATION;  Service: Cardiology;  Laterality: N/A;    CARDIAC CATHETERIZATION N/A 4/25/2024    Procedure: Percutaneous Coronary Intervention;  Surgeon: Jadiel Blake MD;  Location:  [facility] CATH INVASIVE LOCATION;  Service: Cardiology;  Laterality: N/A;    CARDIAC ELECTROPHYSIOLOGY PROCEDURE N/A 6/3/2024    Procedure: Pacemaker DC new, [employer];  Surgeon: Zamzam Carrillo MD;  Location: [facility] CATH INVASIVE LOCATION;  Service: Cardiovascular;  Laterality: N/A;   ,   Family History   Family history unknown: Yes   ,   Social History     Tobacco Use    Smoking status: Never    Smokeless tobacco: Never   Vaping Use    Vaping status: Never Used   Substance Use Topics    Alcohol use: Never    Drug use: Never   ,   Medications Prior to Admission   Medication Sig Dispense Refill Last Dose    acetaminophen (TYLENOL) 500 MG tablet Take 1 tablet by mouth Every 6 (Six) Hours As Needed for Mild Pain.   6/10/2024    atorvastatin (LIPITOR) 40 MG tablet Take 1 tablet by mouth Daily. 90 tablet 3 6/9/2024    Cholecalciferol (Vitamin D3) 50 MCG (2000 UT) tablet Take 1 tablet by mouth Daily.   6/10/2024    febuxostat (ULORIC) 40 MG tablet Take 1 tablet by mouth Daily.   6/10/2024    metoprolol succinate XL (TOPROL-XL) 25 MG 24 hr tablet Take 0.5 tablets by mouth Daily. 30 tablet 0 6/10/2024    folic acid (FOLVITE) 1 MG tablet Take 1 tablet by mouth Daily. 30 tablet 0     NON FORMULARY Apply 1 dose topically to the appropriate area as directed 3 (Three) Times a Week. Lidocaine 2.5% ointment -  Apply 1 application Monday, Wednesday, Friday before dialysis      , Scheduled Meds:  [Held by provider] apixaban, 2.5 mg, Oral, Q12H  atorvastatin, 40 mg, Oral, Daily  furosemide, 20 mg,  "Intravenous, Q12H  piperacillin-tazobactam, 3.375 g, Intravenous, Q12H  sodium chloride, 10 mL, Intravenous, Q12H    , Continuous Infusions:  dextrose, 75 mL/hr    , PRN Meds:    senna-docusate sodium **AND** polyethylene glycol **AND** bisacodyl **AND** bisacodyl    Calcium Replacement - Follow Nurse / BPA Driven Protocol    dextrose    dextrose    glucagon (human recombinant)    Magnesium Standard Dose Replacement - Follow Nurse / BPA Driven Protocol    Phosphorus Replacement - Follow Nurse / BPA Driven Protocol    Potassium Replacement - Follow Nurse / BPA Driven Protocol    [COMPLETED] Insert Peripheral IV **AND** sodium chloride    sodium chloride    sodium chloride   Allergies:  Heparin    Subjective     ROS:  Review of Systems   Constitutional:  Positive for fatigue. Negative for chills and fever.   HENT:  Negative for ear pain, mouth sores, nosebleeds and sore throat.    Eyes:  Negative for photophobia and visual disturbance.   Respiratory:  Negative for wheezing and stridor.    Cardiovascular:  Negative for chest pain and palpitations.   Gastrointestinal:  Negative for abdominal pain, diarrhea, nausea and vomiting.   Endocrine: Negative for cold intolerance and heat intolerance.   Genitourinary:  Negative for dysuria and hematuria.   Musculoskeletal:  Negative for joint swelling and neck stiffness.   Skin:  Negative for color change and rash.   Neurological:  Positive for weakness. Negative for seizures and syncope.   Hematological:  Negative for adenopathy.   Psychiatric/Behavioral:  Negative for agitation, confusion and hallucinations.         Objective   Vital Signs:   /63 (BP Location: Right arm, Patient Position: Lying)   Pulse 70   Temp 97.5 °F (36.4 °C) (Oral)   Resp 13   Ht 162.6 cm (64\")   Wt 56.7 kg (125 lb)   SpO2 95%   BMI 21.46 kg/m²     Physical Exam: (performed by MD)  Physical Exam  Constitutional:       Appearance: Normal appearance.   HENT:      Head: Normocephalic and " atraumatic.   Eyes:      Pupils: Pupils are equal, round, and reactive to light.   Cardiovascular:      Rate and Rhythm: Normal rate and regular rhythm.      Pulses: Normal pulses.      Heart sounds: No murmur heard.  Pulmonary:      Effort: Pulmonary effort is normal.      Breath sounds: Normal breath sounds.   Abdominal:      General: There is no distension.      Palpations: Abdomen is soft. There is no mass.      Tenderness: There is no abdominal tenderness.   Musculoskeletal:         General: Normal range of motion.      Cervical back: Normal range of motion.   Skin:     General: Skin is warm.   Neurological:      General: No focal deficit present.      Mental Status: She is alert.   Psychiatric:         Mood and Affect: Mood normal.         Results Review:  Lab Results (last 48 hours)       Procedure Component Value Units Date/Time    POC Glucose 4x Daily Before Meals & at Bedtime [[id number]]  (Normal) Collected: 06/12/24 0734    Specimen: Blood Updated: 06/12/24 0736     Glucose 76 mg/dL      Comment: Serial Number: [id number]:  [id number]       POC Glucose Once [[id number]]  (Abnormal) Collected: 06/12/24 0357    Specimen: Blood Updated: 06/12/24 0359     Glucose 222 mg/dL      Comment: Serial Number: [id number]:  [id number]       POC Glucose Once [[id number]]  (Abnormal) Collected: 06/12/24 0330    Specimen: Blood Updated: 06/12/24 0332     Glucose 58 mg/dL      Comment: Serial Number: [id number]:  [id number]       Comprehensive Metabolic Panel [[id number]]  (Abnormal) Collected: 06/12/24 0220    Specimen: Blood from Arm, Right Updated: 06/12/24 0327     Glucose 66 mg/dL      BUN 52 mg/dL      Creatinine 3.44 mg/dL      Sodium 136 mmol/L      Potassium 4.7 mmol/L      Chloride 104 mmol/L      CO2 23.0 mmol/L      Calcium 8.0 mg/dL      Total Protein 5.1 g/dL      Albumin 3.3 g/dL      ALT (SGPT) 33 U/L      AST (SGOT) 129 U/L      Alkaline Phosphatase 70 U/L      Total Bilirubin 0.7 mg/dL       Globulin 1.8 gm/dL      A/G Ratio 1.8 g/dL      BUN/Creatinine Ratio 15.1     Anion Gap 9.0 mmol/L      eGFR 12.7 mL/min/1.73      Comment: <15 Indicative of kidney failure       Narrative:      GFR Normal >60  Chronic Kidney Disease <60  Kidney Failure <15    The GFR formula is only valid for adults with stable renal function between ages 18 and 70.    CBC & Differential [[id number]]  (Abnormal) Collected: 06/12/24 0220    Specimen: Blood from Arm, Right Updated: 06/12/24 0259    Narrative:      The following orders were created for panel order CBC & Differential.  Procedure                               Abnormality         Status                     ---------                               -----------         ------                     CBC Auto Differential[[id number]]        Abnormal            Final result               Scan Slide[[id number]]                                                                    Please view results for these tests on the individual orders.    CBC Auto Differential [[id number]]  (Abnormal) Collected: 06/12/24 0220    Specimen: Blood from Arm, Right Updated: 06/12/24 0259     WBC 1.82 10*3/mm3      RBC 2.92 10*6/mm3      Hemoglobin 8.5 g/dL      Hematocrit 28.7 %      MCV 98.3 fL      MCH 29.1 pg      MCHC 29.6 g/dL      RDW 15.7 %      RDW-SD 56.3 fl      MPV 10.6 fL      Platelets 66 10*3/mm3      Neutrophil % 52.2 %      Lymphocyte % 30.2 %      Monocyte % 12.1 %      Eosinophil % 4.4 %      Basophil % 1.1 %      Immature Grans % 0.0 %      Neutrophils, Absolute 0.95 10*3/mm3      Lymphocytes, Absolute 0.55 10*3/mm3      Monocytes, Absolute 0.22 10*3/mm3      Eosinophils, Absolute 0.08 10*3/mm3      Basophils, Absolute 0.02 10*3/mm3      Immature Grans, Absolute 0.00 10*3/mm3      nRBC 0.0 /100 WBC     Cancer Antigen 19-9 [[id number]]  (Abnormal) Collected: 06/11/24 1734    Specimen: Blood Updated: 06/12/24 0031     CA 19-9 44.2 U/mL     Narrative:      Results may be falsely decreased  if patient taking Biotin.    Testing Method: [employer] Electrochemiluminescence Immunoassay(ECLIA)  Values obtained with different assay methods or kits cannot be used interchangeably.    High Sensitivity Troponin T 2Hr [[id number]]  (Abnormal) Collected: 06/11/24 2001    Specimen: Blood from Arm, Right Updated: 06/11/24 2142     HS Troponin T 85 ng/L      Troponin T Delta -6 ng/L     Narrative:      High Sensitive Troponin T Reference Range:  <14.0 ng/L- Negative Female for AMI  <22.0 ng/L- Negative Male for AMI  >=14 - Abnormal Female indicating possible myocardial injury.  >=22 - Abnormal Male indicating possible myocardial injury.   Clinicians would have to utilize clinical acumen, EKG, Troponin, and serial changes to determine if it is an Acute Myocardial Infarction or myocardial injury due to an underlying chronic condition.         Lactic Acid, Plasma [[id number]]  (Normal) Collected: 06/11/24 2100    Specimen: Blood from Arm, Right Updated: 06/11/24 2139     Lactate 0.9 mmol/L     High Sensitivity Troponin T [[id number]]  (Abnormal) Collected: 06/11/24 1734    Specimen: Blood Updated: 06/11/24 1825     HS Troponin T 91 ng/L      Comment: Specimen hemolyzed.  Results may be falsely decreased.       Narrative:      High Sensitive Troponin T Reference Range:  <14.0 ng/L- Negative Female for AMI  <22.0 ng/L- Negative Male for AMI  >=14 - Abnormal Female indicating possible myocardial injury.  >=22 - Abnormal Male indicating possible myocardial injury.   Clinicians would have to utilize clinical acumen, EKG, Troponin, and serial changes to determine if it is an Acute Myocardial Infarction or myocardial injury due to an underlying chronic condition.         Blood Culture - Blood, Blood, PICC Line [[id number]] Collected: 06/11/24 1733    Specimen: Blood, PICC Line Updated: 06/11/24 1743    Procalcitonin [[id number]]  (Normal) Collected: 06/11/24 1504    Specimen: Blood Updated: 06/11/24 1553     Procalcitonin  "0.14 ng/mL     Narrative:      As a Marker for Sepsis (Non-Neonates):    1. <0.5 ng/mL represents a low risk of severe sepsis and/or septic shock.  2. >2 ng/mL represents a high risk of severe sepsis and/or septic shock.    As a Marker for Lower Respiratory Tract Infections that require antibiotic therapy:    PCT on Admission    Antibiotic Therapy       6-12 Hrs later    >0.5                Strongly Recommended  >0.25 - <0.5        Recommended   0.1 - 0.25          Discouraged              Remeasure/reassess PCT  <0.1                Strongly Discouraged     Remeasure/reassess PCT    As 28 day mortality risk marker: \"Change in Procalcitonin Result\" (>80% or <=80%) if Day 0 (or Day 1) and Day 4 values are available. Refer to http://www."[employer] Association[facility] Musicpct-calculator.com    Change in PCT <=80%  A decrease of PCT levels below or equal to 80% defines a positive change in PCT test result representing a higher risk for 28-day all-cause mortality of patients diagnosed with severe sepsis for septic shock.    Change in PCT >80%  A decrease of PCT levels of more than 80% defines a negative change in PCT result representing a lower risk for 28-day all-cause mortality of patients diagnosed with severe sepsis or septic shock.       Blood Gas, Arterial - [[id number]]  (Abnormal) Collected: 06/11/24 1444    Specimen: Arterial Blood Updated: 06/11/24 1552     Site Right Brachial     Dat's Test N/A     pH, Arterial 7.344 pH units      pCO2, Arterial 39.2 mm Hg      pO2, Arterial 84.1 mm Hg      HCO3, Arterial 21.4 mmol/L      Base Excess, Arterial -4.0 mmol/L      Comment: Serial Number: [id number]:  [id number]        O2 Saturation, Arterial 95.7 %      CO2 Content 22.6 mmol/L      Barometric Pressure for Blood Gas --     Comment: N/A        Modality Room Air     Hemodilution No    Ammonia [[id number]]  (Normal) Collected: 06/11/24 1504    Specimen: Blood Updated: 06/11/24 1547     Ammonia 26 umol/L     Blood Culture - Blood, Arm, Right " [[id number]] Collected: 06/11/24 1504    Specimen: Blood from Arm, Right Updated: 06/11/24 1518    POCT Electrolytes +HGB +HCT [[id number]]  (Abnormal) Collected: 06/11/24 1444    Specimen: Arterial Blood Updated: 06/11/24 1450     Sodium 135 mmol/L      POC Potassium 4.1 mmol/L      Ionized Calcium 1.14 mmol/L      Comment: Serial Number: [id number]:  [id number]        Hematocrit 25 %      Hemoglobin 8.4 g/dL     POC Lactate [[id number]]  (Normal) Collected: 06/11/24 1444    Specimen: Arterial Blood Updated: 06/11/24 1450     Lactate 1.3 mmol/L      Comment: Serial Number: [id number]:  [id number]       POC Glucose Once [[id number]]  (Normal) Collected: 06/11/24 1421    Specimen: Blood Updated: 06/11/24 1422     Glucose 85 mg/dL      Comment: Serial Number: [id number]:  [id number]       Comprehensive Metabolic Panel [[id number]]  (Abnormal) Collected: 06/11/24 0352    Specimen: Blood Updated: 06/11/24 0437     Glucose 78 mg/dL      BUN 47 mg/dL      Creatinine 3.33 mg/dL      Sodium 138 mmol/L      Potassium 4.4 mmol/L      Comment: Slight hemolysis detected by analyzer. Result may be falsely elevated.        Chloride 103 mmol/L      CO2 24.1 mmol/L      Calcium 8.3 mg/dL      Total Protein 5.4 g/dL      Albumin 3.5 g/dL      ALT (SGPT) 27 U/L      AST (SGOT) 126 U/L      Alkaline Phosphatase 71 U/L      Total Bilirubin 0.6 mg/dL      Globulin 1.9 gm/dL      A/G Ratio 1.8 g/dL      BUN/Creatinine Ratio 14.1     Anion Gap 10.9 mmol/L      eGFR 13.2 mL/min/1.73      Comment: <15 Indicative of kidney failure       Narrative:      GFR Normal >60  Chronic Kidney Disease <60  Kidney Failure <15    The GFR formula is only valid for adults with stable renal function between ages 18 and 70.    CBC (No Diff) [[id number]]  (Abnormal) Collected: 06/11/24 0352    Specimen: Blood Updated: 06/11/24 0359     WBC 2.05 10*3/mm3      RBC 2.85 10*6/mm3      Hemoglobin 8.4 g/dL      Hematocrit 28.2 %      MCV 98.9 fL      MCH 29.5 pg       MCHC 29.8 g/dL      RDW 15.7 %      RDW-SD 57.1 fl      MPV 10.1 fL      Platelets 64 10*3/mm3     COVID-19, FLU A/B, RSV PCR 1 HR TAT - Swab, Nasopharynx [[id number]]  (Normal) Collected: 06/10/24 1821    Specimen: Swab from Nasopharynx Updated: 06/10/24 1906     COVID19 Not Detected     Influenza A PCR Not Detected     Influenza B PCR Not Detected     RSV, PCR Not Detected    Narrative:      Fact sheet for providers: https://www.fda.gov/media/238005/download    Fact sheet for patients: https://www.fda.gov/media/749019/download    Test performed by PCR.    Basic Metabolic Panel [[id number]]  (Abnormal) Collected: 06/10/24 1017    Specimen: Blood Updated: 06/10/24 1054     Glucose 104 mg/dL      BUN 41 mg/dL      Creatinine 2.95 mg/dL      Sodium 135 mmol/L      Potassium 3.9 mmol/L      Comment: Specimen hemolyzed.  Result may be falsely elevated.        Chloride 106 mmol/L      CO2 17.0 mmol/L      Calcium 7.8 mg/dL      BUN/Creatinine Ratio 13.9     Anion Gap 12.0 mmol/L      eGFR 15.3 mL/min/1.73     Narrative:      GFR Normal >60  Chronic Kidney Disease <60  Kidney Failure <15    The GFR formula is only valid for adults with stable renal function between ages 18 and 70.    CBC & Differential [[id number]]  (Abnormal) Collected: 06/10/24 1017    Specimen: Blood Updated: 06/10/24 1044    Narrative:      The following orders were created for panel order CBC & Differential.  Procedure                               Abnormality         Status                     ---------                               -----------         ------                     CBC Auto Differential[[id number]]        Abnormal            Final result               Scan Slide[[id number]]                                       Final result                 Please view results for these tests on the individual orders.    CBC Auto Differential [[id number]]  (Abnormal) Collected: 06/10/24 1017    Specimen: Blood Updated: 06/10/24 1044     WBC 2.37 10*3/mm3       RBC 2.92 10*6/mm3      Hemoglobin 8.6 g/dL      Hematocrit 29.6 %      .4 fL      MCH 29.5 pg      MCHC 29.1 g/dL      RDW 15.7 %      RDW-SD 57.7 fl      MPV 11.3 fL      Platelets 71 10*3/mm3      Neutrophil % 58.6 %      Lymphocyte % 22.8 %      Monocyte % 12.7 %      Eosinophil % 3.8 %      Basophil % 1.3 %      Immature Grans % 0.8 %      Neutrophils, Absolute 1.39 10*3/mm3      Lymphocytes, Absolute 0.54 10*3/mm3      Monocytes, Absolute 0.30 10*3/mm3      Eosinophils, Absolute 0.09 10*3/mm3      Basophils, Absolute 0.03 10*3/mm3      Immature Grans, Absolute 0.02 10*3/mm3      nRBC 0.0 /100 WBC     Scan Slide [[id number]] Collected: 06/10/24 1017    Specimen: Blood Updated: 06/10/24 1044     Anisocytosis Slight/1+     Dresser Cells Slight/1+     Crenated RBC's Slight/1+     Poikilocytes Slight/1+     WBC Morphology Normal     Platelet Estimate Adequate    Extra Tubes [[id number]] Collected: 06/10/24 1017    Specimen: Blood, Venous Line Updated: 06/10/24 1030    Narrative:      The following orders were created for panel order Extra Tubes.  Procedure                               Abnormality         Status                     ---------                               -----------         ------                     Gold Top - SST[[id number]]                                   Final result               Light Blue Top[[id number]]                                   Final result                 Please view results for these tests on the individual orders.    Gold Top - SST [[id number]] Collected: 06/10/24 1017    Specimen: Blood Updated: 06/10/24 1030     Extra Tube Hold for add-ons.     Comment: Auto resulted.       Light Blue Top [[id number]] Collected: 06/10/24 1017    Specimen: Blood Updated: 06/10/24 1030     Extra Tube Hold for add-ons.     Comment: Auto resulted                Pending Results:     Imaging Reviewed:   CT Abdomen Pelvis Without Contrast    Result Date: 6/11/2024  Impression: 1.Volume overload/CHF  features as detailed above. Superimposed left lower lobe pneumonia is not excluded. Correlate with symptoms. 2.There is a nonspecific 3.4 cm cystic mass involving the pancreatic head. Follow-up dedicated pancreatic MRI with and without contrast recommended. 3.Increased density urine consistent with hematuria. 4.Other incidental findings as detailed above. Electronically Signed: Flip Lee MD  6/11/2024 4:32 PM EDT  Workstation ID: [id number]    CT Head Without Contrast    Result Date: 6/11/2024  Impression: No acute intracranial pathology. Electronically Signed: Tristian Boone MD  6/11/2024 4:27 PM EDT  Workstation ID: [id number]    XR Chest 1 View    Result Date: 6/10/2024  Impression: 1.Cardiomegaly with pulmonary vascular congestion. 2.Left basilar atelectasis or infiltrate. Electronically Signed: Armando Gonzalez MD  6/10/2024 10:43 AM EDT  Workstation ID: [id number]         Assessment & Plan     Deann Warren is a 83 y.o. with history of sick sinus syndrome and recently placed cardiac pacemaker and has been admitted under a principal diagnosis of syncope.  Hematology oncology has been consulted for pancytopenia.    Pancytopenia  -Patient has chronic thrombocytopenia and chronic anemia at baseline.  Anemia is likely due to chronic kidney disease.  -May be secondary to acute infection and antibiotic use.    -Will assess further for nutritional deficiencies, hemolysis  -Continue to monitor CBC    Pancreatic lesion  -CT imaging of the abdomen and pelvis showed a 3.4 cm cystic mass within the pancreatic head that is indeterminant.  Recommendations for pancreatic MRI.  Patient cannot undergo MRI due to recent peacemaker placement    End-stage renal disease on hemodialysis  -She receives dialysis on Mondays and Fridays.  -She is followed by nephrology    Syncopal episode  -Etiology unclear  -Recent echocardiogram showed LVEF 35%.  And recent cardiac catheterization showed multivessel CAD.  -CT head without contrast was  unremarkable  -Cardiology is following    Permanent atrial fibrillation  Sick sinus syndrome   -status post pacemaker placement on 6/3/2024.  -She has been restarted on low-dose Eliquis 2.5 mg twice daily, history of bleeding issues  -Cardiology is following    Further recommendations per Dr. 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  She has end-stage renal disease and is on hemodialysis.  She also has had a syncopal episode with unclear etiology.  We did above workup and ruled out hemolysis with haptoglobin normal and reticulocyte count not significantly elevated.  Given that retic count is not elevated based on her hemoglobin this means that her bone marrow is not adequately responding which could be due to antibiotic or infection causing suppression.  Her iron saturation is very high as his ferritin she will need to stop iron infusion with her dialysis.  No DIC with normal fibrinogen.  So the most likely explanation for her pancytopenia is a possible underlying bone marrow hypoproliferative disorder such as myelodysplastic syndrome now acutely worsened by infection antibiotic use.  Ideally I would recommend a bone marrow biopsy but given that she has been treated with antibiotics and has had infection this could give an inaccurate result.  I would like to wait for her CBC to get more towards her baseline and possibly consider doing a bone marrow outpatient.  She is getting an EUS will wait on biopsy results as well.  I am still waiting on B12 folate copper and zinc levels to rule out nutritional causes.   These could also mimic MDS    Thank you for this consult. We will be happy to follow along with you.     I have obtained complete history and perform physical exam along with review of labs, imaging.  I have completed the majority and substantive portion of medical decision making    Sonya Barahona MD         Electronically signed by Sonya Barahona MD at 06/13/24 0813       Monica Sanchez APRN at 06/12/24 0939        Consult Orders    1. Inpatient Gastroenterology Consult [[id number]] ordered by Jayda Nicole MD at 06/11/24 1656              Attestation signed by Cesia Hyde MD at 06/12/24 1120    I have reviewed this documentation and agree.  I, the attending physician, have performed the medical decision making for this patient.  Patient seen and examined.  Nurse practitioner findings verified.  Labs and imaging personally reviewed.    83 years old female who has a history of A-fib on Eliquis as well as end-stage renal disease on dialysis diabetes, sick sinus syndrome got admitted when she has been passing out likely related to 6 sinus versus others, we have been consulted as patient noted to have cystic lesion versus mass at head of the pancreas close to 3 cm denies any significant weight loss does have some decrease in appetite, MRI cannot be performed due to a pacemaker.  On examination awake and alert multiple bruising was noticed on the arm and the left upper quadrant in the lower part of the chest area where she is hurting.  Labs shows hemoglobin 8.6 which has been baseline low however WBC was 2.37 down to 1.82 and platelet count 66.  Alk phos 129.  CT scan abdomen and pelvis raise possibility of volume overload CHF feature with superimposed pneumonia cannot be excluded as well as nonspecific new 3.4 cm cystic mass involving head of the pancreas.    83 years old female who likely have either cystic pancreatic head mass related to IPMN versus solid mass with a cystic lesion cannot rule out adenocarcinoma.   We will proceed with EUS and fine-needle biopsy once patient is hemodynamically optimized.  If this turns out to be a cystic lesion which is more than 3 cm malignant transformation risk is significantly high EUS with a fine-needle aspiration will definitely beneficial.   She does have leukopenia as well as did take Eliquis last night we may consider either performing EUS later today after discussing with the primary team tomorrow morning.  Agree with the PPI.  CA 19-9 is borderline elevated.                GI CONSULT  NOTE:    Referring Provider:  Dr. Nicole    Chief complaint: Abnormal CT pancreas, abdominal pain    Subjective .     History of present illness: Deann Warren is a 83 y.o. female with history of sick sinus syndrome, A-fib on Eliquis, pacemaker, cardiomyopathy, CAD, hyperlipidemia, ESRD on HD, and DMII who presents with complaints of syncope.  The patient recently underwent pacemaker placement on 6/3/2024 for sick sinus syndrome.  Cardiology is following as patient did have a syncopal episode.  They plan to interrogate pacemaker.  Unable to proceed with MRI for 6 weeks from pacemaker placement.  GI has been asked to consult due to abnormal CT showing pancreatic lesion.  The patient does report left upper quadrant pain, but large bruise is noted at the site of her pain.  She denies any nausea/vomiting, heartburn, or dysphagia.  No bright red blood per rectum or melena.  Denies recent fever.      Endo History:  4/2010 ERCP (Dr. Hyde) -choledocholithiasis s/p sphincterotomy and balloon clearance of the bile duct    Past Medical History:  Past Medical History:   Diagnosis Date    Allergic conjunctivitis and rhinitis 11/06/2014    Anemia due to chronic kidney disease, on chronic dialysis 08/25/2020    Anxiety 07/30/2012    Bradycardia by electrocardiogram 06/03/2024    Cardiomyopathy, dilated 04/18/2024    Chronic gouty arthritis 11/06/2014    Coronary artery disease involving native coronary artery of  native heart without angina pectoris 04/24/2024    Dependence on renal dialysis 07/01/2022    ESRD (end stage renal disease) 08/25/2020    Essential hypertension 09/16/2015    History of shingles 01/18/2022    Hyperlipidemia 04/08/2024    Ischemic cardiomyopathy 04/18/2024    Paroxysmal atrial fibrillation 07/01/2022    Presence of cardiac pacemaker 06/03/2024    Sick sinus syndrome 06/03/2024    Type 2 diabetes mellitus with diabetic chronic kidney disease 07/01/2022    Vitamin D deficiency 07/22/2021       Past Surgical History:  Past Surgical History:   Procedure Laterality Date    ARTERIOVENOUS FISTULA Left     CARDIAC CATHETERIZATION N/A 4/25/2024    Procedure: Right and Left Heart Cath;  Surgeon: Dilcia Lui MD;  Location:  [facility];  Service: Cardiology;  Laterality: N/A;    CARDIAC CATHETERIZATION N/A 4/25/2024    Procedure: Percutaneous Coronary Intervention;  Surgeon: Jadiel Blake MD;  Location:  [facility];  Service: Cardiology;  Laterality: N/A;    CARDIAC ELECTROPHYSIOLOGY PROCEDURE N/A 6/3/2024    Procedure: Pacemaker DC new, [employer];  Surgeon: Zamzam Carrillo MD;  Location:  [facility];  Service: Cardiovascular;  Laterality: N/A;       Social History:  Social History     Tobacco Use    Smoking status: Never    Smokeless tobacco: Never   Vaping Use    Vaping status: Never Used   Substance Use Topics    Alcohol use: Never    Drug use: Never       Family History:  Family History   Family history unknown: Yes       Medications:  Medications Prior to Admission   Medication Sig Dispense Refill Last Dose    acetaminophen (TYLENOL) 500 MG tablet Take 1 tablet by mouth Every 6 (Six) Hours As Needed for Mild Pain.   6/10/2024    atorvastatin (LIPITOR) 40 MG tablet Take 1 tablet by mouth Daily. 90 tablet 3 6/9/2024    Cholecalciferol (Vitamin D3) 50 MCG (2000 UT) tablet Take 1 tablet by mouth Daily.   6/10/2024    febuxostat (ULORIC) 40 MG tablet  Take 1 tablet by mouth Daily.   6/10/2024    metoprolol succinate XL (TOPROL-XL) 25 MG 24 hr tablet Take 0.5 tablets by mouth Daily. 30 tablet 0 6/10/2024    folic acid (FOLVITE) 1 MG tablet Take 1 tablet by mouth Daily. 30 tablet 0     NON FORMULARY Apply 1 dose topically to the appropriate area as directed 3 (Three) Times a Week. Lidocaine 2.5% ointment -  Apply 1 application Monday, Wednesday, Friday before dialysis          Scheduled Meds:[Held by provider] apixaban, 2.5 mg, Oral, Q12H  atorvastatin, 40 mg, Oral, Daily  cefTRIAXone, 1,000 mg, Intravenous, Q24H  furosemide, 20 mg, Intravenous, Q12H  metroNIDAZOLE, 500 mg, Oral, Q8H  sodium chloride, 10 mL, Intravenous, Q12H      Continuous Infusions:dextrose, 75 mL/hr, Last Rate: 75 mL/hr (06/12/24 0842)      PRN Meds:.  senna-docusate sodium **AND** polyethylene glycol **AND** bisacodyl **AND** bisacodyl    Calcium Replacement - Follow Nurse / BPA Driven Protocol    dextrose    dextrose    glucagon (human recombinant)    Magnesium Standard Dose Replacement - Follow Nurse / BPA Driven Protocol    Phosphorus Replacement - Follow Nurse / BPA Driven Protocol    Potassium Replacement - Follow Nurse / BPA Driven Protocol    [COMPLETED] Insert Peripheral IV **AND** sodium chloride    sodium chloride    sodium chloride    ALLERGIES:  Heparin    ROS:  The following systems were reviewed and negative;   Constitution:  No fevers, chills, no unintentional weight loss  Skin: no rash, no jaundice  Eyes:  No blurry vision, no eye pain  HENT:  No change in hearing or smell  Resp:  No dyspnea or cough  CV:  No chest pain or palpitations  :  No dysuria, hematuria  Musculoskeletal:  No leg cramps or arthralgias  Neuro:  No tremor, no numbness  Psych:  No depression or confusion    Objective     Vital Signs:   Vitals:    06/11/24 2123 06/11/24 2352 06/12/24 0358 06/12/24 0735   BP: 106/58 107/48 114/57 112/63   BP Location: Right arm Right arm Right arm Right arm   Patient  Position: Lying Lying Lying Lying   Pulse: 70 70 70 70   Resp: 16 14 16 13   Temp: 97.4 °F (36.3 °C) 97.8 °F (36.6 °C) 97.5 °F (36.4 °C)    TempSrc: Oral Oral Oral    SpO2: 99% 100% 98% 95%   Weight:       Height:           Physical Exam:       General Appearance:    Awake and alert, in no acute distress   Head:    Normocephalic, without obvious abnormality, atraumatic   Throat:   No oral lesions, no thrush, oral mucosa moist   Lungs:     Respirations regular, even and unlabored   Chest Wall:    No abnormalities observed   Abdomen:     Soft, left upper quadrant tenderness at site of large bruise, no rebound or guarding, non-distended   Rectal:     Deferred   Extremities:   Moves all extremities, no edema, no cyanosis   Pulses:   Pulses palpable and equal bilaterally   Skin:   No rash, no jaundice, normal palpation, large bruise to the left trunk/breast   Lymph nodes:   No cervical, supraclavicular or submandibular palpable adenopathy   Neurologic:   Cranial nerves 2 - 12 grossly intact, no asterixis       Results Review:   I reviewed the patient's labs and imaging.  CBC    Results from last 7 days   Lab Units 06/12/24  0220 06/11/24  1444 06/11/24  0352 06/10/24  1017   WBC 10*3/mm3 1.82*  --  2.05* 2.37*   HEMOGLOBIN g/dL 8.5*  --  8.4* 8.6*   HEMOGLOBIN, POC g/dL  --  8.4*  --   --    PLATELETS 10*3/mm3 66*  --  64* 71*     CMP   Results from last 7 days   Lab Units 06/12/24  0220 06/11/24  1504 06/11/24  0352 06/10/24  1017   SODIUM mmol/L 136  --  138 135*   POTASSIUM mmol/L 4.7  --  4.4 3.9   CHLORIDE mmol/L 104  --  103 106   CO2 mmol/L 23.0  --  24.1 17.0*   BUN mg/dL 52*  --  47* 41*   CREATININE mg/dL 3.44*  --  3.33* 2.95*   GLUCOSE mg/dL 66  --  78 104*   ALBUMIN g/dL 3.3*  --  3.5  --    BILIRUBIN mg/dL 0.7  --  0.6  --    ALK PHOS U/L 70  --  71  --    AST (SGOT) U/L 129*  --  126*  --    ALT (SGPT) U/L 33  --  27  --    AMMONIA umol/L  --  26  --   --      Cr Clearance Estimated Creatinine Clearance:  11.1 mL/min (A) (by C-G formula based on SCr of 3.44 mg/dL (H)).  Coag     HbA1C   Lab Results   Component Value Date    HGBA1C 4.90 05/21/2024    HGBA1C 4.8 07/12/2022    HGBA1C 5.0 01/11/2022     Blood Glucose   Glucose   Date/Time Value Ref Range Status   06/12/2024 0734 76 70 - 105 mg/dL Final     Comment:     Serial Number: [id number]:  [id number]   06/12/2024 0357 222 (H) 70 - 105 mg/dL Final     Comment:     Serial Number: [id number]:  [id number]   06/12/2024 0330 58 (L) 70 - 105 mg/dL Final     Comment:     Serial Number: [id number]:  [id number]   06/11/2024 1421 85 70 - 105 mg/dL Final     Comment:     Serial Number: [id number]:  [id number]     Infection   Results from last 7 days   Lab Units 06/11/24  1504   PROCALCITONIN ng/mL 0.14     UA      Imaging Results (Last 72 Hours)       Procedure Component Value Units Date/Time    CT Abdomen Pelvis Without Contrast [[id number]] Collected: 06/11/24 1626     Updated: 06/11/24 1635    Narrative:      CT ABDOMEN PELVIS WO CONTRAST    Date of Exam: 6/11/2024 4:18 PM EDT    Indication: abd pain.    Comparison: Chest CT 4/9/2024    Technique: Axial CT images were obtained of the abdomen and pelvis without the administration of contrast. Sagittal and coronal reconstructions were performed.  Automated exposure control and iterative reconstruction methods were used.      Findings:  Examination is limited by arms down positioning. LUNG BASES: Moderate left and small right pleural effusions with lower lung consolidation, likely atelectasis though pneumonia not excluded. The heart is enlarged.    LIVER:  Unremarkable parenchyma without focal lesion.  BILIARY/GALLBLADDER: Cholecystectomy  SPLEEN:  Unremarkable  PANCREAS: There is a 3.4 cm cystic lesion in the region of the pancreatic head (image 57, series 3).  ADRENAL:  Unremarkable  KIDNEYS: Moderate bilateral renal cortical atrophy with no solid mass identified. No obstruction.  No calculus  identified.  GASTROINTESTINAL/MESENTERY:  No evidence of obstruction nor inflammation.    AORTA/IVC:  Normal caliber. There is advanced atherosclerotic disease throughout.    RETROPERITONEUM/LYMPH NODES:  Unremarkable    REPRODUCTIVE:  Unremarkable  BLADDER: Increased density urine within the urinary bladder consistent with hematuria. Correlate with urinalysis.    OSSEUS STRUCTURES: Chronic L1 superior end plate compression fracture similar to previous chest CT    There is small volume ascites. There is generalized subcutaneous edema throughout.      Impression:      Impression:  1.Volume overload/CHF features as detailed above. Superimposed left lower lobe pneumonia is not excluded. Correlate with symptoms.  2.There is a nonspecific 3.4 cm cystic mass involving the pancreatic head. Follow-up dedicated pancreatic MRI with and without contrast recommended.  3.Increased density urine consistent with hematuria.  4.Other incidental findings as detailed above.            Electronically Signed: Flip Lee MD    6/11/2024 4:32 PM EDT    Workstation ID: [id number]    CT Head Without Contrast [[id number]] Collected: 06/11/24 1626     Updated: 06/11/24 1629    Narrative:      CT HEAD WO CONTRAST    Date of Exam: 6/11/2024 4:18 PM EDT    Indication: syncope.    Comparison: None available.    Technique: Axial CT images were obtained of the head without contrast administration.  Coronal reconstructions were performed.  Automated exposure control and iterative reconstruction methods were used.    Findings: No intracranial hemorrhage. 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 Gray-white matter differentiation is maintained without evidence of an acute infarction. Multiple foci of decreased attenuation are present within the subcortical, deep cerebral, and   periventricular white matter consistent with chronic small vessel/microangiopathic ischemic changes. No extra-axial mass or collection. The ventricles and sulci are prominent commensurate  with involutional changes. The posterior fossa appears grossly   normal. Sellar and suprasellar structures are normal.    Orbital and periorbital soft tissues are normal. The paranasal sinuses, ethmoid air cells, and mastoid air cells are aerated. The bony calvarium is intact.      Impression:      Impression: No acute intracranial pathology.          Electronically Signed: Tristian Boone MD    6/11/2024 4:27 PM EDT    Workstation ID: [id number]    XR Chest 1 View [[id number]] Collected: 06/10/24 1041     Updated: 06/10/24 1045    Narrative:      XR CHEST 1 VW    Date of Exam: 6/10/2024 10:38 AM EDT    Indication: Chest pain    Comparison: 6/3/2024    Findings:  Right chest wall pacemaker is present. Cardiac silhouette is enlarged. Perihilar and bibasilar opacities may represent edema or mild infiltrates. Left basilar atelectasis is seen. No pneumothorax is seen. No acute osseous lesion is seen. There are   senescent changes of the aorta and skeletal structures.      Impression:      Impression:  1.Cardiomegaly with pulmonary vascular congestion.  2.Left basilar atelectasis or infiltrate.      Electronically Signed: Armando Gonzalez MD    6/10/2024 10:43 AM EDT    Workstation ID: [id number]            ASSESSMENT:  -Abnormal CT showing 3.4 cm cystic mass in the pancreatic head  -Elevated CA 19-9  -Pancytopenia  -Mildly elevated LFTs  -Syncopal episode  -SSS s/p pacemaker placement on 6/3/2024  -A-fib on Eliquis  -Cardiomyopathy  -CAD  -Hyperlipidemia  -ESRD on HD  -DMII    PLAN:  Patient is an 83-year-old female with history of SSS s/p pacemaker placement on 6/3/2024, A-fib on Eliquis, and ESRD on HD who presented on 6/10 with complaints of syncopal episode.  Cardiology has been consulted and are planning pacemaker interrogation.  GI asked to consult due to abnormal CT findings.    CT abdomen/pelvis WO on admission shows changes of volume overload along with a 3.4 cm cystic mass in the pancreatic head.  CA 19-9 44.2.  Could  consider EUS with biopsy, but patient is severely pancytopenic.  Will ask hematology to evaluate and optimize patient's status prior to proceeding with endoscopy.  Hold Eliquis in preparation for potential EUS.  Hemoglobin 8.5, MCV 98.3.  Continue to monitor H/H and transfuse as needed.  Elevated AST noted.  Send liver serologies.  Check RUQ US.  Supportive care.      I discussed the patients findings and my recommendations with the patient.  I will discuss the case with Dr. Hyde and change plan accordingly.    We appreciate the referral.    Electronically signed by LUCA Agustin, 06/12/24, 9:39 AM EDT.                  Electronically signed by Cesia Hyde MD at 06/12/24 1120       Nutrition Notes (most recent note)    No notes exist for this encounter.          Physical Therapy Notes (all)        Reyes, Carmela, PT at 06/11/24 1047  Version 1 of 1         Goal Outcome Evaluation:  Plan of Care Reviewed With: patient, friend           Outcome Evaluation: 84 y/o female admitted on 6/10 due to syncopal epiosde. Patient is s/p pacemaker placement on 6/3/2024. PMH Includes ESRD on dialysis, thrombocytopenia, L heel wound. Patient resides with adult grandson and has friend that assists with bathing and household tasks. Patient is aware of her pacemaker precautions. Patient was ambulatory at baseline using rw. At time of eval, patient needed CGA for supine to sit. No dizziness reported upon sitting/standing nor have change in BP or HR. Patient needed min/mod A of 1-2 to come to standing and bed needed to be elevated. Patient only managed to ambulate 2 ft fwd with min/mod A and had to return to sitting. Flexed trunk and head down posture noted, can correct with cues but unable to maintain. Patient will need SNF at discharge for strength, mobility and balance training.      Anticipated Discharge Disposition (PT): skilled nursing facility                          Electronically signed by Reyes, Carmela, PT at  24 1047       Reyes, Carmela, PT at 24 1048  Version 1 of 1         Patient Name: Deann Warren  : 1940    MRN: [id number]                              Today's Date: 2024       Admit Date: 6/10/2024    Visit Dx:     ICD-10-CM ICD-9-CM   1. Syncope, unspecified syncope type  R55 780.2     Patient Active Problem List   Diagnosis    Syncope and collapse    Dependence on renal dialysis    Type 2 diabetes mellitus with diabetic chronic kidney disease    Anxiety    Chronic gouty arthritis    Allergic conjunctivitis and rhinitis    Essential hypertension    ESRD (end stage renal disease)    Anemia due to chronic kidney disease, on chronic dialysis    History of shingles    Hyperlipidemia    Vitamin D deficiency    Cardiomyopathy, dilated    Syncopal episodes    Permanent atrial fibrillation    Syncope    Presence of cardiac pacemaker    Coronary artery disease involving native coronary artery of native heart without angina pectoris     Past Medical History:   Diagnosis Date    Allergic conjunctivitis and rhinitis 2014    Anemia due to chronic kidney disease, on chronic dialysis 2020    Anxiety 2012    Bradycardia by electrocardiogram 2024    Cardiomyopathy, dilated 2024    Chronic gouty arthritis 2014    Coronary artery disease involving native coronary artery of native heart without angina pectoris 2024    Dependence on renal dialysis 2022    ESRD (end stage renal disease) 2020    Essential hypertension 2015    History of shingles 2022    Hyperlipidemia 2024    Paroxysmal atrial fibrillation 2022    Presence of cardiac pacemaker 2024    Sick sinus syndrome 2024    Type 2 diabetes mellitus with diabetic chronic kidney disease 2022    Vitamin D deficiency 2021     Past Surgical History:   Procedure Laterality Date    ARTERIOVENOUS FISTULA Left     CARDIAC CATHETERIZATION N/A 2024    Procedure:  Right and Left Heart Cath;  Surgeon: Dilcia Lui MD;  Location: [facility] CATH INVASIVE LOCATION;  Service: Cardiology;  Laterality: N/A;    CARDIAC CATHETERIZATION N/A 4/25/2024    Procedure: Percutaneous Coronary Intervention;  Surgeon: Jadiel Blake MD;  Location:  [facility] CATH INVASIVE LOCATION;  Service: Cardiology;  Laterality: N/A;    CARDIAC ELECTROPHYSIOLOGY PROCEDURE N/A 6/3/2024    Procedure: Pacemaker DC new, [employer];  Surgeon: Zamzam Carrillo MD;  Location:  [facility] CATH INVASIVE LOCATION;  Service: Cardiovascular;  Laterality: N/A;      General Information       Row Name 06/11/24 1037          Physical Therapy Time and Intention    Document Type evaluation  -CR     Mode of Treatment physical therapy  -CR       Row Name 06/11/24 1037          General Information    Patient Profile Reviewed yes  -CR     Prior Level of Function independent:;all household mobility  -CR     Existing Precautions/Restrictions fall;pacemaker  pacemaker precautions R side until 6/17;has chronic L heel wound  -CR     Barriers to Rehab medically complex  -CR       Row Name 06/11/24 1037          Living Environment    People in Home grandchild(vamsi)  friend assists with bathing, grocery  -CR       Row Name 06/11/24 1037          Home Main Entrance    Number of Stairs, Main Entrance none  -CR       Row Name 06/11/24 1037          Stairs Within Home, Primary    Number of Stairs, Within Home, Primary none  -CR       Row Name 06/11/24 1037          Cognition    Orientation Status (Cognition) oriented x 4  -CR       Row Name 06/11/24 1037          Safety Issues, Functional Mobility    Impairments Affecting Function (Mobility) balance;endurance/activity tolerance;strength;pain;range of motion (ROM);postural/trunk control  -CR               User Key  (r) = Recorded By, (t) = Taken By, (c) = Cosigned By      Initials Name Provider Type    CR Reyes, Carmela, PT Physical Therapist                   Mobility       Row Name 06/11/24 1038           Bed Mobility    Bed Mobility supine-sit;sit-supine  -CR     Supine-Sit [place] (Bed Mobility) verbal cues;contact guard  -CR     Sit-Supine [place] (Bed Mobility) minimum assist (75% patient effort)  -CR       Row Name 06/11/24 1038          Sit-Stand Transfer    Sit-Stand [place] (Transfers) minimum assist (75% patient effort);1 person assist;2 person assist  -CR     Assistive Device (Sit-Stand Transfers) walker, front-wheeled  -CR       Row Name 06/11/24 1038          Gait/Stairs (Locomotion)    [place]orence Level (Gait) minimum assist (75% patient effort);verbal cues  -CR     Assistive Device (Gait) walker, front-wheeled  -CR     Distance in Feet (Gait) 2  -CR     Deviations/Abnormal Patterns (Gait) gait speed decreased;festinating/shuffling  -CR     Bilateral Gait Deviations forward flexed posture  -CR     Left Sided Gait Deviations weight shift ability decreased  -CR               User Key  (r) = Recorded By, (t) = Taken By, (c) = Cosigned By      Initials Name Provider Type    CR Reyes, Carmela, PT Physical Therapist                   Obj/Interventions       Row Name 06/11/24 1040          Range of Motion Comprehensive    Comment, General Range of Motion AROM WFL; trunk extension min/mod limit  -CR       Row Name 06/11/24 1040          Strength Comprehensive (MMT)    Comment, General Manual Muscle Testing (MMT) Assessment BLE grossly 3/5  -CR       Row Name 06/11/24 1040          Balance    Balance Assessment sitting static balance;standing static balance;standing dynamic balance  -CR     Static Sitting Balance standby assist  -CR     Position, Sitting Balance sitting edge of bed  -CR     Static Standing Balance contact guard;minimal assist  -CR     Dynamic Standing Balance contact guard;minimal assist  -CR     Position/Device Used, Standing Balance walker, front-wheeled  -CR               User Key  (r) = Recorded By, (t) = Taken By, (c) = Cosigned By      Initials Name Provider Type    CR  Reyes, Carmela, PT Physical Therapist                   Goals/Plan       Row Name 06/11/24 1046          Transfer Goal 1 (PT)    Activity/Assistive Device (Transfer Goal 1, PT) transfers, all;walker, rolling  -CR     Waller Level/Cues Needed (Transfer Goal 1, PT) standby assist  -CR     Time Frame (Transfer Goal 1, PT) long term goal (LTG);2 weeks  -CR       Row Name 06/11/24 1046          Gait Training Goal 1 (PT)    Activity/Assistive Device (Gait Training Goal 1, PT) gait (walking locomotion);assistive device use;decrease fall risk;diminish gait deviation;forward stepping;improve balance and speed;increase endurance/gait distance;walker, rolling  -CR     Waller Level (Gait Training Goal 1, PT) contact guard required  -CR     Distance (Gait Training Goal 1, PT) 50 ft x 2  -CR     Time Frame (Gait Training Goal 1, PT) long term goal (LTG);2 weeks  -CR       Row Name 06/11/24 1046          Therapy Assessment/Plan (PT)    Planned Therapy Interventions (PT) balance training;bed mobility training;gait training;home exercise program;patient/family education;postural re-education;transfer training;neuromuscular re-education;strengthening;ROM (range of motion)  -CR               User Key  (r) = Recorded By, (t) = Taken By, (c) = Cosigned By      Initials Name Provider Type    CR Reyes, Carmela, PT Physical Therapist                   Clinical Impression       Row Name 06/11/24 1040          Pain    Additional Documentation Pain Scale: FACES Pre/Post-Treatment (Group)  -CR       Row Name 06/11/24 1040          Pain Scale: FACES Pre/Post-Treatment    Pain: FACES Scale, Pretreatment 4-->hurts little more  -CR     Posttreatment Pain Rating 4-->hurts little more  -CR     Pain Location generalized  -CR     Pre/Posttreatment Pain Comment patient has multiple bruises all over  -CR       Row Name 06/11/24 1040          Plan of Care Review    Plan of Care Reviewed With patient;friend  -CR     Outcome Evaluation 84 y/o  female admitted on 6/10 due to syncopal epiosde. Patient is s/p pacemaker placement on 6/3/2024. PMH Includes ESRD on dialysis, thrombocytopenia, L heel wound. Patient resides with adult grandson and has friend that assists with bathing and household tasks. Patient is aware of her pacemaker precautions. Patient was ambulatory at baseline using rw. At time of eval, patient needed CGA for supine to sit. No dizziness reported upon sitting/standing nor have change in BP or HR. Patient needed min/mod A of 1-2 to come to standing and bed needed to be elevated. Patient only managed to ambulate 2 ft fwd with min/mod A and had to return to sitting. Flexed trunk and head down posture noted, can correct with cues but unable to maintain. Patient will need SNF at discharge for strength, mobility and balance training.  -CR       Row Name 06/11/24 1040          Therapy Assessment/Plan (PT)    Patient/Family Therapy Goals Statement (PT) agreeable to rehab  -CR     Rehab Potential (PT) good, to achieve stated therapy goals  -CR     Criteria for Skilled Interventions Met (PT) yes;skilled treatment is necessary  -CR     Therapy Frequency (PT) 5 times/wk  -CR     Predicted Duration of Therapy Intervention (PT) dc  -CR               User Key  (r) = Recorded By, (t) = Taken By, (c) = Cosigned By      Initials Name Provider Type    CR Reyes, Carmela, PT Physical Therapist                   Outcome Measures       Row Name 06/11/24 1046 06/11/24 0800       How much help from another person do you currently need...    Turning from your back to your side while in flat bed without using bedrails? 3  -CR 3  -JH    Moving from lying on back to sitting on the side of a flat bed without bedrails? 3  -CR 3  -JH    Moving to and from a bed to a chair (including a wheelchair)? 2  -CR 3  -JH    Standing up from a chair using your arms (e.g., wheelchair, bedside chair)? 2  -CR 2  -JH    Climbing 3-5 steps with a railing? 2  -CR 2  -JH    To walk in  hospital room? 2  -CR 3  -JH    AM-PAC 6 Clicks Score (PT) 14  -CR 16  -JH    Highest Level of Mobility Goal 4 --> Transfer to chair/commode  -CR 5 --> Static standing  -              User Key  (r) = Recorded By, (t) = Taken By, (c) = Cosigned By      Initials Name Provider Type    CR Reyes, Carmela, PT Physical Therapist    Yuli Wyatt RN Registered Nurse                                 Physical Therapy Education       Title: PT OT SLP Therapies (In Progress)       Topic: Physical Therapy (In Progress)       Point: Mobility training (Done)       Learning Progress Summary             Patient Acceptance, E, VU,NR by CR at 6/11/2024 1047                         Point: Home exercise program (Not Started)       Learner Progress:  Not documented in this visit.              Point: Precautions (Done)       Learning Progress Summary             Patient Acceptance, E, VU,NR by CR at 6/11/2024 1047                                         User Key       Initials Effective Dates Name Provider Type Discipline     06/16/21 -  Reyes, Carmela, PT Physical Therapist PT                  PT Recommendation and Plan  Planned Therapy Interventions (PT): balance training, bed mobility training, gait training, home exercise program, patient/family education, postural re-education, transfer training, neuromuscular re-education, strengthening, ROM (range of motion)  Plan of Care Reviewed With: patient, friend  Outcome Evaluation: 82 y/o female admitted on 6/10 due to syncopal epiosde. Patient is s/p pacemaker placement on 6/3/2024. PMH Includes ESRD on dialysis, thrombocytopenia, L heel wound. Patient resides with adult grandson and has friend that assists with bathing and household tasks. Patient is aware of her pacemaker precautions. Patient was ambulatory at baseline using rw. At time of eval, patient needed CGA for supine to sit. No dizziness reported upon sitting/standing nor have change in BP or HR. Patient needed min/mod A  of 1-2 to come to standing and bed needed to be elevated. Patient only managed to ambulate 2 ft fwd with min/mod A and had to return to sitting. Flexed trunk and head down posture noted, can correct with cues but unable to maintain. Patient will need SNF at discharge for strength, mobility and balance training.     Time Calculation:         PT Charges       Row Name 24 1047             Time Calculation    Start Time 916  -CR      Stop Time 941  -CR      Time Calculation (min) 25 min  -CR      PT Received On 24  -CR      PT - Next Appointment 24  -CR      PT Goal Re-Cert Due Date 24  -CR         Time Calculation- PT    Total Timed Code Minutes- PT 0 minute(s)  -CR                User Key  (r) = Recorded By, (t) = Taken By, (c) = Cosigned By      Initials Name Provider Type    CR Reyes, Carmela, PT Physical Therapist                  Therapy Charges for Today       Code Description Service Date Service Provider Modifiers Qty    [id number] HC PT EVAL MOD COMPLEXITY 4 2024 Reyes, Carmela, PT GP 1            PT G-Codes  AM-PAC 6 Clicks Score (PT): 14  PT Discharge Summary  Anticipated Discharge Disposition (PT): skilled nursing facility    Carmela Reyes, PT  2024      Electronically signed by Reyes, Carmela, VAL at 24 1048            Occupational Therapy Notes (all)        Lary Yancey, OT at 24 1603          Patient Name: Deann Warren  : 1940    MRN: [id number]                              Today's Date: 2024       Admit Date: 6/10/2024    Visit Dx:     ICD-10-CM ICD-9-CM   1. Syncope, unspecified syncope type  R55 780.2     Patient Active Problem List   Diagnosis    Syncope and collapse    Dependence on renal dialysis    Type 2 diabetes mellitus with diabetic chronic kidney disease    Anxiety    Chronic gouty arthritis    Allergic conjunctivitis and rhinitis    Essential hypertension    ESRD (end stage renal disease)    Anemia due to chronic kidney  disease, on chronic dialysis    History of shingles    Hyperlipidemia    Vitamin D deficiency    Ischemic cardiomyopathy    Syncopal episodes    Permanent atrial fibrillation    Syncope    Presence of cardiac pacemaker    Coronary artery disease involving native coronary artery of native heart without angina pectoris     Past Medical History:   Diagnosis Date    Allergic conjunctivitis and rhinitis 11/06/2014    Anemia due to chronic kidney disease, on chronic dialysis 08/25/2020    Anxiety 07/30/2012    Bradycardia by electrocardiogram 06/03/2024    Cardiomyopathy, dilated 04/18/2024    Chronic gouty arthritis 11/06/2014    Coronary artery disease involving native coronary artery of native heart without angina pectoris 04/24/2024    Dependence on renal dialysis 07/01/2022    ESRD (end stage renal disease) 08/25/2020    Essential hypertension 09/16/2015    History of shingles 01/18/2022    Hyperlipidemia 04/08/2024    Ischemic cardiomyopathy 04/18/2024    Paroxysmal atrial fibrillation 07/01/2022    Presence of cardiac pacemaker 06/03/2024    Sick sinus syndrome 06/03/2024    Type 2 diabetes mellitus with diabetic chronic kidney disease 07/01/2022    Vitamin D deficiency 07/22/2021     Past Surgical History:   Procedure Laterality Date    ARTERIOVENOUS FISTULA Left     CARDIAC CATHETERIZATION N/A 4/25/2024    Procedure: Right and Left Heart Cath;  Surgeon: Dilcia Lui MD;  Location: [facility] CATH INVASIVE LOCATION;  Service: Cardiology;  Laterality: N/A;    CARDIAC CATHETERIZATION N/A 4/25/2024    Procedure: Percutaneous Coronary Intervention;  Surgeon: Jadiel Blake MD;  Location:  [facility] CATH INVASIVE LOCATION;  Service: Cardiology;  Laterality: N/A;    CARDIAC ELECTROPHYSIOLOGY PROCEDURE N/A 6/3/2024    Procedure: Pacemaker DC new, [employer];  Surgeon: Zamzam Carrillo MD;  Location:  [facility] CATH INVASIVE LOCATION;  Service: Cardiovascular;  Laterality: N/A;      General Information       Row Name 06/11/24  1550          OT Time and Intention    Document Type evaluation  -SR     Mode of Treatment occupational therapy  -SR       Row Name 06/11/24 1550          General Information    Existing Precautions/Restrictions fall;pacemaker  -SR       Row Name 06/11/24 1550          Occupational Profile    Reason for Services/Referral (Occupational Profile) Deann Warren is a 83 y.o. female with a CMH of thrombocytopenia, sick sinus syndrome, atrial fibrillation, LBBB, cardiomyopathy with a EF of 35%, CAD, Pacemaker (placed on 6/3/24), HLD, ESRD (left fistula) who presented to [facility] on 6/10/2024 with syncope.  Patient was in route to receive dialysis, patient states that when she was walking into the door she felt weak and unsteady, patient to the seat is out of the office.  Pt live at home with her grandson (24 hour assist) as well as assist from her son and a close friend.  She typically walks using rwx.  -SR       Row Name 06/11/24 1550          Living Environment    People in Home grandchild(vamsi)  -SR       Row Name 06/11/24 1550          Cognition    Orientation Status (Cognition) oriented x 4  -SR       Row Name 06/11/24 1557          Safety Issues, Functional Mobility    Impairments Affecting Function (Mobility) balance;endurance/activity tolerance;strength;pain;range of motion (ROM);postural/trunk control  -SR               User Key  (r) = Recorded By, (t) = Taken By, (c) = Cosigned By      Initials Name Provider Type    SR Lary Yancey, OT Occupational Therapist                     Mobility/ADL's       Row Name 06/11/24 1550          Bed Mobility    Bed Mobility supine-sit;sit-supine  -SR     Supine-Sit [place] (Bed Mobility) verbal cues;contact guard  -SR     Sit-Supine [place] (Bed Mobility) minimum assist (75% patient effort)  -SR       Row Name 06/11/24 1550          Sit-Stand Transfer    Sit-Stand [place] (Transfers) minimum assist (75% patient effort);1 person assist;2 person  assist  -SR     Assistive Device (Sit-Stand Transfers) walker, front-wheeled  -SR       Row Name 06/11/24 1558          Activities of Daily Living    BADL Assessment/Intervention lower body dressing  -SR       [facility] Name 06/11/24 1558          Lower Body Dressing Assessment/Training    [place] Level (Lower Body Dressing) don;shoes/slippers;dependent (less than 25% patient effort)  -SR               User Key  (r) = Recorded By, (t) = Taken By, (c) = Cosigned By      Initials Name Provider Type    SR Lary Yancey OT Occupational Therapist                   Obj/Interventions       [facility] Name 06/11/24 1558          Range of Motion Comprehensive    Comment, General Range of Motion R shoulder ROM limited due to pacemaker.  L shoulder impaired due to stiffness.  Distal UE ROM WFL.  -SR       [facility] Name 06/11/24 1558          Strength Comprehensive (MMT)    Comment, General Manual Muscle Testing (MMT) Assessment L shoulder 3-/5, distal UE 3+/5  -SR       [facility] Name 06/11/24 1558          Balance    Balance Interventions sitting;standing;sit to stand;supported;static;dynamic;minimal challenge  -SR               User Key  (r) = Recorded By, (t) = Taken By, (c) = Cosigned By      Initials Name Provider Type    SR Lary Yancey OT Occupational Therapist                   Goals/Plan       Row Name 06/11/24 1601          Toileting Goal 1 (OT)    Activity/Device (Toileting Goal 1, OT) toileting skills, all  -SR     [place] Level/Cues Needed (Toileting Goal 1, OT) moderate assist (50-74% patient effort)  -SR     Time Frame (Toileting Goal 1, OT) 2 weeks  -SR       [facility] Name 06/11/24 1601          Grooming Goal 1 (OT)    Activity/Device (Grooming Goal 1, OT) grooming skills, all  -SR     [place] (Grooming Goal 1, OT) contact guard required  -SR     Time Frame (Grooming Goal 1, OT) 2 weeks  -SR       Row Name 06/11/24 1601          Therapy Assessment/Plan (OT)    Planned Therapy Interventions (OT) activity  tolerance training;BADL retraining;IADL retraining;functional balance retraining;neuromuscular control/coordination retraining;ROM/therapeutic exercise;transfer/mobility retraining;strengthening exercise;occupation/activity based interventions  -SR               User Key  (r) = Recorded By, (t) = Taken By, (c) = Cosigned By      Initials Name Provider Type    SR Lary Yancey, OT Occupational Therapist                   Clinical Impression       Row Name 06/11/24 9155          Pain Scale: FACES Pre/Post-Treatment    Pain: FACES Scale, Pretreatment 4-->hurts little more  -SR     Posttreatment Pain Rating 4-->hurts little more  -SR     Pain Location generalized  -SR       Row Name 06/11/24 9698          Plan of Care Review    Plan of Care Reviewed With patient  -SR     Outcome Evaluation Deann Warren is a 83 y.o. female with a CMH of thrombocytopenia, sick sinus syndrome, atrial fibrillation, LBBB, cardiomyopathy with a EF of 35%, CAD, Pacemaker (placed on 6/3/24), HLD, ESRD (left fistula) who presented to [facility] on 6/10/2024 with syncope.  Patient was in route to receive dialysis, patient states that when she was walking into the door she felt weak and unsteady, patient to the seat is out of the office.  Pt live at home with her grandson (24 hour assist) as well as assist from her son and a close friend.  She typically walks using rwx.  This date pt reqiured min to mod asisst for bed mobility and transfers and depenent for lower body ADLs.  Recommend SNF at discharge.  -SR       Row Name 06/11/24 2709          Therapy Assessment/Plan (OT)    Rehab Potential (OT) good, to achieve stated therapy goals  -SR     Criteria for Skilled Therapeutic Interventions Met (OT) yes  -SR     Therapy Frequency (OT) 3 times/wk  -SR       Row Name 06/11/24 4011          Therapy Plan Review/Discharge Plan (OT)    Anticipated Discharge Disposition (OT) skilled nursing facility  -SR       Row Name 06/11/24 4238           Positioning and Restraints    Pre-Treatment Position in bed  -SR     Post Treatment Position bed  -SR     In Bed call light within reach;encouraged to call for assist;exit alarm on  -SR               User Key  (r) = Recorded By, (t) = Taken By, (c) = Cosigned By      Initials Name Provider Type    SR Lary Yancey OT Occupational Therapist                   Outcome Measures       Row Name 06/11/24 1227 06/11/24 1046       How much help from another person do you currently need...    Turning from your back to your side while in flat bed without using bedrails? 3  -ES 3  -CR    Moving from lying on back to sitting on the side of a flat bed without bedrails? 3  -ES 3  -CR    Moving to and from a bed to a chair (including a wheelchair)? 2  -ES 2  -CR    Standing up from a chair using your arms (e.g., wheelchair, bedside chair)? 2  -ES 2  -CR    Climbing 3-5 steps with a railing? 2  -ES 2  -CR    To walk in hospital room? 2  -ES 2  -CR    AM-PAC 6 Clicks Score (PT) 14  -ES 14  -CR    Highest Level of Mobility Goal 4 --> Transfer to chair/commode  -ES 4 --> Transfer to chair/commode  -CR      Row Name 06/11/24 0800          How much help from another person do you currently need...    Turning from your back to your side while in flat bed without using bedrails? 3  -JH     Moving from lying on back to sitting on the side of a flat bed without bedrails? 3  -JH     Moving to and from a bed to a chair (including a wheelchair)? 3  -JH     Standing up from a chair using your arms (e.g., wheelchair, bedside chair)? 2  -JH     Climbing 3-5 steps with a railing? 2  -JH     To walk in hospital room? 3  -JH     AM-PAC 6 Clicks Score (PT) 16  -JH     Highest Level of Mobility Goal 5 --> Static standing  -JH               User Key  (r) = Recorded By, (t) = Taken By, (c) = Cosigned By      Initials Name Provider Type    CR Reyes, Carmela, PT Physical Therapist    Kori Wayne, RN Registered Nurse    KRISTOFER Berman,  Yuli, RN Registered Nurse                    Occupational Therapy Education       Title: PT OT SLP Therapies (In Progress)       Topic: Occupational Therapy (Done)       Point: ADL training (Done)       Description:   Instruct learner(s) on proper safety adaptation and remediation techniques during self care or transfers.   Instruct in proper use of assistive devices.                  Learning Progress Summary             Patient Eager, E, VU by TN at 6/10/2024 2214                         Point: Home exercise program (Done)       Description:   Instruct learner(s) on appropriate technique for monitoring, assisting and/or progressing therapeutic exercises/activities.                  Learning Progress Summary             Patient Eager, E, VU by TN at 6/10/2024 2214                         Point: Precautions (Done)       Description:   Instruct learner(s) on prescribed precautions during self-care and functional transfers.                  Learning Progress Summary             Patient Acceptance, E,TB, VU by SR at 6/11/2024 1601    Eager, E, VU by TN at 6/10/2024 2214                         Point: Body mechanics (Done)       Description:   Instruct learner(s) on proper positioning and spine alignment during self-care, functional mobility activities and/or exercises.                  Learning Progress Summary             Patient Acceptance, E,TB, VU by SR at 6/11/2024 1601    Eager, E, VU by TN at 6/10/2024 2214                                         User Key       Initials Effective Dates Name Provider Type Discipline     06/16/21 -  Lary Yancey OT Occupational Therapist OT    TN 06/21/23 -  Tony Rodrigues, JULIO Registered Nurse Nurse                  OT Recommendation and Plan  Planned Therapy Interventions (OT): activity tolerance training, BADL retraining, IADL retraining, functional balance retraining, neuromuscular control/coordination retraining, ROM/therapeutic exercise, transfer/mobility  retraining, strengthening exercise, occupation/activity based interventions  Therapy Frequency (OT): 3 times/wk  Plan of Care Review  Plan of Care Reviewed With: patient  Outcome Evaluation: Deann Warren is a 83 y.o. female with a CMH of thrombocytopenia, sick sinus syndrome, atrial fibrillation, LBBB, cardiomyopathy with a EF of 35%, CAD, Pacemaker (placed on 6/3/24), HLD, ESRD (left fistula) who presented to [facility] on 6/10/2024 with syncope.  Patient was in route to receive dialysis, patient states that when she was walking into the door she felt weak and unsteady, patient to the seat is out of the office.  Pt live at home with her grandson (24 hour assist) as well as assist from her son and a close friend.  She typically walks using rwx.  This date pt reqiured min to mod asisst for bed mobility and transfers and depenent for lower body ADLs.  Recommend SNF at discharge.     Time Calculation:         Time Calculation- OT       Row Name 06/11/24 1602             Time Calculation- OT    OT Start Time 0924  -SR      OT Stop Time 0941  -SR      OT Time Calculation (min) 17 min  -SR      Total Timed Code Minutes- OT 0 minute(s)  -SR      OT Received On 06/11/24  -SR      OT - Next Appointment 06/13/24  -SR                User Key  (r) = Recorded By, (t) = Taken By, (c) = Cosigned By      Initials Name Provider Type    SR Lary Yancey OT Occupational Therapist                  Therapy Charges for Today       Code Description Service Date Service Provider Modifiers Qty    [id number]  OT EVAL MOD COMPLEXITY 4 6/11/2024 Lary Yancey OT GO 1                 Lary Yancey OT  6/11/2024    Electronically signed by Lary Yancey OT at 06/11/24 1603       Lary Yancey OT at 06/11/24 1602          Goal Outcome Evaluation:  Plan of Care Reviewed With: patient           Outcome Evaluation: Deann Warren is a 83 y.o. female with a CMH of thrombocytopenia, sick  sinus syndrome, atrial fibrillation, LBBB, cardiomyopathy with a EF of 35%, CAD, Pacemaker (placed on 6/3/24), HLD, ESRD (left fistula) who presented to [facility] on 6/10/2024 with syncope.  Patient was in route to receive dialysis, patient states that when she was walking into the door she felt weak and unsteady, patient to the seat is out of the office.  Pt live at home with her grandson (24 hour assist) as well as assist from her son and a close friend.  She typically walks using rwx.  This date pt reqiured min to mod asisst for bed mobility and transfers and depenent for lower body ADLs.  Recommend SNF at discharge.      Anticipated Discharge Disposition (OT): skilled nursing facility                          Electronically signed by Lary Yancey, OT at 06/11/24 1602          Speech Language Pathology Notes (all)        Brayan Aleman SLP at 06/12/24 1300          Goal Outcome Evaluation:     Clinical swallow evaluation completed. Upon entry, pt was supine in bed, which was brought up at a 90 degree angle prior to being given PO.  Family at bedside.  Oral [facility] examination revealed facial symmetry and no abnormality. Pt reported regular and thins is her baseline diet and denied swallowing difficulty.  Pt self fed all trials.  Pt has been cleared by GI for a clear liquid diet, therefore only thin liquid (water) trials were provided and assessed.  No difficulty using cup or straw.  No labial spillage occurred.  Oral transit on thin was unremarkable. Digital palpation suggests timely swallow.  After the swallow, pt had clear vocal quality w/ no cough, throat clear, or any sign of respiratory distress. O2 remained at 100% during and after the swallow of all trials provided.  Per findings, pt presents w/ functional oral and pharyngeal phase of swallow on thin liquids.  It is recommended pt remain on clear liquid diet per GI order.  ST will continue to complete meal assessment(s) to ensure  tolerance and safety of diet per GI clearance and closely monitor for possible overt s/s of aspiration during PO intake.  If pt begins to show increased overt s/s of aspiration or difficulty during PO, VFSS is available if indicated to objectively assess swallow function.     Recommendations:     - Remain on clear liquid diet per GI order     - Safe swallow strategies: HOB at a 90 degree angle, slow rate of intake, small bites/sips, alternate liquid and solid    - ST will continue to complete meal assessment(s) to ensure tolerance and safety of diet per GI clearance and closely monitor for possible overt s/s of aspiration during PO intake.    - Discussed plan w/ pt and family.  All in agreement w/ plan.  -PF                          SLP Swallowing Diagnosis: functional pharyngeal phase (24 1300)               Electronically signed by Brayan Aleman SLP at 24 1356       Brayan Aleman SLP at 24 1300          [facility] - Speech Language Pathology   Swallow Initial Evaluation HYUN Neto     Patient Name: Deann Warren  : 1940  MRN: [id number]  Today's Date: 2024               Admit Date: 6/10/2024    Visit Dx:     ICD-10-CM ICD-9-CM   1. Syncope, unspecified syncope type  R55 780.2     Patient Active Problem List   Diagnosis    Syncope and collapse    Dependence on renal dialysis    Type 2 diabetes mellitus with diabetic chronic kidney disease    Anxiety    Chronic gouty arthritis    Allergic conjunctivitis and rhinitis    Essential hypertension    ESRD (end stage renal disease)    Anemia due to chronic kidney disease, on chronic dialysis    History of shingles    Hyperlipidemia    Vitamin D deficiency    Ischemic cardiomyopathy    Syncopal episodes    Permanent atrial fibrillation    Syncope    Presence of cardiac pacemaker    Coronary artery disease involving native coronary artery of native heart without angina pectoris     Past Medical History:   Diagnosis Date    Allergic  conjunctivitis and rhinitis 11/06/2014    Anemia due to chronic kidney disease, on chronic dialysis 08/25/2020    Anxiety 07/30/2012    Bradycardia by electrocardiogram 06/03/2024    Cardiomyopathy, dilated 04/18/2024    Chronic gouty arthritis 11/06/2014    Coronary artery disease involving native coronary artery of native heart without angina pectoris 04/24/2024    Dependence on renal dialysis 07/01/2022    ESRD (end stage renal disease) 08/25/2020    Essential hypertension 09/16/2015    History of shingles 01/18/2022    Hyperlipidemia 04/08/2024    Ischemic cardiomyopathy 04/18/2024    Paroxysmal atrial fibrillation 07/01/2022    Presence of cardiac pacemaker 06/03/2024    Sick sinus syndrome 06/03/2024    Type 2 diabetes mellitus with diabetic chronic kidney disease 07/01/2022    Vitamin D deficiency 07/22/2021     SLP Recommendation and Plan  SLP Swallowing Diagnosis: functional pharyngeal phase (06/12/24 1300)  SLP Diet Recommendation:  (remain on clear liquid per GI order) (06/12/24 1300)  Recommended Precautions and Strategies: upright posture during/after eating, small bites of food and sips of liquid (06/12/24 1300)  SLP Rec. for Method of Medication Administration: as tolerated (06/12/24 1300)  Monitor for Signs of Aspiration: yes, notify SLP if any concerns (06/12/24 1300)  Recommended Diagnostics: reassess via clinical swallow evaluation (06/12/24 1300)  Swallow Criteria for Skilled Therapeutic Interventions Met: demonstrates skilled criteria (06/12/24 1300)  Rehab Potential/Prognosis, Swallowing: good, to achieve stated therapy goals (06/12/24 1300)  Therapy Frequency (Swallow): 3 days per week, 4 days per week (06/12/24 1300)  Predicted Duration Therapy Intervention (Days): until discharge (06/12/24 1300)  Oral Care Recommendations: Oral Care BID/PRN, Oral Care before breakfast, after meals and PRN (06/12/24 1300)     SWALLOW EVALUATION (Last 72 Hours)       SLP Adult Swallow Evaluation       Row Name  06/12/24 1300       Rehab Evaluation    Document Type evaluation  -PF    Subjective Information no complaints  -PF    Patient Observations alert;cooperative  -PF    Patient Effort good  -PF    Symptoms Noted During/After Treatment none  -PF       General Information    Patient Profile Reviewed yes  -PF    Pertinent History Of Current Problem Deann Warren is an 83 y.o. female, who presented to [facility] ED on 6/10/2024 w/ complaints of genearlized weakness after she had a syncopal episode at dialysis that morning. She stated she did not recall losing consciousness.  PMHx of ESRD, dependence on renal dialysis, A-fib, HTN, DM, and CAD.  CXR 6/10 showed left basilar atelectasis or infiltrate.  Received ST orders for dysphagia evaluation d/t concern for aspiration.  -PF    Precautions/Limitations, Vision WFL with corrective lenses  -PF    Precautions/Limitations, Hearing WFL;for purposes of eval  -PF    Prior Level of Function-Swallowing no diet consistency restrictions  -PF    Plans/Goals Discussed with patient;family  -PF    Barriers to Rehab none identified  -PF       Oral Motor Structure and Function    Dentition Assessment natural, present and adequate  -PF       Oral Musculature and Cranial Nerve Assessment    Oral Motor General Assessment generalized oral motor weakness  -PF       General Eating/Swallowing Observations    Respiratory Support Currently in Use room air  -PF    Eating/Swallowing Skills self-fed  -PF    Positioning During Eating upright 90 degree;upright in bed  -PF    Utensils Used cup;straw  -PF    Consistencies Trialed thin liquids  -PF       Clinical Swallow Eval    Clinical Swallow Evaluation Summary Clinical swallow evaluation completed. Upon entry, pt was supine in bed, which was brought up at a 90 degree angle prior to being given PO.  Family at bedside.  Oral [facility] examination revealed facial symmetry and no abnormality. Pt reported regular and thins is her baseline diet and denied swallowing  difficulty.  Pt self fed all trials.  Pt has been cleared by GI for a clear liquid diet, therefore only thin liquid (water) trials were provided and assessed.  No difficulty using cup or straw.  No labial spillage occurred.  Oral transit on thin was unremarkable. Digital palpation suggests timely swallow.  After the swallow, pt had clear vocal quality w/ no cough, throat clear, or any sign of respiratory distress. O2 remained at 100% during and after the swallow of all trials provided.  Per findings, pt presents w/ functional oral and pharyngeal phase of swallow on thin liquids.  It is recommended pt remain on clear liquid diet per GI order.  ST will continue to complete meal assessment(s) to ensure tolerance and safety of diet per GI clearance and closely monitor for possible overt s/s of aspiration during PO intake.  If pt begins to show increased overt s/s of aspiration or difficulty during PO, VFSS is available if indicated to objectively assess swallow function.     Recommendations:     - Remain on clear liquid diet per GI order     - Safe swallow strategies: HOB at a 90 degree angle, slow rate of intake, small bites/sips, alternate liquid and solid    - ST will continue to complete meal assessment(s) to ensure tolerance and safety of diet per GI clearance and closely monitor for possible overt s/s of aspiration during PO intake.    - Discussed plan w/ pt and family.  All in agreement w/ plan.  -PF       SLP Evaluation Clinical Impression    SLP Swallowing Diagnosis functional pharyngeal phase  -PF    Functional Impact no impact on function  -PF    Rehab Potential/Prognosis, Swallowing good, to achieve stated therapy goals  -PF    Swallow Criteria for Skilled Therapeutic Interventions Met demonstrates skilled criteria  -PF       Recommendations    Therapy Frequency (Swallow) 3 days per week;4 days per week  -PF    Predicted Duration Therapy Intervention (Days) until discharge  -PF    SLP Diet Recommendation --   remain on clear liquid per GI order  -PF    Recommended Diagnostics reassess via clinical swallow evaluation  -PF    Recommended Precautions and Strategies upright posture during/after eating;small bites of food and sips of liquid  -PF    Oral Care Recommendations Oral Care BID/PRN;Oral Care before breakfast, after meals and PRN  -PF    SLP Rec. for Method of Medication Administration as tolerated  -PF    Monitor for Signs of Aspiration yes;notify SLP if any concerns  -PF       Swallow Goals (SLP)    Swallow LTGs Swallow Long Term Goal (free text)  -PF    Swallow STGs diet tolerance goal selection (SLP)  -PF    Diet Tolerance Goal Selection (SLP) Swallow Short Term Goal 1;Swallow Short Term Goal 2  -PF       (LTG) Swallow    (LTG) Swallow Pt will maximize swallow function for least restrictive PO diet, exhibiting no complication associated with dysphagia, adequate PO intake, and demonstrating independent use of swallow compensation.  -PF    [place] (Swallow Long Term Goal) with minimal cues (75-90% accuracy)  -PF    Time Frame (Swallow Long Term Goal) by discharge  -PF    Progress/Outcomes (Swallow Long Term Goal) new goal  -PF       (STG) Swallow 1    (STG) Swallow 1 The patient will participate in a full meal assessment to determine safety and adequacy of recommended diet, independent use of safe swallow compensations, and additional goals/recommendations to follow.  -PF    [place] (Swallow Short Term Goal 1) with minimal cues (75-90% accuracy)  -PF    Time Frame (Swallow Short Term Goal 1) 1 week  -PF    Progress/Outcomes (Swallow Short Term Goal 1) new goal  -PF              User Key  (r) = Recorded By, (t) = Taken By, (c) = Cosigned By      Initials Name Effective Dates    PF Brayan Aleman SLP 05/08/23 -                     EDUCATION  The patient has been educated in the following areas:   Dysphagia (Swallowing Impairment) Oral Care/Hydration.        SLP GOALS       Row Name 06/12/24 1300        (LTG) Swallow    (LTG) Swallow Pt will maximize swallow function for least restrictive PO diet, exhibiting no complication associated with dysphagia, adequate PO intake, and demonstrating independent use of swallow compensation.  -PF    Day (Swallow Long Term Goal) with minimal cues (75-90% accuracy)  -PF    Time Frame (Swallow Long Term Goal) by discharge  -PF    Progress/Outcomes (Swallow Long Term Goal) new goal  -PF       (STG) Swallow 1    (STG) Swallow 1 The patient will participate in a full meal assessment to determine safety and adequacy of recommended diet, independent use of safe swallow compensations, and additional goals/recommendations to follow.  -PF    Day (Swallow Short Term Goal 1) with minimal cues (75-90% accuracy)  -PF    Time Frame (Swallow Short Term Goal 1) 1 week  -PF    Progress/Outcomes (Swallow Short Term Goal 1) new goal  -PF              User Key  (r) = Recorded By, (t) = Taken By, (c) = Cosigned By      Initials Name Provider Type    PF Fakto, [place], SLP Speech and Language Pathologist             ANGEL Ramirez  6/12/2024    Electronically signed by Brayan Aleman SLP at 06/12/24 1271

## 2024-06-13 NOTE — OP NOTE
ESOPHAGOGASTRODUODENOSCOPY WITH ULTRASOUND AND FINE NEEDLE ASPIRATION Procedure Report    Patient Name:  Deann Warren  YOB: 1940    Date of Surgery:  6/13/2024     Pre-Op Diagnosis:  Pancreatic mass [K86.89]       Post-Op Diagnosis Codes:     * Pancreatic mass [K86.89]      Procedure/CPT® Codes:      Procedure(s):  ESOPHAGOGASTRODUODENOSCOPY WITH ULTRASOUND AND FINE NEEDLE ASPIRATION of pancreatic cyst and forcep biopsy x2 areas    Staff:  Surgeon(s):  Cesia Hyde MD      Anesthesia: Monitored Anesthesia Care    Description of Procedure:  A description of the procedure as well as risks, benefits and alternative methods were explained to the patient who voiced understanding and signed the corresponding consent form. A physical exam was performed and vital signs were monitored throughout the procedure.    Initially Pentax radial scope was advanced under direct realization from oropharynx, esophagus stomach and the second part duodenum.  Limited evaluation of the esophageal gastric antral mucosa looks normal.    Scanning of the body and the tail of the pancreas showed small cystic lesion at the body and the tail area that measured around 9 mm to a centimeter and otherwise the tail close to 7 to 8 mm.  Scanning of the head of the pancreas did show a large multilobulated septated cystic lesion at the head of the pancreas total measures close to 3.8 cm at least, no mural nodule was noticed no surrounding mass was seen.  Minimal dilation of the pancreas duct noted around that area 3.4 mm no pancreas divisum was noticed.  CBD was also prominent around that area close to 6.5 mm at the very distal part CBD was around 4 to 5 mm.  No obvious mass was noticed around that area.  At this stage, linear array scope was advanced and pancreatic head a large cyst was brought into the picture using a 22-gauge needle cyst was aspirated fluid was sent for CEA, amylase lipase and glucose.  Patient tolerated procedure  very well no immediate complication was noticed.  Subsequent to that, EGD scope was advanced under regularization, there is a small submucosal lesion noticed in the esophagus which likely represent either a lipoma or leiomyoma biopsy was performed from that.  Severe antral gastritis was noticed biopsy was performed with a moderate-sized hiatal hernia was noticed on slow withdrawal of the scope.  Duodenal mucosa looks normal.    Impression:  1.  Multiple pancreatic cystic lesions in body tail and head of pancreas,  the largest one in the head of the pancreas area measures 3.6cm.  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  No significant alarming feature of mural nodule, focal mass, surrounding lymph node or any focal severe dilation of pancreatic duct was noticed.  PD was prominent at head of the pancreas close to 3.4 to 3.6 mm without any pancreas divisum.  Common bile duct was also slightly prominent above the cyst close to 6.5 to 7 mm, distal to the cyst, both CBD and PD were normal.  Normal pancreatic parenchymal EUS without any finding of chronic pancreatitis.  2.  There was a focal nodule in the mid part of the esophagus biopsy was performed.  3.  Severe erosive gastritis along with changes suggestive of chronic gastritis biopsy was performed.  4.  Hiatal hernia.    Recommendations:  Follow the CEA amylase lipase and glucose level.  Given the large size of cystic lesion at the head of the pancreas more than 3 cm, this is significantly high risk for malignant transformation and may need a close monitoring and surveillance with the imaging study if CEA turned out to be markedly elevated from aspirated fluid.  Okay to restart anticoagulation after 48 hours.  Start clear liquids and advance to low-fat diet.  Add PPI.  Follow the biopsy result        Cesia Hyde MD     Date: 6/13/2024    Time: 11:32 EDT

## 2024-06-13 NOTE — ANESTHESIA PREPROCEDURE EVALUATION
Anesthesia Evaluation     Patient summary reviewed and Nursing notes reviewed   NPO Solid Status: > 8 hours  NPO Liquid Status: > 8 hours           Airway   Mallampati: II  TM distance: >3 FB  Neck ROM: full  No difficulty expected  Dental - normal exam     Pulmonary    Cardiovascular     (+) pacemaker pacemaker, hypertension, CAD, dysrhythmias, hyperlipidemia      Neuro/Psych  (+) syncope  GI/Hepatic/Renal/Endo    (+) renal disease- ESRD, diabetes mellitus    Musculoskeletal     Abdominal    Substance History      OB/GYN          Other   arthritis,     ROS/Med Hx Other: Left ventricular systolic function is severely decreased. Left ventricular ejection fraction appears to be 21 - 25%.  ·  The left ventricular cavity is dilated.  ·  The left atrial cavity is severely dilated.  ·  Left atrial volume is severely increased.  ·  The right atrial cavity is severely  dilated.  ·  Moderate to severe mitral valve regurgitation is present.  ·  Moderate tricuspid valve regurgitation is present.  ·  Estimated right ventricular systolic pressure from tricuspid regurgitation is moderately elevated (45-55 mmHg).  ·  Moderate to severe pulmonary hypertension is present.                Anesthesia Plan    ASA 4     MAC and general     intravenous induction     Anesthetic plan, risks, benefits, and alternatives have been provided, discussed and informed consent has been obtained with: patient.    Plan discussed with CRNA.    CODE STATUS:    Level Of Support Discussed With: Patient  Code Status (Patient has no pulse and is not breathing): CPR (Attempt to Resuscitate)  Medical Interventions (Patient has pulse or is breathing): Full Support

## 2024-06-13 NOTE — PROGRESS NOTES
"Pharmacy Antimicrobial Dosing Service    Subjective:  Deann Warren is a 83 y.o.female admitted with syncope. Pharmacy has been consulted to dose Vancomycin for possible bacteremia.    PMH: ESRD on HD      Assessment/Plan    1. Day #1 Vancomycin: Pulse dosing d/t HD status. Ordered once dose of vancomycin 1250 mg (20 mg/kg) IV x 1. Pt has HD on MWF, will pass off to clinical pharmacist to order a level after HD tomorrow.     2. Day #2 Ceftriaxone: 1 gram IV q24h for HD.    Will continue to monitor drug levels, renal function, culture and sensitivities, and patient clinical status.       Objective:  Relevant clinical data and objective history reviewed:  162.6 cm (64\")   56.7 kg (125 lb)   Ideal body weight: 54.7 kg (120 lb 9.5 oz)  Adjusted ideal body weight: 55.5 kg (122 lb 5.7 oz)  Body mass index is 21.46 kg/m².        Results from last 7 days   Lab Units 06/13/24  0022 06/12/24  0220 06/11/24  0352   CREATININE mg/dL 2.46* 3.44* 3.33*     Estimated Creatinine Clearance: 15.5 mL/min (A) (by C-G formula based on SCr of 2.46 mg/dL (H)).  I/O last 3 completed shifts:  In: 1440 [P.O.:1440]  Out: 1400 [Urine:200]    Results from last 7 days   Lab Units 06/13/24  0022 06/12/24  0220 06/11/24  0352   WBC 10*3/mm3 1.90* 1.82* 2.05*     Temperature    06/13/24 0929 06/13/24 1155 06/13/24 1519   Temp: 97.6 °F (36.4 °C) 96.5 °F (35.8 °C) 97.3 °F (36.3 °C)     Baseline culture/source/susceptibility:  Microbiology Results (last 10 days)       Procedure Component Value - Date/Time    Blood Culture - Blood, Blood, PICC Line [[id number]]  (Abnormal) Collected: 06/11/24 0511    Lab Status: Preliminary result Specimen: Blood, PICC Line Updated: 06/13/24 1712     Blood Culture Abnormal Stain     Gram Stain Anaerobic Bottle Gram positive cocci in clusters    Blood Culture ID, PCR - Blood, Blood, PICC Line [[id number]]  (Abnormal) Collected: 06/11/24 6362    Lab Status: Final result Specimen: Blood, PICC Line Updated: 06/13/24 1556     " BCID, PCR Staph spp, not aureus or lugdunensis. Identification by BCID2 PCR.     BOTTLE TYPE Anaerobic Bottle    Blood Culture - Blood, Arm, Right [[id number]]  (Normal) Collected: 06/11/24 1504    Lab Status: Preliminary result Specimen: Blood from Arm, Right Updated: 06/13/24 1531     Blood Culture No growth at 2 days    COVID-19, FLU A/B, RSV PCR 1 HR TAT - Swab, Nasopharynx [[id number]]  (Normal) Collected: 06/10/24 1821    Lab Status: Final result Specimen: Swab from Nasopharynx Updated: 06/10/24 1906     COVID19 Not Detected     Influenza A PCR Not Detected     Influenza B PCR Not Detected     RSV, PCR Not Detected    Narrative:      Fact sheet for providers: https://www.fda.gov/media/971603/download    Fact sheet for patients: https://www.fda.gov/media/414558/download    Test performed by PCR.            Holly Dumont, Olivia  06/13/24 17:50 EDT

## 2024-06-13 NOTE — PROGRESS NOTES
"                                                                                                                                      Nephrology  Progress Note                                        [facility]    Patient Identification    Name: Deann Warren  Age: 83 y.o.  Sex: female  :  1940  MRN: [id number]      DATE OF SERVICE:  2024        Subective    Follow-up ESRD  No chest pain, breathing better  Had about 2-hour dialysis yesterday  Had some mental status change after morphine injection but improved after Narcan.     Objective   Scheduled Meds:[Held by provider] apixaban, 2.5 mg, Oral, Q12H  atorvastatin, 40 mg, Oral, Daily  cefTRIAXone, 1,000 mg, Intravenous, Q24H  filgrastim (NEUPOGEN) injection, 300 mcg, Subcutaneous, Once  [Held by provider] furosemide, 20 mg, Intravenous, Q12H  metroNIDAZOLE, 500 mg, Oral, Q8H  Naloxone HCl, 0.4 mg, Intravenous, Once  sodium chloride, 10 mL, Intravenous, Q12H          Continuous Infusions:       PRN Meds:  acetaminophen    senna-docusate sodium **AND** polyethylene glycol **AND** bisacodyl **AND** bisacodyl    Calcium Replacement - Follow Nurse / BPA Driven Protocol    dextrose    dextrose    glucagon (human recombinant)    Magnesium Standard Dose Replacement - Follow Nurse / BPA Driven Protocol    Phosphorus Replacement - Follow Nurse / BPA Driven Protocol    Potassium Replacement - Follow Nurse / BPA Driven Protocol    [COMPLETED] Insert Peripheral IV **AND** sodium chloride    sodium chloride    sodium chloride     Exam:  /53 (BP Location: Right arm, Patient Position: Lying)   Pulse 71   Temp 96.5 °F (35.8 °C) (Axillary)   Resp 24   Ht 162.6 cm (64\")   Wt 56.7 kg (125 lb)   SpO2 100%   BMI 21.46 kg/m²     Intake/Output last 3 shifts:  I/O last 3 completed shifts:  In: 1440 [P.O.:1440]  Out: 1400 [Urine:200]    Intake/Output this shift:  I/O this shift:  In: 100 [I.V.:100]  Out: -        Data Review:  All labs (24hrs): "   Recent Results (from the past 24 hour(s))   Haptoglobin    Collection Time: 06/12/24  1:49 PM    Specimen: Blood   Result Value Ref Range    Haptoglobin 75 30 - 200 mg/dL   POC Glucose Once    Collection Time: 06/12/24  4:36 PM    Specimen: Blood   Result Value Ref Range    Glucose 108 (H) 70 - 105 mg/dL   Adult Transthoracic Echo Complete W/ Cont if Necessary Per Protocol    Collection Time: 06/12/24  5:53 PM   Result Value Ref Range    EF(MOD-bp) 25.4 %    LV GLOBAL STRAIN  -9.5 %    LVIDd 4.5 cm    LVIDs 3.4 cm    IVSd 1.30 cm    LVPWd 1.20 cm    FS 24.4 %    IVS/LVPW 1.08 cm    ESV(cubed) 39.3 ml    LV Sys Vol (BSA corrected) 43.5 cm2    EDV(cubed) 91.1 ml    LV [place] Vol (BSA corrected) 59.0 cm2    LV mass(C)d 210.2 grams    LVOT area 3.1 cm2    LVOT diam 2.00 cm    EDV(MOD-sp2) 102.0 ml    EDV(MOD-sp4) 94.5 ml    ESV(MOD-sp2) 78.8 ml    ESV(MOD-sp4) 69.7 ml    SV(MOD-sp2) 23.2 ml    SV(MOD-sp4) 24.8 ml    SVi(MOD-SP2) 14.5 ml/m2    SVi(MOD-SP4) 15.5 ml/m2    SVi (LVOT) 52.0 ml/m2    EF(MOD-sp2) 22.7 %    EF(MOD-sp4) 26.2 %    MV E max felipe 87.5 cm/sec    MV A max felipe 57.6 cm/sec    MV dec time 0.20 sec    MV E/A 1.52     Pulm A Revs Dur 0.10 sec    MV A dur 0.10 sec    Med Peak E' Felipe 3.6 cm/sec    Lat Peak E' Felipe 9.1 cm/sec    TR max felipe 345.0 cm/sec    Avg E/e' ratio 13.78     SV(LVOT) 83.3 ml    TAPSE (>1.6) 1.35 cm    RV S' 9.9 cm/sec    LA dimension (2D)  4.3 cm    Pulm Sys Felipe 43.0 cm/sec    Pulm [place] Felipe 60.9 cm/sec    Pulm S/D 0.71     Pulm A Revs Felipe 18.4 cm/sec    LV V1 max 147.0 cm/sec    LV V1 max PG 8.6 mmHg    LV V1 mean PG 4.0 mmHg    LV V1 VTI 26.5 cm    Ao pk felipe 185.0 cm/sec    Ao max PG 13.7 mmHg    Ao mean PG 7.0 mmHg    Ao V2 VTI 37.3 cm    EFRAIN(I,D) 2.23 cm2    AI P1/2t 682.7 msec    MV max PG 5.0 mmHg    MV mean PG 3.0 mmHg    MV V2 VTI 29.6 cm    MV P1/2t 117.2 msec    MVA(P1/2t) 1.88 cm2    MVA(VTI) 2.8 cm2    MV dec slope 290.0 cm/sec2    MR max felipe 413.0 cm/sec    MR max PG 68.2 mmHg     TR max PG 47.6 mmHg    RV V1 max PG 2.02 mmHg    RV V1 max 71.1 cm/sec    RV V1 VTI 15.2 cm    PA V2 max 106.0 cm/sec    PA acc time 0.11 sec    Sinus 3.3 cm    STJ 2.30 cm    Dimensionless Index 0.41 (DI)    RVSP(TR) 55 mmHg   Reticulocytes    Collection Time: 06/12/24  6:02 PM    Specimen: Blood   Result Value Ref Range    Reticulocyte % 1.55 0.70 - 1.90 %    Reticulocyte Absolute 0.0411 0.0200 - 0.1300 10*6/mm3   Fibrinogen    Collection Time: 06/12/24  6:02 PM    Specimen: Blood   Result Value Ref Range    Fibrinogen 241 210 - 450 mg/dL   POC Glucose Once    Collection Time: 06/12/24  7:48 PM    Specimen: Blood   Result Value Ref Range    Glucose 89 70 - 105 mg/dL   Comprehensive Metabolic Panel    Collection Time: 06/13/24 12:22 AM    Specimen: Arm, Right; Blood   Result Value Ref Range    Glucose 69 65 - 99 mg/dL    BUN 34 (H) 8 - 23 mg/dL    Creatinine 2.46 (H) 0.57 - 1.00 mg/dL    Sodium 137 136 - 145 mmol/L    Potassium 4.2 3.5 - 5.2 mmol/L    Chloride 103 98 - 107 mmol/L    CO2 24.4 22.0 - 29.0 mmol/L    Calcium 7.8 (L) 8.6 - 10.5 mg/dL    Total Protein 5.1 (L) 6.0 - 8.5 g/dL    Albumin 3.2 (L) 3.5 - 5.2 g/dL    ALT (SGPT) 35 (H) 1 - 33 U/L    AST (SGOT) 131 (H) 1 - 32 U/L    Alkaline Phosphatase 57 39 - 117 U/L    Total Bilirubin 0.7 0.0 - 1.2 mg/dL    Globulin 1.9 gm/dL    A/G Ratio 1.7 g/dL    BUN/Creatinine Ratio 13.8 7.0 - 25.0    Anion Gap 9.6 5.0 - 15.0 mmol/L    eGFR 19.0 (L) >60.0 mL/min/1.73   Protime-INR    Collection Time: 06/13/24 12:22 AM    Specimen: Arm, Right; Blood   Result Value Ref Range    Protime 14.8 (H) 9.6 - 11.7 Seconds    INR 1.39 (H) 0.93 - 1.10   Lipase    Collection Time: 06/13/24 12:22 AM    Specimen: Arm, Right; Blood   Result Value Ref Range    Lipase 17 13 - 60 U/L   C-reactive Protein    Collection Time: 06/13/24 12:22 AM    Specimen: Arm, Right; Blood   Result Value Ref Range    C-Reactive Protein <0.30 0.00 - 0.50 mg/dL   CBC Auto Differential    Collection Time:  06/13/24 12:22 AM    Specimen: Arm, Right; Blood   Result Value Ref Range    WBC 1.90 (C) 3.40 - 10.80 10*3/mm3    RBC 2.88 (L) 3.77 - 5.28 10*6/mm3    Hemoglobin 8.5 (L) 12.0 - 15.9 g/dL    Hematocrit 27.8 (L) 34.0 - 46.6 %    MCV 96.5 79.0 - 97.0 fL    MCH 29.5 26.6 - 33.0 pg    MCHC 30.6 (L) 31.5 - 35.7 g/dL    RDW 15.9 (H) 12.3 - 15.4 %    RDW-SD 55.6 (H) 37.0 - 54.0 fl    MPV 10.5 6.0 - 12.0 fL    Platelets 71 (L) 140 - 450 10*3/mm3    Neutrophil % 53.8 42.7 - 76.0 %    Lymphocyte % 28.9 19.6 - 45.3 %    Monocyte % 10.5 5.0 - 12.0 %    Eosinophil % 4.7 0.3 - 6.2 %    Basophil % 1.6 (H) 0.0 - 1.5 %    Immature Grans % 0.5 0.0 - 0.5 %    Neutrophils, Absolute 1.02 (L) 1.70 - 7.00 10*3/mm3    Lymphocytes, Absolute 0.55 (L) 0.70 - 3.10 10*3/mm3    Monocytes, Absolute 0.20 0.10 - 0.90 10*3/mm3    Eosinophils, Absolute 0.09 0.00 - 0.40 10*3/mm3    Basophils, Absolute 0.03 0.00 - 0.20 10*3/mm3    Immature Grans, Absolute 0.01 0.00 - 0.05 10*3/mm3    nRBC 0.0 0.0 - 0.2 /100 WBC   POC Glucose Once    Collection Time: 06/13/24  1:07 AM    Specimen: Blood   Result Value Ref Range    Glucose 82 70 - 105 mg/dL   POC Glucose Once    Collection Time: 06/13/24  9:03 AM    Specimen: Blood   Result Value Ref Range    Glucose 69 (L) 70 - 105 mg/dL   POC Glucose Once    Collection Time: 06/13/24 12:05 PM    Specimen: Blood   Result Value Ref Range    Glucose 65 (L) 70 - 105 mg/dL          Imaging:  US Liver    Result Date: 6/12/2024  Impression: 1. Liver parenchyma is normal. 2. Trace ascitic fluid adjacent to the liver. There is also a partially imaged right pleural effusion. Electronically Signed: Donaldo Cantu MD  6/12/2024 12:27 PM EDT  Workstation ID: [id number]    CT Abdomen Pelvis Without Contrast    Result Date: 6/11/2024  Impression: 1.Volume overload/CHF features as detailed above. Superimposed left lower lobe pneumonia is not excluded. Correlate with symptoms. 2.There is a nonspecific 3.4 cm cystic mass involving the  pancreatic head. Follow-up dedicated pancreatic MRI with and without contrast recommended. 3.Increased density urine consistent with hematuria. 4.Other incidental findings as detailed above. Electronically Signed: Flip Lee MD  6/11/2024 4:32 PM EDT  Workstation ID: [id number]    CT Head Without Contrast    Result Date: 6/11/2024  Impression: No acute intracranial pathology. Electronically Signed: Tristian Boone MD  6/11/2024 4:27 PM EDT  Workstation ID: [id number]    XR Chest 1 View    Result Date: 6/10/2024  Impression: 1.Cardiomegaly with pulmonary vascular congestion. 2.Left basilar atelectasis or infiltrate. Electronically Signed: Armando Gonzalez MD  6/10/2024 10:43 AM EDT  Workstation ID: [id number]     Assessment/Plan:     Syncope    Type 2 diabetes mellitus with diabetic chronic kidney disease    Essential hypertension    ESRD (end stage renal disease)    Anemia due to chronic kidney disease, on chronic dialysis    Hyperlipidemia    Ischemic cardiomyopathy    Permanent atrial fibrillation    Presence of cardiac pacemaker    Coronary artery disease involving native coronary artery of native heart without angina pectoris    Pancreatic mass        End-stage renal disease-HD Mondays and Fridays  History of recent pacemaker   Syncope   Sick sinus syndrome   History of thrombocytopenia  Pancytopenia-per hematology       Pt gets HD 2x a week, Monday and Friday  Dialysis tomorrow    Sanchez Steele MD

## 2024-06-14 ENCOUNTER — INPATIENT HOSPITAL (OUTPATIENT)
Dept: URBAN - METROPOLITAN AREA HOSPITAL 84 | Facility: HOSPITAL | Age: 84
End: 2024-06-14
Payer: MEDICAID

## 2024-06-14 DIAGNOSIS — D61.818 OTHER PANCYTOPENIA: ICD-10-CM

## 2024-06-14 DIAGNOSIS — R97.8 OTHER ABNORMAL TUMOR MARKERS: ICD-10-CM

## 2024-06-14 DIAGNOSIS — K86.2 CYST OF PANCREAS: ICD-10-CM

## 2024-06-14 DIAGNOSIS — R74.01 ELEVATION OF LEVELS OF LIVER TRANSAMINASE LEVELS: ICD-10-CM

## 2024-06-14 LAB
ADV 40+41 DNA STL QL NAA+NON-PROBE: NOT DETECTED
ALBUMIN SERPL-MCNC: 3.1 G/DL (ref 3.5–5.2)
ANA SER QL: NEGATIVE
ANION GAP SERPL CALCULATED.3IONS-SCNC: 8.8 MMOL/L (ref 5–15)
ASTRO TYP 1-8 RNA STL QL NAA+NON-PROBE: NOT DETECTED
BACTERIA SPEC AEROBE CULT: ABNORMAL
BASOPHILS # BLD AUTO: 0.03 10*3/MM3 (ref 0–0.2)
BASOPHILS NFR BLD AUTO: 0.4 % (ref 0–1.5)
BUN SERPL-MCNC: 45 MG/DL (ref 8–23)
BUN/CREAT SERPL: 14.8 (ref 7–25)
C CAYETANENSIS DNA STL QL NAA+NON-PROBE: NOT DETECTED
C COLI+JEJ+UPSA DNA STL QL NAA+NON-PROBE: NOT DETECTED
CALCIUM SPEC-SCNC: 8.1 MG/DL (ref 8.6–10.5)
CHLORIDE SERPL-SCNC: 105 MMOL/L (ref 98–107)
CO2 SERPL-SCNC: 26.2 MMOL/L (ref 22–29)
CREAT SERPL-MCNC: 3.04 MG/DL (ref 0.57–1)
CRYPTOSP DNA STL QL NAA+NON-PROBE: NOT DETECTED
DEPRECATED RDW RBC AUTO: 58 FL (ref 37–54)
E HISTOLYT DNA STL QL NAA+NON-PROBE: NOT DETECTED
EAEC PAA PLAS AGGR+AATA ST NAA+NON-PRB: NOT DETECTED
EC STX1+STX2 GENES STL QL NAA+NON-PROBE: NOT DETECTED
EGFRCR SERPLBLD CKD-EPI 2021: 14.8 ML/MIN/1.73
EOSINOPHIL # BLD AUTO: 0.1 10*3/MM3 (ref 0–0.4)
EOSINOPHIL NFR BLD AUTO: 1.3 % (ref 0.3–6.2)
EPEC EAE GENE STL QL NAA+NON-PROBE: NOT DETECTED
ERYTHROCYTE [DISTWIDTH] IN BLOOD BY AUTOMATED COUNT: 16.1 % (ref 12.3–15.4)
ETEC LTA+ST1A+ST1B TOX ST NAA+NON-PROBE: NOT DETECTED
G LAMBLIA DNA STL QL NAA+NON-PROBE: NOT DETECTED
GLUCOSE BLDC GLUCOMTR-MCNC: 119 MG/DL (ref 70–105)
GLUCOSE BLDC GLUCOMTR-MCNC: 148 MG/DL (ref 70–105)
GLUCOSE BLDC GLUCOMTR-MCNC: 87 MG/DL (ref 70–105)
GLUCOSE SERPL-MCNC: 119 MG/DL (ref 65–99)
GRAM STN SPEC: ABNORMAL
HCT VFR BLD AUTO: 24.8 % (ref 34–46.6)
HEMOCCULT STL QL IA: POSITIVE
HEMOCCULT STL QL IA: POSITIVE
HGB BLD-MCNC: 7.4 G/DL (ref 12–15.9)
HGB BLD-MCNC: 7.4 G/DL (ref 12–15.9)
IMM GRANULOCYTES # BLD AUTO: 0.05 10*3/MM3 (ref 0–0.05)
IMM GRANULOCYTES NFR BLD AUTO: 0.7 % (ref 0–0.5)
ISOLATED FROM: ABNORMAL
LAB AP CASE REPORT: NORMAL
LAB AP CASE REPORT: NORMAL
LAB AP DIAGNOSIS COMMENT: NORMAL
LAB AP SPECIAL STAINS: NORMAL
LYMPHOCYTES # BLD AUTO: 0.74 10*3/MM3 (ref 0.7–3.1)
LYMPHOCYTES NFR BLD AUTO: 9.9 % (ref 19.6–45.3)
MCH RBC QN AUTO: 29.6 PG (ref 26.6–33)
MCHC RBC AUTO-ENTMCNC: 29.8 G/DL (ref 31.5–35.7)
MCV RBC AUTO: 99.2 FL (ref 79–97)
MONOCYTES # BLD AUTO: 0.64 10*3/MM3 (ref 0.1–0.9)
MONOCYTES NFR BLD AUTO: 8.6 % (ref 5–12)
NEUTROPHILS NFR BLD AUTO: 5.9 10*3/MM3 (ref 1.7–7)
NEUTROPHILS NFR BLD AUTO: 79.1 % (ref 42.7–76)
NOROVIRUS GI+II RNA STL QL NAA+NON-PROBE: NOT DETECTED
NRBC BLD AUTO-RTO: 0 /100 WBC (ref 0–0.2)
P SHIGELLOIDES DNA STL QL NAA+NON-PROBE: NOT DETECTED
PATH REPORT.FINAL DX SPEC: NORMAL
PATH REPORT.FINAL DX SPEC: NORMAL
PATH REPORT.GROSS SPEC: NORMAL
PATH REPORT.GROSS SPEC: NORMAL
PHOSPHATE SERPL-MCNC: 3.8 MG/DL (ref 2.5–4.5)
PLATELET # BLD AUTO: 80 10*3/MM3 (ref 140–450)
PMV BLD AUTO: 10.6 FL (ref 6–12)
POTASSIUM SERPL-SCNC: 4.4 MMOL/L (ref 3.5–5.2)
RBC # BLD AUTO: 2.5 10*6/MM3 (ref 3.77–5.28)
REF LAB TEST METHOD: NORMAL
RVA RNA STL QL NAA+NON-PROBE: NOT DETECTED
S ENT+BONG DNA STL QL NAA+NON-PROBE: NOT DETECTED
SAPO I+II+IV+V RNA STL QL NAA+NON-PROBE: NOT DETECTED
SHIGELLA SP+EIEC IPAH ST NAA+NON-PROBE: NOT DETECTED
SODIUM SERPL-SCNC: 140 MMOL/L (ref 136–145)
V CHOL+PARA+VUL DNA STL QL NAA+NON-PROBE: NOT DETECTED
V CHOLERAE DNA STL QL NAA+NON-PROBE: NOT DETECTED
WBC NRBC COR # BLD AUTO: 7.46 10*3/MM3 (ref 3.4–10.8)
Y ENTEROCOL DNA STL QL NAA+NON-PROBE: NOT DETECTED

## 2024-06-14 PROCEDURE — 85018 HEMOGLOBIN: CPT | Performed by: NURSE PRACTITIONER

## 2024-06-14 PROCEDURE — 25010000002 CEFTRIAXONE PER 250 MG

## 2024-06-14 PROCEDURE — 99232 SBSQ HOSP IP/OBS MODERATE 35: CPT | Performed by: NURSE PRACTITIONER

## 2024-06-14 PROCEDURE — 82948 REAGENT STRIP/BLOOD GLUCOSE: CPT | Performed by: STUDENT IN AN ORGANIZED HEALTH CARE EDUCATION/TRAINING PROGRAM

## 2024-06-14 PROCEDURE — 82274 ASSAY TEST FOR BLOOD FECAL: CPT | Performed by: STUDENT IN AN ORGANIZED HEALTH CARE EDUCATION/TRAINING PROGRAM

## 2024-06-14 PROCEDURE — 80069 RENAL FUNCTION PANEL: CPT | Performed by: INTERNAL MEDICINE

## 2024-06-14 PROCEDURE — 99232 SBSQ HOSP IP/OBS MODERATE 35: CPT | Performed by: INTERNAL MEDICINE

## 2024-06-14 PROCEDURE — 82948 REAGENT STRIP/BLOOD GLUCOSE: CPT

## 2024-06-14 PROCEDURE — 87507 IADNA-DNA/RNA PROBE TQ 12-25: CPT | Performed by: FAMILY MEDICINE

## 2024-06-14 PROCEDURE — 85025 COMPLETE CBC W/AUTO DIFF WBC: CPT | Performed by: INTERNAL MEDICINE

## 2024-06-14 RX ORDER — SACCHAROMYCES BOULARDII 250 MG
250 CAPSULE ORAL 2 TIMES DAILY
Status: DISCONTINUED | OUTPATIENT
Start: 2024-06-14 | End: 2024-06-17 | Stop reason: HOSPADM

## 2024-06-14 RX ORDER — FAMOTIDINE 10 MG/ML
20 INJECTION, SOLUTION INTRAVENOUS ONCE
Status: COMPLETED | OUTPATIENT
Start: 2024-06-14 | End: 2024-06-14

## 2024-06-14 RX ORDER — PANTOPRAZOLE SODIUM 40 MG/1
40 TABLET, DELAYED RELEASE ORAL
Status: DISCONTINUED | OUTPATIENT
Start: 2024-06-14 | End: 2024-06-17 | Stop reason: HOSPADM

## 2024-06-14 RX ORDER — FAMOTIDINE 10 MG/ML
20 INJECTION, SOLUTION INTRAVENOUS ONCE
Status: DISCONTINUED | OUTPATIENT
Start: 2024-06-14 | End: 2024-06-14 | Stop reason: SDUPTHER

## 2024-06-14 RX ADMIN — CEFTRIAXONE 1000 MG: 1 INJECTION, POWDER, FOR SOLUTION INTRAMUSCULAR; INTRAVENOUS at 13:35

## 2024-06-14 RX ADMIN — FAMOTIDINE 20 MG: 10 INJECTION INTRAVENOUS at 00:57

## 2024-06-14 RX ADMIN — Medication 250 MG: at 13:35

## 2024-06-14 RX ADMIN — PANTOPRAZOLE SODIUM 40 MG: 40 TABLET, DELAYED RELEASE ORAL at 17:47

## 2024-06-14 RX ADMIN — Medication 250 MG: at 20:22

## 2024-06-14 RX ADMIN — Medication 10 ML: at 13:35

## 2024-06-14 RX ADMIN — ATORVASTATIN CALCIUM 40 MG: 40 TABLET, FILM COATED ORAL at 13:35

## 2024-06-14 RX ADMIN — Medication 10 ML: at 20:22

## 2024-06-14 RX ADMIN — METRONIDAZOLE 500 MG: 500 TABLET ORAL at 13:35

## 2024-06-14 NOTE — PROGRESS NOTES
LOS: 4 days   Patient Care Team:  Antonino Shen MD as PCP - Family Medicine  Dilcia Lui MD as Consulting Physician (Interventional Cardiology)  Zamzam Carrillo MD as Consulting Physician (Cardiac Electrophysiology)      Subjective     Interval History:     Subjective: Patient reports that she has developed diarrhea overnight.  Bedside RN reports 7 bowel movements overnight which were dark in color.  The patient denies any abdominal pain or nausea/vomiting.      ROS:   No chest pain, shortness of breath, or cough.        Medication Review:     Current Facility-Administered Medications:     acetaminophen (TYLENOL) tablet 650 mg, 650 mg, Oral, Q6H PRN, Jayda Nicole MD, 650 mg at 06/13/24 1527    [Held by provider] apixaban (ELIQUIS) tablet 2.5 mg, 2.5 mg, Oral, Q12H, SalamancaOlimpia moseley, APRN, 2.5 mg at 06/11/24 2101    atorvastatin (LIPITOR) tablet 40 mg, 40 mg, Oral, Daily, Olimpia Salamanca, APRN, 40 mg at 06/13/24 1203    sennosides-docusate (PERICOLACE) 8.6-50 MG per tablet 2 tablet, 2 tablet, Oral, BID PRN **AND** polyethylene glycol (MIRALAX) packet 17 g, 17 g, Oral, Daily PRN **AND** bisacodyl (DULCOLAX) EC tablet 5 mg, 5 mg, Oral, Daily PRN **AND** bisacodyl (DULCOLAX) suppository 10 mg, 10 mg, Rectal, Daily PRN, Oliva Shaw, APRN    Calcium Replacement - Follow Nurse / BPA Driven Protocol, , Does not apply, PRN, Oliva Shaw, APRN    cefTRIAXone (ROCEPHIN) 1,000 mg in sodium chloride 0.9 % 100 mL MBP, 1,000 mg, Intravenous, Q24H, Diann Herndon APRN, Last Rate: 200 mL/hr at 06/12/24 1126, 1,000 mg at 06/12/24 1126    dextrose (D50W) (25 g/50 mL) IV injection 25 g, 25 g, Intravenous, Q15 Min PRN, Llanes Alvarez, Carlos, MD, 25 g at 06/12/24 0340    dextrose (GLUTOSE) oral gel 15 g, 15 g, Oral, Q15 Min PRN, Llanes Alvarez, Carlos, MD, 15 g at 06/13/24 1227    [Held by provider] furosemide (LASIX) injection 20 mg, 20 mg, Intravenous, Q12H, Corey, Jayda NG MD, 20 mg at 06/13/24 0606    glucagon  (GLUCAGEN) injection 1 mg, 1 mg, Intramuscular, Q15 Min PRN, Llanes Alvarez, Carlos, MD    Magnesium Standard Dose Replacement - Follow Nurse / BPA Driven Protocol, , Does not apply, PRN, Oliva Shaw P, APRN    metroNIDAZOLE (FLAGYL) tablet 500 mg, 500 mg, Oral, Q8H, Melania Herndona, APRN, 500 mg at 06/13/24 2058    Naloxone HCl (NARCAN) injection 0.4 mg, 0.4 mg, Intravenous, Once, Jayda Nicole MD    pantoprazole (PROTONIX) EC tablet 40 mg, 40 mg, Oral, Q AM, Herndon, Diann, APRN    Phosphorus Replacement - Follow Nurse / BPA Driven Protocol, , Does not apply, PRN, Deandre Shawe P, APRN    Potassium Replacement - Follow Nurse / BPA Driven Protocol, , Does not apply, PRN, Oliva Shaw P, APRN    [COMPLETED] Insert Peripheral IV, , , Once **AND** sodium chloride 0.9 % flush 10 mL, 10 mL, Intravenous, PRN, Cisco Leal MD    sodium chloride 0.9 % flush 10 mL, 10 mL, Intravenous, Q12H, GriteDeandre ortize P, APRN, 10 mL at 06/13/24 2058    sodium chloride 0.9 % flush 10 mL, 10 mL, Intravenous, PRN, Deandre Shawe P, APRN    sodium chloride 0.9 % infusion 40 mL, 40 mL, Intravenous, PRN, GriteMicaela ortizOliva P, APRN      Objective     Vital Signs  Vitals:    06/14/24 0915 06/14/24 0930 06/14/24 0945 06/14/24 1000   BP: 119/52 119/50 109/48 115/46   BP Location: Right arm Right arm Right arm Right arm   Patient Position: Lying Lying Lying Lying   Pulse: 70 70 70 73   Resp: 15 17 20 15   Temp:       TempSrc:       SpO2: 100% 100% 100% 100%   Weight:       Height:           Physical Exam:     General Appearance:    Awake and alert, in no acute distress   Head:    Normocephalic, without obvious abnormality   Eyes:          Conjunctivae normal, anicteric sclera   Throat:   No oral lesions, no thrush, oral mucosa moist   Neck:   No adenopathy, supple, no JVD   Lungs:     respirations regular, even and unlabored   Abdomen:     Soft, non-tender, no rebound or guarding, non-distended   Rectal:     Deferred   Extremities:   No  edema, no cyanosis   Skin:   No bruising or rash, no jaundice        Results Review:    CBC    Results from last 7 days   Lab Units 06/14/24  0043 06/13/24  0022 06/12/24 0220 06/11/24  1444 06/11/24  0352 06/10/24  1017   WBC 10*3/mm3 7.46 1.90* 1.82*  --  2.05* 2.37*   HEMOGLOBIN g/dL 7.4* 8.5* 8.5*  --  8.4* 8.6*   HEMOGLOBIN, POC g/dL  --   --   --  8.4*  --   --    PLATELETS 10*3/mm3 80* 71* 66*  --  64* 71*     CMP   Results from last 7 days   Lab Units 06/14/24  0043 06/13/24  0022 06/12/24 0220 06/11/24  1504 06/11/24  0352 06/10/24  1017   SODIUM mmol/L 140 137 136  --  138 135*   POTASSIUM mmol/L 4.4 4.2 4.7  --  4.4 3.9   CHLORIDE mmol/L 105 103 104  --  103 106   CO2 mmol/L 26.2 24.4 23.0  --  24.1 17.0*   BUN mg/dL 45* 34* 52*  --  47* 41*   CREATININE mg/dL 3.04* 2.46* 3.44*  --  3.33* 2.95*   GLUCOSE mg/dL 119* 69 66  --  78 104*   ALBUMIN g/dL 3.1* 3.2* 3.3*  --  3.5  --    BILIRUBIN mg/dL  --  0.7 0.7  --  0.6  --    ALK PHOS U/L  --  57 70  --  71  --    AST (SGOT) U/L  --  131* 129*  --  126*  --    ALT (SGPT) U/L  --  35* 33  --  27  --    PHOSPHORUS mg/dL 3.8  --   --   --   --   --    LIPASE U/L  --  17  --   --   --   --    AMMONIA umol/L  --   --   --  26  --   --      Cr Clearance Estimated Creatinine Clearance: 12.6 mL/min (A) (by C-G formula based on SCr of 3.04 mg/dL (H)).  Coag   Results from last 7 days   Lab Units 06/13/24  0022   INR  1.39*     HbA1C   Lab Results   Component Value Date    HGBA1C 4.90 05/21/2024    HGBA1C 4.8 07/12/2022    HGBA1C 5.0 01/11/2022     Results from last 7 days   Lab Units 06/11/24 2001 06/11/24  1734   HSTROP T ng/L 85* 91*     Infection   Results from last 7 days   Lab Units 06/11/24  1733 06/11/24  1504   BLOODCX  Staphylococcus, coagulase negative* No growth at 2 days   BCIDPCR  Staph spp, not aureus or lugdunensis. Identification by BCID2 PCR.*  --    PROCALCITONIN ng/mL  --  0.14     UA      Microbiology Results (last 10 days)       Procedure  Component Value - Date/Time    Blood Culture - Blood, Blood, PICC Line [[id number]]  (Abnormal) Collected: 06/11/24 1733    Lab Status: Final result Specimen: Blood, PICC Line Updated: 06/14/24 0651     Blood Culture Staphylococcus, coagulase negative     Isolated from Anaerobic Bottle     Gram Stain Anaerobic Bottle Gram positive cocci in clusters    Narrative:      Probable contaminant requires clinical correlation, susceptibility not performed unless requested by physician.      Blood Culture ID, PCR - Blood, Blood, PICC Line [[id number]]  (Abnormal) Collected: 06/11/24 1733    Lab Status: Final result Specimen: Blood, PICC Line Updated: 06/13/24 1556     BCID, PCR Staph spp, not aureus or lugdunensis. Identification by BCID2 PCR.     BOTTLE TYPE Anaerobic Bottle    Blood Culture - Blood, Arm, Right [[id number]]  (Normal) Collected: 06/11/24 1504    Lab Status: Preliminary result Specimen: Blood from Arm, Right Updated: 06/13/24 1531     Blood Culture No growth at 2 days    COVID-19, FLU A/B, RSV PCR 1 HR TAT - Swab, Nasopharynx [[id number]]  (Normal) Collected: 06/10/24 1821    Lab Status: Final result Specimen: Swab from Nasopharynx Updated: 06/10/24 1906     COVID19 Not Detected     Influenza A PCR Not Detected     Influenza B PCR Not Detected     RSV, PCR Not Detected    Narrative:      Fact sheet for providers: https://www.fda.gov/media/897576/download    Fact sheet for patients: https://www.fda.gov/media/948194/download    Test performed by PCR.          Imaging Results (Last 72 Hours)       Procedure Component Value Units Date/Time    US Liver [[id number]] Collected: 06/12/24 1225     Updated: 06/12/24 1229    Narrative:      US LIVER    Date of Exam: 6/12/2024 11:58 AM EDT    Indication: Elevated liver enzymes.    Comparison: CT of the abdomen performed on 6/11/2024.    Technique: Grayscale and color Doppler ultrasound evaluation of the liver was performed.      Findings:  The liver parenchyma is normal. The  liver measures 14.0 cm in length. There are no focal hepatic masses. There is normal directional flow in the main portal vein and hepatic veins. The common bile duct caliber is normal measuring 4 mm in diameter. There   is trace ascites which was also seen on the yesterday's CT study. There is also a partially imaged right pleural effusion.      Impression:      Impression:    1. Liver parenchyma is normal.  2. Trace ascitic fluid adjacent to the liver. There is also a partially imaged right pleural effusion.        Electronically Signed: Donaldo Cantu MD    6/12/2024 12:27 PM EDT    Workstation ID: [id number]    CT Abdomen Pelvis Without Contrast [[id number]] Collected: 06/11/24 1626     Updated: 06/11/24 1635    Narrative:      CT ABDOMEN PELVIS WO CONTRAST    Date of Exam: 6/11/2024 4:18 PM EDT    Indication: abd pain.    Comparison: Chest CT 4/9/2024    Technique: Axial CT images were obtained of the abdomen and pelvis without the administration of contrast. Sagittal and coronal reconstructions were performed.  Automated exposure control and iterative reconstruction methods were used.      Findings:  Examination is limited by arms down positioning. LUNG BASES: Moderate left and small right pleural effusions with lower lung consolidation, likely atelectasis though pneumonia not excluded. The heart is enlarged.    LIVER:  Unremarkable parenchyma without focal lesion.  BILIARY/GALLBLADDER: Cholecystectomy  SPLEEN:  Unremarkable  PANCREAS: There is a 3.4 cm cystic lesion in the region of the pancreatic head (image 57, series 3).  ADRENAL:  Unremarkable  KIDNEYS: Moderate bilateral renal cortical atrophy with no solid mass identified. No obstruction.  No calculus identified.  GASTROINTESTINAL/MESENTERY:  No evidence of obstruction nor inflammation.    AORTA/IVC:  Normal caliber. There is advanced atherosclerotic disease throughout.    RETROPERITONEUM/LYMPH NODES:  Unremarkable    REPRODUCTIVE:  Unremarkable  BLADDER:  Increased density urine within the urinary bladder consistent with hematuria. Correlate with urinalysis.    OSSEUS STRUCTURES: Chronic L1 superior end plate compression fracture similar to previous chest CT    There is small volume ascites. There is generalized subcutaneous edema throughout.      Impression:      Impression:  1.Volume overload/CHF features as detailed above. Superimposed left lower lobe pneumonia is not excluded. Correlate with symptoms.  2.There is a nonspecific 3.4 cm cystic mass involving the pancreatic head. Follow-up dedicated pancreatic MRI with and without contrast recommended.  3.Increased density urine consistent with hematuria.  4.Other incidental findings as detailed above.            Electronically Signed: Flip Lee MD    6/11/2024 4:32 PM EDT    Workstation ID: [id number]    CT Head Without Contrast [[id number]] Collected: 06/11/24 1626     Updated: 06/11/24 1629    Narrative:      CT HEAD WO CONTRAST    Date of Exam: 6/11/2024 4:18 PM EDT    Indication: syncope.    Comparison: None available.    Technique: Axial CT images were obtained of the head without contrast administration.  Coronal reconstructions were performed.  Automated exposure control and iterative reconstruction methods were used.    Findings: No intracranial hemorrhage. 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 Gray-white matter differentiation is maintained without evidence of an acute infarction. Multiple foci of decreased attenuation are present within the subcortical, deep cerebral, and   periventricular white matter consistent with chronic small vessel/microangiopathic ischemic changes. No extra-axial mass or collection. The ventricles and sulci are prominent commensurate with involutional changes. The posterior fossa appears grossly   normal. Sellar and suprasellar structures are normal.    Orbital and periorbital soft tissues are normal. The paranasal sinuses, ethmoid air cells, and mastoid air cells are aerated. The bony  calvarium is intact.      Impression:      Impression: No acute intracranial pathology.          Electronically Signed: Tristian Boone MD    6/11/2024 4:27 PM EDT    Workstation ID: [id number]            Assessment & Plan     ASSESSMENT:  -Abnormal CT showing 3.4 cm cystic mass in the pancreatic head  -Elevated CA 19-9  -Pancytopenia  -Mildly elevated LFTs  -Syncopal episode  -SSS s/p pacemaker placement on 6/3/2024  -A-fib on Eliquis  -Cardiomyopathy  -CAD  -Hyperlipidemia  -ESRD on HD  -DMII     PLAN:  Patient is an 83-year-old female with history of SSS s/p pacemaker placement on 6/3/2024, A-fib on Eliquis, and ESRD on HD who presented on 6/10 with complaints of syncopal episode.  Cardiology has been consulted and are planning pacemaker interrogation.  GI asked to consult due to abnormal CT findings. CT abdomen/pelvis WO on admission shows changes of volume overload along with a 3.4 cm cystic mass in the pancreatic head. CA 19-9 44.2.     Patient reports that she has developed diarrhea overnight.  Bedside RN reports 7 dark bowel movements.  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  Pancreatic fluid CEA level pending.  Await results.  Will check stool studies to assess for any infectious etiology of diarrhea.  Start probiotics and Metamucil.  Hemoglobin 7.4 from 8.5.  Continue to monitor H/H and transfuse as needed.  Hematology following for pancytopenia.  Total bilirubin 0.7 from 0.7, alk phos 57 from 70,  from 129, ALT 35 from 33.  INR 1.39.  Ceruloplasmin low at 18.  Plan 24-hour urine copper.  Await further liver serologies.  RUQ US shows normal liver.  Advance to low-fat diet.  Continue supportive care.    Electronically signed by LUCA Agustin, 06/14/24, 10:13 AM EDT.

## 2024-06-14 NOTE — PROGRESS NOTES
"                                                                                                                                      Nephrology  Progress Note                                        [facility]    Patient Identification    Name: Deann Warren  Age: 83 y.o.  Sex: female  :  1940  MRN: [id number]      DATE OF SERVICE:  2024        Subective    Follow-up ESRD  No chest pain, breathing better  Dialyzed earlier this morning  1.5 L removed due to blood pressure dropping..     Objective   Scheduled Meds:[Held by provider] apixaban, 2.5 mg, Oral, Q12H  atorvastatin, 40 mg, Oral, Daily  cefTRIAXone, 1,000 mg, Intravenous, Q24H  [Held by provider] furosemide, 20 mg, Intravenous, Q12H  metroNIDAZOLE, 500 mg, Oral, Q8H  Naloxone HCl, 0.4 mg, Intravenous, Once  pantoprazole, 40 mg, Oral, BID AC  Psyllium, 1 packet, Oral, Daily  saccharomyces boulardii, 250 mg, Oral, BID  sodium chloride, 10 mL, Intravenous, Q12H          Continuous Infusions:       PRN Meds:  acetaminophen    senna-docusate sodium **AND** polyethylene glycol **AND** bisacodyl **AND** bisacodyl    Calcium Replacement - Follow Nurse / BPA Driven Protocol    dextrose    dextrose    glucagon (human recombinant)    Magnesium Standard Dose Replacement - Follow Nurse / BPA Driven Protocol    Phosphorus Replacement - Follow Nurse / BPA Driven Protocol    Potassium Replacement - Follow Nurse / BPA Driven Protocol    [COMPLETED] Insert Peripheral IV **AND** sodium chloride    sodium chloride    sodium chloride     Exam:  BP 93/73 (BP Location: Right arm, Patient Position: Lying)   Pulse 72   Temp 97.9 °F (36.6 °C) (Oral)   Resp 17   Ht 162.6 cm (64\")   Wt 56.7 kg (125 lb)   SpO2 100%   BMI 21.46 kg/m²     Intake/Output last 3 shifts:  I/O last 3 completed shifts:  In: 820 [P.O.:720; I.V.:100]  Out: -     Intake/Output this shift:  No intake/output data recorded.       Data Review:  All labs (24hrs):   Recent Results " (from the past 24 hour(s))   POC Glucose Once    Collection Time: 06/13/24 12:20 PM    Specimen: Blood   Result Value Ref Range    Glucose 64 (L) 70 - 105 mg/dL   POC Glucose Once    Collection Time: 06/13/24 12:39 PM    Specimen: Blood   Result Value Ref Range    Glucose 106 (H) 70 - 105 mg/dL   POC Glucose Once    Collection Time: 06/13/24  4:57 PM    Specimen: Blood   Result Value Ref Range    Glucose 143 (H) 70 - 105 mg/dL   POC Glucose Once    Collection Time: 06/13/24  8:21 PM    Specimen: Blood   Result Value Ref Range    Glucose 139 (H) 70 - 105 mg/dL   Renal Function Panel    Collection Time: 06/14/24 12:43 AM    Specimen: Arm, Right; Blood   Result Value Ref Range    Glucose 119 (H) 65 - 99 mg/dL    BUN 45 (H) 8 - 23 mg/dL    Creatinine 3.04 (H) 0.57 - 1.00 mg/dL    Sodium 140 136 - 145 mmol/L    Potassium 4.4 3.5 - 5.2 mmol/L    Chloride 105 98 - 107 mmol/L    CO2 26.2 22.0 - 29.0 mmol/L    Calcium 8.1 (L) 8.6 - 10.5 mg/dL    Albumin 3.1 (L) 3.5 - 5.2 g/dL    Phosphorus 3.8 2.5 - 4.5 mg/dL    Anion Gap 8.8 5.0 - 15.0 mmol/L    BUN/Creatinine Ratio 14.8 7.0 - 25.0    eGFR 14.8 (L) >60.0 mL/min/1.73   CBC Auto Differential    Collection Time: 06/14/24 12:43 AM    Specimen: Arm, Right; Blood   Result Value Ref Range    WBC 7.46 3.40 - 10.80 10*3/mm3    RBC 2.50 (L) 3.77 - 5.28 10*6/mm3    Hemoglobin 7.4 (L) 12.0 - 15.9 g/dL    Hematocrit 24.8 (L) 34.0 - 46.6 %    MCV 99.2 (H) 79.0 - 97.0 fL    MCH 29.6 26.6 - 33.0 pg    MCHC 29.8 (L) 31.5 - 35.7 g/dL    RDW 16.1 (H) 12.3 - 15.4 %    RDW-SD 58.0 (H) 37.0 - 54.0 fl    MPV 10.6 6.0 - 12.0 fL    Platelets 80 (L) 140 - 450 10*3/mm3    Neutrophil % 79.1 (H) 42.7 - 76.0 %    Lymphocyte % 9.9 (L) 19.6 - 45.3 %    Monocyte % 8.6 5.0 - 12.0 %    Eosinophil % 1.3 0.3 - 6.2 %    Basophil % 0.4 0.0 - 1.5 %    Immature Grans % 0.7 (H) 0.0 - 0.5 %    Neutrophils, Absolute 5.90 1.70 - 7.00 10*3/mm3    Lymphocytes, Absolute 0.74 0.70 - 3.10 10*3/mm3    Monocytes, Absolute  0.64 0.10 - 0.90 10*3/mm3    Eosinophils, Absolute 0.10 0.00 - 0.40 10*3/mm3    Basophils, Absolute 0.03 0.00 - 0.20 10*3/mm3    Immature Grans, Absolute 0.05 0.00 - 0.05 10*3/mm3    nRBC 0.0 0.0 - 0.2 /100 WBC   Occult Blood, Fecal By Immunoassay - Stool, Per Rectum    Collection Time: 06/14/24  2:00 AM    Specimen: Per Rectum; Stool   Result Value Ref Range    Occult Blood, Fecal by Immunoassay Positive (A) Negative   Gastrointestinal Panel, PCR - Stool, Per Rectum    Collection Time: 06/14/24  6:00 AM    Specimen: Per Rectum; Stool   Result Value Ref Range    Campylobacter Not Detected Not Detected    Plesiomonas shigelloides Not Detected Not Detected    Salmonella Not Detected Not Detected    Vibrio Not Detected Not Detected    Vibrio cholerae Not Detected Not Detected    Yersinia enterocolitica Not Detected Not Detected    Enteroaggregative E. coli (EAEC) Not Detected Not Detected    Enteropathogenic E. coli (EPEC) Not Detected Not Detected    Enterotoxigenic E. coli (ETEC) lt/st Not Detected Not Detected    Shiga-like toxin-producing E. coli (STEC) stx1/stx2 Not Detected Not Detected    Shigella/Enteroinvasive E. coli (EIEC) Not Detected Not Detected    Cryptosporidium Not Detected Not Detected    Cyclospora cayetanensis Not Detected Not Detected    Entamoeba histolytica Not Detected Not Detected    Giardia lamblia Not Detected Not Detected    Adenovirus F40/41 Not Detected Not Detected    Astrovirus Not Detected Not Detected    Norovirus GI/GII Not Detected Not Detected    Rotavirus A Not Detected Not Detected    Sapovirus (I, II, IV or V) Not Detected Not Detected   POC Glucose 4x Daily Before Meals & at Bedtime    Collection Time: 06/14/24  7:42 AM    Specimen: Blood   Result Value Ref Range    Glucose 87 70 - 105 mg/dL          Imaging:  US Liver    Result Date: 6/12/2024  Impression: 1. Liver parenchyma is normal. 2. Trace ascitic fluid adjacent to the liver. There is also a partially imaged right pleural  effusion. Electronically Signed: Donaldo Cantu MD  6/12/2024 12:27 PM EDT  Workstation ID: [id number]    CT Abdomen Pelvis Without Contrast    Result Date: 6/11/2024  Impression: 1.Volume overload/CHF features as detailed above. Superimposed left lower lobe pneumonia is not excluded. Correlate with symptoms. 2.There is a nonspecific 3.4 cm cystic mass involving the pancreatic head. Follow-up dedicated pancreatic MRI with and without contrast recommended. 3.Increased density urine consistent with hematuria. 4.Other incidental findings as detailed above. Electronically Signed: Flip Lee MD  6/11/2024 4:32 PM EDT  Workstation ID: [id number]    CT Head Without Contrast    Result Date: 6/11/2024  Impression: No acute intracranial pathology. Electronically Signed: Tristian Boone MD  6/11/2024 4:27 PM EDT  Workstation ID: [id number]    XR Chest 1 View    Result Date: 6/10/2024  Impression: 1.Cardiomegaly with pulmonary vascular congestion. 2.Left basilar atelectasis or infiltrate. Electronically Signed: Armando Gonzalez MD  6/10/2024 10:43 AM EDT  Workstation ID: [id number]     Assessment/Plan:     Syncope    Type 2 diabetes mellitus with diabetic chronic kidney disease    Essential hypertension    ESRD (end stage renal disease)    Anemia due to chronic kidney disease, on chronic dialysis    Hyperlipidemia    Ischemic cardiomyopathy    Permanent atrial fibrillation    Presence of cardiac pacemaker    Coronary artery disease involving native coronary artery of native heart without angina pectoris    Pancreatic mass        End-stage renal disease-HD Mondays and Fridays  History of recent pacemaker   Syncope   Sick sinus syndrome   History of thrombocytopenia  Pancytopenia-per hematology       Dialyzed this a.m.  Pt gets HD 2x a week, Monday and Friday  Next dialysis Monday    Sanchez Steele MD

## 2024-06-14 NOTE — CONSULTS
Infectious Diseases Consult Note    Referring Provider: Jayda Nicole MD    Reason for Consultation: Positive blood culture    Patient Care Team:  Antonino Shen MD as PCP - Family Medicine  Dilcia Lui MD as Consulting Physician (Interventional Cardiology)  Zamzam Carrillo MD as Consulting Physician (Cardiac Electrophysiology)    Chief complaint weak    Subjective     History of present illness:      This is a 83-year-old female who was hospitalized [facility] on Faina 10, 2024 after she was noted to be hypotensive at the dialysis unit.  The patient had pacemaker placement on Faina 3, 2024 secondary to sick sinus syndrome.  The patient has been stable since admission but apparently she had 2 sets of blood culture one of them was positive for coagulase-negative Staphylococcus from June 11, 2024.  Repeat blood culture from yesterday is negative so far.  The patient is currently asymptomatic.  She is currently on room air.  She was suspected to have pneumonia but imaging study was mostly consistent with congestive heart failure.  The patient's been afebrile since admission.    Review of Systems   Review of Systems   Constitutional: Negative.    HENT: Negative.     Eyes: Negative.    Respiratory: Negative.     Cardiovascular: Negative.    Gastrointestinal: Negative.    Genitourinary: Negative.    Musculoskeletal: Negative.    Skin: Negative.    Neurological: Negative.    Hematological: Negative.    Psychiatric/Behavioral: Negative.         Medications  Medications Prior to Admission   Medication Sig Dispense Refill Last Dose    acetaminophen (TYLENOL) 500 MG tablet Take 1 tablet by mouth Every 6 (Six) Hours As Needed for Mild Pain.   6/10/2024    atorvastatin (LIPITOR) 40 MG tablet Take 1 tablet by mouth Daily. 90 tablet 3 6/9/2024    Cholecalciferol (Vitamin D3) 50 MCG (2000 UT) tablet Take 1 tablet by mouth Daily.   6/10/2024    febuxostat (ULORIC) 40 MG tablet Take 1 tablet by mouth Daily.    6/10/2024    metoprolol succinate XL (TOPROL-XL) 25 MG 24 hr tablet Take 0.5 tablets by mouth Daily. 30 tablet 0 6/10/2024    folic acid (FOLVITE) 1 MG tablet Take 1 tablet by mouth Daily. 30 tablet 0     NON FORMULARY Apply 1 dose topically to the appropriate area as directed 3 (Three) Times a Week. Lidocaine 2.5% ointment -  Apply 1 application Monday, Wednesday, Friday before dialysis          History  Past Medical History:   Diagnosis Date    Allergic conjunctivitis and rhinitis 11/06/2014    Anemia due to chronic kidney disease, on chronic dialysis 08/25/2020    Anxiety 07/30/2012    Bradycardia by electrocardiogram 06/03/2024    Cardiomyopathy, dilated 04/18/2024    Chronic gouty arthritis 11/06/2014    Coronary artery disease involving native coronary artery of native heart without angina pectoris 04/24/2024    Dependence on renal dialysis 07/01/2022    ESRD (end stage renal disease) 08/25/2020    Essential hypertension 09/16/2015    History of shingles 01/18/2022    Hyperlipidemia 04/08/2024    Ischemic cardiomyopathy 04/18/2024    Paroxysmal atrial fibrillation 07/01/2022    Presence of cardiac pacemaker 06/03/2024    Sick sinus syndrome 06/03/2024    Type 2 diabetes mellitus with diabetic chronic kidney disease 07/01/2022    Vitamin D deficiency 07/22/2021     Past Surgical History:   Procedure Laterality Date    ARTERIOVENOUS FISTULA Left     CARDIAC CATHETERIZATION N/A 4/25/2024    Procedure: Right and Left Heart Cath;  Surgeon: Dilcia Lui MD;  Location: [facility] CATH INVASIVE LOCATION;  Service: Cardiology;  Laterality: N/A;    CARDIAC CATHETERIZATION N/A 4/25/2024    Procedure: Percutaneous Coronary Intervention;  Surgeon: Jadiel Blake MD;  Location: [facility] CATH INVASIVE LOCATION;  Service: Cardiology;  Laterality: N/A;    CARDIAC ELECTROPHYSIOLOGY PROCEDURE N/A 6/3/2024    Procedure: Pacemaker DC new, [employer];  Surgeon: Zamzam Carrillo MD;  Location: [facility] CATH INVASIVE LOCATION;  Service:  Cardiovascular;  Laterality: N/A;       Family History  Family History   Family history unknown: Yes       Social History   reports that she has never smoked. She has never used smokeless tobacco. She reports that she does not drink alcohol and does not use drugs.    Allergies  Heparin    Objective     Vital Signs   Vital Signs (last 24 hours)         06/13 0700  06/14 0659 06/14 0700  06/14 1622   Most Recent      Temp (°F) 96.5 -  98.7    97.9 -  98.4     98.1 (36.7) 06/14 1600    Heart Rate 70 -  71    70 -  77     73 06/14 1228    Resp 15 -  24    13 -  20     13 06/14 1600    BP 91/49 -  130/54    85/41 -  148/78     148/78 06/14 1600    SpO2 (%) 98 -  100      100     100 06/14 1228    Flow (L/min)   4                    Physical Exam:  Physical Exam  Vitals and nursing note reviewed.   Constitutional:       Appearance: She is well-developed.   HENT:      Head: Normocephalic and atraumatic.   Eyes:      Pupils: Pupils are equal, round, and reactive to light.   Cardiovascular:      Rate and Rhythm: Normal rate and regular rhythm.      Heart sounds: Normal heart sounds.   Pulmonary:      Effort: Pulmonary effort is normal. No respiratory distress.      Breath sounds: Normal breath sounds. No wheezing or rales.   Abdominal:      General: Bowel sounds are normal. There is no distension.      Palpations: Abdomen is soft. There is no mass.      Tenderness: There is no abdominal tenderness. There is no guarding or rebound.   Musculoskeletal:         General: No deformity. Normal range of motion.      Cervical back: Normal range of motion and neck supple.   Skin:     General: Skin is warm.      Findings: No erythema or rash.   Neurological:      Mental Status: She is alert and oriented to person, place, and time.      Cranial Nerves: No cranial nerve deficit.         Microbiology  Microbiology Results (last 10 days)       Procedure Component Value - Date/Time    Gastrointestinal Panel, PCR - Stool, Per Rectum  [[id number]]  (Normal) Collected: 06/14/24 0600    Lab Status: Final result Specimen: Stool from Per Rectum Updated: 06/14/24 1143     Campylobacter Not Detected     Plesiomonas shigelloides Not Detected     Salmonella Not Detected     Vibrio Not Detected     Vibrio cholerae Not Detected     Yersinia enterocolitica Not Detected     Enteroaggregative E. coli (EAEC) Not Detected     Enteropathogenic E. coli (EPEC) Not Detected     Enterotoxigenic E. coli (ETEC) lt/st Not Detected     Shiga-like toxin-producing E. coli (STEC) stx1/stx2 Not Detected     Shigella/Enteroinvasive E. coli (EIEC) Not Detected     Cryptosporidium Not Detected     Cyclospora cayetanensis Not Detected     Entamoeba histolytica Not Detected     Giardia lamblia Not Detected     Adenovirus F40/41 Not Detected     Astrovirus Not Detected     Norovirus GI/GII Not Detected     Rotavirus A Not Detected     Sapovirus (I, II, IV or V) Not Detected    Narrative:      If Aeromonas, Staphylococcus aureus or Bacillus cereus are suspected, please order [id number]: Stool Culture, Aeromonas, S aureus, B Cereus.    Blood Culture - Blood, Blood, PICC Line [[id number]]  (Abnormal) Collected: 06/11/24 1733    Lab Status: Final result Specimen: Blood, PICC Line Updated: 06/14/24 0651     Blood Culture Staphylococcus, coagulase negative     Isolated from Anaerobic Bottle     Gram Stain Anaerobic Bottle Gram positive cocci in clusters    Narrative:      Probable contaminant requires clinical correlation, susceptibility not performed unless requested by physician.      Blood Culture ID, PCR - Blood, Blood, PICC Line [[id number]]  (Abnormal) Collected: 06/11/24 1733    Lab Status: Final result Specimen: Blood, PICC Line Updated: 06/13/24 1556     BCID, PCR Staph spp, not aureus or lugdunensis. Identification by BCID2 PCR.     BOTTLE TYPE Anaerobic Bottle    Blood Culture - Blood, Arm, Right [[id number]]  (Normal) Collected: 06/11/24 1504    Lab Status: Preliminary result  Specimen: Blood from Arm, Right Updated: 06/14/24 1532     Blood Culture No growth at 3 days    COVID-19, FLU A/B, RSV PCR 1 HR TAT - Swab, Nasopharynx [[id number]]  (Normal) Collected: 06/10/24 1821    Lab Status: Final result Specimen: Swab from Nasopharynx Updated: 06/10/24 1906     COVID19 Not Detected     Influenza A PCR Not Detected     Influenza B PCR Not Detected     RSV, PCR Not Detected    Narrative:      Fact sheet for providers: https://www.fda.gov/media/768753/download    Fact sheet for patients: https://www.fda.gov/media/800574/download    Test performed by PCR.            Laboratory  Results from last 7 days   Lab Units 06/14/24  1604 06/14/24  0043   WBC 10*3/mm3  --  7.46   HEMOGLOBIN g/dL 7.4* 7.4*   HEMATOCRIT %  --  24.8*   PLATELETS 10*3/mm3  --  80*     Results from last 7 days   Lab Units 06/14/24  0043   SODIUM mmol/L 140   POTASSIUM mmol/L 4.4   CHLORIDE mmol/L 105   CO2 mmol/L 26.2   BUN mg/dL 45*   CREATININE mg/dL 3.04*   GLUCOSE mg/dL 119*   CALCIUM mg/dL 8.1*     Results from last 7 days   Lab Units 06/14/24  0043   SODIUM mmol/L 140   POTASSIUM mmol/L 4.4   CHLORIDE mmol/L 105   CO2 mmol/L 26.2   BUN mg/dL 45*   CREATININE mg/dL 3.04*   GLUCOSE mg/dL 119*   CALCIUM mg/dL 8.1*                   Radiology  Imaging Results (Last 72 Hours)       Procedure Component Value Units Date/Time    US Liver [[id number]] Collected: 06/12/24 1225     Updated: 06/12/24 1229    Narrative:      US LIVER    Date of Exam: 6/12/2024 11:58 AM EDT    Indication: Elevated liver enzymes.    Comparison: CT of the abdomen performed on 6/11/2024.    Technique: Grayscale and color Doppler ultrasound evaluation of the liver was performed.      Findings:  The liver parenchyma is normal. The liver measures 14.0 cm in length. There are no focal hepatic masses. There is normal directional flow in the main portal vein and hepatic veins. The common bile duct caliber is normal measuring 4 mm in diameter. There   is trace  ascites which was also seen on the yesterday's CT study. There is also a partially imaged right pleural effusion.      Impression:      Impression:    1. Liver parenchyma is normal.  2. Trace ascitic fluid adjacent to the liver. There is also a partially imaged right pleural effusion.        Electronically Signed: Donaldo Cantu MD    6/12/2024 12:27 PM EDT    Workstation ID: [id number]    CT Abdomen Pelvis Without Contrast [[id number]] Collected: 06/11/24 1626     Updated: 06/11/24 1635    Narrative:      CT ABDOMEN PELVIS WO CONTRAST    Date of Exam: 6/11/2024 4:18 PM EDT    Indication: abd pain.    Comparison: Chest CT 4/9/2024    Technique: Axial CT images were obtained of the abdomen and pelvis without the administration of contrast. Sagittal and coronal reconstructions were performed.  Automated exposure control and iterative reconstruction methods were used.      Findings:  Examination is limited by arms down positioning. LUNG BASES: Moderate left and small right pleural effusions with lower lung consolidation, likely atelectasis though pneumonia not excluded. The heart is enlarged.    LIVER:  Unremarkable parenchyma without focal lesion.  BILIARY/GALLBLADDER: Cholecystectomy  SPLEEN:  Unremarkable  PANCREAS: There is a 3.4 cm cystic lesion in the region of the pancreatic head (image 57, series 3).  ADRENAL:  Unremarkable  KIDNEYS: Moderate bilateral renal cortical atrophy with no solid mass identified. No obstruction.  No calculus identified.  GASTROINTESTINAL/MESENTERY:  No evidence of obstruction nor inflammation.    AORTA/IVC:  Normal caliber. There is advanced atherosclerotic disease throughout.    RETROPERITONEUM/LYMPH NODES:  Unremarkable    REPRODUCTIVE:  Unremarkable  BLADDER: Increased density urine within the urinary bladder consistent with hematuria. Correlate with urinalysis.    OSSEUS STRUCTURES: Chronic L1 superior end plate compression fracture similar to previous chest CT    There is small volume  ascites. There is generalized subcutaneous edema throughout.      Impression:      Impression:  1.Volume overload/CHF features as detailed above. Superimposed left lower lobe pneumonia is not excluded. Correlate with symptoms.  2.There is a nonspecific 3.4 cm cystic mass involving the pancreatic head. Follow-up dedicated pancreatic MRI with and without contrast recommended.  3.Increased density urine consistent with hematuria.  4.Other incidental findings as detailed above.            Electronically Signed: Flip Lee MD    6/11/2024 4:32 PM EDT    Workstation ID: [id number]    CT Head Without Contrast [[id number]] Collected: 06/11/24 1626     Updated: 06/11/24 1629    Narrative:      CT HEAD WO CONTRAST    Date of Exam: 6/11/2024 4:18 PM EDT    Indication: syncope.    Comparison: None available.    Technique: Axial CT images were obtained of the head without contrast administration.  Coronal reconstructions were performed.  Automated exposure control and iterative reconstruction methods were used.    Findings: No intracranial hemorrhage. 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 Gray-white matter differentiation is maintained without evidence of an acute infarction. Multiple foci of decreased attenuation are present within the subcortical, deep cerebral, and   periventricular white matter consistent with chronic small vessel/microangiopathic ischemic changes. No extra-axial mass or collection. The ventricles and sulci are prominent commensurate with involutional changes. The posterior fossa appears grossly   normal. Sellar and suprasellar structures are normal.    Orbital and periorbital soft tissues are normal. The paranasal sinuses, ethmoid air cells, and mastoid air cells are aerated. The bony calvarium is intact.      Impression:      Impression: No acute intracranial pathology.          Electronically Signed: Tristian Boone MD    6/11/2024 4:27 PM EDT    Workstation ID: [id number]            Cardiology      Results  Review:  I have reviewed all clinical data, test, lab, and imaging results.       Schedule Meds  [Held by provider] apixaban, 2.5 mg, Oral, Q12H  atorvastatin, 40 mg, Oral, Daily  [Held by provider] furosemide, 20 mg, Intravenous, Q12H  Naloxone HCl, 0.4 mg, Intravenous, Once  pantoprazole, 40 mg, Oral, BID AC  Psyllium, 1 packet, Oral, Daily  saccharomyces boulardii, 250 mg, Oral, BID  sodium chloride, 10 mL, Intravenous, Q12H        Infusion Meds       PRN Meds    acetaminophen    senna-docusate sodium **AND** polyethylene glycol **AND** bisacodyl **AND** bisacodyl    Calcium Replacement - Follow Nurse / BPA Driven Protocol    dextrose    dextrose    glucagon (human recombinant)    Magnesium Standard Dose Replacement - Follow Nurse / BPA Driven Protocol    Phosphorus Replacement - Follow Nurse / BPA Driven Protocol    Potassium Replacement - Follow Nurse / BPA Driven Protocol    [COMPLETED] Insert Peripheral IV **AND** sodium chloride    sodium chloride    sodium chloride      Assessment & Plan       Assessment    Positive blood culture for coagulase-negative Staphylococcus in 1 out of 2 sets.  This most likely contamination.  Although patient has endovascular device/pacemaker but this is recent device which I doubt it is infected with this organism.  Repeat blood cultures are negative so far    End-stage renal disease on hemodialysis.    Pulmonary edema based on the imaging study findings.  No clinical signs of pneumonia    S/p pacemaker placement on Faina 3, 2024 secondary to sick sinus syndrome    Chronic pancytopenia.  Patient being followed by hematology service.  Suspected to be related to medication.  White count is back to normal    Plan    Discontinue antimicrobial therapy  Monitor patient off antibiotics  With  Repeat blood culture results      Yuri Hua MD  06/14/24  16:22 EDT    Note is dictated utilizing voice recognition software/Dragon

## 2024-06-14 NOTE — CONSULTS
"Nutrition Services    Patient Name: Deann Warren  YOB: 1940  MRN: [id number]  Admission date: 6/10/2024    Addition of Boost Plus BID (Provides 720 kcals, 28 g protein if consumed). Monitor need for renal specific ONS.     NUTRITION SCREENING      Trending Narrative: 6/14: Pt being assessed for skin breakdown. Pt admitted to [facility] with syncope. Pt was on her way to dialysis when she felt weak and unsteady. Pt s/p upper endoscopy and EUS on 6/13 showing pancreatic cystic lesion of the head along with multiple other small cysts to body tail area. GI following.        PO Diet: Diet: Gastrointestinal; Fat-Restricted; Fluid Consistency: Thin (IDDSI 0)   PO Supplements: -   Trending PO Intake:  6/14: 100% x 3 meals recorded       Nutritionally-Pertinent Medications RDN Reviewed, C/W clinical course         Labs (reviewed below): Reviewed, management per attending.      Results from last 7 days   Lab Units 06/14/24  0043 06/13/24  0022 06/12/24  0220 06/11/24  0352   SODIUM mmol/L 140 137 136 138   POTASSIUM mmol/L 4.4 4.2 4.7 4.4   CHLORIDE mmol/L 105 103 104 103   CO2 mmol/L 26.2 24.4 23.0 24.1   BUN mg/dL 45* 34* 52* 47*   CREATININE mg/dL 3.04* 2.46* 3.44* 3.33*   CALCIUM mg/dL 8.1* 7.8* 8.0* 8.3*   BILIRUBIN mg/dL  --  0.7 0.7 0.6   ALK PHOS U/L  --  57 70 71   ALT (SGPT) U/L  --  35* 33 27   AST (SGOT) U/L  --  131* 129* 126*   GLUCOSE mg/dL 119* 69 66 78     Results from last 7 days   Lab Units 06/14/24  0043   PHOSPHORUS mg/dL 3.8   HEMOGLOBIN g/dL 7.4*   HEMATOCRIT % 24.8*     Lab Results   Component Value Date    HGBA1C 4.90 05/21/2024          GI Function:  + BM on 6/14       Skin: Unstageable pressure injury/DM foot ulcer to L posterior heel  Stage 1 pressure injury to coccyx    WOCN following       Weight Review: Estimated body mass index is 21.46 kg/m² as calculated from the following:    Height as of this encounter: 162.6 cm (64\").    Weight as of this encounter: 56.7 kg (125 lb).    Comment: "   6/14: 125# - stated wt  8% wt gain x 1.5 months; expect wt fluctuation with HD    Wt Readings from Last 30 Encounters:   06/10/24 0950 56.7 kg (125 lb)   06/01/24 1815 56.7 kg (125 lb)   06/01/24 1313 56.7 kg (125 lb)   05/09/24 0957 54.9 kg (121 lb)   04/25/24 0810 52.2 kg (115 lb 1.3 oz)   04/18/24 1535 53.5 kg (118 lb)   04/09/24 0821 53.5 kg (118 lb)   04/07/24 0953 53.5 kg (118 lb)          Protein Requirements    EST Needs, Method, Wt used 68-85 g/day (1.2-2.5 g/kg current body wt, 56.7 kg)          Nutrition Problem Statement: Increased nutrient (protein) need related to wound healing and ESRD as evidenced by loss of skin integrity and routine dialysis.        Nutrition Intervention: Continue current diet. Addition of Boost Plus BID (Provides 720 kcals, 28 g protein if consumed)           Monitoring/Evaluation Per protocol, PO intake, Supplement intake, Pertinent labs, Weight, Skin status          RD to follow up per protocol.    Electronically signed by:  Isabela Castillo RD  06/14/24 14:20 EDT

## 2024-06-14 NOTE — NURSING NOTE
Verbal consent obtained. Water & machine checks passed. Orders verified. Treatment initiated without difficulty. BFR & UF goal are being adjusted due to needle size & patient's tolerance. Provider is aware. Patient tolerated cannulation well. Education given.

## 2024-06-14 NOTE — NURSING NOTE
Treatment completed. UF = 1.4L. Access is intact, +bruit, +thrill. Pressure dressing in place. Patient was seen by nephrologist at bedside. Education given. Report given to floor RN.

## 2024-06-14 NOTE — CASE MANAGEMENT/SOCIAL WORK
Continued Stay Note  HYUN Duque     Patient Name: Deann Warren  MRN: [id number]  Today's Date: 6/14/2024    Admit Date: 6/10/2024    Plan: [facility] (accepted-bed available Fri 6/14). PASRR approved, pre-cert approved (6/14-6/21) HD- MWF   Discharge Plan       Row Name 06/14/24 1422       Plan    Plan Comments DC barriers: gastric biopsy pending, blood cultures pending,  Hgb trending down- 7.4, + occult blood, IV abx, NPO, HD today. GI, ID, oncology, nephrology, cardiovascular electrophysiology, and cardiology following.               Ashly Salas RN     Office phone: [phone]  Office fax: [phone]

## 2024-06-14 NOTE — NURSING NOTE
Patient with several stools during the shift.  Patient noted to have large black loose stool.  No smell noted.  Provider contacted and order for 20mg IV Pepcid given.  Morning labs drawn early.  No new orders given.  Vitals obtained.  Pt stable at this time.

## 2024-06-14 NOTE — SIGNIFICANT NOTE
06/14/24 1041   Rehab Time/Intention   Session Not Performed unable to treat, medical status change  (RN requested to hold this date.)

## 2024-06-14 NOTE — PROGRESS NOTES
Hematology/Oncology Inpatient Progress Note    PATIENT NAME: Deann Warren  : 1940  MRN: [id number]    CHIEF COMPLAINT: Syncope    HISTORY OF PRESENT ILLNESS:      Deann Warren is a 83 y.o. female with past medical history significant for coronary artery disease, hypertension, hyperlipidemia, dilated cardiomyopathy, atrial fibrillation, sick sinus syndrome status post permanent pacemaker placement on 6/3/2024, type 2 diabetes, end-stage renal disease on hemodialysis, chronic thrombocytopenia and chronic anemiawho presented to [facility]24 with complaints of syncopal episode.  She states she was walking into dialysis when she began to feel weak.  She states she sat down in a chair in the waiting room could not lift her head or arms she denied dizziness, chest pain, shortness of breath.  She was sent to the ED for further evaluation.  Workup in the ED was unremarkable.  Patient's EKG showed a ventricular paced rhythm.  Neurology was consulted for dialysis management and cardiology/cardiac EP was consulted for further evaluation of syncope.     Of note, patient recently admitted to [facility] from -24 due to syncopal episodes.  She was found to have A-fib with slow ventricular response Vega with heart rate in the 40s.  She was on low-dose Toprol however her bradycardia was out of proportion to medication use.  Cardiology did evaluate and felt she most likely had sick sinus syndrome.  As such, she underwent PPM placement on 6/3/2024.  She was discharged in stable condition with radiology follow-up in 2 weeks.     Patient had chest x-ray on 6/10/2023 that showed cardiomegaly with pulmonary vascular congestion as well as basilar atelectasis/infiltrate.     24 CT head without contrast completed due to syncope showed no acute intracranial pathology     2024 CT abdomen pelvis without contrast  Impression:  1.Volume overload/CHF features as detailed above. Superimposed left  lower lobe pneumonia is not excluded. Correlate with symptoms.  2.There is a nonspecific 3.4 cm cystic mass involving the pancreatic head. Follow-up dedicated pancreatic MRI with and without contrast recommended.  3.Increased density urine consistent with hematuria.  4.Other incidental findings as detailed above.        06/12/24  Hematology/Oncology was consulted for pancytopenia.       Patient was seen in April 2024 during a previous hospitalizations for her chronic thrombocytopenia.    Her platelet count remained stable, with her baseline between 50,000 and 90,000.  She also has chronic anemia, with baseline between 10.5 and 11.5 g/dL.  She is now noted to have a hemoglobin of 8.5 g/dL, slightly macrocytic.  She also has newly noted leukopenia (neutropenia and lymphopenia) since April 2024.  There is also been an incidental finding of a 3.4 cm cystic mass within the pancreas.  She is unable to undergo MRI for at least 6 weeks due to her pacemaker placement.    6/13/24: Status post EUS.  Impression:  1.  Multiple pancreatic cystic lesions in body tail and head of pancreas,  the largest one in the head of the pancreas area measures 3.6cm.  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  No significant alarming feature of mural nodule, focal mass, surrounding lymph node or any focal severe dilation of pancreatic duct was noticed.  PD was prominent at head of the pancreas close to 3.4 to 3.6 mm without any pancreas divisum.  Common bile duct was also slightly prominent above the cyst close to 6.5 to 7 mm, distal to the cyst, both CBD and PD were normal.  Normal pancreatic parenchymal EUS without any finding of chronic pancreatitis.  2.  There was a focal nodule in the mid part of the esophagus biopsy was performed.  3.  Severe erosive gastritis along with changes suggestive of chronic gastritis biopsy was performed.  4.  Hiatal hernia.    Subjective   Patient complains of  "ongoing fatigue.    ROS:  Review of Systems   Constitutional:  Positive for fatigue.   Gastrointestinal: Negative.    Endocrine: Negative.    Neurological:  Positive for syncope and weakness.   Psychiatric/Behavioral:  Negative for confusion.         MEDICATIONS:    Scheduled Meds:  [Held by provider] apixaban, 2.5 mg, Oral, Q12H  atorvastatin, 40 mg, Oral, Daily  cefTRIAXone, 1,000 mg, Intravenous, Q24H  [Held by provider] furosemide, 20 mg, Intravenous, Q12H  metroNIDAZOLE, 500 mg, Oral, Q8H  Naloxone HCl, 0.4 mg, Intravenous, Once  pantoprazole, 40 mg, Oral, BID AC  Psyllium, 1 packet, Oral, Daily  saccharomyces boulardii, 250 mg, Oral, BID  sodium chloride, 10 mL, Intravenous, Q12H       Continuous Infusions:      PRN Meds:    acetaminophen    senna-docusate sodium **AND** polyethylene glycol **AND** bisacodyl **AND** bisacodyl    Calcium Replacement - Follow Nurse / BPA Driven Protocol    dextrose    dextrose    glucagon (human recombinant)    Magnesium Standard Dose Replacement - Follow Nurse / BPA Driven Protocol    Phosphorus Replacement - Follow Nurse / BPA Driven Protocol    Potassium Replacement - Follow Nurse / BPA Driven Protocol    [COMPLETED] Insert Peripheral IV **AND** sodium chloride    sodium chloride    sodium chloride     ALLERGIES:    Allergies   Allergen Reactions    Heparin Hives       Objective    VITALS:   /49 (BP Location: Right arm, Patient Position: Lying)   Pulse 73   Temp 97.9 °F (36.6 °C) (Oral)   Resp 20   Ht 162.6 cm (64\")   Wt 56.7 kg (125 lb)   SpO2 100%   BMI 21.46 kg/m²     PHYSICAL EXAM: (performed by MD)  Physical Exam      RECENT LABS:  Lab Results (last 24 hours)       Procedure Component Value Units Date/Time    Gastrointestinal Panel, PCR - Stool, Per Rectum [[id number]]  (Normal) Collected: 06/14/24 0600    Specimen: Stool from Per Rectum Updated: 06/14/24 1143     Campylobacter Not Detected     Plesiomonas shigelloides Not Detected     Salmonella Not Detected "     Vibrio Not Detected     Vibrio cholerae Not Detected     Yersinia enterocolitica Not Detected     Enteroaggregative E. coli (EAEC) Not Detected     Enteropathogenic E. coli (EPEC) Not Detected     Enterotoxigenic E. coli (ETEC) lt/st Not Detected     Shiga-like toxin-producing E. coli (STEC) stx1/stx2 Not Detected     Shigella/Enteroinvasive E. coli (EIEC) Not Detected     Cryptosporidium Not Detected     Cyclospora cayetanensis Not Detected     Entamoeba histolytica Not Detected     Giardia lamblia Not Detected     Adenovirus F40/41 Not Detected     Astrovirus Not Detected     Norovirus GI/GII Not Detected     Rotavirus A Not Detected     Sapovirus (I, II, IV or V) Not Detected    Narrative:      If Aeromonas, Staphylococcus aureus or Bacillus cereus are suspected, please order [id number]: Stool Culture, Aeromonas, S aureus, B Cereus.    WOODROW [[id number]]  (Normal) Collected: 06/12/24 1349    Specimen: Blood Updated: 06/14/24 1031     Antinuclear Antibodies (WOODROW) Negative    Fine Needle Aspiration [[id number]] Collected: 06/13/24 1001    Specimen: Fine Needle Aspirate from Pancreas Updated: 06/14/24 0814     Case Report --     Medical Cytology Report                           Case: [id number]                                  Authorizing Provider:  Cesia Hyde MD          Collected:           06/13/2024 10:01 AM          Ordering Location:     [facility]  Received:            06/13/2024 12:55 PM                                 SUITES                                                                       Pathologist:           Antonino Nunez MD                                                            Specimen:    Pancreas                                                                                    Final Diagnosis --     Cyst, pancreas, fine-needle aspiration with smears and cytospin:  Rare benign glandular epithelial cells in a background of chronic inflammation and macrophages  No malignancy  "identified, see comment    SARAH       Gross Description --     1. Pancreas.  Received in carbowax and designated \"pancreatic cyst\" are 3 mL of clear, green fluid. Particulate matter is present. This specimen is processed as per protocol.           Special Stains --     Clinical correlation and follow-up are recommended.  SARAH      POC Glucose 4x Daily Before Meals & at Bedtime [[id number]]  (Normal) Collected: 06/14/24 0742    Specimen: Blood Updated: 06/14/24 0744     Glucose 87 mg/dL      Comment: Serial Number: [id number]:  [id number]       Blood Culture - Blood, Blood, PICC Line [[id number]]  (Abnormal) Collected: 06/11/24 1733    Specimen: Blood, PICC Line Updated: 06/14/24 0651     Blood Culture Staphylococcus, coagulase negative     Isolated from Anaerobic Bottle     Gram Stain Anaerobic Bottle Gram positive cocci in clusters    Narrative:      Probable contaminant requires clinical correlation, susceptibility not performed unless requested by physician.      Occult Blood, Fecal By Immunoassay - Stool, Per Rectum [[id number]]  (Abnormal) Collected: 06/14/24 0200    Specimen: Stool from Per Rectum Updated: 06/14/24 0232     Occult Blood, Fecal by Immunoassay Positive    Renal Function Panel [[id number]]  (Abnormal) Collected: 06/14/24 0043    Specimen: Blood from Arm, Right Updated: 06/14/24 0129     Glucose 119 mg/dL      BUN 45 mg/dL      Creatinine 3.04 mg/dL      Sodium 140 mmol/L      Potassium 4.4 mmol/L      Chloride 105 mmol/L      CO2 26.2 mmol/L      Calcium 8.1 mg/dL      Albumin 3.1 g/dL      Phosphorus 3.8 mg/dL      Anion Gap 8.8 mmol/L      BUN/Creatinine Ratio 14.8     eGFR 14.8 mL/min/1.73      Comment: <15 Indicative of kidney failure       Narrative:      GFR Normal >60  Chronic Kidney Disease <60  Kidney Failure <15    The GFR formula is only valid for adults with stable renal function between ages 18 and 70.    CBC & Differential [[id number]]  (Abnormal) Collected: 06/14/24 0043    " Specimen: Blood from Arm, Right Updated: 06/14/24 0112    Narrative:      The following orders were created for panel order CBC & Differential.  Procedure                               Abnormality         Status                     ---------                               -----------         ------                     CBC Auto Differential[[id number]]        Abnormal            Final result               Scan Slide[[id number]]                                                                    Please view results for these tests on the individual orders.    CBC Auto Differential [[id number]]  (Abnormal) Collected: 06/14/24 0043    Specimen: Blood from Arm, Right Updated: 06/14/24 0112     WBC 7.46 10*3/mm3      RBC 2.50 10*6/mm3      Hemoglobin 7.4 g/dL      Hematocrit 24.8 %      MCV 99.2 fL      MCH 29.6 pg      MCHC 29.8 g/dL      RDW 16.1 %      RDW-SD 58.0 fl      MPV 10.6 fL      Platelets 80 10*3/mm3      Neutrophil % 79.1 %      Lymphocyte % 9.9 %      Monocyte % 8.6 %      Eosinophil % 1.3 %      Basophil % 0.4 %      Immature Grans % 0.7 %      Neutrophils, Absolute 5.90 10*3/mm3      Lymphocytes, Absolute 0.74 10*3/mm3      Monocytes, Absolute 0.64 10*3/mm3      Eosinophils, Absolute 0.10 10*3/mm3      Basophils, Absolute 0.03 10*3/mm3      Immature Grans, Absolute 0.05 10*3/mm3      nRBC 0.0 /100 WBC     POC Glucose Once [[id number]]  (Abnormal) Collected: 06/13/24 2021    Specimen: Blood Updated: 06/13/24 2028     Glucose 139 mg/dL      Comment: Serial Number: [id number]:  [id number]       Blood Culture - Blood, Arm, Right [[id number]] Collected: 06/13/24 1809    Specimen: Blood from Arm, Right Updated: 06/13/24 1830    Glucose, Body Fluid - Body Fluid, Pancreas [[id number]] Collected: 06/13/24 1001    Specimen: Body Fluid from Pancreas Updated: 06/13/24 1721     Glucose, Fluid <2 mg/dL     Narrative:      No Reference Ranges Established.    Serous fluid glucose less than 60 mg/dL or less than 30 mg/dL  below serum glucose suggests an infectious or malignant exudate.     This test was developed, it performance characteristics determined and judged suitable for clinical purposes by [facility].  It has not been cleared or approved by the FDA.  The laboratory is regulated under CLIA as qualified to perform high-complexity testing.     POC Glucose Once [[id number]]  (Abnormal) Collected: 06/13/24 1657    Specimen: Blood Updated: 06/13/24 1659     Glucose 143 mg/dL      Comment: Serial Number: [id number]:  [id number]       Amylase, Body Fluid - Body Fluid, Pancreas [[id number]] Collected: 06/13/24 1001    Specimen: Body Fluid from Pancreas Updated: 06/13/24 1646     Amylase, Fluid 35 U/L     Narrative:      No Reference Ranges Established.    This test was developed, it performance characteristics determined and judged suitable for clinical purposes by [facility].  It has not been cleared or approved by the FDA.  The laboratory is regulated under CLIA as qualified to perform high-complexity testing.     Anti-Smooth Muscle Antibody Titer [[id number]] Collected: 06/12/24 1349    Specimen: Blood Updated: 06/13/24 1613     Smooth Muscle Ab 2 Units      Comment:                  Negative                     0 - 19                   Weak positive               20 - 30                   Moderate to strong positive     >30   Actin Antibodies are found in 52-85% of patients with   autoimmune hepatitis or chronic active hepatitis and   in 22% of patients with primary biliary cirrhosis.       Narrative:      Performed at:  [address] [place] [address]  [id number]  : Taj Stallings PhD, Phone:  [phone]    Mitochondrial Antibodies, M2 [[id number]] Collected: 06/12/24 1349    Specimen: Blood Updated: 06/13/24 1613     Mitochondrial Ab <20.0 Units      Comment:                                 Negative    0.0 - 20.0                                   Equivocal  20.1 - 24.9                                  Positive         >24.9  Mitochondrial (M2) Antibodies are found in 90-96% of  patients with primary biliary cirrhosis.       Narrative:      Performed at:  [address]  [id number]  : Taj Stallings PhD, Phone:  [phone]    Blood Culture ID, PCR - Blood, Blood, PICC Line [[id number]]  (Abnormal) Collected: 06/11/24 1733    Specimen: Blood, PICC Line Updated: 06/13/24 1556     BCID, PCR Staph spp, not aureus or lugdunensis. Identification by BCID2 PCR.     BOTTLE TYPE Anaerobic Bottle    Blood Culture - Blood, Arm, Right [[id number]]  (Normal) Collected: 06/11/24 1504    Specimen: Blood from Arm, Right Updated: 06/13/24 1531     Blood Culture No growth at 2 days            PENDING RESULTS:     IMAGING REVIEWED:  No radiology results for the last day    Assessment & Plan     Deann Warren is a 83 y.o. with history of sick sinus syndrome and recently placed cardiac pacemaker and has been admitted under a principal diagnosis of syncope.  Hematology oncology has been consulted for worsening pancytopenia.     Pancytopenia  -Patient has chronic thrombocytopenia and chronic anemia at baseline.  Anemia is likely due to chronic kidney disease.  -We have done workup and ruled out hemolysis with normal haptoglobin, reticulocyte count not significantly elevated.  Given that reticulocyte count is not elevated, based on her hemoglobin this means that her bone marrow is not adequately responding.  No DIC with normal fibrinogen.  The most likely explanation for pancytopenia as a possible underlying bone marrow hypoproliferative disorder such as myelodysplastic syndrome, now acutely worsened by acute infection and antibiotic use.  -Her iron saturation and ferritin are very high, she will probably need to stop iron infusions with her dialysis.  - Ideally would recommend a bone marrow biopsy, but given that she has been treated  with antibiotics and has had infection this could given it near great result.  Will continue to monitor CBC and once it is closer to baseline we will possibly consider bone marrow biopsy in the outpatient setting.  -Mild folate deficiency.  Continue oral folic acid.  Copper and zinc levels are pending.     Pancreatic lesion  -CT imaging of the abdomen and pelvis showed a 3.4 cm cystic mass within the pancreatic head that is indeterminant.  Recommendations for pancreatic MRI.  Patient cannot undergo MRI due to recent peacemaker placement  -She has undergone EUS on 6/13/2024.  Per GI there are multiple pancreatic cystic lesions in the body, tail and head of pancreas.  These likely represent sidebranch IPMN's given the appearance.  -Given the large size of cystic lesion at the head of the pancreas that more than 3 cm, there is significant risk for malignant transformation and she may need to close monitoring and surveillance.     End-stage renal disease on hemodialysis  -She receives dialysis on Mondays and Fridays.  -She is followed by nephrology     Syncopal episode  -Etiology unclear  -Recent echocardiogram showed LVEF 35%.  And recent cardiac catheterization showed multivessel CAD.  -CT head without contrast was unremarkable  -Cardiology is following     Permanent atrial fibrillation  Sick sinus syndrome   -status post pacemaker placement on 6/3/2024.  -She has been restarted on low-dose Eliquis 2.5 mg twice daily, history of bleeding issues  -Cardiology is following     Further recommendations per Dr. Barahona        Electronically signed by Stephanie Quesada PA-C    Patient seen and examined agree with assessment plan    Patient is a 82-year-old female with end-stage renal disease on dialysis with baseline thrombocytopenia mild folate deficiency was started on oral folic acid supplementation.  Copper and zinc levels are still pending.  Patient likely has underlying bone marrow disorder such as MDS or  myelofibrosis.  She would benefit from getting a bone marrow biopsy for diagnosis but this can be done outpatient once she is fully recovered from recent infection.  Recurrent acute pancytopenia could be related to recent infection or antibiotic use this is improved now.  She also has a pancreatic lesion which is likely to be a pancreatic cyst this is being evaluated by GI will be monitoring given in size.  I will follow-up outpatient in 2 to 3 weeks get a CBC and possibly consider a bone marrow biopsy at that point    Will sign off.    I have obtained complete history and perform physical exam along with review of labs, imaging.  I have completed the majority and substantive portion of medical decision making    Sonya Barahona MD

## 2024-06-14 NOTE — PLAN OF CARE
Goal Outcome Evaluation:      ST attempted to see pt for DT/meal; however, she was undergoing dialysis at that time. Dialysis professionals advised that she was not appropriate to eat during this procedure. Will follow up at a later time.

## 2024-06-14 NOTE — PROGRESS NOTES
[facility] MEDICINE SERVICE  DAILY PROGRESS NOTE    NAME: Deann Warren  : 1940  MRN: [id number]      LOS: 4 days     PROVIDER OF SERVICE: Jayda Nicole MD    Chief Complaint: Syncope    Subjective:     Interval History:  History taken from: patient  Patient Complaints: None at this time.  She wishes to be discharged home.  Patient Denies: Any chest pain or shortness of breath    Patient is currently undergoing hemodialysis.    Review of Systems:   Review of Systems   All other systems reviewed and are negative.      Objective:     Vital Signs  Temp:  [96.5 °F (35.8 °C)-98.7 °F (37.1 °C)] 97.9 °F (36.6 °C)  Heart Rate:  [70-71] 70  Resp:  [14-24] 17  BP: ()/(37-57) 119/50  Flow (L/min):  [4] 4   Body mass index is 21.46 kg/m².    Physical Exam  Physical Exam  Constitutional:       General: She is awake.      Appearance: Normal appearance. She is well-developed and well-groomed.   HENT:      Head: Normocephalic and atraumatic.      Nose: Nose normal.      Mouth/Throat:      Mouth: Mucous membranes are moist.      Pharynx: Oropharynx is clear.   Eyes:      Extraocular Movements: Extraocular movements intact.      Conjunctiva/sclera: Conjunctivae normal.      Pupils: Pupils are equal, round, and reactive to light.   Cardiovascular:      Rate and Rhythm: Normal rate and regular rhythm.      Pulses: Normal pulses.      Heart sounds: Normal heart sounds.   Pulmonary:      Effort: Pulmonary effort is normal.      Breath sounds: Normal breath sounds.   Abdominal:      General: Abdomen is flat. Bowel sounds are normal.      Palpations: Abdomen is soft.   Musculoskeletal:         General: Normal range of motion.      Cervical back: Normal range of motion and neck supple.      Right lower leg: No edema.      Left lower leg: No edema.   Skin:     General: Skin is warm and dry.   Neurological:      General: No focal deficit present.      Mental Status: She is alert and oriented to person, place, and time.  Mental status is at baseline.      Cranial Nerves: Cranial nerves 2-12 are intact.      Sensory: Sensation is intact.      Motor: Motor function is intact.   Psychiatric:         Mood and Affect: Mood normal.         Behavior: Behavior normal. Behavior is cooperative.         Thought Content: Thought content normal.         Judgment: Judgment normal.         Scheduled Meds   [Held by provider] apixaban, 2.5 mg, Oral, Q12H  atorvastatin, 40 mg, Oral, Daily  cefTRIAXone, 1,000 mg, Intravenous, Q24H  [Held by provider] furosemide, 20 mg, Intravenous, Q12H  metroNIDAZOLE, 500 mg, Oral, Q8H  Naloxone HCl, 0.4 mg, Intravenous, Once  pantoprazole, 40 mg, Oral, Q AM  sodium chloride, 10 mL, Intravenous, Q12H       PRN Meds     acetaminophen    senna-docusate sodium **AND** polyethylene glycol **AND** bisacodyl **AND** bisacodyl    Calcium Replacement - Follow Nurse / BPA Driven Protocol    dextrose    dextrose    glucagon (human recombinant)    Magnesium Standard Dose Replacement - Follow Nurse / BPA Driven Protocol    Phosphorus Replacement - Follow Nurse / BPA Driven Protocol    Potassium Replacement - Follow Nurse / BPA Driven Protocol    [COMPLETED] Insert Peripheral IV **AND** sodium chloride    sodium chloride    sodium chloride   Infusions         Diagnostic Data    Results from last 7 days   Lab Units 06/14/24  0043 06/13/24  0022   WBC 10*3/mm3 7.46 1.90*   HEMOGLOBIN g/dL 7.4* 8.5*   HEMATOCRIT % 24.8* 27.8*   PLATELETS 10*3/mm3 80* 71*   GLUCOSE mg/dL 119* 69   CREATININE mg/dL 3.04* 2.46*   BUN mg/dL 45* 34*   SODIUM mmol/L 140 137   POTASSIUM mmol/L 4.4 4.2   AST (SGOT) U/L  --  131*   ALT (SGPT) U/L  --  35*   ALK PHOS U/L  --  57   BILIRUBIN mg/dL  --  0.7   ANION GAP mmol/L 8.8 9.6       US Liver    Result Date: 6/12/2024  Impression: 1. Liver parenchyma is normal. 2. Trace ascitic fluid adjacent to the liver. There is also a partially imaged right pleural effusion. Electronically Signed: Donaldo Cantu MD   6/12/2024 12:27 PM EDT  Workstation ID: [id number]       I reviewed the patient's new clinical results.    Assessment/Plan:     Active and Resolved Problems  Active Hospital Problems    Diagnosis  POA    **Syncope [R55]  Yes    Pancreatic mass [K86.89]  Unknown    Presence of cardiac pacemaker [Z95.0]  Yes    Permanent atrial fibrillation [I48.21]  Yes    Coronary artery disease involving native coronary artery of native heart without angina pectoris [I25.10]  Yes    Ischemic cardiomyopathy [I25.5]  Yes    Hyperlipidemia [E78.5]  Yes    Type 2 diabetes mellitus with diabetic chronic kidney disease [E11.22]  Yes    ESRD (end stage renal disease) [N18.6]  Yes    Anemia due to chronic kidney disease, on chronic dialysis [N18.6, D63.1, Z99.2]  Not Applicable    Essential hypertension [I10]  Yes      Resolved Hospital Problems   No resolved problems to display.         Syncope  - Last echo 4/9/2024 EF 35%  - Creatinine 3.46 - on dialysis   - EKG noted for ventricular paced rhythm  - Cardiology consulted  - Cardiology EP consulted     Fluid Overload/CHF  -Fluids discontinued --off Lasix as well.  Fluid management as per nephrology --patient is currently on hemodialysis     Pancytopenia   - Platelets 71  - WBC 1.90 on 6/13/2024.  This morning white WBC count currently 7.46  Unclear if elevation of white count is secondary to acute infectious process versus discontinuation of Zosyn.  - switched zosyn to rocephin and flagyl for concern of antibiotic ADR  - Hematology consulted, following.  Possible outpatient bone marrow biopsy.  Longstanding history of chronic thrombocytopenia.     ESRD  - Dialysis schedule Monday Friday.  - Followed by Dr. Buchanan  -Able to tolerate with no difficulty.  Avoid narcotic medications as they cause syncopal episodes during hemodialysis        Atrial fibrillation  - dual antiplatelet therapy at home of plavix and aspirin--currently on hold  - eliquis on hold --given drop in hemoglobin as well as  Hemoccult positive stools     HLD  - Continue home regime of lipitor 40 mg daily      Pneumonia   - left lower lobe pna   - SLP consulted - no aspiration per swallow study   - Rocephin and flagyl --possible ADR to Zosyn causing acute leukopenia     Pancreatic cyst  - EUS performed on 6/13/2024.  - No significant alarming feature of nodule but high risk of malignant transformation  - Dx with severe erosive gastritis, biopsy taken   - Hiatal hernia present  - Will restart anticoag once stable  - PPI added    VTE Prophylaxis:  Pharmacologic & mechanical VTE prophylaxis orders are present.         Code status is   Code Status and Medical Interventions:   Ordered at: 06/10/24 1150     Level Of Support Discussed With:    Patient     Code Status (Patient has no pulse and is not breathing):    CPR (Attempt to Resuscitate)     Medical Interventions (Patient has pulse or is breathing):    Full Support       Plan for disposition: Rehab in 3-5 days    Time: 30 minutes    Signature: Electronically signed by Jayda Nicole MD, 06/14/24, 09:42 EDT.  [facility] Hospitalist Team

## 2024-06-14 NOTE — SIGNIFICANT NOTE
06/14/24 1721   OTHER   Discipline physical therapist   Rehab Time/Intention   Session Not Performed   (RN requested to hold PT services at time of attempt)   Recommendation   PT - Next Appointment 06/15/24

## 2024-06-14 NOTE — PLAN OF CARE
Goal Outcome Evaluation:              Outcome Evaluation: Patient slept off and on during the night. Patient found to be having multiple stools during the night.  Stool was noted to be dark in color but with no smell.  Hospitalist on call called and occult stool order and advised to monitor patient and inform GI in the AM.  Patient stool was positive for blood.  Patient made strict NPO.  Patient refused to turn during the night.  Coccyx patch removed and patient has been creamed during the night due to all the stooling.  No complaints voiced.  Stable at this time.

## 2024-06-15 LAB
ALBUMIN SERPL-MCNC: 3.1 G/DL (ref 3.5–5.2)
ALBUMIN/GLOB SERPL: 1.8 G/DL
ALP SERPL-CCNC: 71 U/L (ref 39–117)
ALT SERPL W P-5'-P-CCNC: 22 U/L (ref 1–33)
ANION GAP SERPL CALCULATED.3IONS-SCNC: 9.6 MMOL/L (ref 5–15)
AST SERPL-CCNC: 47 U/L (ref 1–32)
BASOPHILS # BLD AUTO: 0.05 10*3/MM3 (ref 0–0.2)
BASOPHILS NFR BLD AUTO: 0.4 % (ref 0–1.5)
BILIRUB SERPL-MCNC: 0.6 MG/DL (ref 0–1.2)
BUN SERPL-MCNC: 24 MG/DL (ref 8–23)
BUN/CREAT SERPL: 10.2 (ref 7–25)
CALCIUM SPEC-SCNC: 8.3 MG/DL (ref 8.6–10.5)
CHLORIDE SERPL-SCNC: 104 MMOL/L (ref 98–107)
CO2 SERPL-SCNC: 26.4 MMOL/L (ref 22–29)
CREAT SERPL-MCNC: 2.36 MG/DL (ref 0.57–1)
DEPRECATED RDW RBC AUTO: 60 FL (ref 37–54)
EGFRCR SERPLBLD CKD-EPI 2021: 20 ML/MIN/1.73
EOSINOPHIL # BLD AUTO: 0.09 10*3/MM3 (ref 0–0.4)
EOSINOPHIL NFR BLD AUTO: 0.8 % (ref 0.3–6.2)
ERYTHROCYTE [DISTWIDTH] IN BLOOD BY AUTOMATED COUNT: 17 % (ref 12.3–15.4)
GLOBULIN UR ELPH-MCNC: 1.7 GM/DL
GLUCOSE BLDC GLUCOMTR-MCNC: 104 MG/DL (ref 70–105)
GLUCOSE BLDC GLUCOMTR-MCNC: 115 MG/DL (ref 70–105)
GLUCOSE BLDC GLUCOMTR-MCNC: 116 MG/DL (ref 70–105)
GLUCOSE BLDC GLUCOMTR-MCNC: 139 MG/DL (ref 70–105)
GLUCOSE BLDC GLUCOMTR-MCNC: 76 MG/DL (ref 70–105)
GLUCOSE SERPL-MCNC: 75 MG/DL (ref 65–99)
HCT VFR BLD AUTO: 24.6 % (ref 34–46.6)
HEMOCCULT STL QL IA: POSITIVE
HGB BLD-MCNC: 7.2 G/DL (ref 12–15.9)
IMM GRANULOCYTES # BLD AUTO: 0.17 10*3/MM3 (ref 0–0.05)
IMM GRANULOCYTES NFR BLD AUTO: 1.4 % (ref 0–0.5)
LYMPHOCYTES # BLD AUTO: 1.16 10*3/MM3 (ref 0.7–3.1)
LYMPHOCYTES NFR BLD AUTO: 9.7 % (ref 19.6–45.3)
MCH RBC QN AUTO: 29.5 PG (ref 26.6–33)
MCHC RBC AUTO-ENTMCNC: 29.3 G/DL (ref 31.5–35.7)
MCV RBC AUTO: 100.8 FL (ref 79–97)
MONOCYTES # BLD AUTO: 0.52 10*3/MM3 (ref 0.1–0.9)
MONOCYTES NFR BLD AUTO: 4.3 % (ref 5–12)
NEUTROPHILS NFR BLD AUTO: 10.01 10*3/MM3 (ref 1.7–7)
NEUTROPHILS NFR BLD AUTO: 83.4 % (ref 42.7–76)
NRBC BLD AUTO-RTO: 0 /100 WBC (ref 0–0.2)
PLATELET # BLD AUTO: 84 10*3/MM3 (ref 140–450)
PMV BLD AUTO: 10.7 FL (ref 6–12)
POTASSIUM SERPL-SCNC: 3.9 MMOL/L (ref 3.5–5.2)
PROT SERPL-MCNC: 4.8 G/DL (ref 6–8.5)
RBC # BLD AUTO: 2.44 10*6/MM3 (ref 3.77–5.28)
SODIUM SERPL-SCNC: 140 MMOL/L (ref 136–145)
WBC NRBC COR # BLD AUTO: 12 10*3/MM3 (ref 3.4–10.8)
ZINC SERPL-MCNC: 57 UG/DL (ref 44–115)

## 2024-06-15 PROCEDURE — 82948 REAGENT STRIP/BLOOD GLUCOSE: CPT | Performed by: STUDENT IN AN ORGANIZED HEALTH CARE EDUCATION/TRAINING PROGRAM

## 2024-06-15 PROCEDURE — 82570 ASSAY OF URINE CREATININE: CPT | Performed by: NURSE PRACTITIONER

## 2024-06-15 PROCEDURE — 80053 COMPREHEN METABOLIC PANEL: CPT | Performed by: NURSE PRACTITIONER

## 2024-06-15 PROCEDURE — 82948 REAGENT STRIP/BLOOD GLUCOSE: CPT

## 2024-06-15 PROCEDURE — 82525 ASSAY OF COPPER: CPT | Performed by: NURSE PRACTITIONER

## 2024-06-15 PROCEDURE — 85025 COMPLETE CBC W/AUTO DIFF WBC: CPT | Performed by: NURSE PRACTITIONER

## 2024-06-15 PROCEDURE — 82274 ASSAY TEST FOR BLOOD FECAL: CPT | Performed by: STUDENT IN AN ORGANIZED HEALTH CARE EDUCATION/TRAINING PROGRAM

## 2024-06-15 PROCEDURE — 81050 URINALYSIS VOLUME MEASURE: CPT | Performed by: NURSE PRACTITIONER

## 2024-06-15 RX ADMIN — Medication 10 ML: at 09:00

## 2024-06-15 RX ADMIN — APIXABAN 2.5 MG: 2.5 TABLET, FILM COATED ORAL at 11:47

## 2024-06-15 RX ADMIN — Medication 250 MG: at 20:02

## 2024-06-15 RX ADMIN — Medication 250 MG: at 08:54

## 2024-06-15 RX ADMIN — Medication 10 ML: at 20:01

## 2024-06-15 RX ADMIN — APIXABAN 2.5 MG: 2.5 TABLET, FILM COATED ORAL at 20:02

## 2024-06-15 RX ADMIN — ATORVASTATIN CALCIUM 40 MG: 40 TABLET, FILM COATED ORAL at 08:53

## 2024-06-15 RX ADMIN — PANTOPRAZOLE SODIUM 40 MG: 40 TABLET, DELAYED RELEASE ORAL at 16:38

## 2024-06-15 RX ADMIN — PANTOPRAZOLE SODIUM 40 MG: 40 TABLET, DELAYED RELEASE ORAL at 08:53

## 2024-06-15 RX ADMIN — ACETAMINOPHEN 650 MG: 325 TABLET, FILM COATED ORAL at 01:16

## 2024-06-15 RX ADMIN — ACETAMINOPHEN 650 MG: 325 TABLET, FILM COATED ORAL at 16:38

## 2024-06-15 NOTE — PLAN OF CARE
Goal Outcome Evaluation:  Patient has rested majority of the shift with difficulty arousing at times. Occult stool obtained. UA still needed-no urine output throughout the shift; hemodialysis. Eliquis restarted. Q2 turn. No complaints. Expected to D/C to [place] when ready. Will continue to monitor.

## 2024-06-15 NOTE — CONSULTS
Infectious Diseases Consult Note    Referring Provider: Soraida Jurado DO    Reason for Consultation: Positive blood culture    Patient Care Team:  Antonino Shen MD as PCP - Family Medicine  Dilcia Lui MD as Consulting Physician (Interventional Cardiology)  Zamzam Carrillo MD as Consulting Physician (Cardiac Electrophysiology)    Chief complaint weak    Subjective     History of present illness:      This is a 83-year-old female who was hospitalized [facility] on Faina 10, 2024 after she was noted to be hypotensive at the dialysis unit.  The patient had pacemaker placement on Faina 3, 2024 secondary to sick sinus syndrome.  The patient has been stable since admission but apparently she had 2 sets of blood culture one of them was positive for coagulase-negative Staphylococcus from June 11, 2024.  Repeat blood culture from yesterday is negative so far.  The patient is currently asymptomatic.  She is currently on room air.  She was suspected to have pneumonia but imaging study was mostly consistent with congestive heart failure.  The patient's been afebrile since admission.    Review of Systems   Review of Systems   Constitutional: Negative.    HENT: Negative.     Eyes: Negative.    Respiratory: Negative.     Cardiovascular: Negative.    Gastrointestinal: Negative.    Genitourinary: Negative.    Musculoskeletal: Negative.    Skin: Negative.    Neurological: Negative.    Hematological: Negative.    Psychiatric/Behavioral: Negative.         Medications  Medications Prior to Admission   Medication Sig Dispense Refill Last Dose    acetaminophen (TYLENOL) 500 MG tablet Take 1 tablet by mouth Every 6 (Six) Hours As Needed for Mild Pain.   6/10/2024    atorvastatin (LIPITOR) 40 MG tablet Take 1 tablet by mouth Daily. 90 tablet 3 6/9/2024    Cholecalciferol (Vitamin D3) 50 MCG (2000 UT) tablet Take 1 tablet by mouth Daily.   6/10/2024    febuxostat (ULORIC) 40 MG tablet Take 1 tablet by mouth Daily.    6/10/2024    metoprolol succinate XL (TOPROL-XL) 25 MG 24 hr tablet Take 0.5 tablets by mouth Daily. 30 tablet 0 6/10/2024    folic acid (FOLVITE) 1 MG tablet Take 1 tablet by mouth Daily. 30 tablet 0     NON FORMULARY Apply 1 dose topically to the appropriate area as directed 3 (Three) Times a Week. Lidocaine 2.5% ointment -  Apply 1 application Monday, Wednesday, Friday before dialysis          History  Past Medical History:   Diagnosis Date    Allergic conjunctivitis and rhinitis 11/06/2014    Anemia due to chronic kidney disease, on chronic dialysis 08/25/2020    Anxiety 07/30/2012    Bradycardia by electrocardiogram 06/03/2024    Cardiomyopathy, dilated 04/18/2024    Chronic gouty arthritis 11/06/2014    Coronary artery disease involving native coronary artery of native heart without angina pectoris 04/24/2024    Dependence on renal dialysis 07/01/2022    ESRD (end stage renal disease) 08/25/2020    Essential hypertension 09/16/2015    History of shingles 01/18/2022    Hyperlipidemia 04/08/2024    Ischemic cardiomyopathy 04/18/2024    Paroxysmal atrial fibrillation 07/01/2022    Presence of cardiac pacemaker 06/03/2024    Sick sinus syndrome 06/03/2024    Type 2 diabetes mellitus with diabetic chronic kidney disease 07/01/2022    Vitamin D deficiency 07/22/2021     Past Surgical History:   Procedure Laterality Date    ARTERIOVENOUS FISTULA Left     CARDIAC CATHETERIZATION N/A 4/25/2024    Procedure: Right and Left Heart Cath;  Surgeon: Dilcia Lui MD;  Location: [facility] CATH INVASIVE LOCATION;  Service: Cardiology;  Laterality: N/A;    CARDIAC CATHETERIZATION N/A 4/25/2024    Procedure: Percutaneous Coronary Intervention;  Surgeon: Jadiel Blake MD;  Location: [facility] CATH INVASIVE LOCATION;  Service: Cardiology;  Laterality: N/A;    CARDIAC ELECTROPHYSIOLOGY PROCEDURE N/A 6/3/2024    Procedure: Pacemaker DC new, [employer];  Surgeon: Zamzam Carrillo MD;  Location: [facility] CATH INVASIVE LOCATION;  Service:  Cardiovascular;  Laterality: N/A;    UPPER ENDOSCOPIC ULTRASOUND W/ FNA N/A 6/13/2024    Procedure: ESOPHAGOGASTRODUODENOSCOPY WITH ULTRASOUND AND FINE NEEDLE ASPIRATION of pancreatic cyst and forcep biopsy x2 areas;  Surgeon: Cesia Hyde MD;  Location: [facility] ENDOSCOPY;  Service: Gastroenterology;  Laterality: N/A;       Family History  Family History   Family history unknown: Yes       Social History   reports that she has never smoked. She has never used smokeless tobacco. She reports that she does not drink alcohol and does not use drugs.    Allergies  Heparin    Objective     Vital Signs   Vital Signs (last 24 hours)         06/13 0700  06/14 0659 06/14 0700  06/14 1622   Most Recent      Temp (°F) 96.5 -  98.7    97.9 -  98.4     98.1 (36.7) 06/14 1600    Heart Rate 70 -  71    70 -  77     73 06/14 1228    Resp 15 -  24    13 -  20     13 06/14 1600    BP 91/49 -  130/54    85/41 -  148/78     148/78 06/14 1600    SpO2 (%) 98 -  100      100     100 06/14 1228    Flow (L/min)   4                    Physical Exam:  Physical Exam  Vitals and nursing note reviewed.   Constitutional:       Appearance: She is well-developed.   HENT:      Head: Normocephalic and atraumatic.   Eyes:      Pupils: Pupils are equal, round, and reactive to light.   Cardiovascular:      Rate and Rhythm: Normal rate and regular rhythm.      Heart sounds: Normal heart sounds.   Pulmonary:      Effort: Pulmonary effort is normal. No respiratory distress.      Breath sounds: Normal breath sounds. No wheezing or rales.   Abdominal:      General: Bowel sounds are normal. There is no distension.      Palpations: Abdomen is soft. There is no mass.      Tenderness: There is no abdominal tenderness. There is no guarding or rebound.   Musculoskeletal:         General: No deformity. Normal range of motion.      Cervical back: Normal range of motion and neck supple.   Skin:     General: Skin is warm.      Findings: No erythema or rash.    Neurological:      Mental Status: She is alert and oriented to person, place, and time.      Cranial Nerves: No cranial nerve deficit.         Microbiology  Microbiology Results (last 10 days)       Procedure Component Value - Date/Time    Gastrointestinal Panel, PCR - Stool, Per Rectum [[id number]]  (Normal) Collected: 06/14/24 0600    Lab Status: Final result Specimen: Stool from Per Rectum Updated: 06/14/24 1143     Campylobacter Not Detected     Plesiomonas shigelloides Not Detected     Salmonella Not Detected     Vibrio Not Detected     Vibrio cholerae Not Detected     Yersinia enterocolitica Not Detected     Enteroaggregative E. coli (EAEC) Not Detected     Enteropathogenic E. coli (EPEC) Not Detected     Enterotoxigenic E. coli (ETEC) lt/st Not Detected     Shiga-like toxin-producing E. coli (STEC) stx1/stx2 Not Detected     Shigella/Enteroinvasive E. coli (EIEC) Not Detected     Cryptosporidium Not Detected     Cyclospora cayetanensis Not Detected     Entamoeba histolytica Not Detected     Giardia lamblia Not Detected     Adenovirus F40/41 Not Detected     Astrovirus Not Detected     Norovirus GI/GII Not Detected     Rotavirus A Not Detected     Sapovirus (I, II, IV or V) Not Detected    Narrative:      If Aeromonas, Staphylococcus aureus or Bacillus cereus are suspected, please order [id number]: Stool Culture, Aeromonas, S aureus, B Cereus.    Blood Culture - Blood, Arm, Right [[id number]]  (Normal) Collected: 06/13/24 1809    Lab Status: Preliminary result Specimen: Blood from Arm, Right Updated: 06/14/24 1845     Blood Culture No growth at 24 hours    Narrative:      Less than seven (7) mL's of blood was collected.  Insufficient quantity may yield false negative results.    Blood Culture - Blood, Blood, PICC Line [[id number]]  (Abnormal) Collected: 06/11/24 1733    Lab Status: Final result Specimen: Blood, PICC Line Updated: 06/14/24 0651     Blood Culture Staphylococcus, coagulase negative     Isolated from  Anaerobic Bottle     Gram Stain Anaerobic Bottle Gram positive cocci in clusters    Narrative:      Probable contaminant requires clinical correlation, susceptibility not performed unless requested by physician.      Blood Culture ID, PCR - Blood, Blood, PICC Line [[id number]]  (Abnormal) Collected: 06/11/24 1733    Lab Status: Final result Specimen: Blood, PICC Line Updated: 06/13/24 1556     BCID, PCR Staph spp, not aureus or lugdunensis. Identification by BCID2 PCR.     BOTTLE TYPE Anaerobic Bottle    Blood Culture - Blood, Arm, Right [[id number]]  (Normal) Collected: 06/11/24 1504    Lab Status: Preliminary result Specimen: Blood from Arm, Right Updated: 06/15/24 1531     Blood Culture No growth at 4 days    COVID-19, FLU A/B, RSV PCR 1 HR TAT - Swab, Nasopharynx [[id number]]  (Normal) Collected: 06/10/24 1821    Lab Status: Final result Specimen: Swab from Nasopharynx Updated: 06/10/24 1906     COVID19 Not Detected     Influenza A PCR Not Detected     Influenza B PCR Not Detected     RSV, PCR Not Detected    Narrative:      Fact sheet for providers: https://www.fda.gov/media/784066/download    Fact sheet for patients: https://www.fda.gov/media/223637/download    Test performed by PCR.            Laboratory  Results from last 7 days   Lab Units 06/15/24  0507   WBC 10*3/mm3 12.00*   HEMOGLOBIN g/dL 7.2*   HEMATOCRIT % 24.6*   PLATELETS 10*3/mm3 84*     Results from last 7 days   Lab Units 06/15/24  0507   SODIUM mmol/L 140   POTASSIUM mmol/L 3.9   CHLORIDE mmol/L 104   CO2 mmol/L 26.4   BUN mg/dL 24*   CREATININE mg/dL 2.36*   GLUCOSE mg/dL 75   CALCIUM mg/dL 8.3*     Results from last 7 days   Lab Units 06/15/24  0507   SODIUM mmol/L 140   POTASSIUM mmol/L 3.9   CHLORIDE mmol/L 104   CO2 mmol/L 26.4   BUN mg/dL 24*   CREATININE mg/dL 2.36*   GLUCOSE mg/dL 75   CALCIUM mg/dL 8.3*                   Radiology  Imaging Results (Last 72 Hours)       ** No results found for the last 72 hours. **             Cardiology      Results Review:  I have reviewed all clinical data, test, lab, and imaging results.       Schedule Meds  apixaban, 2.5 mg, Oral, Q12H  atorvastatin, 40 mg, Oral, Daily  [Held by provider] furosemide, 20 mg, Intravenous, Q12H  Naloxone HCl, 0.4 mg, Intravenous, Once  pantoprazole, 40 mg, Oral, BID AC  Psyllium, 1 packet, Oral, Daily  saccharomyces boulardii, 250 mg, Oral, BID  sodium chloride, 10 mL, Intravenous, Q12H        Infusion Meds       PRN Meds    acetaminophen    senna-docusate sodium **AND** polyethylene glycol **AND** bisacodyl **AND** bisacodyl    Calcium Replacement - Follow Nurse / BPA Driven Protocol    dextrose    dextrose    glucagon (human recombinant)    Magnesium Standard Dose Replacement - Follow Nurse / BPA Driven Protocol    Phosphorus Replacement - Follow Nurse / BPA Driven Protocol    Potassium Replacement - Follow Nurse / BPA Driven Protocol    [COMPLETED] Insert Peripheral IV **AND** sodium chloride    sodium chloride    sodium chloride      Assessment & Plan       Assessment    Positive blood culture for coagulase-negative Staphylococcus in 1 out of 2 sets.  This most likely contamination.  Although patient has endovascular device/pacemaker but this is recent device which I doubt it is infected with this organism.  Repeat blood cultures are negative so far    End-stage renal disease on hemodialysis.    Pulmonary edema based on the imaging study findings.  No clinical signs of pneumonia    S/p pacemaker placement on Faina 3, 2024 secondary to sick sinus syndrome    Chronic pancytopenia.  Patient being followed by hematology service.  Suspected to be related to medication.  White count is back to normal    Plan    No need for antimicrobial therapy  Monitor patient off antibiotics  Not much to add from infectious point.  Will sign off today.  Please call for any question or if repeat blood culture turns positive      Yuri Hua MD  06/15/24  15:34 EDT    Note is  dictated utilizing voice recognition software/Dragon

## 2024-06-15 NOTE — PLAN OF CARE
Goal Outcome Evaluation:              Outcome Evaluation: Patient slept well during the night.  Some stooling noted but less than the night before.  Complaints of hip pain during the night.  Stable at this time.

## 2024-06-15 NOTE — PROGRESS NOTES
[facility] MEDICINE SERVICE  DAILY PROGRESS NOTE    NAME: Deann Warren  : 1940  MRN: [id number]      LOS: 5 days     PROVIDER OF SERVICE: Soraida Jurado DO    Chief Complaint: Syncope    Subjective:     Interval History:  History taken from: patient    Patient seen and examined this morning.  Taking over care today.  Doing well, denies any particular complaints.  No fever, chills, night sweats, chest pain, shortness of breath.  Had dialysis yesterday.    Review of Systems:   Pertinent positives as noted in HPI/subjective.  All other systems were reviewed and are negative.      Objective:     Vital Signs  Temp:  [97.3 °F (36.3 °C)-100.2 °F (37.9 °C)] 97.8 °F (36.6 °C)  Heart Rate:  [70-77] 76  Resp:  [13-21] 21  BP: ()/(41-78) 114/51   Body mass index is 21.46 kg/m².    Physical Exam    General: Awake, alert, NAD  Eyes: PERRL, EOMI, conjunctivae are clear  Cardiovascular: Regular rate and rhythm, no murmurs  Respiratory: Clear to auscultation bilaterally, no wheezing or rales, unlabored breathing  Abdomen: Soft, nontender, positive bowel sounds, no guarding  Neurologic: A&O, CN grossly intact, moves all extremities spontaneously  Musculoskeletal: Generalized weakness, no other gross deformities  Skin: Warm, dry    Scheduled Meds   [Held by provider] apixaban, 2.5 mg, Oral, Q12H  atorvastatin, 40 mg, Oral, Daily  [Held by provider] furosemide, 20 mg, Intravenous, Q12H  Naloxone HCl, 0.4 mg, Intravenous, Once  pantoprazole, 40 mg, Oral, BID AC  Psyllium, 1 packet, Oral, Daily  saccharomyces boulardii, 250 mg, Oral, BID  sodium chloride, 10 mL, Intravenous, Q12H       PRN Meds     acetaminophen    senna-docusate sodium **AND** polyethylene glycol **AND** bisacodyl **AND** bisacodyl    Calcium Replacement - Follow Nurse / BPA Driven Protocol    dextrose    dextrose    glucagon (human recombinant)    Magnesium Standard Dose Replacement - Follow Nurse / BPA Driven Protocol    Phosphorus Replacement -  Follow Nurse / BPA Driven Protocol    Potassium Replacement - Follow Nurse / BPA Driven Protocol    [COMPLETED] Insert Peripheral IV **AND** sodium chloride    sodium chloride    sodium chloride   Infusions         Diagnostic Data    Results from last 7 days   Lab Units 06/15/24  0507   WBC 10*3/mm3 12.00*   HEMOGLOBIN g/dL 7.2*   HEMATOCRIT % 24.6*   PLATELETS 10*3/mm3 84*   GLUCOSE mg/dL 75   CREATININE mg/dL 2.36*   BUN mg/dL 24*   SODIUM mmol/L 140   POTASSIUM mmol/L 3.9   AST (SGOT) U/L 47*   ALT (SGPT) U/L 22   ALK PHOS U/L 71   BILIRUBIN mg/dL 0.6   ANION GAP mmol/L 9.6       No radiology results for the last day      I reviewed the patient's new clinical results.    Assessment/Plan:     Active and Resolved Problems  Active Hospital Problems    Diagnosis  POA    **Syncope [R55]  Yes    Pancreatic mass [K86.89]  Unknown    Presence of cardiac pacemaker [Z95.0]  Yes    Permanent atrial fibrillation [I48.21]  Yes    Coronary artery disease involving native coronary artery of native heart without angina pectoris [I25.10]  Yes    Ischemic cardiomyopathy [I25.5]  Yes    Hyperlipidemia [E78.5]  Yes    Type 2 diabetes mellitus with diabetic chronic kidney disease [E11.22]  Yes    ESRD (end stage renal disease) [N18.6]  Yes    Anemia due to chronic kidney disease, on chronic dialysis [N18.6, D63.1, Z99.2]  Not Applicable    Essential hypertension [I10]  Yes      Resolved Hospital Problems   No resolved problems to display.         Syncope  - Unclear etiology, recent echo with EF of 35%  - Patient has pacemaker, interrogated per cardiology  -Unable to obtain MRI for 6 weeks post pacemaker  -No further syncopal events noted while admitted  -PT/OT, rehab placement     Acute on chronic HFrEF  ICM  CAD  -Fluid management per nephrology with ultrafiltration  -Continue other GDMT as tolerated per cardiology    ESRD on HD  -Nephrology following for dialysis management     Pancytopenia   Pancreatic lesion  -CBC appears stable  now, the imaging findings noted  -Possible bone marrow biopsy as outpatient per heme-onc  -Heme-onc and GI following, further workup likely as outpatient     Permanent A-fib  Sick sinus syndrome status post pacemaker  -[employer] single-chamber pacemaker implanted on 6/3/24  -Patient had a hematoma post-procedure so her DAPT and anticoagulation were held  -Restart Eliquis when able     Positive blood cultures  -Positive blood culture for coagulase-negative Staphylococcus in 1 out of 2 sets   -Most likely contaminant per ID  -Patient previously on IV antibiotics which have been stopped per ID on 6/14  -Repeat blood cultures pending    VTE Prophylaxis:  Pharmacologic & mechanical VTE prophylaxis orders are present.         Code status is   Code Status and Medical Interventions:   Ordered at: 06/10/24 1150     Level Of Support Discussed With:    Patient     Code Status (Patient has no pulse and is not breathing):    CPR (Attempt to Resuscitate)     Medical Interventions (Patient has pulse or is breathing):    Full Support       Plan for disposition: Rehab placement    Signature: Electronically signed by Soraida Jurado DO, 06/15/24, 11:13 EDT.  [facility]    Part of this note may be an electronic transcription/translation of spoken language to printed text using the Dragon Dictation System.

## 2024-06-15 NOTE — PROGRESS NOTES
"                                                                                                                                      Nephrology  Progress Note                                        [facility]    Patient Identification    Name: Deann Warren  Age: 83 y.o.  Sex: female  :  1940  MRN: [id number]      DATE OF SERVICE:  6/15/2024        Subective    Follow-up ESRD  No chest pain, breathing better  Dialyzed yesterday  1.5 L removed due to blood pressure dropping..     Objective   Scheduled Meds:[Held by provider] apixaban, 2.5 mg, Oral, Q12H  atorvastatin, 40 mg, Oral, Daily  [Held by provider] furosemide, 20 mg, Intravenous, Q12H  Naloxone HCl, 0.4 mg, Intravenous, Once  pantoprazole, 40 mg, Oral, BID AC  Psyllium, 1 packet, Oral, Daily  saccharomyces boulardii, 250 mg, Oral, BID  sodium chloride, 10 mL, Intravenous, Q12H          Continuous Infusions:       PRN Meds:  acetaminophen    senna-docusate sodium **AND** polyethylene glycol **AND** bisacodyl **AND** bisacodyl    Calcium Replacement - Follow Nurse / BPA Driven Protocol    dextrose    dextrose    glucagon (human recombinant)    Magnesium Standard Dose Replacement - Follow Nurse / BPA Driven Protocol    Phosphorus Replacement - Follow Nurse / BPA Driven Protocol    Potassium Replacement - Follow Nurse / BPA Driven Protocol    [COMPLETED] Insert Peripheral IV **AND** sodium chloride    sodium chloride    sodium chloride     Exam:  /43 (BP Location: Right arm, Patient Position: Lying)   Pulse 70   Temp 97.3 °F (36.3 °C) (Oral)   Resp 17   Ht 162.6 cm (64\")   Wt 56.7 kg (125 lb)   SpO2 100%   BMI 21.46 kg/m²     Intake/Output last 3 shifts:  I/O last 3 completed shifts:  In: 600 [P.O.:600]  Out: 1400     Intake/Output this shift:  No intake/output data recorded.       Data Review:  All labs (24hrs):   Recent Results (from the past 24 hour(s))   POC Glucose 4x Daily Before Meals & at Bedtime    Collection Time: " 06/14/24  7:42 AM    Specimen: Blood   Result Value Ref Range    Glucose 87 70 - 105 mg/dL   POC Glucose Once    Collection Time: 06/14/24  3:44 PM    Specimen: Blood   Result Value Ref Range    Glucose 119 (H) 70 - 105 mg/dL   Hemoglobin    Collection Time: 06/14/24  4:04 PM    Specimen: Blood   Result Value Ref Range    Hemoglobin 7.4 (L) 12.0 - 15.9 g/dL   Occult Blood, Fecal By Immunoassay - Stool, Per Rectum    Collection Time: 06/14/24  8:19 PM    Specimen: Per Rectum; Stool   Result Value Ref Range    Occult Blood, Fecal by Immunoassay Positive (A) Negative   POC Glucose Once    Collection Time: 06/14/24  8:43 PM    Specimen: Blood   Result Value Ref Range    Glucose 148 (H) 70 - 105 mg/dL   Comprehensive Metabolic Panel    Collection Time: 06/15/24  5:07 AM    Specimen: Blood   Result Value Ref Range    Glucose 75 65 - 99 mg/dL    BUN 24 (H) 8 - 23 mg/dL    Creatinine 2.36 (H) 0.57 - 1.00 mg/dL    Sodium 140 136 - 145 mmol/L    Potassium 3.9 3.5 - 5.2 mmol/L    Chloride 104 98 - 107 mmol/L    CO2 26.4 22.0 - 29.0 mmol/L    Calcium 8.3 (L) 8.6 - 10.5 mg/dL    Total Protein 4.8 (L) 6.0 - 8.5 g/dL    Albumin 3.1 (L) 3.5 - 5.2 g/dL    ALT (SGPT) 22 1 - 33 U/L    AST (SGOT) 47 (H) 1 - 32 U/L    Alkaline Phosphatase 71 39 - 117 U/L    Total Bilirubin 0.6 0.0 - 1.2 mg/dL    Globulin 1.7 gm/dL    A/G Ratio 1.8 g/dL    BUN/Creatinine Ratio 10.2 7.0 - 25.0    Anion Gap 9.6 5.0 - 15.0 mmol/L    eGFR 20.0 (L) >60.0 mL/min/1.73   CBC Auto Differential    Collection Time: 06/15/24  5:07 AM    Specimen: Blood   Result Value Ref Range    WBC 12.00 (H) 3.40 - 10.80 10*3/mm3    RBC 2.44 (L) 3.77 - 5.28 10*6/mm3    Hemoglobin 7.2 (L) 12.0 - 15.9 g/dL    Hematocrit 24.6 (L) 34.0 - 46.6 %    .8 (H) 79.0 - 97.0 fL    MCH 29.5 26.6 - 33.0 pg    MCHC 29.3 (L) 31.5 - 35.7 g/dL    RDW 17.0 (H) 12.3 - 15.4 %    RDW-SD 60.0 (H) 37.0 - 54.0 fl    MPV 10.7 6.0 - 12.0 fL    Platelets 84 (L) 140 - 450 10*3/mm3    Neutrophil % 83.4  (H) 42.7 - 76.0 %    Lymphocyte % 9.7 (L) 19.6 - 45.3 %    Monocyte % 4.3 (L) 5.0 - 12.0 %    Eosinophil % 0.8 0.3 - 6.2 %    Basophil % 0.4 0.0 - 1.5 %    Immature Grans % 1.4 (H) 0.0 - 0.5 %    Neutrophils, Absolute 10.01 (H) 1.70 - 7.00 10*3/mm3    Lymphocytes, Absolute 1.16 0.70 - 3.10 10*3/mm3    Monocytes, Absolute 0.52 0.10 - 0.90 10*3/mm3    Eosinophils, Absolute 0.09 0.00 - 0.40 10*3/mm3    Basophils, Absolute 0.05 0.00 - 0.20 10*3/mm3    Immature Grans, Absolute 0.17 (H) 0.00 - 0.05 10*3/mm3    nRBC 0.0 0.0 - 0.2 /100 WBC          Imaging:  US Liver    Result Date: 6/12/2024  Impression: 1. Liver parenchyma is normal. 2. Trace ascitic fluid adjacent to the liver. There is also a partially imaged right pleural effusion. Electronically Signed: Donaldo Cantu MD  6/12/2024 12:27 PM EDT  Workstation ID: [id number]    CT Abdomen Pelvis Without Contrast    Result Date: 6/11/2024  Impression: 1.Volume overload/CHF features as detailed above. Superimposed left lower lobe pneumonia is not excluded. Correlate with symptoms. 2.There is a nonspecific 3.4 cm cystic mass involving the pancreatic head. Follow-up dedicated pancreatic MRI with and without contrast recommended. 3.Increased density urine consistent with hematuria. 4.Other incidental findings as detailed above. Electronically Signed: Flip Lee MD  6/11/2024 4:32 PM EDT  Workstation ID: [id number]    CT Head Without Contrast    Result Date: 6/11/2024  Impression: No acute intracranial pathology. Electronically Signed: Tristian Boone MD  6/11/2024 4:27 PM EDT  Workstation ID: [id number]    XR Chest 1 View    Result Date: 6/10/2024  Impression: 1.Cardiomegaly with pulmonary vascular congestion. 2.Left basilar atelectasis or infiltrate. Electronically Signed: Armando Gonzalez MD  6/10/2024 10:43 AM EDT  Workstation ID: [id number]     Assessment/Plan:     Syncope    Type 2 diabetes mellitus with diabetic chronic kidney disease    Essential hypertension    ESRD (end stage  renal disease)    Anemia due to chronic kidney disease, on chronic dialysis    Hyperlipidemia    Ischemic cardiomyopathy    Permanent atrial fibrillation    Presence of cardiac pacemaker    Coronary artery disease involving native coronary artery of native heart without angina pectoris    Pancreatic mass        End-stage renal disease-HD Mondays and Fridays  History of recent pacemaker   Syncope   Sick sinus syndrome   History of thrombocytopenia  Pancytopenia-per hematology       Dialyzed yesterday  Pt gets HD 2x a week, Monday and Friday  Next dialysis Monday      Cass Garcia MD

## 2024-06-16 LAB
ABO GROUP BLD: NORMAL
ABO GROUP BLD: NORMAL
ANION GAP SERPL CALCULATED.3IONS-SCNC: 8.3 MMOL/L (ref 5–15)
BACTERIA SPEC AEROBE CULT: NORMAL
BASOPHILS # BLD AUTO: 0.05 10*3/MM3 (ref 0–0.2)
BASOPHILS NFR BLD AUTO: 0.4 % (ref 0–1.5)
BLD GP AB SCN SERPL QL: NEGATIVE
BUN SERPL-MCNC: 26 MG/DL (ref 8–23)
BUN/CREAT SERPL: 9.3 (ref 7–25)
CALCIUM SPEC-SCNC: 8.3 MG/DL (ref 8.6–10.5)
CHLORIDE SERPL-SCNC: 104 MMOL/L (ref 98–107)
CO2 SERPL-SCNC: 27.7 MMOL/L (ref 22–29)
COPPER SERPL-MCNC: 84 UG/DL (ref 80–158)
CREAT SERPL-MCNC: 2.79 MG/DL (ref 0.57–1)
DEPRECATED RDW RBC AUTO: 60.8 FL (ref 37–54)
EGFRCR SERPLBLD CKD-EPI 2021: 16.4 ML/MIN/1.73
EOSINOPHIL # BLD AUTO: 0.13 10*3/MM3 (ref 0–0.4)
EOSINOPHIL NFR BLD AUTO: 1.1 % (ref 0.3–6.2)
ERYTHROCYTE [DISTWIDTH] IN BLOOD BY AUTOMATED COUNT: 17.4 % (ref 12.3–15.4)
GLUCOSE BLDC GLUCOMTR-MCNC: 101 MG/DL (ref 70–105)
GLUCOSE BLDC GLUCOMTR-MCNC: 103 MG/DL (ref 70–105)
GLUCOSE BLDC GLUCOMTR-MCNC: 120 MG/DL (ref 70–105)
GLUCOSE BLDC GLUCOMTR-MCNC: 159 MG/DL (ref 70–105)
GLUCOSE SERPL-MCNC: 115 MG/DL (ref 65–99)
HCT VFR BLD AUTO: 22.9 % (ref 34–46.6)
HCT VFR BLD AUTO: 27.6 % (ref 34–46.6)
HGB BLD-MCNC: 6.7 G/DL (ref 12–15.9)
HGB BLD-MCNC: 8.5 G/DL (ref 12–15.9)
IMM GRANULOCYTES # BLD AUTO: 0.78 10*3/MM3 (ref 0–0.05)
IMM GRANULOCYTES NFR BLD AUTO: 6.7 % (ref 0–0.5)
LIPASE FLD-CCNC: 88 U/L
LYMPHOCYTES # BLD AUTO: 1 10*3/MM3 (ref 0.7–3.1)
LYMPHOCYTES NFR BLD AUTO: 8.6 % (ref 19.6–45.3)
MCH RBC QN AUTO: 29.4 PG (ref 26.6–33)
MCHC RBC AUTO-ENTMCNC: 29.3 G/DL (ref 31.5–35.7)
MCV RBC AUTO: 100.4 FL (ref 79–97)
MONOCYTES # BLD AUTO: 0.4 10*3/MM3 (ref 0.1–0.9)
MONOCYTES NFR BLD AUTO: 3.5 % (ref 5–12)
NEUTROPHILS NFR BLD AUTO: 79.7 % (ref 42.7–76)
NEUTROPHILS NFR BLD AUTO: 9.23 10*3/MM3 (ref 1.7–7)
NRBC BLD AUTO-RTO: 0 /100 WBC (ref 0–0.2)
PLATELET # BLD AUTO: 72 10*3/MM3 (ref 140–450)
PMV BLD AUTO: 10.2 FL (ref 6–12)
POTASSIUM SERPL-SCNC: 3.6 MMOL/L (ref 3.5–5.2)
RBC # BLD AUTO: 2.28 10*6/MM3 (ref 3.77–5.28)
RH BLD: POSITIVE
RH BLD: POSITIVE
SODIUM SERPL-SCNC: 140 MMOL/L (ref 136–145)
T&S EXPIRATION DATE: NORMAL
WBC NRBC COR # BLD AUTO: 11.59 10*3/MM3 (ref 3.4–10.8)

## 2024-06-16 PROCEDURE — 86900 BLOOD TYPING SEROLOGIC ABO: CPT

## 2024-06-16 PROCEDURE — 85025 COMPLETE CBC W/AUTO DIFF WBC: CPT | Performed by: INTERNAL MEDICINE

## 2024-06-16 PROCEDURE — 86923 COMPATIBILITY TEST ELECTRIC: CPT

## 2024-06-16 PROCEDURE — 86901 BLOOD TYPING SEROLOGIC RH(D): CPT

## 2024-06-16 PROCEDURE — 86901 BLOOD TYPING SEROLOGIC RH(D): CPT | Performed by: STUDENT IN AN ORGANIZED HEALTH CARE EDUCATION/TRAINING PROGRAM

## 2024-06-16 PROCEDURE — 36430 TRANSFUSION BLD/BLD COMPNT: CPT

## 2024-06-16 PROCEDURE — 85018 HEMOGLOBIN: CPT | Performed by: INTERNAL MEDICINE

## 2024-06-16 PROCEDURE — 85014 HEMATOCRIT: CPT | Performed by: INTERNAL MEDICINE

## 2024-06-16 PROCEDURE — P9016 RBC LEUKOCYTES REDUCED: HCPCS

## 2024-06-16 PROCEDURE — 86900 BLOOD TYPING SEROLOGIC ABO: CPT | Performed by: STUDENT IN AN ORGANIZED HEALTH CARE EDUCATION/TRAINING PROGRAM

## 2024-06-16 PROCEDURE — 82948 REAGENT STRIP/BLOOD GLUCOSE: CPT | Performed by: STUDENT IN AN ORGANIZED HEALTH CARE EDUCATION/TRAINING PROGRAM

## 2024-06-16 PROCEDURE — 82948 REAGENT STRIP/BLOOD GLUCOSE: CPT

## 2024-06-16 PROCEDURE — 80048 BASIC METABOLIC PNL TOTAL CA: CPT | Performed by: INTERNAL MEDICINE

## 2024-06-16 PROCEDURE — 86850 RBC ANTIBODY SCREEN: CPT | Performed by: STUDENT IN AN ORGANIZED HEALTH CARE EDUCATION/TRAINING PROGRAM

## 2024-06-16 RX ORDER — ALBUMIN (HUMAN) 12.5 G/50ML
12.5 SOLUTION INTRAVENOUS AS NEEDED
Status: DISCONTINUED | OUTPATIENT
Start: 2024-06-16 | End: 2024-06-17 | Stop reason: HOSPADM

## 2024-06-16 RX ORDER — LIDOCAINE AND PRILOCAINE 25; 25 MG/G; MG/G
CREAM TOPICAL ONCE
Status: DISCONTINUED | OUTPATIENT
Start: 2024-06-16 | End: 2024-06-17 | Stop reason: HOSPADM

## 2024-06-16 RX ORDER — ACETAMINOPHEN 325 MG/1
650 TABLET ORAL EVERY 4 HOURS PRN
Status: DISCONTINUED | OUTPATIENT
Start: 2024-06-16 | End: 2024-06-17 | Stop reason: HOSPADM

## 2024-06-16 RX ADMIN — ACETAMINOPHEN 650 MG: 325 TABLET, FILM COATED ORAL at 23:45

## 2024-06-16 RX ADMIN — Medication 250 MG: at 22:16

## 2024-06-16 RX ADMIN — APIXABAN 2.5 MG: 2.5 TABLET, FILM COATED ORAL at 09:58

## 2024-06-16 RX ADMIN — ATORVASTATIN CALCIUM 40 MG: 40 TABLET, FILM COATED ORAL at 09:59

## 2024-06-16 RX ADMIN — APIXABAN 2.5 MG: 2.5 TABLET, FILM COATED ORAL at 22:16

## 2024-06-16 RX ADMIN — ACETAMINOPHEN 650 MG: 325 TABLET, FILM COATED ORAL at 09:58

## 2024-06-16 RX ADMIN — Medication 250 MG: at 09:57

## 2024-06-16 RX ADMIN — Medication 10 ML: at 09:59

## 2024-06-16 RX ADMIN — PANTOPRAZOLE SODIUM 40 MG: 40 TABLET, DELAYED RELEASE ORAL at 09:59

## 2024-06-16 RX ADMIN — PANTOPRAZOLE SODIUM 40 MG: 40 TABLET, DELAYED RELEASE ORAL at 17:14

## 2024-06-16 RX ADMIN — ACETAMINOPHEN 650 MG: 325 TABLET, FILM COATED ORAL at 01:30

## 2024-06-16 NOTE — SIGNIFICANT NOTE
06/16/24 0917   OTHER   Discipline physical therapy assistant   Rehab Time/Intention   Session Not Performed patient/family declined, not feeling well  (Pt got blood thru the night and was too weak and lethargic to participate with PT today.)   Recommendation   PT - Next Appointment 06/17/24

## 2024-06-16 NOTE — PLAN OF CARE
Goal Outcome Evaluation:              Outcome Evaluation: Patient slept well during the night.  Hmg 6.7.  1 Unit of blood given.  Pt stable at this time.

## 2024-06-16 NOTE — PROGRESS NOTES
"                                                                                                                                      Nephrology  Progress Note                                        [facility]    Patient Identification    Name: Deann Warren  Age: 83 y.o.  Sex: female  :  1940  MRN: [id number]      DATE OF SERVICE:  2024        Subective    Follow-up ESRD  No chest pain, breathing better  Feels weak, appetite fair  Transfused 1 unit PRBC this morning     Objective   Scheduled Meds:apixaban, 2.5 mg, Oral, Q12H  atorvastatin, 40 mg, Oral, Daily  [Held by provider] furosemide, 20 mg, Intravenous, Q12H  Naloxone HCl, 0.4 mg, Intravenous, Once  pantoprazole, 40 mg, Oral, BID AC  Psyllium, 1 packet, Oral, Daily  saccharomyces boulardii, 250 mg, Oral, BID  sodium chloride, 10 mL, Intravenous, Q12H          Continuous Infusions:       PRN Meds:  acetaminophen    senna-docusate sodium **AND** polyethylene glycol **AND** bisacodyl **AND** bisacodyl    Calcium Replacement - Follow Nurse / BPA Driven Protocol    dextrose    dextrose    glucagon (human recombinant)    Magnesium Standard Dose Replacement - Follow Nurse / BPA Driven Protocol    Phosphorus Replacement - Follow Nurse / BPA Driven Protocol    Potassium Replacement - Follow Nurse / BPA Driven Protocol    [COMPLETED] Insert Peripheral IV **AND** sodium chloride    sodium chloride    sodium chloride     Exam:  /60 (BP Location: Right arm, Patient Position: Lying)   Pulse 77   Temp 97.6 °F (36.4 °C) (Oral)   Resp 22   Ht 162.6 cm (64\")   Wt 56.7 kg (125 lb)   SpO2 98%   BMI 21.46 kg/m²     Intake/Output last 3 shifts:  I/O last 3 completed shifts:  In: 900 [P.O.:600; Blood:300]  Out: 20 [Urine:20]    Intake/Output this shift:  No intake/output data recorded.       Data Review:  All labs (24hrs):   Recent Results (from the past 24 hour(s))   POC Glucose Once    Collection Time: 06/15/24  4:17 PM    Specimen: " Blood   Result Value Ref Range    Glucose 104 70 - 105 mg/dL   POC Glucose Once    Collection Time: 06/15/24  8:24 PM    Specimen: Blood   Result Value Ref Range    Glucose 139 (H) 70 - 105 mg/dL   Basic Metabolic Panel    Collection Time: 06/16/24 12:52 AM    Specimen: Arm, Right; Blood   Result Value Ref Range    Glucose 115 (H) 65 - 99 mg/dL    BUN 26 (H) 8 - 23 mg/dL    Creatinine 2.79 (H) 0.57 - 1.00 mg/dL    Sodium 140 136 - 145 mmol/L    Potassium 3.6 3.5 - 5.2 mmol/L    Chloride 104 98 - 107 mmol/L    CO2 27.7 22.0 - 29.0 mmol/L    Calcium 8.3 (L) 8.6 - 10.5 mg/dL    BUN/Creatinine Ratio 9.3 7.0 - 25.0    Anion Gap 8.3 5.0 - 15.0 mmol/L    eGFR 16.4 (L) >60.0 mL/min/1.73   CBC Auto Differential    Collection Time: 06/16/24 12:52 AM    Specimen: Arm, Right; Blood   Result Value Ref Range    WBC 11.59 (H) 3.40 - 10.80 10*3/mm3    RBC 2.28 (L) 3.77 - 5.28 10*6/mm3    Hemoglobin 6.7 (C) 12.0 - 15.9 g/dL    Hematocrit 22.9 (L) 34.0 - 46.6 %    .4 (H) 79.0 - 97.0 fL    MCH 29.4 26.6 - 33.0 pg    MCHC 29.3 (L) 31.5 - 35.7 g/dL    RDW 17.4 (H) 12.3 - 15.4 %    RDW-SD 60.8 (H) 37.0 - 54.0 fl    MPV 10.2 6.0 - 12.0 fL    Platelets 72 (L) 140 - 450 10*3/mm3    Neutrophil % 79.7 (H) 42.7 - 76.0 %    Lymphocyte % 8.6 (L) 19.6 - 45.3 %    Monocyte % 3.5 (L) 5.0 - 12.0 %    Eosinophil % 1.1 0.3 - 6.2 %    Basophil % 0.4 0.0 - 1.5 %    Immature Grans % 6.7 (H) 0.0 - 0.5 %    Neutrophils, Absolute 9.23 (H) 1.70 - 7.00 10*3/mm3    Lymphocytes, Absolute 1.00 0.70 - 3.10 10*3/mm3    Monocytes, Absolute 0.40 0.10 - 0.90 10*3/mm3    Eosinophils, Absolute 0.13 0.00 - 0.40 10*3/mm3    Basophils, Absolute 0.05 0.00 - 0.20 10*3/mm3    Immature Grans, Absolute 0.78 (H) 0.00 - 0.05 10*3/mm3    nRBC 0.0 0.0 - 0.2 /100 WBC   ABO RH Specimen Verification    Collection Time: 06/16/24 12:52 AM    Specimen: Arm, Right; Blood   Result Value Ref Range    ABO Type B     RH type Positive    Type & Screen    Collection Time: 06/16/24  1:22  AM    Specimen: Arm, Right; Blood   Result Value Ref Range    ABO Type B     RH type Positive     Antibody Screen Negative     T&S Expiration Date 6/19/2024 11:59:59 PM    Prepare RBC, 1 Units    Collection Time: 06/16/24  3:09 AM   Result Value Ref Range    Product Code [id number]     Unit Number [id number]     UNIT  ABO B     UNIT  RH POS     Crossmatch Interpretation Compatible     Dispense Status IS     Blood Expiration Date [id number]     Blood Type Barcode 7300    POC Glucose 4x Daily Before Meals & at Bedtime    Collection Time: 06/16/24  7:18 AM    Specimen: Blood   Result Value Ref Range    Glucose 101 70 - 105 mg/dL   POC Glucose Once    Collection Time: 06/16/24  7:40 AM    Specimen: Blood   Result Value Ref Range    Glucose 103 70 - 105 mg/dL   Hemoglobin & Hematocrit, Blood    Collection Time: 06/16/24 10:55 AM    Specimen: Blood   Result Value Ref Range    Hemoglobin 8.5 (L) 12.0 - 15.9 g/dL    Hematocrit 27.6 (L) 34.0 - 46.6 %          Imaging:  US Liver    Result Date: 6/12/2024  Impression: 1. Liver parenchyma is normal. 2. Trace ascitic fluid adjacent to the liver. There is also a partially imaged right pleural effusion. Electronically Signed: Donaldo Cantu MD  6/12/2024 12:27 PM EDT  Workstation ID: [id number]    CT Abdomen Pelvis Without Contrast    Result Date: 6/11/2024  Impression: 1.Volume overload/CHF features as detailed above. Superimposed left lower lobe pneumonia is not excluded. Correlate with symptoms. 2.There is a nonspecific 3.4 cm cystic mass involving the pancreatic head. Follow-up dedicated pancreatic MRI with and without contrast recommended. 3.Increased density urine consistent with hematuria. 4.Other incidental findings as detailed above. Electronically Signed: Flip Lee MD  6/11/2024 4:32 PM EDT  Workstation ID: [id number]    CT Head Without Contrast    Result Date: 6/11/2024  Impression: No acute intracranial pathology. Electronically Signed: Tristian Boone MD  6/11/2024  4:27 PM EDT  Workstation ID: [id number]    XR Chest 1 View    Result Date: 6/10/2024  Impression: 1.Cardiomegaly with pulmonary vascular congestion. 2.Left basilar atelectasis or infiltrate. Electronically Signed: Armando Gonzalez MD  6/10/2024 10:43 AM EDT  Workstation ID: [id number]     Assessment/Plan:     Syncope    Type 2 diabetes mellitus with diabetic chronic kidney disease    Essential hypertension    ESRD (end stage renal disease)    Anemia due to chronic kidney disease, on chronic dialysis    Hyperlipidemia    Ischemic cardiomyopathy    Permanent atrial fibrillation    Presence of cardiac pacemaker    Coronary artery disease involving native coronary artery of native heart without angina pectoris    Pancreatic mass        End-stage renal disease-HD Mondays and Fridays  History of recent pacemaker   Syncope   Sick sinus syndrome   History of thrombocytopenia  Pancytopenia-per hematology  Anemia-status post transfusion       Pt gets HD 2x a week, Monday and Friday  Next dialysis Monday  Add Nepro for dietary supplement.      Cass Garcia MD

## 2024-06-16 NOTE — PLAN OF CARE
Goal Outcome Evaluation:  Patient has rested majority of the shift with difficulty arousing at times. Q2 turn. C/O pain in right side- tx with PRN Tylenol. Dialysis scheduled for tomorrow. Javy added for supplement. Expected to D/C to [place] when ready. Will continue to monitor.

## 2024-06-16 NOTE — PROGRESS NOTES
[facility] MEDICINE SERVICE  DAILY PROGRESS NOTE    NAME: Deann Warren  : 1940  MRN: [id number]      LOS: 6 days     PROVIDER OF SERVICE: Stephanie Esquivel MD    Chief Complaint: Syncope    Subjective:   No new complaint      Objective:     Vital Signs  Temp:  [97.3 °F (36.3 °C)-98.6 °F (37 °C)] 97.6 °F (36.4 °C)  Heart Rate:  [70-77] 77  Resp:  [14-30] 22  BP: ()/(50-64) 126/60   Body mass index is 21.46 kg/m².    Physical Exam    General: Awake, alert, NAD  Eyes: PERRL, EOMI, conjunctivae are clear  Cardiovascular: Regular rate and rhythm, no murmurs  Respiratory: Clear to auscultation bilaterally, no wheezing or rales, unlabored breathing  Abdomen: Soft, nontender, positive bowel sounds, no guarding  Neurologic: A&O, CN grossly intact, moves all extremities spontaneously  Musculoskeletal: Generalized weakness, no other gross deformities  Skin: Warm, dry    Scheduled Meds   apixaban, 2.5 mg, Oral, Q12H  atorvastatin, 40 mg, Oral, Daily  [Held by provider] furosemide, 20 mg, Intravenous, Q12H  lidocaine-prilocaine, , Topical, Once  Naloxone HCl, 0.4 mg, Intravenous, Once  pantoprazole, 40 mg, Oral, BID AC  Psyllium, 1 packet, Oral, Daily  saccharomyces boulardii, 250 mg, Oral, BID  sodium chloride, 10 mL, Intravenous, Q12H       PRN Meds     acetaminophen    albumin human    senna-docusate sodium **AND** polyethylene glycol **AND** bisacodyl **AND** bisacodyl    Calcium Replacement - Follow Nurse / BPA Driven Protocol    dextrose    dextrose    glucagon (human recombinant)    Magnesium Standard Dose Replacement - Follow Nurse / BPA Driven Protocol    Phosphorus Replacement - Follow Nurse / BPA Driven Protocol    Potassium Replacement - Follow Nurse / BPA Driven Protocol    [COMPLETED] Insert Peripheral IV **AND** sodium chloride    sodium chloride    sodium chloride   Infusions         Diagnostic Data    Results from last 7 days   Lab Units 24  1055 24  0052 06/15/24  0507   WBC  10*3/mm3  --  11.59* 12.00*   HEMOGLOBIN g/dL 8.5* 6.7* 7.2*   HEMATOCRIT % 27.6* 22.9* 24.6*   PLATELETS 10*3/mm3  --  72* 84*   GLUCOSE mg/dL  --  115* 75   CREATININE mg/dL  --  2.79* 2.36*   BUN mg/dL  --  26* 24*   SODIUM mmol/L  --  140 140   POTASSIUM mmol/L  --  3.6 3.9   AST (SGOT) U/L  --   --  47*   ALT (SGPT) U/L  --   --  22   ALK PHOS U/L  --   --  71   BILIRUBIN mg/dL  --   --  0.6   ANION GAP mmol/L  --  8.3 9.6       No radiology results for the last day      I reviewed the patient's new clinical results.    Assessment/Plan:     Active and Resolved Problems  Active Hospital Problems    Diagnosis  POA    **Syncope [R55]  Yes    Pancreatic mass [K86.89]  Unknown    Presence of cardiac pacemaker [Z95.0]  Yes    Permanent atrial fibrillation [I48.21]  Yes    Coronary artery disease involving native coronary artery of native heart without angina pectoris [I25.10]  Yes    Ischemic cardiomyopathy [I25.5]  Yes    Hyperlipidemia [E78.5]  Yes    Type 2 diabetes mellitus with diabetic chronic kidney disease [E11.22]  Yes    ESRD (end stage renal disease) [N18.6]  Yes    Anemia due to chronic kidney disease, on chronic dialysis [N18.6, D63.1, Z99.2]  Not Applicable    Essential hypertension [I10]  Yes      Resolved Hospital Problems   No resolved problems to display.         Syncope  - Unclear etiology, recent echo with EF of 35%  - Patient has pacemaker, interrogated per cardiology  -Unable to obtain MRI for 6 weeks post pacemaker  -No further syncopal events noted while admitted  -PT/OT, rehab placement    Melena  Monitor H and h per GI. H and H with an upward trend.     Acute on chronic HFrEF  ICM  CAD  -Fluid management per nephrology with ultrafiltration  -Continue other GDMT as tolerated per cardiology    ESRD on HD  -Nephrology following for dialysis management     Pancytopenia   Pancreatic lesion  -CBC appears stable now, the imaging findings noted  -Possible bone marrow biopsy as outpatient per  heme-onc  -Heme-onc and GI following, further workup likely as outpatient     Permanent A-fib  Sick sinus syndrome status post pacemaker  -[employer] single-chamber pacemaker implanted on 6/3/24  -Patient had a hematoma post-procedure so her DAPT and anticoagulation were held  -Restart Eliquis when able     Positive blood cultures  -Positive blood culture for coagulase-negative Staphylococcus in 1 out of 2 sets   -Most likely contaminant per ID  -Patient previously on IV antibiotics which have been stopped per ID on 6/14  -Repeat blood cultures pending    VTE Prophylaxis:  Pharmacologic & mechanical VTE prophylaxis orders are present.         Code status is   Code Status and Medical Interventions:   Ordered at: 06/10/24 1150     Level Of Support Discussed With:    Patient     Code Status (Patient has no pulse and is not breathing):    CPR (Attempt to Resuscitate)     Medical Interventions (Patient has pulse or is breathing):    Full Support       Plan for disposition: Rehab placement    Signature: Electronically signed by Stephanie Esquivel MD, 06/16/24, 16:24 EDT.  [facility] Hospitalist Team    Part of this note may be an electronic transcription/translation of spoken language to printed text using the Dragon Dictation System.

## 2024-06-17 ENCOUNTER — APPOINTMENT (OUTPATIENT)
Dept: NEPHROLOGY | Facility: HOSPITAL | Age: 84
DRG: 312 | End: 2024-06-17
Payer: MEDICARE

## 2024-06-17 VITALS
HEART RATE: 70 BPM | DIASTOLIC BLOOD PRESSURE: 50 MMHG | WEIGHT: 125 LBS | BODY MASS INDEX: 21.34 KG/M2 | RESPIRATION RATE: 22 BRPM | HEIGHT: 64 IN | TEMPERATURE: 96 F | OXYGEN SATURATION: 100 % | SYSTOLIC BLOOD PRESSURE: 111 MMHG

## 2024-06-17 LAB
ANION GAP SERPL CALCULATED.3IONS-SCNC: 9.4 MMOL/L (ref 5–15)
BASOPHILS # BLD AUTO: 0.05 10*3/MM3 (ref 0–0.2)
BASOPHILS NFR BLD AUTO: 0.4 % (ref 0–1.5)
BH BB BLOOD EXPIRATION DATE: NORMAL
BH BB BLOOD TYPE BARCODE: 7300
BH BB DISPENSE STATUS: NORMAL
BH BB PRODUCT CODE: NORMAL
BH BB UNIT NUMBER: NORMAL
BUN SERPL-MCNC: 28 MG/DL (ref 8–23)
BUN/CREAT SERPL: 9.2 (ref 7–25)
CALCIUM SPEC-SCNC: 8.7 MG/DL (ref 8.6–10.5)
CHLORIDE SERPL-SCNC: 103 MMOL/L (ref 98–107)
CO2 SERPL-SCNC: 26.6 MMOL/L (ref 22–29)
CREAT SERPL-MCNC: 3.05 MG/DL (ref 0.57–1)
CROSSMATCH INTERPRETATION: NORMAL
DEPRECATED RDW RBC AUTO: 68.3 FL (ref 37–54)
EGFRCR SERPLBLD CKD-EPI 2021: 14.7 ML/MIN/1.73
EOSINOPHIL # BLD AUTO: 0.17 10*3/MM3 (ref 0–0.4)
EOSINOPHIL NFR BLD AUTO: 1.5 % (ref 0.3–6.2)
ERYTHROCYTE [DISTWIDTH] IN BLOOD BY AUTOMATED COUNT: 20.2 % (ref 12.3–15.4)
GLUCOSE BLDC GLUCOMTR-MCNC: 83 MG/DL (ref 70–105)
GLUCOSE BLDC GLUCOMTR-MCNC: 84 MG/DL (ref 70–105)
GLUCOSE BLDC GLUCOMTR-MCNC: 88 MG/DL (ref 70–105)
GLUCOSE SERPL-MCNC: 111 MG/DL (ref 65–99)
HCT VFR BLD AUTO: 29.2 % (ref 34–46.6)
HGB BLD-MCNC: 8.7 G/DL (ref 12–15.9)
IMM GRANULOCYTES # BLD AUTO: 0.09 10*3/MM3 (ref 0–0.05)
IMM GRANULOCYTES NFR BLD AUTO: 0.8 % (ref 0–0.5)
LYMPHOCYTES # BLD AUTO: 1.08 10*3/MM3 (ref 0.7–3.1)
LYMPHOCYTES NFR BLD AUTO: 9.3 % (ref 19.6–45.3)
MCH RBC QN AUTO: 29 PG (ref 26.6–33)
MCHC RBC AUTO-ENTMCNC: 29.8 G/DL (ref 31.5–35.7)
MCV RBC AUTO: 97.3 FL (ref 79–97)
MONOCYTES # BLD AUTO: 0.41 10*3/MM3 (ref 0.1–0.9)
MONOCYTES NFR BLD AUTO: 3.5 % (ref 5–12)
NEUTROPHILS NFR BLD AUTO: 84.5 % (ref 42.7–76)
NEUTROPHILS NFR BLD AUTO: 9.87 10*3/MM3 (ref 1.7–7)
NRBC BLD AUTO-RTO: 0 /100 WBC (ref 0–0.2)
PLATELET # BLD AUTO: 85 10*3/MM3 (ref 140–450)
PMV BLD AUTO: 10.1 FL (ref 6–12)
POTASSIUM SERPL-SCNC: 3.6 MMOL/L (ref 3.5–5.2)
RBC # BLD AUTO: 3 10*6/MM3 (ref 3.77–5.28)
SODIUM SERPL-SCNC: 139 MMOL/L (ref 136–145)
UNIT  ABO: NORMAL
UNIT  RH: NORMAL
WBC NRBC COR # BLD AUTO: 11.67 10*3/MM3 (ref 3.4–10.8)

## 2024-06-17 PROCEDURE — 99232 SBSQ HOSP IP/OBS MODERATE 35: CPT | Performed by: NURSE PRACTITIONER

## 2024-06-17 PROCEDURE — 82948 REAGENT STRIP/BLOOD GLUCOSE: CPT

## 2024-06-17 PROCEDURE — 85025 COMPLETE CBC W/AUTO DIFF WBC: CPT | Performed by: INTERNAL MEDICINE

## 2024-06-17 PROCEDURE — 80048 BASIC METABOLIC PNL TOTAL CA: CPT | Performed by: INTERNAL MEDICINE

## 2024-06-17 PROCEDURE — 82948 REAGENT STRIP/BLOOD GLUCOSE: CPT | Performed by: STUDENT IN AN ORGANIZED HEALTH CARE EDUCATION/TRAINING PROGRAM

## 2024-06-17 RX ORDER — HYDROCODONE BITARTRATE AND ACETAMINOPHEN 5; 325 MG/1; MG/1
1 TABLET ORAL EVERY 4 HOURS PRN
Status: DISCONTINUED | OUTPATIENT
Start: 2024-06-17 | End: 2024-06-17 | Stop reason: HOSPADM

## 2024-06-17 RX ORDER — PANTOPRAZOLE SODIUM 40 MG/1
40 TABLET, DELAYED RELEASE ORAL
Qty: 60 TABLET | Refills: 0 | Status: SHIPPED | OUTPATIENT
Start: 2024-06-17 | End: 2024-06-22 | Stop reason: HOSPADM

## 2024-06-17 RX ADMIN — Medication 10 ML: at 12:51

## 2024-06-17 RX ADMIN — APIXABAN 2.5 MG: 2.5 TABLET, FILM COATED ORAL at 12:50

## 2024-06-17 RX ADMIN — HYDROCODONE BITARTRATE AND ACETAMINOPHEN 1 TABLET: 5; 325 TABLET ORAL at 05:27

## 2024-06-17 RX ADMIN — ATORVASTATIN CALCIUM 40 MG: 40 TABLET, FILM COATED ORAL at 12:50

## 2024-06-17 RX ADMIN — Medication 250 MG: at 12:50

## 2024-06-17 RX ADMIN — PANTOPRAZOLE SODIUM 40 MG: 40 TABLET, DELAYED RELEASE ORAL at 12:50

## 2024-06-17 RX ADMIN — ACETAMINOPHEN 650 MG: 325 TABLET, FILM COATED ORAL at 12:50

## 2024-06-17 NOTE — SIGNIFICANT NOTE
06/17/24 1154   OTHER   Discipline physical therapy assistant   Rehab Time/Intention   Session Not Performed other (see comments)  (Pt discharging after dialysis back to facility)   Recommendation   PT - Next Appointment 06/18/24

## 2024-06-17 NOTE — PROGRESS NOTES
"                                                                                                                                      Nephrology  Progress Note                                        [facility]    Patient Identification    Name: Deann Warren  Age: 83 y.o.  Sex: female  :  1940  MRN: [id number]      DATE OF SERVICE:  2024        Subective    Follow-up ESRD   Seen on dialysis     Objective   Scheduled Meds:apixaban, 2.5 mg, Oral, Q12H  atorvastatin, 40 mg, Oral, Daily  [Held by provider] furosemide, 20 mg, Intravenous, Q12H  lidocaine-prilocaine, , Topical, Once  Naloxone HCl, 0.4 mg, Intravenous, Once  pantoprazole, 40 mg, Oral, BID AC  Psyllium, 1 packet, Oral, Daily  saccharomyces boulardii, 250 mg, Oral, BID  sodium chloride, 10 mL, Intravenous, Q12H          Continuous Infusions:       PRN Meds:  acetaminophen    albumin human    senna-docusate sodium **AND** polyethylene glycol **AND** bisacodyl **AND** bisacodyl    Calcium Replacement - Follow Nurse / BPA Driven Protocol    dextrose    dextrose    glucagon (human recombinant)    HYDROcodone-acetaminophen    Magnesium Standard Dose Replacement - Follow Nurse / BPA Driven Protocol    Phosphorus Replacement - Follow Nurse / BPA Driven Protocol    Potassium Replacement - Follow Nurse / BPA Driven Protocol    [COMPLETED] Insert Peripheral IV **AND** sodium chloride    sodium chloride    sodium chloride     Exam:  /58 (BP Location: Right arm, Patient Position: Lying)   Pulse 70   Temp 97.8 °F (36.6 °C) (Oral)   Resp 24   Ht 162.6 cm (64\")   Wt 56.7 kg (125 lb)   SpO2 100%   BMI 21.46 kg/m²     Intake/Output last 3 shifts:  I/O last 3 completed shifts:  In: 1213 [P.O.:913; Blood:300]  Out: -     Intake/Output this shift:  No intake/output data recorded.       Data Review:  All labs (24hrs):   Recent Results (from the past 24 hour(s))   Hemoglobin & Hematocrit, Blood    Collection Time: 24 10:55 AM    " Specimen: Blood   Result Value Ref Range    Hemoglobin 8.5 (L) 12.0 - 15.9 g/dL    Hematocrit 27.6 (L) 34.0 - 46.6 %   POC Glucose Once    Collection Time: 06/16/24  4:33 PM    Specimen: Blood   Result Value Ref Range    Glucose 120 (H) 70 - 105 mg/dL   POC Glucose Once    Collection Time: 06/16/24  7:49 PM    Specimen: Blood   Result Value Ref Range    Glucose 159 (H) 70 - 105 mg/dL   Prepare RBC, 1 Units    Collection Time: 06/17/24  2:00 AM   Result Value Ref Range    Product Code [id number]     Unit Number [id number]     UNIT  ABO B     UNIT  RH POS     Crossmatch Interpretation Compatible     Dispense Status PT     Blood Expiration Date [id number]     Blood Type Barcode 7300    Basic Metabolic Panel    Collection Time: 06/17/24  2:08 AM    Specimen: Blood   Result Value Ref Range    Glucose 111 (H) 65 - 99 mg/dL    BUN 28 (H) 8 - 23 mg/dL    Creatinine 3.05 (H) 0.57 - 1.00 mg/dL    Sodium 139 136 - 145 mmol/L    Potassium 3.6 3.5 - 5.2 mmol/L    Chloride 103 98 - 107 mmol/L    CO2 26.6 22.0 - 29.0 mmol/L    Calcium 8.7 8.6 - 10.5 mg/dL    BUN/Creatinine Ratio 9.2 7.0 - 25.0    Anion Gap 9.4 5.0 - 15.0 mmol/L    eGFR 14.7 (L) >60.0 mL/min/1.73   CBC Auto Differential    Collection Time: 06/17/24  2:08 AM    Specimen: Blood   Result Value Ref Range    WBC 11.67 (H) 3.40 - 10.80 10*3/mm3    RBC 3.00 (L) 3.77 - 5.28 10*6/mm3    Hemoglobin 8.7 (L) 12.0 - 15.9 g/dL    Hematocrit 29.2 (L) 34.0 - 46.6 %    MCV 97.3 (H) 79.0 - 97.0 fL    MCH 29.0 26.6 - 33.0 pg    MCHC 29.8 (L) 31.5 - 35.7 g/dL    RDW 20.2 (H) 12.3 - 15.4 %    RDW-SD 68.3 (H) 37.0 - 54.0 fl    MPV 10.1 6.0 - 12.0 fL    Platelets 85 (L) 140 - 450 10*3/mm3    Neutrophil % 84.5 (H) 42.7 - 76.0 %    Lymphocyte % 9.3 (L) 19.6 - 45.3 %    Monocyte % 3.5 (L) 5.0 - 12.0 %    Eosinophil % 1.5 0.3 - 6.2 %    Basophil % 0.4 0.0 - 1.5 %    Immature Grans % 0.8 (H) 0.0 - 0.5 %    Neutrophils, Absolute 9.87 (H) 1.70 - 7.00 10*3/mm3    Lymphocytes, Absolute 1.08  0.70 - 3.10 10*3/mm3    Monocytes, Absolute 0.41 0.10 - 0.90 10*3/mm3    Eosinophils, Absolute 0.17 0.00 - 0.40 10*3/mm3    Basophils, Absolute 0.05 0.00 - 0.20 10*3/mm3    Immature Grans, Absolute 0.09 (H) 0.00 - 0.05 10*3/mm3    nRBC 0.0 0.0 - 0.2 /100 WBC          Imaging:  US Liver    Result Date: 6/12/2024  Impression: 1. Liver parenchyma is normal. 2. Trace ascitic fluid adjacent to the liver. There is also a partially imaged right pleural effusion. Electronically Signed: Donaldo Cantu MD  6/12/2024 12:27 PM EDT  Workstation ID: [id number]    CT Abdomen Pelvis Without Contrast    Result Date: 6/11/2024  Impression: 1.Volume overload/CHF features as detailed above. Superimposed left lower lobe pneumonia is not excluded. Correlate with symptoms. 2.There is a nonspecific 3.4 cm cystic mass involving the pancreatic head. Follow-up dedicated pancreatic MRI with and without contrast recommended. 3.Increased density urine consistent with hematuria. 4.Other incidental findings as detailed above. Electronically Signed: Flip Lee MD  6/11/2024 4:32 PM EDT  Workstation ID: [id number]    CT Head Without Contrast    Result Date: 6/11/2024  Impression: No acute intracranial pathology. Electronically Signed: Tristian Boone MD  6/11/2024 4:27 PM EDT  Workstation ID: [id number]    XR Chest 1 View    Result Date: 6/10/2024  Impression: 1.Cardiomegaly with pulmonary vascular congestion. 2.Left basilar atelectasis or infiltrate. Electronically Signed: Armando Gonzalez MD  6/10/2024 10:43 AM EDT  Workstation ID: [id number]     Assessment/Plan:     Syncope    Type 2 diabetes mellitus with diabetic chronic kidney disease    Essential hypertension    ESRD (end stage renal disease)    Anemia due to chronic kidney disease, on chronic dialysis    Hyperlipidemia    Ischemic cardiomyopathy    Permanent atrial fibrillation    Presence of cardiac pacemaker    Coronary artery disease involving native coronary artery of native heart without angina  pectoris    Pancreatic mass        End-stage renal disease-HD Mondays and Fridays  History of recent pacemaker   Syncope   Sick sinus syndrome   History of thrombocytopenia  Pancytopenia-per hematology  Anemia-status post transfusion       Pt gets HD 2x a week, Monday and Friday   Seen on dialysis today  Add Nepro for dietary supplement.      Cass Garcia MD

## 2024-06-17 NOTE — PLAN OF CARE
Problem: Syncope  Goal: Absence of Syncopal Symptoms  6/17/2024 0519 by Holly Ma LPN  Outcome: Ongoing, Progressing  6/17/2024 0354 by Holly Ma LPN  Outcome: Ongoing, Progressing  6/17/2024 0353 by Holly Ma LPN  Outcome: Ongoing, Progressing  6/17/2024 0349 by Holly Ma LPN  Outcome: Ongoing, Progressing     Problem: Adult Inpatient Plan of Care  Goal: Plan of Care Review  6/17/2024 0519 by Holly Ma LPN  Outcome: Ongoing, Progressing  6/17/2024 0354 by Holly Ma LPN  Outcome: Ongoing, Progressing  6/17/2024 0353 by Holly Ma LPN  Outcome: Ongoing, Progressing  6/17/2024 0349 by Holly Ma LPN  Outcome: Ongoing, Progressing  Goal: Patient-Specific Goal (Individualized)  6/17/2024 0519 by Holly Ma LPN  Outcome: Ongoing, Progressing  6/17/2024 0354 by Holly Ma LPN  Outcome: Ongoing, Progressing  6/17/2024 0353 by Holly Ma LPN  Outcome: Ongoing, Progressing  6/17/2024 0349 by Holly Ma LPN  Outcome: Ongoing, Progressing  Goal: Absence of Hospital-Acquired Illness or Injury  6/17/2024 0519 by Holly Ma LPN  Outcome: Ongoing, Progressing  6/17/2024 0354 by Holly Ma LPN  Outcome: Ongoing, Progressing  6/17/2024 0353 by Holly Ma LPN  Outcome: Ongoing, Progressing  6/17/2024 0349 by Holly Ma LPN  Outcome: Ongoing, Progressing  Intervention: Identify and Manage Fall Risk  Recent Flowsheet Documentation  Taken 6/17/2024 0200 by Holly Ma LPN  Safety Promotion/Fall Prevention:   fall prevention program maintained   lighting adjusted   room organization consistent   safety round/check completed  Taken 6/17/2024 0023 by Anil, Holly, LPN  Safety Promotion/Fall Prevention:   fall prevention program maintained   lighting adjusted   nonskid shoes/slippers when out of bed   safety round/check completed  Taken 6/16/2024 2200 by Holly Ma  Hourly rounding done. Patient resting quietly in position of comfort with call bell in reach. No signs or symptoms of acute distress noted at this time. No needs or wants verbalized at this time. LPN  Safety Promotion/Fall Prevention:   safety round/check completed   lighting adjusted  Taken 6/16/2024 2000 by Anil, Holly, LPN  Safety Promotion/Fall Prevention: safety round/check completed  Intervention: Prevent Infection  Recent Flowsheet Documentation  Taken 6/17/2024 0200 by Holly Ma LPN  Infection Prevention:   rest/sleep promoted   single patient room provided   hand hygiene promoted  Taken 6/17/2024 0023 by Holly Ma LPN  Infection Prevention:   rest/sleep promoted   personal protective equipment utilized   hand hygiene promoted   single patient room provided  Taken 6/16/2024 2200 by Holly Ma LPN  Infection Prevention:   hand hygiene promoted   personal protective equipment utilized   rest/sleep promoted   single patient room provided  Taken 6/16/2024 2000 by Holly Ma LPN  Infection Prevention:   rest/sleep promoted   personal protective equipment utilized   hand hygiene promoted   single patient room provided  Goal: Optimal Comfort and Wellbeing  6/17/2024 0519 by Holly Ma LPN  Outcome: Ongoing, Progressing  6/17/2024 0354 by Holly Ma LPN  Outcome: Ongoing, Progressing  6/17/2024 0353 by Holly Ma LPN  Outcome: Ongoing, Progressing  6/17/2024 0349 by Holly Ma LPN  Outcome: Ongoing, Progressing  Goal: Readiness for Transition of Care  6/17/2024 0519 by Holly Ma LPN  Outcome: Ongoing, Progressing  6/17/2024 0354 by Holly Ma LPN  Outcome: Ongoing, Progressing  6/17/2024 0353 by Holly Ma LPN  Outcome: Ongoing, Progressing  6/17/2024 0349 by Holly Ma LPN  Outcome: Ongoing, Progressing     Problem: Diabetes Comorbidity  Goal: Blood Glucose Level Within Targeted Range  6/17/2024 0519 by Holly Ma LPN  Outcome: Ongoing, Progressing  6/17/2024 0354 by Holly Ma LPN  Outcome: Ongoing, Progressing  6/17/2024 0353 by Holly Ma LPN  Outcome: Ongoing,  Progressing  6/17/2024 0349 by Holly Ma LPN  Outcome: Ongoing, Progressing     Problem: Hypertension Comorbidity  Goal: Blood Pressure in Desired Range  6/17/2024 0519 by Holly Ma LPN  Outcome: Ongoing, Progressing  6/17/2024 0354 by Holly Ma LPN  Outcome: Ongoing, Progressing  6/17/2024 0353 by Holly Ma LPN  Outcome: Ongoing, Progressing  6/17/2024 0349 by Holly Ma LPN  Outcome: Ongoing, Progressing  Intervention: Maintain Blood Pressure Management  Recent Flowsheet Documentation  Taken 6/16/2024 2200 by Holly Ma LPN  Medication Review/Management: medications reviewed  Taken 6/16/2024 2000 by Holly Ma LPN  Medication Review/Management: medications reviewed     Problem: Skin Injury Risk Increased  Goal: Skin Health and Integrity  6/17/2024 0519 by Holly Ma LPN  Outcome: Ongoing, Progressing  6/17/2024 0354 by Holly Ma LPN  Outcome: Ongoing, Progressing  6/17/2024 0353 by Holly Ma LPN  Outcome: Ongoing, Progressing  6/17/2024 0349 by Holly Ma LPN  Outcome: Ongoing, Progressing  Intervention: Optimize Skin Protection  Recent Flowsheet Documentation  Taken 6/16/2024 2000 by Holly Ma LPN  Pressure Reduction Techniques:   frequent weight shift encouraged   heels elevated off bed   positioned off wounds   pressure points protected  Pressure Reduction Devices: positioning supports utilized     Problem: Fall Injury Risk  Goal: Absence of Fall and Fall-Related Injury  6/17/2024 0519 by Holly Ma LPN  Outcome: Ongoing, Progressing  6/17/2024 0354 by Holly Ma LPN  Outcome: Ongoing, Progressing  6/17/2024 0353 by Holly Ma LPN  Outcome: Ongoing, Progressing  6/17/2024 0349 by Holly Ma LPN  Outcome: Ongoing, Progressing  Intervention: Identify and Manage Contributors  Recent Flowsheet Documentation  Taken 6/16/2024 2200 by Holly Ma LPN  Medication  Review/Management: medications reviewed  Taken 6/16/2024 2000 by Holly Ma LPN  Medication Review/Management: medications reviewed  Intervention: Promote Injury-Free Environment  Recent Flowsheet Documentation  Taken 6/17/2024 0200 by Holly Ma LPN  Safety Promotion/Fall Prevention:   fall prevention program maintained   lighting adjusted   room organization consistent   safety round/check completed  Taken 6/17/2024 0023 by Anil, Holly, LPN  Safety Promotion/Fall Prevention:   fall prevention program maintained   lighting adjusted   nonskid shoes/slippers when out of bed   safety round/check completed  Taken 6/16/2024 2200 by Holly Ma LPN  Safety Promotion/Fall Prevention:   safety round/check completed   lighting adjusted  Taken 6/16/2024 2000 by Anil, Holly, LPN  Safety Promotion/Fall Prevention: safety round/check completed   Goal Outcome Evaluation:            Pt AOX4 at times. Lethargic at other times. Messaged Ana Maria nassar [place].

## 2024-06-17 NOTE — PLAN OF CARE
Goal Outcome Evaluation:        Patient lethargic at times but ALOX4. Complains of flank pain. Ana Maria Blanco contacted. Norco given for pain.

## 2024-06-17 NOTE — CASE MANAGEMENT/SOCIAL WORK
Continued Stay Note  HYUN Duque     Patient Name: Deann Warren  MRN: [id number]  Today's Date: 6/17/2024    Admit Date: 6/10/2024    Plan: DC PLAN: [facility] (Accepted. Bed available 6/14) [facility] approved. 6/14-6/21. HD MWF. EMS to transport.       Discharge Plan       Row Name 06/17/24 1357       Plan    Plan DC PLAN: [facility] (Accepted. Bed available 6/14) [facility] approved. 6/14-6/21. HD MWF. EMS to transport.    Plan Comments Reached out to Nayely hager liasion for [facility] who states patient is good to dc today. Patient is bed bound and has mulitiple wounds. Medical necessity form completed. Requested EMS via [employer]. Bedside nurse updated. Plans on discharging today 6/17.                          Expected Discharge Date and Time       Expected Discharge Date Expected Discharge Time    Jun 17, 2024           Kayla Hernandez RN

## 2024-06-17 NOTE — PROGRESS NOTES
[facility]   PROGRESS NOTE      Referring Provider: Soraida Jurado DO    Reason for follow-up: syncope     Patient Care Team:  Antonino Shen MD as PCP - Family Medicine  Dilcia Lui MD as Consulting Physician (Interventional Cardiology)  Zamzam Carrillo MD as Consulting Physician (Cardiac Electrophysiology)      SUBJECTIVE    Patient having dialysis in room. Appears very lethargic due to narcotic administration several hours earlier. No current complaints.     ROS    Allergies:  Heparin    Personal History:    Past Medical History:   Diagnosis Date    Allergic conjunctivitis and rhinitis 11/06/2014    Anemia due to chronic kidney disease, on chronic dialysis 08/25/2020    Anxiety 07/30/2012    Bradycardia by electrocardiogram 06/03/2024    Cardiomyopathy, dilated 04/18/2024    Chronic gouty arthritis 11/06/2014    Coronary artery disease involving native coronary artery of native heart without angina pectoris 04/24/2024    Dependence on renal dialysis 07/01/2022    ESRD (end stage renal disease) 08/25/2020    Essential hypertension 09/16/2015    History of shingles 01/18/2022    Hyperlipidemia 04/08/2024    Ischemic cardiomyopathy 04/18/2024    Paroxysmal atrial fibrillation 07/01/2022    Presence of cardiac pacemaker 06/03/2024    Sick sinus syndrome 06/03/2024    Type 2 diabetes mellitus with diabetic chronic kidney disease 07/01/2022    Vitamin D deficiency 07/22/2021       Past Surgical History:   Procedure Laterality Date    ARTERIOVENOUS FISTULA Left     CARDIAC CATHETERIZATION N/A 4/25/2024    Procedure: Right and Left Heart Cath;  Surgeon: Dilcia Lui MD;  Location:  [facility];  Service: Cardiology;  Laterality: N/A;    CARDIAC CATHETERIZATION N/A 4/25/2024    Procedure: Percutaneous Coronary Intervention;  Surgeon: Jadiel Blake MD;  Location:  [facility];  Service: Cardiology;  Laterality: N/A;    CARDIAC  "ELECTROPHYSIOLOGY PROCEDURE N/A 6/3/2024    Procedure: Pacemaker DC new, [employer]tronic;  Surgeon: Zamzam Carrillo MD;  Location: [facility] CATH INVASIVE LOCATION;  Service: Cardiovascular;  Laterality: N/A;    UPPER ENDOSCOPIC ULTRASOUND W/ FNA N/A 6/13/2024    Procedure: ESOPHAGOGASTRODUODENOSCOPY WITH ULTRASOUND AND FINE NEEDLE ASPIRATION of pancreatic cyst and forcep biopsy x2 areas;  Surgeon: Cesia Hyde MD;  Location: [facility] ENDOSCOPY;  Service: Gastroenterology;  Laterality: N/A;       Family History   Family history unknown: Yes       Social History     Tobacco Use    Smoking status: Never    Smokeless tobacco: Never   Vaping Use    Vaping status: Never Used   Substance Use Topics    Alcohol use: Never    Drug use: Never        Medications Prior to Admission   Medication Sig Dispense Refill Last Dose    acetaminophen (TYLENOL) 500 MG tablet Take 1 tablet by mouth Every 6 (Six) Hours As Needed for Mild Pain.   6/10/2024    atorvastatin (LIPITOR) 40 MG tablet Take 1 tablet by mouth Daily. 90 tablet 3 6/9/2024    Cholecalciferol (Vitamin D3) 50 MCG (2000 UT) tablet Take 1 tablet by mouth Daily.   6/10/2024    febuxostat (ULORIC) 40 MG tablet Take 1 tablet by mouth Daily.   6/10/2024    metoprolol succinate XL (TOPROL-XL) 25 MG 24 hr tablet Take 0.5 tablets by mouth Daily. 30 tablet 0 6/10/2024    folic acid (FOLVITE) 1 MG tablet Take 1 tablet by mouth Daily. 30 tablet 0     NON FORMULARY Apply 1 dose topically to the appropriate area as directed 3 (Three) Times a Week. Lidocaine 2.5% ointment -  Apply 1 application Monday, Wednesday, Friday before dialysis            OBJECTIVE    VITAL SIGNS  Vitals:    06/17/24 0900 06/17/24 0930 06/17/24 1000 06/17/24 1030   BP:       BP Location:       Patient Position:       Pulse:       Resp: 20 18 14 14   Temp:       TempSrc:       SpO2:       Weight:       Height:         Flowsheet Rows      Flowsheet Row First Filed Value   Admission Height 162.6 cm (64\") Documented at " 06/10/2024 0950   Admission Weight 56.7 kg (125 lb) Documented at 06/10/2024 0950             TELEMETRY: ventricular paced    Physical Exam:  Vitals reviewed.   Constitutional:       Appearance: Normal weight and not in distress.   Pulmonary:      Effort: Pulmonary effort is normal.      Breath sounds: Normal breath sounds.   Cardiovascular:      Normal rate. Regular rhythm. Normal S1. Normal S2.    Pulses:     Intact distal pulses.   Edema:     Peripheral edema absent.   Abdominal:      General: Bowel sounds are normal.   Musculoskeletal: Normal range of motion.      Cervical back: Normal range of motion. Skin:     General: Skin is warm and dry.   Neurological:      Mental Status: Alert and oriented to person, place and time.   Psychiatric:         Behavior: Behavior is cooperative.          LAB RESULTS (LAST 7 DAYS)  I have reviewed new clinical results.    CMP   Results from last 7 days   Lab Units 06/17/24  0208 06/16/24  0052 06/15/24  0507 06/14/24  0043 06/13/24  0022 06/12/24  0220 06/11/24  1504 06/11/24  0352   SODIUM mmol/L 139 140 140 140 137 136  --  138   POTASSIUM mmol/L 3.6 3.6 3.9 4.4 4.2 4.7  --  4.4   CHLORIDE mmol/L 103 104 104 105 103 104  --  103   CO2 mmol/L 26.6 27.7 26.4 26.2 24.4 23.0  --  24.1   BUN mg/dL 28* 26* 24* 45* 34* 52*  --  47*   CREATININE mg/dL 3.05* 2.79* 2.36* 3.04* 2.46* 3.44*  --  3.33*   GLUCOSE mg/dL 111* 115* 75 119* 69 66  --  78   ALBUMIN g/dL  --   --  3.1* 3.1* 3.2* 3.3*  --  3.5   BILIRUBIN mg/dL  --   --  0.6  --  0.7 0.7  --  0.6   ALK PHOS U/L  --   --  71  --  57 70  --  71   AST (SGOT) U/L  --   --  47*  --  131* 129*  --  126*   ALT (SGPT) U/L  --   --  22  --  35* 33  --  27   LIPASE U/L  --   --   --   --  17  --   --   --    AMMONIA umol/L  --   --   --   --   --   --  26  --      CBC  Results from last 7 days   Lab Units 06/17/24  0208 06/16/24  1055 06/16/24  0052 06/15/24  0507 06/14/24  1604 06/14/24  0043 06/13/24  0022 06/12/24  0220 06/11/24  1444  06/11/24  0352   WBC 10*3/mm3 11.67*  --  11.59* 12.00*  --  7.46 1.90* 1.82*  --  2.05*   RBC 10*6/mm3 3.00*  --  2.28* 2.44*  --  2.50* 2.88* 2.92*  --  2.85*   HEMOGLOBIN g/dL 8.7* 8.5* 6.7* 7.2* 7.4* 7.4* 8.5* 8.5*  --  8.4*   HEMOGLOBIN, POC   --   --   --   --   --   --   --   --    < >  --    HEMATOCRIT % 29.2* 27.6* 22.9* 24.6*  --  24.8* 27.8* 28.7*  --  28.2*   HEMATOCRIT POC   --   --   --   --   --   --   --   --    < >  --    MCV fL 97.3*  --  100.4* 100.8*  --  99.2* 96.5 98.3*  --  98.9*   PLATELETS 10*3/mm3 85*  --  72* 84*  --  80* 71* 66*  --  64*    < > = values in this interval not displayed.     ProBNP      TROPONIN  Results from last 7 days   Lab Units 06/11/24 2001   HSTROP T ng/L 85*     CoAg  Results from last 7 days   Lab Units 06/13/24  0022   INR  1.39*     Creatinine Clearance  Estimated Creatinine Clearance: 12.5 mL/min (A) (by C-G formula based on SCr of 3.05 mg/dL (H)).    Radiology  No radiology results for the last day    EKG    I personally viewed and interpreted the patient's EKG/Telemetry data:  ECG 12 Lead Syncope   Final Result   HEART RATE= 70  bpm   RR Interval= 851  ms   MD Interval= 128  ms   P Horizontal Axis=   deg   P Front Axis= 0  deg   QRSD Interval= 142  ms   QT Interval= 436  ms   QTcB= 473  ms   QRS Axis= 3  deg   T Wave Axis= 189  deg   - ABNORMAL ECG -   Ventricular-paced rhythm   Left bundle branch block   When compared with ECG of 01-Jun-2024 13:37:49,   Significant change in rhythm   Electronically Signed By: Cisco Leal ([facility]) 11-Jun-2024 07:23:22   Date and Time of Study: 2024-06-10 10:03:15      Telemetry Scan   Final Result      Telemetry Scan   Final Result      Telemetry Scan   Final Result      Telemetry Scan   Final Result      Telemetry Scan   Final Result      Telemetry Scan   Final Result      Telemetry Scan   Final Result      Telemetry Scan   Final Result      Telemetry Scan   Final Result      Telemetry Scan   Final Result      Telemetry Scan    Final Result      Telemetry Scan   Final Result      Telemetry Scan   Final Result      Telemetry Scan   Final Result      Telemetry Scan   Final Result      Telemetry Scan   Final Result      Telemetry Scan   Final Result      Telemetry Scan   Final Result      Telemetry Scan   Final Result      Telemetry Scan   Final Result      Telemetry Scan   Final Result      Telemetry Scan   Final Result      Telemetry Scan   Final Result          ECHOCARDIOGRAM:  Results for orders placed during the hospital encounter of 06/10/24    Adult Transthoracic Echo Complete W/ Cont if Necessary Per Protocol    Interpretation Summary    Left ventricular systolic function is severely decreased. Left ventricular ejection fraction appears to be 21 - 25%.    The left ventricular cavity is dilated.    The left atrial cavity is severely dilated.    Left atrial volume is severely increased.    The right atrial cavity is severely  dilated.    Moderate to severe mitral valve regurgitation is present.    Moderate tricuspid valve regurgitation is present.    Estimated right ventricular systolic pressure from tricuspid regurgitation is moderately elevated (45-55 mmHg).    Moderate to severe pulmonary hypertension is present.    Conclusion      LVE with severe global left ventricular hypokinesis, LVEF of 20 to 25%  Normal RV size  Severe biatrial enlargement.  There is color flow seen from left to right near the fossa ovalis consistent with possible ASD.  Pulmonic valve is not well visualized.  Aortic valve is trileaflet and sclerosed.  Moderate aortic regurgitation seen  Mitral valve, tricuspid valve appears structurally normal, moderate to severe mitral regurgitation seen.  Moderate tricuspid regurgitation seen.  Calculated RV systolic pressure of 48 mmHg consistent with moderate pulmonary hypertension  No pericardial effusion seen.  Pleural effusion seen  Proximal aorta appears normal in size.      STRESS MYOVIEW:      CARDIAC  CATHETERIZATION:  Results for orders placed during the hospital encounter of 04/25/24    Cardiac Catheterization/Vascular Study    Conclusion  OPERATORS  Dilcia Lui M.D. (Attending Cardiologist)      PROCEDURES PERFORMED  Ultrasound guided Vascular access  Left Heart Catheterization  Coronary Angiogram  Right heart catheterization  POBA the LAD  IVUS of the LAD and left main  Moderate sedation  Abdominal aorta runoff    INDICATIONS FOR PROCEDURE  Cardiomyopathy    PROCEDURE IN DETAIL  Informed consent was obtained from the patient after explaining the risks, benefits, and alternative options of the procedure. After obtaining informed consent, the patient was brought to the cath lab and was prepped in a sterile fashion. Lidocaine 2% was used for local anesthesia into the right femoral access site. The right femoral artery and vein were accessed with a micropuncture needle via modified Seldinger technique under ultrasound guidance. A 6F sheath was inserted successfully into the artery and a 7 Cymraes sheath was inserted into the vein.  6 Cymraes [place]harmesh catheter was used to obtain RA, RV, PA, and pulmonary capillary wedge pressure.  PA sat was obtained.    Afterwards, 6F JR4 and JL4 diagnostic catheters were advanced over a wire into the ascending aorta and were used to engage the ostia of the left main and RCA respectively. JR4 used to cross the AV and obtain LV pressures and gradient across the AV measured via pullback technique. Images of the right and left coronary systems were obtained.    Interventional report  After giving bivalirudin due to heparin allergy and upsizing the arterial sheath to a 7 Cymraes system, a 7 Cymraes XB LAD 3.5 guide was used to engage the left main.  A run-through wire was advanced to the distal LAD.  I then attempted to predilate the distal LAD lesion with a NC 2.25 x 12 mm balloon.  However the balloon did not expand.  The patient became very hypotensive at this point so the  balloon was removed and the guide was disengaged.  Next I performed IVUS of the left main and LAD.  IVUS would not go past the proximal LAD lesion.  At this point the LAD measures 2.4 x 3.5 mm.  There is heavy calcification with 360 degree involvement.  There is diffuse disease in the entire LAD up to the ostium.  Also noted on pullback was that there was heavy calcification involving the ostium of the left circumflex.  There is also ostial left main disease with reference vessel diameter of 4.7 x 5.1 mm.  At this point the procedure was aborted.    A pigtail was advanced to the abdominal aorta and a runoff was performed.    All the catheters were exchanged over a wire and subsequently removed. Angiogram of the femoral access site was obtained and did not show complications. The patient tolerated the procedure well without any complications.  A Perclose suture was deployed at the arterial site.  The pictures were reviewed at the end of the procedure. A Mynx closure device was applied to the venous site.    HEMODYNAMICS    LV: 111/2/5  AO: 116/18/56  Gradient none    Right heart cath    RA 5  RV: 46/0/4  PA 45/12/26  PCW 9    Cardiac output 8.65  Cardiac index 5.72    FINDINGS  Coronary Angiogram    Right dominant circulation    Left main: Left main is a large caliber vessel which gives rise to the Left Anterior Descending and the Left circumflex.  There is a 60% ostial left main stenosis with about 40 to 50% distal left main stenosis.    Left Anterior Descending Artery: LAD is a large caliber vessel which gives rise to several septal perforators and several diagonal branches.  It is heavily calcified.  There is diffuse 60 to 70% stenosis in the proximal to mid LAD.  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  This is followed by another 80 to 90% lesion in the mid distal LAD.  There is also a 50 to 60% lesion in the distal LAD.    Left Circumflex: Lcx is a large caliber vessel  which gives rise to several OM branches.  It is heavily calcified in the proximal to midportion with nodule noted in the proximal portion.  Diffuse 70% lesions in the proximal to mid left circumflex.    Right Coronary Artery: The RCA is a large caliber vessel gives rise to PDA and PLV.  There is moderate calcification of the RCA with about 40 to 50% stenosis of the proximal segment followed by a 30% lesion in the mid RCA.    ESTIMATED BLOOD LOSS:  10 ml    COMPLICATIONS:  None    PROCEDURE DATA:  Sedation Time: 1 hour    IMPRESSIONS  Multivessel heavily calcified CAD,  low normal right and left heart filling pressures  No evidence of pulmonary hypertension    RECOMMENDATIONS  I recommend CV surgery evaluation for bypass surgery along with left atrial appendage ligation given the extent of her coronary artery disease which is very calcified and complex  If she is not a candidate for bypass surgery I will follow her clinically and consider high risk PCI with Impella support and atherectomy of the LAD left circumflex and left main.  This would obviously be a very high risk procedure given her comorbidities  We can also consider medical management if her symptoms remain stable  I have started her on aspirin and Plavix  Continue statin  GDMT for heart failure      OTHER:       ASSESSMENT/PLAN      Syncope    Type 2 diabetes mellitus with diabetic chronic kidney disease    Essential hypertension    ESRD (end stage renal disease)    Anemia due to chronic kidney disease, on chronic dialysis    Hyperlipidemia    Ischemic cardiomyopathy    Permanent atrial fibrillation    Presence of cardiac pacemaker    Coronary artery disease involving native coronary artery of native heart without angina pectoris    Pancreatic mass      PLAN  Syncope  Etiology unclear  Recent echocardiogram showed LVEF 35%  Recent cardiac catheterization showed multivessel CAD  Interrogate pacemaker  Obtain CT head  Unable to do MRI for 6 weeks post  pacemaker placement   PT/OT to eval     Permanent atrial fibrillation  Sick sinus syndrome, status post permanent pacemaker placement  Medtronic single-chamber pacemaker implanted on 6/3/24  Patient had a hematoma post-procedure so her DAPT and anticoagulation were held  Currently v-paced rhythm  Interrogate pacemaker  Restart beta blocker if device interrogation okay  [id number] score 6  Restart low-dose Eliquis 2.5 mg twice daily due to history of bleeding issues     Coronary artery disease involving native coronary artery of native heart without angina pectoris  Multivessel disease per cardiac cath done in April 2024  Patient evaluated by CTS at that time and deemed to not be a surgical candidate.   Patient is currently pain-free.  Discontinue DAPT and restart low-dose Eliquis as above due to history of a-fib and bleeding issues  Continue high intensity statin      Ischemic Cardiomyopathy  Recent echocardiogram showed LVEF 35%  Recent cardiac cath showed multivessel CAD  On beta blocker   Unable to add GDMT due to hypotension  Not on SGLT2-I due to ESRD     Hyperlipidemia  Continue high intensity statin      Essential hypertension, chronic  Blood pressure well controlled on beta blocker     ESRD (end stage renal disease)  On hemodialysis Mondays and Fridays  Nephrology following      Anemia due to chronic kidney disease, on chronic dialysis  Hemoglobin 8.4 today  Monitor H&H     Type 2 diabetes mellitus with diabetic chronic kidney disease  Recent A1c 4.90%  Diet-controlled       6/17/2024  Patient received several units of PRBCs on 6/15/24, H&H trending upward. No further syncopal episodes since admittance. Heme-onc workup as OP. Low-dose Eliquis restarted. Patient to discharge back to facility after dialysis today.        LUCA Branch  06/17/24  11:28 EDT  Electronically signed by LUCA Branch, 06/17/24, 11:35 AM EDT.

## 2024-06-17 NOTE — DISCHARGE SUMMARY
Hospitalist Service   DISCHARGE SUMMARY    Patient Name: Deann Warren  : 1940  MRN: [id number]    Date of Admission: 6/10/2024  Date of Discharge:  24    Primary Care Physician: No primary care provider on file.      Presenting Problem:   Syncope [R55]  Syncope, unspecified syncope type [R55]    Active and Resolved Hospital Problems:  Active Hospital Problems    Diagnosis POA   • **Syncope [R55] Yes   • Pancreatic mass [K86.89] Unknown   • Presence of cardiac pacemaker [Z95.0] Yes   • Permanent atrial fibrillation [I48.21] Yes   • Coronary artery disease involving native coronary artery of native heart without angina pectoris [I25.10] Yes   • Ischemic cardiomyopathy [I25.5] Yes   • Hyperlipidemia [E78.5] Yes   • Type 2 diabetes mellitus with diabetic chronic kidney disease [E11.22] Yes   • ESRD (end stage renal disease) [N18.6] Yes   • Anemia due to chronic kidney disease, on chronic dialysis [N18.6, D63.1, Z99.2] Not Applicable   • Essential hypertension [I10] Yes      Resolved Hospital Problems   No resolved problems to display.         Hospital Course     Hospital Course:  Deann Warren is a 83 y.o. female with history of sick sinus syndrome, A-fib on Eliquis, pacemaker, cardiomyopathy, CAD, hyperlipidemia, ESRD on HD, and DMII who presents with complaints of syncope.  The patient recently underwent pacemaker placement on 6/3/2024 for sick sinus syndrome.  Cardiology and nephrology consulted.  Heme-onc also consulted for pancreatic head lesion along with GI. 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  Did have episode of anemia and required transfusion but no signs of any acute bleeding.  Heme-onc planning possible outpatient bone marrow biopsy.  Pacemaker interrogated which was normal per  cardiology.  No further inpatient workup required.  Did have 1 set of blood culture positive for coag negative staph, was evaluated by ID, likely contamination, antibiotics stopped.  Patient is overall improved, PT/OT recommending SNF.  Okay to discharge per all specialties.  Patient is medically stable discharge to SNF with follow-up with PCP and oncology as an outpatient.        DISCHARGE Follow Up Recommendations for labs and diagnostics:   Follow-up with PCP, oncology    Reasons For Change In Medications and Indications for New Medications:  As noted below    Day of Discharge     Vital Signs:  Temp:  [97.7 °F (36.5 °C)-98.5 °F (36.9 °C)] 98.5 °F (36.9 °C)  Heart Rate:  [69-82] 69  Resp:  [14-24] 14  BP: (105-124)/(55-59) 124/56    Physical Exam:  General: Awake, alert, NAD  Eyes: PERRL, EOMI, conjunctivae are clear  Cardiovascular: Regular rate and rhythm, no murmurs  Respiratory: Clear to auscultation bilaterally, no wheezing or rales, unlabored breathing  Abdomen: Soft, nontender, positive bowel sounds, no guarding  Neurologic: A&O, CN grossly intact, moves all extremities spontaneously  Musculoskeletal: Generalized weakness, no other gross deformities  Skin: Warm, dry        Pertinent  and/or Most Recent Results     LAB RESULTS:      Lab 06/17/24  0208 06/16/24  1055 06/16/24  0052 06/15/24  0507 06/14/24  1604 06/14/24  0043 06/13/24  0022 06/12/24  0220 06/11/24  2100 06/11/24  1504 06/11/24  1444 06/11/24  0352   WBC 11.67*  --  11.59* 12.00*  --  7.46 1.90* 1.82*  --   --   --   --    HEMOGLOBIN 8.7* 8.5* 6.7* 7.2* 7.4* 7.4* 8.5* 8.5*  --   --   --   --    HEMOGLOBIN, POC  --   --   --   --   --   --   --   --   --   --  8.4*  --    HEMATOCRIT 29.2* 27.6* 22.9* 24.6*  --  24.8* 27.8* 28.7*  --   --   --   --    HEMATOCRIT POC  --   --   --   --   --   --   --   --   --   --  25*  --    PLATELETS 85*  --  72* 84*  --  80* 71* 66*  --   --   --   --    NEUTROS ABS 9.87*  --  9.23* 10.01*  --  5.90 1.02*  0.95*  --   --   --    < >   IMMATURE GRANS (ABS) 0.09*  --  0.78* 0.17*  --  0.05 0.01 0.00  --   --   --    < >   LYMPHS ABS 1.08  --  1.00 1.16  --  0.74 0.55* 0.55*  --   --   --    < >   MONOS ABS 0.41  --  0.40 0.52  --  0.64 0.20 0.22  --   --   --    < >   EOS ABS 0.17  --  0.13 0.09  --  0.10 0.09 0.08  --   --   --    < >   MCV 97.3*  --  100.4* 100.8*  --  99.2* 96.5 98.3*  --   --   --   --    CRP  --   --   --   --   --   --  <0.30  --   --   --   --   --    PROCALCITONIN  --   --   --   --   --   --   --   --   --  0.14  --   --    LACTATE  --   --   --   --   --   --   --   --  0.9  --  1.3  --    LDH  --   --   --   --   --   --   --  237*  --   --   --   --    PROTIME  --   --   --   --   --   --  14.8*  --   --   --   --   --     < > = values in this interval not displayed.         Lab 06/17/24  0208 06/16/24  0052 06/15/24  0507 06/14/24  0043 06/13/24  0022   SODIUM 139 140 140 140 137   POTASSIUM 3.6 3.6 3.9 4.4 4.2   CHLORIDE 103 104 104 105 103   CO2 26.6 27.7 26.4 26.2 24.4   ANION GAP 9.4 8.3 9.6 8.8 9.6   BUN 28* 26* 24* 45* 34*   CREATININE 3.05* 2.79* 2.36* 3.04* 2.46*   EGFR 14.7* 16.4* 20.0* 14.8* 19.0*   GLUCOSE 111* 115* 75 119* 69   CALCIUM 8.7 8.3* 8.3* 8.1* 7.8*   PHOSPHORUS  --   --   --  3.8  --          Lab 06/15/24  0507 06/14/24  0043 06/13/24  0022 06/12/24 0220 06/11/24  0352   TOTAL PROTEIN 4.8*  --  5.1* 5.1* 5.4*   ALBUMIN 3.1* 3.1* 3.2* 3.3* 3.5   GLOBULIN 1.7  --  1.9 1.8 1.9   ALT (SGPT) 22  --  35* 33 27   AST (SGOT) 47*  --  131* 129* 126*   BILIRUBIN 0.6  --  0.7 0.7 0.6   ALK PHOS 71  --  57 70 71   LIPASE  --   --  17  --   --          Lab 06/13/24  0022 06/11/24 2001 06/11/24  1734   HSTROP T  --  85* 91*   PROTIME 14.8*  --   --    INR 1.39*  --   --              Lab 06/16/24  0122 06/16/24  0052 06/12/24  0220   IRON  --   --  190*   IRON SATURATION (TSAT)  --   --  81*   TIBC  --   --  234*   TRANSFERRIN  --   --  157*   FERRITIN  --   --  1,145.00*   ABO  TYPING B   < >  --    RH TYPING Positive   < >  --    ANTIBODY SCREEN Negative  --   --     < > = values in this interval not displayed.         Lab 06/11/24  1444   PH, ARTERIAL 7.344*   PCO2, ARTERIAL 39.2   PO2 ART 84.1   O2 SATURATION ART 95.7   HCO3 ART 21.4   BASE EXCESS ART -4.0*     Brief Urine Lab Results       None          Microbiology Results (last 10 days)       Procedure Component Value - Date/Time    Gastrointestinal Panel, PCR - Stool, Per Rectum [[id number]]  (Normal) Collected: 06/14/24 0600    Lab Status: Final result Specimen: Stool from Per Rectum Updated: 06/14/24 1143     Campylobacter Not Detected     Plesiomonas shigelloides Not Detected     Salmonella Not Detected     Vibrio Not Detected     Vibrio cholerae Not Detected     Yersinia enterocolitica Not Detected     Enteroaggregative E. coli (EAEC) Not Detected     Enteropathogenic E. coli (EPEC) Not Detected     Enterotoxigenic E. coli (ETEC) lt/st Not Detected     Shiga-like toxin-producing E. coli (STEC) stx1/stx2 Not Detected     Shigella/Enteroinvasive E. coli (EIEC) Not Detected     Cryptosporidium Not Detected     Cyclospora cayetanensis Not Detected     Entamoeba histolytica Not Detected     Giardia lamblia Not Detected     Adenovirus F40/41 Not Detected     Astrovirus Not Detected     Norovirus GI/GII Not Detected     Rotavirus A Not Detected     Sapovirus (I, II, IV or V) Not Detected    Narrative:      If Aeromonas, Staphylococcus aureus or Bacillus cereus are suspected, please order [id number]: Stool Culture, Aeromonas, S aureus, B Cereus.    Blood Culture - Blood, Arm, Right [[id number]]  (Normal) Collected: 06/13/24 1809    Lab Status: Preliminary result Specimen: Blood from Arm, Right Updated: 06/16/24 1831     Blood Culture No growth at 3 days    Narrative:      Less than seven (7) mL's of blood was collected.  Insufficient quantity may yield false negative results.    Blood Culture - Blood, Blood, PICC Line [[id number]]  (Abnormal)  Collected: 06/11/24 1733    Lab Status: Final result Specimen: Blood, PICC Line Updated: 06/14/24 0651     Blood Culture Staphylococcus, coagulase negative     Isolated from Anaerobic Bottle     Gram Stain Anaerobic Bottle Gram positive cocci in clusters    Narrative:      Probable contaminant requires clinical correlation, susceptibility not performed unless requested by physician.      Blood Culture ID, PCR - Blood, Blood, PICC Line [[id number]]  (Abnormal) Collected: 06/11/24 1733    Lab Status: Final result Specimen: Blood, PICC Line Updated: 06/13/24 1556     BCID, PCR Staph spp, not aureus or lugdunensis. Identification by BCID2 PCR.     BOTTLE TYPE Anaerobic Bottle    Blood Culture - Blood, Arm, Right [[id number]]  (Normal) Collected: 06/11/24 1504    Lab Status: Final result Specimen: Blood from Arm, Right Updated: 06/16/24 1530     Blood Culture No growth at 5 days    COVID-19, FLU A/B, RSV PCR 1 HR TAT - Swab, Nasopharynx [[id number]]  (Normal) Collected: 06/10/24 1821    Lab Status: Final result Specimen: Swab from Nasopharynx Updated: 06/10/24 1906     COVID19 Not Detected     Influenza A PCR Not Detected     Influenza B PCR Not Detected     RSV, PCR Not Detected    Narrative:      Fact sheet for providers: https://www.fda.gov/media/911239/download    Fact sheet for patients: https://www.fda.gov/media/819330/download    Test performed by PCR.            US Liver    Result Date: 6/12/2024  Impression: Impression: 1. Liver parenchyma is normal. 2. Trace ascitic fluid adjacent to the liver. There is also a partially imaged right pleural effusion. Electronically Signed: Donaldo Cantu MD  6/12/2024 12:27 PM EDT  Workstation ID: [id number]    CT Abdomen Pelvis Without Contrast    Result Date: 6/11/2024  Impression: Impression: 1.Volume overload/CHF features as detailed above. Superimposed left lower lobe pneumonia is not excluded. Correlate with symptoms. 2.There is a nonspecific 3.4 cm cystic mass involving the  pancreatic head. Follow-up dedicated pancreatic MRI with and without contrast recommended. 3.Increased density urine consistent with hematuria. 4.Other incidental findings as detailed above. Electronically Signed: Flip Lee MD  6/11/2024 4:32 PM EDT  Workstation ID: [id number]    CT Head Without Contrast    Result Date: 6/11/2024  Impression: Impression: No acute intracranial pathology. Electronically Signed: Tristian Boone MD  6/11/2024 4:27 PM EDT  Workstation ID: [id number]    XR Chest 1 View    Result Date: 6/10/2024  Impression: Impression: 1.Cardiomegaly with pulmonary vascular congestion. 2.Left basilar atelectasis or infiltrate. Electronically Signed: Armando Gonzalez MD  6/10/2024 10:43 AM EDT  Workstation ID: [id number]    XR Chest 1 View    Result Date: 6/3/2024  Impression: Impression: No significant change after pacemaker placement. Electronically Signed: Won Bravo MD  6/3/2024 6:34 PM EDT  Workstation ID: [id number]    XR Chest 1 View    Result Date: 6/1/2024  Impression: Impression: Chronic changes are noted which are stable and suggest chronic changes of CHF and pulmonary edema. Electronically Signed: Nathaniel Harman MD  6/1/2024 2:05 PM EDT  Workstation ID: [id number]    XR Foot 3+ View Left    Result Date: 5/22/2024  Impression: Impression: 1. Degenerative changes of the left foot with osteopenia. 2. No plain film evidence of osteomyelitis. Electronically Signed: Lucille Ham MD  5/22/2024 4:10 PM EDT  Workstation ID: [id number]     Results for orders placed during the hospital encounter of 04/07/24    Duplex Carotid Ultrasound CAR    Interpretation Summary  •  Right internal carotid artery demonstrates a less than 50% stenosis.  •  Left internal carotid artery demonstrates a less than 50% stenosis.      Results for orders placed during the hospital encounter of 04/07/24    Duplex Carotid Ultrasound CAR    Interpretation Summary  •  Right internal carotid artery demonstrates a less than 50% stenosis.  •   Left internal carotid artery demonstrates a less than 50% stenosis.      Results for orders placed during the hospital encounter of 06/10/24    Adult Transthoracic Echo Complete W/ Cont if Necessary Per Protocol    Interpretation Summary  •  Left ventricular systolic function is severely decreased. Left ventricular ejection fraction appears to be 21 - 25%.  •  The left ventricular cavity is dilated.  •  The left atrial cavity is severely dilated.  •  Left atrial volume is severely increased.  •  The right atrial cavity is severely  dilated.  •  Moderate to severe mitral valve regurgitation is present.  •  Moderate tricuspid valve regurgitation is present.  •  Estimated right ventricular systolic pressure from tricuspid regurgitation is moderately elevated (45-55 mmHg).  •  Moderate to severe pulmonary hypertension is present.    Conclusion      LVE with severe global left ventricular hypokinesis, LVEF of 20 to 25%  Normal RV size  Severe biatrial enlargement.  There is color flow seen from left to right near the fossa ovalis consistent with possible ASD.  Pulmonic valve is not well visualized.  Aortic valve is trileaflet and sclerosed.  Moderate aortic regurgitation seen  Mitral valve, tricuspid valve appears structurally normal, moderate to severe mitral regurgitation seen.  Moderate tricuspid regurgitation seen.  Calculated RV systolic pressure of 48 mmHg consistent with moderate pulmonary hypertension  No pericardial effusion seen.  Pleural effusion seen  Proximal aorta appears normal in size.      Labs Pending at Discharge:  Pending Labs       Order Current Status    Copper, Urine - Urine, Clean Catch In process    Blood Culture - Blood, Arm, Right Preliminary result            Procedures Performed  Procedure(s):  ESOPHAGOGASTRODUODENOSCOPY WITH ULTRASOUND AND FINE NEEDLE ASPIRATION of pancreatic cyst and forcep biopsy x2 areas         Consults:   Consults       Date and Time Order Name Status Description     6/14/2024  8:09 AM Inpatient Gastroenterology Consult      6/13/2024  5:24 PM Inpatient Infectious Diseases Consult Completed     6/12/2024  7:41 AM IP Consult to Hematology and Oncology Completed     6/11/2024  4:56 PM Inpatient Gastroenterology Consult Completed     6/11/2024  1:05 PM Inpatient Nephrology Consult      6/10/2024 11:55 AM Inpatient Cardiac Electrophysiology Consult Completed     6/10/2024 11:55 AM Inpatient Cardiology Consult Completed     6/10/2024 11:02 AM Hospitalist (on-call MD unless specified)      6/3/2024 11:15 AM Inpatient Hospitalist Consult Completed     6/2/2024  3:38 PM Inpatient Nephrology Consult Completed     6/1/2024  5:10 PM Inpatient Cardiology Consult Completed               Discharge Details        Discharge Medications        New Medications        Instructions Start Date   apixaban 2.5 MG tablet tablet  Commonly known as: ELIQUIS   2.5 mg, Oral, Every 12 Hours Scheduled      pantoprazole 40 MG EC tablet  Commonly known as: PROTONIX   40 mg, Oral, 2 Times Daily Before Meals             Continue These Medications        Instructions Start Date   acetaminophen 500 MG tablet  Commonly known as: TYLENOL   500 mg, Oral, Every 6 Hours PRN      atorvastatin 40 MG tablet  Commonly known as: LIPITOR   40 mg, Oral, Daily      febuxostat 40 MG tablet  Commonly known as: ULORIC   40 mg, Oral, Daily      folic acid 1 MG tablet  Commonly known as: FOLVITE   1 mg, Oral, Daily      NON FORMULARY   1 dose, Topical, 3 Times Weekly, Lidocaine 2.5% ointment -  Apply 1 application Monday, Wednesday, Friday before dialysis       Vitamin D3 50 MCG (2000 UT) tablet   1 tablet, Oral, Daily             Stop These Medications      cephalexin 500 MG capsule  Commonly known as: Keflex     metoprolol succinate XL 25 MG 24 hr tablet  Commonly known as: TOPROL-XL              Allergies   Allergen Reactions   • Heparin Hives         Discharge Disposition:  Skilled Nursing Facility (DC -  External)    Diet:  Hospital:  Diet Order   Procedures   • Diet: Gastrointestinal; Fat-Restricted; Fluid Consistency: Thin (IDDSI 0)         Discharge Activity:         CODE STATUS:  Code Status and Medical Interventions:   Ordered at: 06/10/24 1150     Level Of Support Discussed With:    Patient     Code Status (Patient has no pulse and is not breathing):    CPR (Attempt to Resuscitate)     Medical Interventions (Patient has pulse or is breathing):    Full Support         Future Appointments   Date Time Provider Department Center   6/20/2024  3:45 PM Dilcia Lui MD [facility]   7/2/2024  2:00 PM [facility]  [facility]           Time spent on Discharge including face to face service:  45 minutes    Signature:    Electronically signed by Soraida Jurado DO, 06/17/24, 11:17 AM EDT.      Part of this note may be an electronic transcription/translation of spoken language to printed text using the Dragon Dictation System.

## 2024-06-18 LAB — BACTERIA SPEC AEROBE CULT: NORMAL

## 2024-06-18 NOTE — CASE MANAGEMENT/SOCIAL WORK
Case Management Discharge Note      Final Note: [facility].         Selected Continued Care - Discharged on 6/17/2024 Admission date: 6/10/2024 - Discharge disposition: Skilled Nursing Facility (DC - External)      Destination Coordination complete.      Service Provider Selected Services Address Phone Fax Patient Preferred    [facility] [address] [place] [address] [phone] --             Transportation Services  Ambulance: [facility]    Final Discharge Disposition Code: 03 - skilled nursing facility (SNF)

## 2024-06-19 ENCOUNTER — TELEPHONE (OUTPATIENT)
Dept: ONCOLOGY | Facility: CLINIC | Age: 84
End: 2024-06-19

## 2024-06-19 NOTE — TELEPHONE ENCOUNTER
Caller: NICOLE    Relationship: Other    Best call back number: [phone]    What is the best time to reach you: ANYTIME    Who are you requesting to speak with (clinical staff, provider,  specific staff member): SCHEDULING    What was the call regarding: PLEASE CALL NICOLE TO SCHEDULE 2 WEEK HOSPITAL F/U APPT FOR PATIENT.

## 2024-06-20 ENCOUNTER — OFFICE VISIT (OUTPATIENT)
Dept: CARDIOLOGY | Facility: CLINIC | Age: 84
End: 2024-06-20
Payer: MEDICARE

## 2024-06-20 ENCOUNTER — APPOINTMENT (OUTPATIENT)
Dept: GENERAL RADIOLOGY | Facility: HOSPITAL | Age: 84
DRG: 291 | End: 2024-06-20
Payer: MEDICARE

## 2024-06-20 ENCOUNTER — APPOINTMENT (OUTPATIENT)
Dept: CT IMAGING | Facility: HOSPITAL | Age: 84
DRG: 291 | End: 2024-06-20
Payer: MEDICARE

## 2024-06-20 ENCOUNTER — HOSPITAL ENCOUNTER (INPATIENT)
Facility: HOSPITAL | Age: 84
LOS: 2 days | Discharge: HOSPICE | DRG: 291 | End: 2024-06-22
Attending: EMERGENCY MEDICINE | Admitting: INTERNAL MEDICINE
Payer: MEDICARE

## 2024-06-20 VITALS
HEIGHT: 64 IN | DIASTOLIC BLOOD PRESSURE: 53 MMHG | HEART RATE: 86 BPM | BODY MASS INDEX: 19.81 KG/M2 | SYSTOLIC BLOOD PRESSURE: 132 MMHG | WEIGHT: 116 LBS

## 2024-06-20 DIAGNOSIS — N18.6 ESRD (END STAGE RENAL DISEASE): ICD-10-CM

## 2024-06-20 DIAGNOSIS — I42.0 CARDIOMYOPATHY, DILATED: ICD-10-CM

## 2024-06-20 DIAGNOSIS — Z99.2 ANEMIA DUE TO CHRONIC KIDNEY DISEASE, ON CHRONIC DIALYSIS: ICD-10-CM

## 2024-06-20 DIAGNOSIS — R53.1 WEAKNESS: Primary | ICD-10-CM

## 2024-06-20 DIAGNOSIS — D63.1 ANEMIA DUE TO CHRONIC KIDNEY DISEASE, ON CHRONIC DIALYSIS: ICD-10-CM

## 2024-06-20 DIAGNOSIS — I10 ESSENTIAL HYPERTENSION: ICD-10-CM

## 2024-06-20 DIAGNOSIS — I48.0 PAROXYSMAL A-FIB: ICD-10-CM

## 2024-06-20 DIAGNOSIS — R41.82 ALTERED MENTAL STATUS, UNSPECIFIED ALTERED MENTAL STATUS TYPE: Primary | ICD-10-CM

## 2024-06-20 DIAGNOSIS — R50.9 FEVER, UNSPECIFIED FEVER CAUSE: ICD-10-CM

## 2024-06-20 DIAGNOSIS — E78.2 MIXED HYPERLIPIDEMIA: ICD-10-CM

## 2024-06-20 DIAGNOSIS — R55 SYNCOPE AND COLLAPSE: ICD-10-CM

## 2024-06-20 DIAGNOSIS — N18.6 ANEMIA DUE TO CHRONIC KIDNEY DISEASE, ON CHRONIC DIALYSIS: ICD-10-CM

## 2024-06-20 LAB
ALBUMIN SERPL-MCNC: 3.4 G/DL (ref 3.5–5.2)
ALBUMIN/GLOB SERPL: 1.5 G/DL
ALP SERPL-CCNC: 72 U/L (ref 39–117)
ALT SERPL W P-5'-P-CCNC: 19 U/L (ref 1–33)
ANION GAP SERPL CALCULATED.3IONS-SCNC: 10.1 MMOL/L (ref 5–15)
ANISOCYTOSIS BLD QL: NORMAL
AST SERPL-CCNC: 41 U/L (ref 1–32)
B PARAPERT DNA SPEC QL NAA+PROBE: NOT DETECTED
B PERT DNA SPEC QL NAA+PROBE: NOT DETECTED
BACTERIA UR QL AUTO: ABNORMAL /HPF
BASOPHILS # BLD AUTO: 0.04 10*3/MM3 (ref 0–0.2)
BASOPHILS NFR BLD AUTO: 0.8 % (ref 0–1.5)
BILIRUB SERPL-MCNC: 0.9 MG/DL (ref 0–1.2)
BILIRUB UR QL STRIP: NEGATIVE
BUN SERPL-MCNC: 27 MG/DL (ref 8–23)
BUN/CREAT SERPL: 8.8 (ref 7–25)
C PNEUM DNA NPH QL NAA+NON-PROBE: NOT DETECTED
CALCIUM SPEC-SCNC: 8.8 MG/DL (ref 8.6–10.5)
CHLORIDE SERPL-SCNC: 100 MMOL/L (ref 98–107)
CLARITY UR: CLEAR
CO2 SERPL-SCNC: 26.9 MMOL/L (ref 22–29)
COLOR UR: YELLOW
CREAT SERPL-MCNC: 3.07 MG/DL (ref 0.57–1)
D-LACTATE SERPL-SCNC: 1.1 MMOL/L (ref 0.3–2)
DEPRECATED RDW RBC AUTO: 71.1 FL (ref 37–54)
EGFRCR SERPLBLD CKD-EPI 2021: 14.6 ML/MIN/1.73
EOSINOPHIL # BLD AUTO: 0.12 10*3/MM3 (ref 0–0.4)
EOSINOPHIL NFR BLD AUTO: 2.3 % (ref 0.3–6.2)
ERYTHROCYTE [DISTWIDTH] IN BLOOD BY AUTOMATED COUNT: 19.8 % (ref 12.3–15.4)
FLUAV SUBTYP SPEC NAA+PROBE: NOT DETECTED
FLUBV RNA ISLT QL NAA+PROBE: NOT DETECTED
GLOBULIN UR ELPH-MCNC: 2.2 GM/DL
GLUCOSE SERPL-MCNC: 105 MG/DL (ref 65–99)
GLUCOSE UR STRIP-MCNC: NEGATIVE MG/DL
HADV DNA SPEC NAA+PROBE: NOT DETECTED
HCOV 229E RNA SPEC QL NAA+PROBE: NOT DETECTED
HCOV HKU1 RNA SPEC QL NAA+PROBE: NOT DETECTED
HCOV NL63 RNA SPEC QL NAA+PROBE: NOT DETECTED
HCOV OC43 RNA SPEC QL NAA+PROBE: NOT DETECTED
HCT VFR BLD AUTO: 32 % (ref 34–46.6)
HGB BLD-MCNC: 9.4 G/DL (ref 12–15.9)
HGB UR QL STRIP.AUTO: NEGATIVE
HMPV RNA NPH QL NAA+NON-PROBE: NOT DETECTED
HOLD SPECIMEN: NORMAL
HOLD SPECIMEN: NORMAL
HPIV1 RNA ISLT QL NAA+PROBE: NOT DETECTED
HPIV2 RNA SPEC QL NAA+PROBE: NOT DETECTED
HPIV3 RNA NPH QL NAA+PROBE: NOT DETECTED
HPIV4 P GENE NPH QL NAA+PROBE: NOT DETECTED
HYALINE CASTS UR QL AUTO: ABNORMAL /LPF
IMM GRANULOCYTES # BLD AUTO: 0.03 10*3/MM3 (ref 0–0.05)
IMM GRANULOCYTES NFR BLD AUTO: 0.6 % (ref 0–0.5)
KETONES UR QL STRIP: ABNORMAL
LEUKOCYTE ESTERASE UR QL STRIP.AUTO: ABNORMAL
LYMPHOCYTES # BLD AUTO: 0.75 10*3/MM3 (ref 0.7–3.1)
LYMPHOCYTES NFR BLD AUTO: 14.4 % (ref 19.6–45.3)
M PNEUMO IGG SER IA-ACNC: NOT DETECTED
MACROCYTES BLD QL SMEAR: NORMAL
MAGNESIUM SERPL-MCNC: 1.9 MG/DL (ref 1.6–2.4)
MCH RBC QN AUTO: 29.5 PG (ref 26.6–33)
MCHC RBC AUTO-ENTMCNC: 29.4 G/DL (ref 31.5–35.7)
MCV RBC AUTO: 100.3 FL (ref 79–97)
MONOCYTES # BLD AUTO: 0.55 10*3/MM3 (ref 0.1–0.9)
MONOCYTES NFR BLD AUTO: 10.6 % (ref 5–12)
NEUTROPHILS NFR BLD AUTO: 3.72 10*3/MM3 (ref 1.7–7)
NEUTROPHILS NFR BLD AUTO: 71.3 % (ref 42.7–76)
NITRITE UR QL STRIP: NEGATIVE
NRBC BLD AUTO-RTO: 0 /100 WBC (ref 0–0.2)
NT-PROBNP SERPL-MCNC: ABNORMAL PG/ML (ref 0–1800)
PH UR STRIP.AUTO: 5.5 [PH] (ref 5–8)
PLAT MORPH BLD: NORMAL
PLATELET # BLD AUTO: 76 10*3/MM3 (ref 140–450)
PMV BLD AUTO: 10.6 FL (ref 6–12)
POTASSIUM SERPL-SCNC: 3.9 MMOL/L (ref 3.5–5.2)
PROCALCITONIN SERPL-MCNC: 0.18 NG/ML (ref 0–0.25)
PROT SERPL-MCNC: 5.6 G/DL (ref 6–8.5)
PROT UR QL STRIP: ABNORMAL
RBC # BLD AUTO: 3.19 10*6/MM3 (ref 3.77–5.28)
RBC # UR STRIP: ABNORMAL /HPF
REF LAB TEST METHOD: ABNORMAL
RHINOVIRUS RNA SPEC NAA+PROBE: NOT DETECTED
RSV RNA NPH QL NAA+NON-PROBE: NOT DETECTED
SARS-COV-2 RNA NPH QL NAA+NON-PROBE: NOT DETECTED
SODIUM SERPL-SCNC: 137 MMOL/L (ref 136–145)
SP GR UR STRIP: 1.02 (ref 1–1.03)
SQUAMOUS #/AREA URNS HPF: ABNORMAL /HPF
UROBILINOGEN UR QL STRIP: ABNORMAL
WBC # UR STRIP: ABNORMAL /HPF
WBC MORPH BLD: NORMAL
WBC NRBC COR # BLD AUTO: 5.21 10*3/MM3 (ref 3.4–10.8)
WHOLE BLOOD HOLD COAG: NORMAL
WHOLE BLOOD HOLD SPECIMEN: NORMAL

## 2024-06-20 PROCEDURE — 83880 ASSAY OF NATRIURETIC PEPTIDE: CPT | Performed by: INTERNAL MEDICINE

## 2024-06-20 PROCEDURE — 36415 COLL VENOUS BLD VENIPUNCTURE: CPT

## 2024-06-20 PROCEDURE — 81001 URINALYSIS AUTO W/SCOPE: CPT | Performed by: EMERGENCY MEDICINE

## 2024-06-20 PROCEDURE — 25810000003 SODIUM CHLORIDE 0.9 % SOLUTION 250 ML FLEX CONT: Performed by: INTERNAL MEDICINE

## 2024-06-20 PROCEDURE — 93005 ELECTROCARDIOGRAM TRACING: CPT

## 2024-06-20 PROCEDURE — 74176 CT ABD & PELVIS W/O CONTRAST: CPT

## 2024-06-20 PROCEDURE — P9612 CATHETERIZE FOR URINE SPEC: HCPCS

## 2024-06-20 PROCEDURE — 87040 BLOOD CULTURE FOR BACTERIA: CPT | Performed by: EMERGENCY MEDICINE

## 2024-06-20 PROCEDURE — 83605 ASSAY OF LACTIC ACID: CPT

## 2024-06-20 PROCEDURE — 84145 PROCALCITONIN (PCT): CPT | Performed by: INTERNAL MEDICINE

## 2024-06-20 PROCEDURE — 25010000002 VANCOMYCIN HCL 1.25 G RECONSTITUTED SOLUTION 1 EACH VIAL: Performed by: INTERNAL MEDICINE

## 2024-06-20 PROCEDURE — 71045 X-RAY EXAM CHEST 1 VIEW: CPT

## 2024-06-20 PROCEDURE — 71250 CT THORAX DX C-: CPT

## 2024-06-20 PROCEDURE — 0202U NFCT DS 22 TRGT SARS-COV-2: CPT | Performed by: EMERGENCY MEDICINE

## 2024-06-20 PROCEDURE — 85007 BL SMEAR W/DIFF WBC COUNT: CPT | Performed by: EMERGENCY MEDICINE

## 2024-06-20 PROCEDURE — 70450 CT HEAD/BRAIN W/O DYE: CPT

## 2024-06-20 PROCEDURE — 93005 ELECTROCARDIOGRAM TRACING: CPT | Performed by: EMERGENCY MEDICINE

## 2024-06-20 PROCEDURE — 87481 CANDIDA DNA AMP PROBE: CPT | Performed by: EMERGENCY MEDICINE

## 2024-06-20 PROCEDURE — 83735 ASSAY OF MAGNESIUM: CPT | Performed by: INTERNAL MEDICINE

## 2024-06-20 PROCEDURE — 85025 COMPLETE CBC W/AUTO DIFF WBC: CPT | Performed by: EMERGENCY MEDICINE

## 2024-06-20 PROCEDURE — 80053 COMPREHEN METABOLIC PANEL: CPT | Performed by: EMERGENCY MEDICINE

## 2024-06-20 PROCEDURE — 99285 EMERGENCY DEPT VISIT HI MDM: CPT

## 2024-06-20 PROCEDURE — 25010000002 CEFTRIAXONE PER 250 MG: Performed by: INTERNAL MEDICINE

## 2024-06-20 RX ORDER — ACETAMINOPHEN 160 MG/5ML
650 SOLUTION ORAL EVERY 4 HOURS PRN
Status: DISCONTINUED | OUTPATIENT
Start: 2024-06-20 | End: 2024-06-22 | Stop reason: HOSPADM

## 2024-06-20 RX ORDER — LOPERAMIDE HYDROCHLORIDE 2 MG/1
2 CAPSULE ORAL 4 TIMES DAILY PRN
COMMUNITY

## 2024-06-20 RX ORDER — BISACODYL 10 MG
10 SUPPOSITORY, RECTAL RECTAL DAILY PRN
Status: DISCONTINUED | OUTPATIENT
Start: 2024-06-20 | End: 2024-06-22 | Stop reason: HOSPADM

## 2024-06-20 RX ORDER — SODIUM CHLORIDE 0.9 % (FLUSH) 0.9 %
10 SYRINGE (ML) INJECTION AS NEEDED
Status: DISCONTINUED | OUTPATIENT
Start: 2024-06-20 | End: 2024-06-22 | Stop reason: HOSPADM

## 2024-06-20 RX ORDER — BISACODYL 5 MG/1
5 TABLET, DELAYED RELEASE ORAL DAILY PRN
Status: DISCONTINUED | OUTPATIENT
Start: 2024-06-20 | End: 2024-06-22 | Stop reason: HOSPADM

## 2024-06-20 RX ORDER — LOPERAMIDE HYDROCHLORIDE 2 MG/1
2 CAPSULE ORAL 4 TIMES DAILY PRN
Status: DISCONTINUED | OUTPATIENT
Start: 2024-06-20 | End: 2024-06-21

## 2024-06-20 RX ORDER — FEBUXOSTAT 40 MG/1
40 TABLET, FILM COATED ORAL DAILY
Status: DISCONTINUED | OUTPATIENT
Start: 2024-06-20 | End: 2024-06-21

## 2024-06-20 RX ORDER — BARRIER CREAM 200; 4.5 MG/G; MG/G
CREAM TOPICAL
COMMUNITY

## 2024-06-20 RX ORDER — ACETAMINOPHEN 500 MG
500 TABLET ORAL EVERY 6 HOURS PRN
Status: DISCONTINUED | OUTPATIENT
Start: 2024-06-20 | End: 2024-06-20 | Stop reason: SDUPTHER

## 2024-06-20 RX ORDER — SODIUM CHLORIDE 0.9 % (FLUSH) 0.9 %
10 SYRINGE (ML) INJECTION EVERY 12 HOURS SCHEDULED
Status: DISCONTINUED | OUTPATIENT
Start: 2024-06-20 | End: 2024-06-22 | Stop reason: HOSPADM

## 2024-06-20 RX ORDER — POLYETHYLENE GLYCOL 3350 17 G/17G
17 POWDER, FOR SOLUTION ORAL DAILY PRN
Status: DISCONTINUED | OUTPATIENT
Start: 2024-06-20 | End: 2024-06-22 | Stop reason: HOSPADM

## 2024-06-20 RX ORDER — ACETAMINOPHEN 650 MG/1
650 SUPPOSITORY RECTAL EVERY 4 HOURS PRN
Status: DISCONTINUED | OUTPATIENT
Start: 2024-06-20 | End: 2024-06-22 | Stop reason: HOSPADM

## 2024-06-20 RX ORDER — AMOXICILLIN 250 MG
2 CAPSULE ORAL 2 TIMES DAILY PRN
Status: DISCONTINUED | OUTPATIENT
Start: 2024-06-20 | End: 2024-06-22 | Stop reason: HOSPADM

## 2024-06-20 RX ORDER — SODIUM CHLORIDE 9 MG/ML
40 INJECTION, SOLUTION INTRAVENOUS AS NEEDED
Status: DISCONTINUED | OUTPATIENT
Start: 2024-06-20 | End: 2024-06-22 | Stop reason: HOSPADM

## 2024-06-20 RX ORDER — FOLIC ACID 1 MG/1
1 TABLET ORAL DAILY
Status: DISCONTINUED | OUTPATIENT
Start: 2024-06-20 | End: 2024-06-21

## 2024-06-20 RX ORDER — PANTOPRAZOLE SODIUM 40 MG/1
40 TABLET, DELAYED RELEASE ORAL
Status: DISCONTINUED | OUTPATIENT
Start: 2024-06-20 | End: 2024-06-21

## 2024-06-20 RX ORDER — ACETAMINOPHEN 325 MG/1
650 TABLET ORAL EVERY 4 HOURS PRN
Status: DISCONTINUED | OUTPATIENT
Start: 2024-06-20 | End: 2024-06-22 | Stop reason: HOSPADM

## 2024-06-20 RX ADMIN — CEFTRIAXONE 1000 MG: 1 INJECTION, POWDER, FOR SOLUTION INTRAMUSCULAR; INTRAVENOUS at 19:36

## 2024-06-20 RX ADMIN — SODIUM CHLORIDE 1250 MG: 9 INJECTION, SOLUTION INTRAVENOUS at 20:14

## 2024-06-20 RX ADMIN — Medication 10 ML: at 20:36

## 2024-06-20 NOTE — ED PROVIDER NOTES
Subjective   History of Present Illness  Patient is an 83-year-old female who was brought in by EMS due to mental status change.  History was taken from EMS.  No other information obtainable at this time.      Review of Systems    Past Medical History:   Diagnosis Date    Allergic conjunctivitis and rhinitis 11/06/2014    Anemia due to chronic kidney disease, on chronic dialysis 08/25/2020    Anxiety 07/30/2012    Bradycardia by electrocardiogram 06/03/2024    Cardiomyopathy, dilated 04/18/2024    Chronic gouty arthritis 11/06/2014    Coronary artery disease involving native coronary artery of native heart without angina pectoris 04/24/2024    Dependence on renal dialysis 07/01/2022    ESRD (end stage renal disease) 08/25/2020    Essential hypertension 09/16/2015    History of shingles 01/18/2022    Hyperlipidemia 04/08/2024    Ischemic cardiomyopathy 04/18/2024    Paroxysmal atrial fibrillation 07/01/2022    Presence of cardiac pacemaker 06/03/2024    Sick sinus syndrome 06/03/2024    Type 2 diabetes mellitus with diabetic chronic kidney disease 07/01/2022    Vitamin D deficiency 07/22/2021       Allergies   Allergen Reactions    Heparin Hives       Past Surgical History:   Procedure Laterality Date    ARTERIOVENOUS FISTULA Left     CARDIAC CATHETERIZATION N/A 4/25/2024    Procedure: Right and Left Heart Cath;  Surgeon: Dilcia Lui MD;  Location: [facility] CATH INVASIVE LOCATION;  Service: Cardiology;  Laterality: N/A;    CARDIAC CATHETERIZATION N/A 4/25/2024    Procedure: Percutaneous Coronary Intervention;  Surgeon: Jadiel Blake MD;  Location: [facility] CATH INVASIVE LOCATION;  Service: Cardiology;  Laterality: N/A;    CARDIAC ELECTROPHYSIOLOGY PROCEDURE N/A 6/3/2024    Procedure: Pacemaker DC new, [employer];  Surgeon: Zamzam Carrillo MD;  Location: [facility] CATH INVASIVE LOCATION;  Service: Cardiovascular;  Laterality: N/A;    UPPER ENDOSCOPIC ULTRASOUND W/ FNA N/A 6/13/2024    Procedure:  ESOPHAGOGASTRODUODENOSCOPY WITH ULTRASOUND AND FINE NEEDLE ASPIRATION of pancreatic cyst and forcep biopsy x2 areas;  Surgeon: Cesia Hyde MD;  Location: [facility] ENDOSCOPY;  Service: Gastroenterology;  Laterality: N/A;       Family History   Family history unknown: Yes       Social History     Socioeconomic History    Marital status:    Tobacco Use    Smoking status: Never    Smokeless tobacco: Never   Vaping Use    Vaping status: Never Used   Substance and Sexual Activity    Alcohol use: Never    Drug use: Never    Sexual activity: Defer           Objective   Physical Exam  Neurologic exam shows patient be obtunded.  She has a nonfocal neurologic exam.  Neck has no adenopathy JVD or bruits.  Lungs are clear.  Heart has regular rate rhythm without murmur rub or gallop.  Chest is nontender.  Abdomen soft nontender.  Extremity exam unremarkable.  Procedures     My EKG interpretation shows a paced rhythm at a rate of 86 with no acute ST change      ED Course      Results for orders placed or performed during the hospital encounter of 06/20/24   Comprehensive Metabolic Panel    Specimen: Blood   Result Value Ref Range    Glucose 105 (H) 65 - 99 mg/dL    BUN 27 (H) 8 - 23 mg/dL    Creatinine 3.07 (H) 0.57 - 1.00 mg/dL    Sodium 137 136 - 145 mmol/L    Potassium 3.9 3.5 - 5.2 mmol/L    Chloride 100 98 - 107 mmol/L    CO2 26.9 22.0 - 29.0 mmol/L    Calcium 8.8 8.6 - 10.5 mg/dL    Total Protein 5.6 (L) 6.0 - 8.5 g/dL    Albumin 3.4 (L) 3.5 - 5.2 g/dL    ALT (SGPT) 19 1 - 33 U/L    AST (SGOT) 41 (H) 1 - 32 U/L    Alkaline Phosphatase 72 39 - 117 U/L    Total Bilirubin 0.9 0.0 - 1.2 mg/dL    Globulin 2.2 gm/dL    A/G Ratio 1.5 g/dL    BUN/Creatinine Ratio 8.8 7.0 - 25.0    Anion Gap 10.1 5.0 - 15.0 mmol/L    eGFR 14.6 (L) >60.0 mL/min/1.73   Urinalysis With Microscopic If Indicated (No Culture) - Urine, Catheter    Specimen: Urine, Catheter   Result Value Ref Range    Color, UA Yellow Yellow, Straw    Appearance, UA  Clear Clear    pH, UA 5.5 5.0 - 8.0    Specific Gravity, UA 1.017 1.005 - 1.030    Glucose, UA Negative Negative    Ketones, UA Trace (A) Negative    Bilirubin, UA Negative Negative    Blood, UA Negative Negative    Protein, UA 30 mg/dL (1+) (A) Negative    Leuk Esterase, UA Trace (A) Negative    Nitrite, UA Negative Negative    Urobilinogen, UA 0.2 E.U./dL 0.2 - 1.0 E.U./dL   CBC Auto Differential    Specimen: Blood   Result Value Ref Range    WBC 5.21 3.40 - 10.80 10*3/mm3    RBC 3.19 (L) 3.77 - 5.28 10*6/mm3    Hemoglobin 9.4 (L) 12.0 - 15.9 g/dL    Hematocrit 32.0 (L) 34.0 - 46.6 %    .3 (H) 79.0 - 97.0 fL    MCH 29.5 26.6 - 33.0 pg    MCHC 29.4 (L) 31.5 - 35.7 g/dL    RDW 19.8 (H) 12.3 - 15.4 %    RDW-SD 71.1 (H) 37.0 - 54.0 fl    MPV 10.6 6.0 - 12.0 fL    Platelets 76 (L) 140 - 450 10*3/mm3    Neutrophil % 71.3 42.7 - 76.0 %    Lymphocyte % 14.4 (L) 19.6 - 45.3 %    Monocyte % 10.6 5.0 - 12.0 %    Eosinophil % 2.3 0.3 - 6.2 %    Basophil % 0.8 0.0 - 1.5 %    Immature Grans % 0.6 (H) 0.0 - 0.5 %    Neutrophils, Absolute 3.72 1.70 - 7.00 10*3/mm3    Lymphocytes, Absolute 0.75 0.70 - 3.10 10*3/mm3    Monocytes, Absolute 0.55 0.10 - 0.90 10*3/mm3    Eosinophils, Absolute 0.12 0.00 - 0.40 10*3/mm3    Basophils, Absolute 0.04 0.00 - 0.20 10*3/mm3    Immature Grans, Absolute 0.03 0.00 - 0.05 10*3/mm3    nRBC 0.0 0.0 - 0.2 /100 WBC   Urinalysis, Microscopic Only - Urine, Catheter    Specimen: Urine, Catheter   Result Value Ref Range    RBC, UA None Seen None Seen, 0-2 /HPF    WBC, UA 0-2 None Seen, 0-2 /HPF    Bacteria, UA None Seen None Seen /HPF    Squamous Epithelial Cells, UA 3-6 (A) None Seen, 0-2 /HPF    Hyaline Casts, UA None Seen None Seen /LPF    Methodology Manual Light Microscopy    Scan Slide    Specimen: Blood   Result Value Ref Range    Anisocytosis Mod/2+ None Seen    Macrocytes Slight/1+ None Seen    WBC Morphology Normal Normal    Platelet Morphology Normal Normal   POC Lactate    Specimen:  Blood   Result Value Ref Range    Lactate 1.1 0.3 - 2.0 mmol/L   ECG 12 Lead Altered Mental Status   Result Value Ref Range    QT Interval 419 ms    QTC Interval 496 ms   Green Top (Gel)   Result Value Ref Range    Extra Tube Hold for add-ons.    Lavender Top   Result Value Ref Range    Extra Tube hold for add-on    Gold Top - SST   Result Value Ref Range    Extra Tube Hold for add-ons.    Light Blue Top   Result Value Ref Range    Extra Tube Hold for add-ons.      XR Chest 1 View    Result Date: 6/20/2024  Impression: Findings suggestive of pulmonary edema. Atypical/multifocal pneumonia is also a possibility. Possible small left pleural effusion. Electronically Signed: Pio Edith, DO  6/20/2024 5:25 PM EDT  Workstation ID: [id number]    CT Head Without Contrast    Result Date: 6/20/2024  Impression: No acute intracranial abnormality. Electronically Signed: Pio Edith, DO  6/20/2024 5:24 PM EDT  Workstation ID: [id number]                                          Medical Decision Making  My chest x-ray interpretation shows no cardiomegaly effusion or infiltrate.  There is possible congestive failure changes.  CT scan of the head without contrast shows no intracerebral hemorrhage or mass effect.  Metabolic panel shows chronic renal failure unchanged from baseline.  Glucose is 105.  There is no evidence of hepatitis based on LFTs.  UA shows no evidence of infectious process.  Patient has no leukocytosis no left shift and chronic anemia unchanged.  Respiratory panel is pending at this time.  Patient will be admitted with a diagnosis of fever of unknown etiology and mental status change.  Did speak the on-call hospitalist.    Amount and/or Complexity of Data Reviewed  Labs: ordered. Decision-making details documented in ED Course.  Radiology: ordered and independent interpretation performed.  ECG/medicine tests: ordered and independent interpretation performed.    Risk  Prescription drug management.  Decision  regarding hospitalization.        Final diagnoses:   Altered mental status, unspecified altered mental status type   Fever, unspecified fever cause       ED Disposition  ED Disposition       ED Disposition   Decision to Admit    Condition   --    Comment   --               No follow-up provider specified.       Medication List      No changes were made to your prescriptions during this visit.            Cisco Leal MD  06/20/24 [id number]

## 2024-06-20 NOTE — LETTER
EMS Transport Request  For use at [facility], [place], [place], [place], and [place] only   Patient Name: Deann Warren : 1940   Weight:51 kg (112 lb 7 oz) Pick-up Location: [facility] BLS/ALS: BLS/ALS: BLS   Insurance: [facility] MEDICARE REPLACEMENT Auth End Date:    Pre-Cert #: D/C Summary complete:    Destination: Other [facility]   Contact Precautions: None   Equipment (O2, Fluids, etc.): O2, settings 2L   Arrive By Date/Time: 2024 Stretcher/WC: Stretcher   CM Requesting: Laisha Cintron RN Ext: [phone]   Notes/Medical Necessity: wounds, unable to maintain sitting position     ______________________________________________________________________    *Only 2 patient bags OR 1 carry-on size bag are permitted.  Wheelchairs and walkers CANNOT transported with the patient. Acknowledge: Yes     I will SWITCH the dose or number of times a day I take the medications listed below when I get home from the hospital:  None

## 2024-06-20 NOTE — PROGRESS NOTES
Encounter Date:04/18/2024      Patient ID: Deann Warren is a 83 y.o. female.    Chief Complaint   Patient presents with    Follow-up     2 MONTH F/U.  Dc'd from [facility] on 6/17/24 -was admitted for syncope          History of Present Illness     Deann Warren is a 83 y.o. female with a history of end-stage renal disease on dialysis, A-fib, hypertension, hyperlipidemia and recurrent syncope, cardiomyopathy, multivessel CAD currently being medically managed who presents for follow-up.  Since I last saw her she underwent pacemaker placement.  She however was readmitted to the hospital with recurrent syncope.  She was found to be anemic and required blood transfusion.  She was eventually discharged to a facility.  She presents today for follow-up and she is extremely weak compared to prior.  She is very tearful and complaining of severe pain in her coccyx area.  She is falling out of her wheelchair and drinker.  She actually fell asleep while I was talking to her.  Hemodynamically she is stable but she also appears very pale.  Verbalizes to me that she just wants to die.  Transport from the nursing facility is with her in the room and mentions that she was also sliding out of the seat in the bus.        Previous note:   Previously followed with [facility].  During her recent hospitalization she had a repeat echocardiogram which showed that her EF was 35% which was new to her. Of note she has had bleeding issues with dialysis and is on low-dose Eliquis.  She has thrombocytopenia at baseline.  She underwent cardiac catheterization given her cardiomyopathy and was found to be euvolemic on this heart cath.  However she was found to have multivessel heavily calcified CAD involving the left main, LAD and left circumflex.  POBA of the LAD was performed but given her heavily calcified disease the procedure was aborted and cardiac surgery was consulted.  She was deemed to not be a surgical candidate.   Since then she also underwent a Holter monitor which showed two 4-second pauses with a minimum heart rate of 36 bpm and a max heart rate of 109 bpm.  Predominant rhythm was A-fib.    The following portions of the patient's history were reviewed and updated as appropriate: allergies, current medications, past family history, past medical history, past social history, past surgical history, and problem list.    Review of Systems   Constitutional: Positive for malaise/fatigue.   Cardiovascular:  Negative for chest pain, dyspnea on exertion, leg swelling and palpitations.   Respiratory:  Negative for cough and shortness of breath.    Gastrointestinal:  Negative for abdominal pain, nausea and vomiting.   Neurological:  Negative for dizziness, focal weakness, headaches, light-headedness and numbness.   All other systems reviewed and are negative.        Current Outpatient Medications:     acetaminophen (TYLENOL) 500 MG tablet, Take 1 tablet by mouth Every 6 (Six) Hours As Needed for Mild Pain., Disp: , Rfl:     apixaban (ELIQUIS) 2.5 MG tablet tablet, Take 1 tablet by mouth Every 12 (Twelve) Hours. Indications: Atrial Fibrillation, Disp: 60 tablet, Rfl: 0    atorvastatin (LIPITOR) 40 MG tablet, Take 1 tablet by mouth Daily., Disp: 90 tablet, Rfl: 3    Cholecalciferol (Vitamin D3) 50 MCG (2000 UT) tablet, Take 1 tablet by mouth Daily., Disp: , Rfl:     febuxostat (ULORIC) 40 MG tablet, Take 1 tablet by mouth Daily., Disp: , Rfl:     folic acid (FOLVITE) 1 MG tablet, Take 1 tablet by mouth Daily., Disp: 30 tablet, Rfl: 0    loperamide (IMODIUM) 2 MG capsule, Take 1 capsule by mouth 4 (Four) Times a Day As Needed for Diarrhea (Loose stools)., Disp: , Rfl:     Menthol-Zinc Oxide 0.45-20 % ointment, Apply  topically. To coccyx area every 12 hours, Disp: , Rfl:     NON FORMULARY, Apply 1 dose topically to the appropriate area as directed 3 (Three) Times a Week. Lidocaine 2.5% ointment -  Apply 1 application Monday, Wednesday,  Friday before dialysis, Disp: , Rfl:     pantoprazole (PROTONIX) 40 MG EC tablet, Take 1 tablet by mouth 2 (Two) Times a Day Before Meals., Disp: 60 tablet, Rfl: 0    Allergies   Allergen Reactions    Heparin Hives       Family History   Family history unknown: Yes       Past Surgical History:   Procedure Laterality Date    ARTERIOVENOUS FISTULA Left     CARDIAC CATHETERIZATION N/A 4/25/2024    Procedure: Right and Left Heart Cath;  Surgeon: Dilcia Lui MD;  Location: [facility] CATH INVASIVE LOCATION;  Service: Cardiology;  Laterality: N/A;    CARDIAC CATHETERIZATION N/A 4/25/2024    Procedure: Percutaneous Coronary Intervention;  Surgeon: Jadiel Blake MD;  Location: [facility] CATH INVASIVE LOCATION;  Service: Cardiology;  Laterality: N/A;    CARDIAC ELECTROPHYSIOLOGY PROCEDURE N/A 6/3/2024    Procedure: Pacemaker DC new, [employer];  Surgeon: Zamzam Carrillo MD;  Location: [facility] CATH INVASIVE LOCATION;  Service: Cardiovascular;  Laterality: N/A;    UPPER ENDOSCOPIC ULTRASOUND W/ FNA N/A 6/13/2024    Procedure: ESOPHAGOGASTRODUODENOSCOPY WITH ULTRASOUND AND FINE NEEDLE ASPIRATION of pancreatic cyst and forcep biopsy x2 areas;  Surgeon: Cesia Hyde MD;  Location: [facility] ENDOSCOPY;  Service: Gastroenterology;  Laterality: N/A;       Past Medical History:   Diagnosis Date    Allergic conjunctivitis and rhinitis 11/06/2014    Anemia due to chronic kidney disease, on chronic dialysis 08/25/2020    Anxiety 07/30/2012    Bradycardia by electrocardiogram 06/03/2024    Cardiomyopathy, dilated 04/18/2024    Chronic gouty arthritis 11/06/2014    Coronary artery disease involving native coronary artery of native heart without angina pectoris 04/24/2024    Dependence on renal dialysis 07/01/2022    ESRD (end stage renal disease) 08/25/2020    Essential hypertension 09/16/2015    History of shingles 01/18/2022    Hyperlipidemia 04/08/2024    Ischemic cardiomyopathy 04/18/2024    Paroxysmal atrial fibrillation 07/01/2022     "Presence of cardiac pacemaker 06/03/2024    Sick sinus syndrome 06/03/2024    Type 2 diabetes mellitus with diabetic chronic kidney disease 07/01/2022    Vitamin D deficiency 07/22/2021       Social History     Socioeconomic History    Marital status:    Tobacco Use    Smoking status: Never    Smokeless tobacco: Never   Vaping Use    Vaping status: Never Used   Substance and Sexual Activity    Alcohol use: Never    Drug use: Never    Sexual activity: Defer         Procedures      Objective:       Physical Exam    /53 (BP Location: Right arm, Patient Position: Sitting)   Pulse 86   Ht 162.6 cm (64\")   Wt 52.6 kg (116 lb) Comment: estimate per patient  BMI 19.91 kg/m²   The patient is alert, extremely frail and weak and intermittently confused    Vital signs as noted above.    Head and neck revealed no carotid bruits or jugular venous distension.  No thyromegaly or lymphadenopathy is present.    Lungs clear.  No wheezing.  Breath sounds are normal bilaterally.    Heart normal first and second heart sounds.  Grade 2 murmur at the right upper sternal border.  No pericardial rub is present.  No gallop is present.    Abdomen soft and nontender.  No organomegaly is present.    Extremities  without any pedal edema.    Skin warm and dry.  Multiple ecchymosis on the bilateral upper extremities           Diagnosis Plan   1. Cardiomyopathy, dilated        2. Paroxysmal A-fib        3. Essential hypertension        4. ESRD (end stage renal disease)        5. Anemia due to chronic kidney disease, on chronic dialysis        6. Mixed hyperlipidemia        7. Syncope and collapse        8. Weakness          LAB RESULTS (LAST 7 DAYS)    CBC  Results from last 7 days   Lab Units 06/17/24  0208 06/16/24  1055 06/16/24  0052 06/15/24  0507 06/14/24  1604 06/14/24  0043   WBC 10*3/mm3 11.67*  --  11.59* 12.00*  --  7.46   RBC 10*6/mm3 3.00*  --  2.28* 2.44*  --  2.50*   HEMOGLOBIN g/dL 8.7* 8.5* 6.7* 7.2* 7.4* 7.4* "   HEMATOCRIT % 29.2* 27.6* 22.9* 24.6*  --  24.8*   MCV fL 97.3*  --  100.4* 100.8*  --  99.2*   PLATELETS 10*3/mm3 85*  --  72* 84*  --  80*       BMP  Results from last 7 days   Lab Units 06/17/24  0208 06/16/24  0052 06/15/24  0507 06/14/24  0043   SODIUM mmol/L 139 140 140 140   POTASSIUM mmol/L 3.6 3.6 3.9 4.4   CHLORIDE mmol/L 103 104 104 105   CO2 mmol/L 26.6 27.7 26.4 26.2   BUN mg/dL 28* 26* 24* 45*   CREATININE mg/dL 3.05* 2.79* 2.36* 3.04*   GLUCOSE mg/dL 111* 115* 75 119*   PHOSPHORUS mg/dL  --   --   --  3.8       CMP   Results from last 7 days   Lab Units 06/17/24  0208 06/16/24  0052 06/15/24  0507 06/14/24  0043   SODIUM mmol/L 139 140 140 140   POTASSIUM mmol/L 3.6 3.6 3.9 4.4   CHLORIDE mmol/L 103 104 104 105   CO2 mmol/L 26.6 27.7 26.4 26.2   BUN mg/dL 28* 26* 24* 45*   CREATININE mg/dL 3.05* 2.79* 2.36* 3.04*   GLUCOSE mg/dL 111* 115* 75 119*   ALBUMIN g/dL  --   --  3.1* 3.1*   BILIRUBIN mg/dL  --   --  0.6  --    ALK PHOS U/L  --   --  71  --    AST (SGOT) U/L  --   --  47*  --    ALT (SGPT) U/L  --   --  22  --          BNP        TROPONIN        CoAg          Creatinine Clearance  Estimated Creatinine Clearance: 11.6 mL/min (A) (by C-G formula based on SCr of 3.05 mg/dL (H)).    ABG        Radiology  No radiology results for the last day         Assessment and Plan       Diagnoses and all orders for this visit:    1. Cardiomyopathy, dilated (Primary)    2. Paroxysmal A-fib    3. Essential hypertension    4. ESRD (end stage renal disease)    5. Anemia due to chronic kidney disease, on chronic dialysis    6. Mixed hyperlipidemia    7. Syncope and collapse    8. Weakness         Frailty and weakness  Although she is hemodynamically stable she appears to be extremely pale and weak on exam today.  Endorses that she would just like to die.  Agreeable to go to the ER to be evaluated.  I think she would also benefit from a palliative care consult.    Syncope  Recurrent syncope with previous negative  workup  No evidence of valvular heart disease that could explain her syncope on echocardiogram  Pacemaker in place and functioning well with no abnormalities noted     ASD: Possible  This was an incidental finding on her echocardiogram  Given her advanced age I do not recommend further workup with a YINA at this time because I do not think that closure would be indicated in this case     Cardiomyopathy  Recent echocardiogram showed LVEF 35%  Recent cardiac cath showed multivessel CAD  On beta blocker   Unable to add GDMT due to hypotension  Not on SGLT2-I due to ESRD     Thrombocytopenia  New onset  Being followed by hematology  I recommend holding Eliquis     End-stage renal disease  On hemodialysis Monday Wednesday Friday     Atrial fibrillation  Shared decision making for Watchman  Currently rate controlled  Continue beta-blocker     Hyperlipidemia  Continue with atorvastatin     Labile hypertension  Continue metoprolol      EMS was called during this clinic visit and patient was transported to the ER    Dilcia Lui MD

## 2024-06-20 NOTE — LETTER
"    EMS Transport Request  For use at [facility], [place], [place], [place], and [place] only   Patient Name: Deann Warren : 1940   Weight:51 kg (112 lb 7 oz) Pick-up Location: [facility] BLS/ALS: BLS/ALS: BLS   Insurance: [employer] MEDICARE REPLACEMENT Auth End Date: N/A   Pre-Cert #: D/C Summary complete:    Destination: Other [facility]   Contact Precautions: None   Equipment (O2, Fluids, etc.): None   Arrive By Date/Time: Anytime Stretcher/WC: Stretcher   CM Requesting: Megan Naegele, RN Ext: [phone]   Notes/Medical Necessity: AMS, end of life care, pain     ______________________________________________________________________    *Only 2 patient bags OR 1 carry-on size bag are permitted.  Wheelchairs and walkers CANNOT transported with the patient. Acknowledge: {Acknowledge:[id number]::\"Yes\"}    "

## 2024-06-20 NOTE — H&P
[facility] Medicine Services  History & Physical    Patient Name: Deann Warren  : 1940  MRN: [id number]  Primary Care Physician:  Antonino Shen MD  Date of admission: 2024  Date and Time of Service: 2024    Subjective      Chief Complaint: Poorly responsive    History of Present Illness:    This is a 83-year-old female with complex past medical history as described below.  Patient has history of recurrent syncope cardiomyopathy CAD s/p pacemaker placement was sent from rehab for cardiology follow-up.  While in the clinic patient was noted to be very lethargic and obtunded for which patient was sent to the ER.  Patient has TME unable to provide much history.  Patient has low-grade temp and blood pressure stable.  CBC showed anemia with hemoglobin of 9.4.  Creatinine is 3.  Chest x-ray suggestive of CHF.  CT head was normal.  Recent echocardiogram showed EF of 25% with tricuspid regurgitation and pulmonary hypertension.  Patient also had cardiac catheterization in April which showed multivessel disease    Review of Systems   Unable to obtain    Personal History     Past Medical History:   Diagnosis Date    Allergic conjunctivitis and rhinitis 2014    Anemia due to chronic kidney disease, on chronic dialysis 2020    Anxiety 2012    Bradycardia by electrocardiogram 2024    Cardiomyopathy, dilated 2024    Chronic gouty arthritis 2014    Coronary artery disease involving native coronary artery of native heart without angina pectoris 2024    Dependence on renal dialysis 2022    ESRD (end stage renal disease) 2020    Essential hypertension 2015    History of shingles 2022    Hyperlipidemia 2024    Ischemic cardiomyopathy 2024    Paroxysmal atrial fibrillation 2022    Presence of cardiac pacemaker 2024    Sick sinus syndrome 2024    Type 2 diabetes mellitus with diabetic chronic kidney disease  07/01/2022    Vitamin D deficiency 07/22/2021       Past Surgical History:   Procedure Laterality Date    ARTERIOVENOUS FISTULA Left     CARDIAC CATHETERIZATION N/A 4/25/2024    Procedure: Right and Left Heart Cath;  Surgeon: Dilcia Lui MD;  Location: [facility] CATH INVASIVE LOCATION;  Service: Cardiology;  Laterality: N/A;    CARDIAC CATHETERIZATION N/A 4/25/2024    Procedure: Percutaneous Coronary Intervention;  Surgeon: Jadiel Blake MD;  Location: [facility] CATH INVASIVE LOCATION;  Service: Cardiology;  Laterality: N/A;    CARDIAC ELECTROPHYSIOLOGY PROCEDURE N/A 6/3/2024    Procedure: Pacemaker DC new, [employer];  Surgeon: Zamzam Carrillo MD;  Location: [facility] CATH INVASIVE LOCATION;  Service: Cardiovascular;  Laterality: N/A;    UPPER ENDOSCOPIC ULTRASOUND W/ FNA N/A 6/13/2024    Procedure: ESOPHAGOGASTRODUODENOSCOPY WITH ULTRASOUND AND FINE NEEDLE ASPIRATION of pancreatic cyst and forcep biopsy x2 areas;  Surgeon: Cesia Hyde MD;  Location: [facility] ENDOSCOPY;  Service: Gastroenterology;  Laterality: N/A;       Family History: Family history is unknown by patient. Otherwise pertinent FHx was reviewed and not pertinent to current issue.    Social History:  reports that she has never smoked. She has never used smokeless tobacco. She reports that she does not drink alcohol and does not use drugs.    Home Medications:  Prior to Admission Medications       Prescriptions Last Dose Informant Patient Reported? Taking?    acetaminophen (TYLENOL) 500 MG tablet   Yes No    Take 1 tablet by mouth Every 6 (Six) Hours As Needed for Mild Pain.    apixaban (ELIQUIS) 2.5 MG tablet tablet   No No    Take 1 tablet by mouth Every 12 (Twelve) Hours. Indications: Atrial Fibrillation    atorvastatin (LIPITOR) 40 MG tablet   No No    Take 1 tablet by mouth Daily.    Cholecalciferol (Vitamin D3) 50 MCG (2000 UT) tablet   Yes No    Take 1 tablet by mouth Daily.    febuxostat (ULORIC) 40 MG tablet   Yes No    Take 1 tablet by mouth  Daily.    folic acid (FOLVITE) 1 MG tablet   No No    Take 1 tablet by mouth Daily.    loperamide (IMODIUM) 2 MG capsule   Yes No    Take 1 capsule by mouth 4 (Four) Times a Day As Needed for Diarrhea (Loose stools).    Menthol-Zinc Oxide 0.45-20 % ointment   Yes No    Apply  topically. To coccyx area every 12 hours    NON FORMULARY   Yes No    Apply 1 dose topically to the appropriate area as directed 3 (Three) Times a Week. Lidocaine 2.5% ointment -  Apply 1 application Monday, Wednesday, Friday before dialysis    pantoprazole (PROTONIX) 40 MG EC tablet   No No    Take 1 tablet by mouth 2 (Two) Times a Day Before Meals.              Allergies:  Allergies   Allergen Reactions    Heparin Hives       Objective      Vitals:   Temp:  [100.1 °F (37.8 °C)] 100.1 °F (37.8 °C)  Heart Rate:  [79-86] 83  Resp:  [29-31] 31  BP: (125-132)/(53-56) 125/56  Body mass index is 19.9 kg/m².  Physical Exam:    Constitutional: Patient lethargic  HEENT:   Head: Normocephalic and atraumatic.   Eyes:  Pupils are equal, round, and reactive to light. EOM are intact. Sclera are anicteric and non-injected.  Mouth and Throat: Patient has moist mucous membranes.      Neck: Neck supple.  No thyromegaly present. No lymphadenopathy present. No  masses.     Cardiovascular: Inspection: No JVD present. Palpation:bilaterally. No leg edema. Auscultation: Regular rate, regular rhythm, S1 normal and S2 normal. reveals no gallop and no friction rub. No Carotid bruit bilaterally.    Pulmonary/Chest: Inspection: No distress, no use of accessory muscles. Lungs are clear to auscultation bilaterally. No respiratory distress. No wheezes. No rhonchi. No rales.     Abdomen /Gastrointestinal: Inspection: no distension. Palpation: no masses, no organomegaly. Soft. There is no tenderness. Bowel sounds are normal.   Extremities no cyanosis clubbing or edema    Neurological: Patient is alert and oriented to person, place, and time. Cranial nerves II-XII are grossly  intact with no focal deficits. Sensori-motor exam is normal. No cerebellar signs.    Skin: Skin is warm. No rash noted. Nails show no clubbing.  No cyanosis or erythema. No bruising.      Diagnostic Data:  Results from last 7 days   Lab Units 06/20/24  1647 06/17/24  0208 06/16/24  0052 06/15/24  0507   WBC 10*3/mm3 5.21 11.67*   < > 12.00*   HEMOGLOBIN g/dL 9.4* 8.7*   < > 7.2*   HEMATOCRIT % 32.0* 29.2*   < > 24.6*   PLATELETS 10*3/mm3 76* 85*   < > 84*   GLUCOSE mg/dL  --  111*   < > 75   CREATININE mg/dL  --  3.05*   < > 2.36*   BUN mg/dL  --  28*   < > 24*   SODIUM mmol/L  --  139   < > 140   POTASSIUM mmol/L  --  3.6   < > 3.9   AST (SGOT) U/L  --   --   --  47*   ALT (SGPT) U/L  --   --   --  22   ALK PHOS U/L  --   --   --  71   BILIRUBIN mg/dL  --   --   --  0.6   ANION GAP mmol/L  --  9.4   < > 9.6    < > = values in this interval not displayed.       XR Chest 1 View    Result Date: 6/20/2024  Impression: Findings suggestive of pulmonary edema. Atypical/multifocal pneumonia is also a possibility. Possible small left pleural effusion. Electronically Signed: Pio Sharma DO  6/20/2024 5:25 PM EDT  Workstation ID: [id number]    CT Head Without Contrast    Result Date: 6/20/2024  Impression: No acute intracranial abnormality. Electronically Signed: Pio Sharma DO  6/20/2024 5:24 PM EDT  Workstation ID: [id number]     Results for orders placed or performed during the hospital encounter of 06/10/24   Blood Culture - Blood, Arm, Right    Specimen: Arm, Right; Blood   Result Value Ref Range    Blood Culture No growth at 5 days        I have personally reviewed the patient's new results.       Assessment & Plan        Active and Resolved Problems    Toxic metabolic encephalopathy  Low-grade fever  Acute on chronic systolic CHF  Cardiomyopathy with EF of 20 to 25%  History of chronic A-fib  History of recurrent syncope  Cardiac etiology showed multivessel CAD on medical management  S/p pacemaker  placement  History of thrombocytopenia  AV fistula in place  Chronic anemia of renal disease  Sick sinus syndrome s/p pacemaker placement  Diabetes mellitus type 2    Suggestion    At this time we will admit this patient in hospital  Start antibiotic in the form of dose of vancomycin and Rocephin  Panculture  Cardiology consult  Nephrology consult  Insulin sliding scale  Guarded prognosis      VTE Prophylaxis:  No VTE prophylaxis order currently exists.        The patient desires to be as follows:    CODE STATUS:           Admission Status: Inpatient    Expected Length of Stay: 2 to 3 days    PDMP and Medication Dispenses via Sidebar reviewed and consistent with patient reported medications.        Signature:     This document has been electronically signed by Evelia Caballero MD on June 20, 2024 17:47 EDT   [facility] Hospitalist Team

## 2024-06-20 NOTE — ED NOTES
"Pt arrived via [facility] from cardiologist office only responsive to pain and strong stimuli. [place] [facility] [place] the  states she has \"looked liked this for 2 days\" according to the  the pt had 2 syncopal episodes yesterday but was not send to the ED. Nursing home states this is behavioral for the patient   "

## 2024-06-21 LAB
ALBUMIN SERPL-MCNC: 3 G/DL (ref 3.5–5.2)
ALBUMIN/GLOB SERPL: 1.4 G/DL
ALP SERPL-CCNC: 59 U/L (ref 39–117)
ALT SERPL W P-5'-P-CCNC: 14 U/L (ref 1–33)
ANION GAP SERPL CALCULATED.3IONS-SCNC: 9.1 MMOL/L (ref 5–15)
AST SERPL-CCNC: 33 U/L (ref 1–32)
BASOPHILS # BLD AUTO: 0.04 10*3/MM3 (ref 0–0.2)
BASOPHILS NFR BLD AUTO: 0.8 % (ref 0–1.5)
BILIRUB SERPL-MCNC: 0.7 MG/DL (ref 0–1.2)
BUN SERPL-MCNC: 27 MG/DL (ref 8–23)
BUN/CREAT SERPL: 9.2 (ref 7–25)
CALCIUM SPEC-SCNC: 8.5 MG/DL (ref 8.6–10.5)
CHLORIDE SERPL-SCNC: 103 MMOL/L (ref 98–107)
CO2 SERPL-SCNC: 26.9 MMOL/L (ref 22–29)
COPPER 24H UR-MRATE: 1 UG/24 HR (ref 3–35)
COPPER UR-MCNC: 95 UG/L
COPPER/CREAT UR: 77 UG/G CREAT (ref 0–49)
CREAT SERPL-MCNC: 2.94 MG/DL (ref 0.57–1)
CREAT UR-MCNC: 1.23 G/L (ref 0.3–3)
DEPRECATED RDW RBC AUTO: 69.2 FL (ref 37–54)
EGFRCR SERPLBLD CKD-EPI 2021: 15.4 ML/MIN/1.73
EOSINOPHIL # BLD AUTO: 0.13 10*3/MM3 (ref 0–0.4)
EOSINOPHIL NFR BLD AUTO: 2.7 % (ref 0.3–6.2)
ERYTHROCYTE [DISTWIDTH] IN BLOOD BY AUTOMATED COUNT: 19.4 % (ref 12.3–15.4)
GEN 5 2HR TROPONIN T REFLEX: 120 NG/L
GLOBULIN UR ELPH-MCNC: 2.1 GM/DL
GLUCOSE SERPL-MCNC: 71 MG/DL (ref 65–99)
HBA1C MFR BLD: 5.26 % (ref 4.8–5.6)
HCT VFR BLD AUTO: 28.4 % (ref 34–46.6)
HGB BLD-MCNC: 8.4 G/DL (ref 12–15.9)
IMM GRANULOCYTES # BLD AUTO: 0.04 10*3/MM3 (ref 0–0.05)
IMM GRANULOCYTES NFR BLD AUTO: 0.8 % (ref 0–0.5)
LYMPHOCYTES # BLD AUTO: 0.61 10*3/MM3 (ref 0.7–3.1)
LYMPHOCYTES NFR BLD AUTO: 12.8 % (ref 19.6–45.3)
MCH RBC QN AUTO: 29.4 PG (ref 26.6–33)
MCHC RBC AUTO-ENTMCNC: 29.6 G/DL (ref 31.5–35.7)
MCV RBC AUTO: 99.3 FL (ref 79–97)
MONOCYTES # BLD AUTO: 0.44 10*3/MM3 (ref 0.1–0.9)
MONOCYTES NFR BLD AUTO: 9.2 % (ref 5–12)
NEUTROPHILS NFR BLD AUTO: 3.52 10*3/MM3 (ref 1.7–7)
NEUTROPHILS NFR BLD AUTO: 73.7 % (ref 42.7–76)
NRBC BLD AUTO-RTO: 0 /100 WBC (ref 0–0.2)
PLATELET # BLD AUTO: 70 10*3/MM3 (ref 140–450)
PMV BLD AUTO: 10.8 FL (ref 6–12)
POTASSIUM SERPL-SCNC: 3.8 MMOL/L (ref 3.5–5.2)
PROT SERPL-MCNC: 5.1 G/DL (ref 6–8.5)
QT INTERVAL: 419 MS
QTC INTERVAL: 496 MS
RBC # BLD AUTO: 2.86 10*6/MM3 (ref 3.77–5.28)
SODIUM SERPL-SCNC: 139 MMOL/L (ref 136–145)
TROPONIN T DELTA: 2 NG/L
TROPONIN T SERPL HS-MCNC: 118 NG/L
VANCOMYCIN SERPL-MCNC: 19.1 MCG/ML (ref 5–40)
WBC NRBC COR # BLD AUTO: 4.78 10*3/MM3 (ref 3.4–10.8)

## 2024-06-21 PROCEDURE — 84484 ASSAY OF TROPONIN QUANT: CPT | Performed by: INTERNAL MEDICINE

## 2024-06-21 PROCEDURE — 25010000002 LORAZEPAM PER 2 MG: Performed by: INTERNAL MEDICINE

## 2024-06-21 PROCEDURE — 80202 ASSAY OF VANCOMYCIN: CPT | Performed by: INTERNAL MEDICINE

## 2024-06-21 PROCEDURE — 85025 COMPLETE CBC W/AUTO DIFF WBC: CPT | Performed by: INTERNAL MEDICINE

## 2024-06-21 PROCEDURE — 80053 COMPREHEN METABOLIC PANEL: CPT | Performed by: INTERNAL MEDICINE

## 2024-06-21 PROCEDURE — 82948 REAGENT STRIP/BLOOD GLUCOSE: CPT

## 2024-06-21 PROCEDURE — 25010000002 HYDROMORPHONE 1 MG/ML SOLUTION: Performed by: INTERNAL MEDICINE

## 2024-06-21 PROCEDURE — 83036 HEMOGLOBIN GLYCOSYLATED A1C: CPT | Performed by: INTERNAL MEDICINE

## 2024-06-21 RX ORDER — LORAZEPAM 2 MG/ML
2 INJECTION INTRAMUSCULAR
Status: DISCONTINUED | OUTPATIENT
Start: 2024-06-21 | End: 2024-06-22 | Stop reason: HOSPADM

## 2024-06-21 RX ORDER — LORAZEPAM 2 MG/ML
1 CONCENTRATE ORAL
Status: DISCONTINUED | OUTPATIENT
Start: 2024-06-21 | End: 2024-06-22 | Stop reason: HOSPADM

## 2024-06-21 RX ORDER — GLYCOPYRROLATE 0.2 MG/ML
0.4 INJECTION INTRAMUSCULAR; INTRAVENOUS
Status: DISCONTINUED | OUTPATIENT
Start: 2024-06-21 | End: 2024-06-22 | Stop reason: HOSPADM

## 2024-06-21 RX ORDER — ONDANSETRON 2 MG/ML
4 INJECTION INTRAMUSCULAR; INTRAVENOUS EVERY 6 HOURS PRN
Status: DISCONTINUED | OUTPATIENT
Start: 2024-06-21 | End: 2024-06-22 | Stop reason: HOSPADM

## 2024-06-21 RX ORDER — SCOLOPAMINE TRANSDERMAL SYSTEM 1 MG/1
1 PATCH, EXTENDED RELEASE TRANSDERMAL
Status: DISCONTINUED | OUTPATIENT
Start: 2024-06-21 | End: 2024-06-22 | Stop reason: HOSPADM

## 2024-06-21 RX ORDER — LORAZEPAM 1 MG/1
2 TABLET ORAL
Status: DISCONTINUED | OUTPATIENT
Start: 2024-06-21 | End: 2024-06-22 | Stop reason: HOSPADM

## 2024-06-21 RX ORDER — LORAZEPAM 2 MG/ML
2 CONCENTRATE ORAL
Status: DISCONTINUED | OUTPATIENT
Start: 2024-06-21 | End: 2024-06-22 | Stop reason: HOSPADM

## 2024-06-21 RX ORDER — LORAZEPAM 2 MG/ML
1 INJECTION INTRAMUSCULAR
Status: DISCONTINUED | OUTPATIENT
Start: 2024-06-21 | End: 2024-06-22 | Stop reason: HOSPADM

## 2024-06-21 RX ORDER — ATROPINE SULFATE 10 MG/ML
2 SOLUTION/ DROPS OPHTHALMIC 2 TIMES DAILY PRN
Status: DISCONTINUED | OUTPATIENT
Start: 2024-06-21 | End: 2024-06-22 | Stop reason: HOSPADM

## 2024-06-21 RX ORDER — GLYCOPYRROLATE 0.2 MG/ML
0.2 INJECTION INTRAMUSCULAR; INTRAVENOUS
Status: DISCONTINUED | OUTPATIENT
Start: 2024-06-21 | End: 2024-06-22 | Stop reason: HOSPADM

## 2024-06-21 RX ORDER — LORAZEPAM 0.5 MG/1
0.5 TABLET ORAL
Status: DISCONTINUED | OUTPATIENT
Start: 2024-06-21 | End: 2024-06-22 | Stop reason: HOSPADM

## 2024-06-21 RX ORDER — LORAZEPAM 2 MG/ML
0.5 INJECTION INTRAMUSCULAR
Status: DISCONTINUED | OUTPATIENT
Start: 2024-06-21 | End: 2024-06-22 | Stop reason: HOSPADM

## 2024-06-21 RX ORDER — LORAZEPAM 1 MG/1
1 TABLET ORAL
Status: DISCONTINUED | OUTPATIENT
Start: 2024-06-21 | End: 2024-06-22 | Stop reason: HOSPADM

## 2024-06-21 RX ORDER — DIPHENOXYLATE HYDROCHLORIDE AND ATROPINE SULFATE 2.5; .025 MG/1; MG/1
1 TABLET ORAL
Status: DISCONTINUED | OUTPATIENT
Start: 2024-06-21 | End: 2024-06-22 | Stop reason: HOSPADM

## 2024-06-21 RX ORDER — ONDANSETRON 4 MG/1
4 TABLET, ORALLY DISINTEGRATING ORAL EVERY 6 HOURS PRN
Status: DISCONTINUED | OUTPATIENT
Start: 2024-06-21 | End: 2024-06-22 | Stop reason: HOSPADM

## 2024-06-21 RX ORDER — LORAZEPAM 2 MG/ML
0.5 CONCENTRATE ORAL
Status: DISCONTINUED | OUTPATIENT
Start: 2024-06-21 | End: 2024-06-22 | Stop reason: HOSPADM

## 2024-06-21 RX ORDER — ATORVASTATIN CALCIUM 40 MG/1
40 TABLET, FILM COATED ORAL DAILY
Status: DISCONTINUED | OUTPATIENT
Start: 2024-06-21 | End: 2024-06-21

## 2024-06-21 RX ORDER — CARBOXYMETHYLCELLULOSE SODIUM 10 MG/ML
1 GEL OPHTHALMIC
Status: DISCONTINUED | OUTPATIENT
Start: 2024-06-21 | End: 2024-06-22 | Stop reason: HOSPADM

## 2024-06-21 RX ORDER — MORPHINE SULFATE 2 MG/ML
2 INJECTION, SOLUTION INTRAMUSCULAR; INTRAVENOUS EVERY 4 HOURS PRN
Status: DISCONTINUED | OUTPATIENT
Start: 2024-06-21 | End: 2024-06-21

## 2024-06-21 RX ADMIN — HYDROMORPHONE HYDROCHLORIDE 1 MG: 1 INJECTION, SOLUTION INTRAMUSCULAR; INTRAVENOUS; SUBCUTANEOUS at 14:48

## 2024-06-21 RX ADMIN — LORAZEPAM 2 MG: 2 INJECTION INTRAMUSCULAR; INTRAVENOUS at 14:48

## 2024-06-21 RX ADMIN — HYDROMORPHONE HYDROCHLORIDE 0.5 MG: 1 INJECTION, SOLUTION INTRAMUSCULAR; INTRAVENOUS; SUBCUTANEOUS at 11:18

## 2024-06-21 RX ADMIN — Medication 10 ML: at 21:27

## 2024-06-21 RX ADMIN — ACETAMINOPHEN 650 MG: 650 SUPPOSITORY RECTAL at 00:05

## 2024-06-21 NOTE — CASE MANAGEMENT/SOCIAL WORK
Case Management Readmission Assessment Note    Case Management Readmission Assessment (all recorded)       Readmission Interview       Row Name 06/21/24 1151             Readmission Indications    Is the patient and/or family able to complete the readmission assessment questions? Yes  Pt is going with hospice so not all questions asked.      Is this hospitalization related to the prior hospital diagnosis? No  Previous hospitalization 6/10-6/17 for syncope. Sent to ED for AMS and frailty by Cardio office        Row Name 06/21/24 1156             Recommendation for rehospitalization    Did you speak with your physician prior to coming to the hospital Yes      If yes, what physician did you speak with? Dr Lui's office        Row Name 06/21/24 1150             Medications    Did you have newly prescribed medications at discharge? Yes  apixaban (ELIQUIS), pantoprazole (PROTONIX)      Did you understand the reasons for your medications at discharge and how to take them? Yes      Did you understand the side effects of your medications? Yes      Are you taking all of you prescribed medications? Yes      What pharmacy was used to fill prescription(s)? [facility]      Were medications picked up? Yes        Row Name 06/21/24 1071             Discharge Instructions    Did you understand your discharge instructions? Yes      Did your family/caregiver hear your instructions? Yes      Were you told to eat a special diet? Yes  Fat-restricted      Did you adhere to the diet? Yes      Were you given a number of someone to call if you had questions or concerns? Yes        Row Name 06/21/24 3055             Index discharge location/services    Where did you go upon discharge? Skilled Nursing Facility      Do you have supportive family or friends in the home? Yes      What services were arranged at discharge? Transportation      Was the provider seen at the facility? Yes      What actions were taken to avoid a readmit? Pt had  routine hospital follow up appt with cardiology who advised pt be sent to ED for further evaluation.      Which Skilled Nursing Facility were your admitted from? [facility]        Row Name 06/21/24 1153             Discharge Readiness    On a scale of 1-5 (5 being well prepared), how ready were you for discharge 3      Recommendation based on interview Goals of care discussion/advanced care planning        Row Name 06/21/24 1153             Palliative Care/Hospice    Are you current with Palliative Care? No  New referral today 6/20      Are you current with Hospice Care? No  New referral to [facility] today 6/20        Row Name 06/21/24 1153 06/21/24 0101 06/20/24 2149       Advance Directives (For Healthcare)    Pre-existing AND/MOST/POLST Order No No No    Advance Directive Status Patient does not have advance directive Patient does not have advance directive Patient does not have advance directive    Have you reviewed your Advance Directive and is it valid for this stay? No No No    Literature Provided on Advance Directives No No No    Patient Requests Assistance on Advance Directives Patient Declined Patient Declined Patient Declined      Row Name 06/21/24 1153             Readmission Assessment Final Comments    Final Comments Previous hospitalization 6/10-6/17 for syncope. Cardio consult, Neph consulted to manage HD orders for M/F. GI consulted after CT scan showed pancreatic mass. EGD w/ EUS completed on 6/13 and Hem/Onc consulted for pancytopenia. PT/OT recommending SNF and pt discharged to [facility] on 6/17. Pt had follow up appt with Dr Lui 6/20 w/ reports of being hemodynamically stable but very pale and weak. Sent to ED for further evaluation and recommendation of Palliative consult. Palliative team consulted [facility] and liaison Yuli met with family today 6/20. Plans are to go to nursing facility with [facility] services.

## 2024-06-21 NOTE — CONSULTS
Palliative Care Social Work Progress Note    Code Status:full code    Goals of Care: Full Treatment    Narrative: Palliative care  met with patients sons and daughter in law at bedside to discuss goals of care. They shared patient was previously at [facility] but they don't believe patient is rehabable anymore. Family would like to focus on comfort. Referral placed to Yuli from \[facility]\"" and will meet with family at 10-1015am. CM, RN, and MD notified.     Plan: \[facility]\"" referral          Manuela Malhotra

## 2024-06-21 NOTE — NURSING NOTE
83-year-old female who presents to the hospital with altered mental status changes.  Patient consult received to assess patient's heels and sacrum.  Patient is will open eyes and acknowledge presence but does not respond to questions.    Patient has a  POAstage III pressure injury to her left heel.  The injury is approximately 1-1/2 x 1 cm with a depth of point 2 cm.  There is a small amount of serous exudate.  No overt symptoms of infection are noted.  The base is quite pale.  There is some yellow slough noted.  I am able to palpate a pedal pulse.    The sacrum also has a stage III sacral pressure injury.  She is present on admission.  The injury is small.  It is approximately 0.6 5.6 with a depth of point 2.  No exudate is noted some nonblanchable erythema is noted around it.  Some skin is slightly macerated.  We will recommend treating with a silicone border foam and placing the patient on an agility offloading surface utilize pressure injury prevention strategies.  Only patient does have taps as well as a soft midline boots in place.

## 2024-06-21 NOTE — CASE MANAGEMENT/SOCIAL WORK
Discharge Planning Assessment   Neto     Patient Name: Deann Warren  MRN: [id number]  Today's Date: 6/21/2024    Admit Date: 6/20/2024    Plan: [facility] accepted, [facility] to admit. No precert or PASRR needed.   Discharge Needs Assessment       Row Name 06/21/24 1146       Living Environment    People in Home facility resident  Rehab at [facility]    Current Living Arrangements extended care facility    Potentially Unsafe Housing Conditions none    In the past 12 months has the electric, gas, oil, or water company threatened to shut off services in your home? No    Primary Care Provided by child(vamsi);other (see comments)    Provides Primary Care For no one, unable/limited ability to care for self    Family Caregiver if Needed child(vamsi), adult    Family Caregiver Names Tai Moya    Quality of Family Relationships helpful;involved;supportive    Able to Return to Prior Arrangements yes       Resource/Environmental Concerns    Resource/Environmental Concerns none    Transportation Concerns none       Transportation Needs    In the past 12 months, has lack of transportation kept you from medical appointments or from getting medications? no    In the past 12 months, has lack of transportation kept you from meetings, work, or from getting things needed for daily living? No       Food Insecurity    Within the past 12 months, you worried that your food would run out before you got the money to buy more. Never true    Within the past 12 months, the food you bought just didn't last and you didn't have money to get more. Never true       Transition Planning    Patient/Family Anticipates Transition to long-term care facility    Patient/Family Anticipated Services at Transition hospice care    Transportation Anticipated health plan transportation       Discharge Needs Assessment    Readmission Within the Last 30 Days current reason for admission unrelated to previous admission    Current  Outpatient/Agency/Support Group hospice facility    Equipment Currently Used at Home bp cuff;pulse ox;shower chair;walker, rolling;wheelchair    Concerns to be Addressed discharge planning;care coordination/care conferences    Anticipated Changes Related to Illness none    Equipment Needed After Discharge none    Outpatient/Agency/Support Group Needs skilled nursing facility;hospice facility    Discharge Facility/Level of Care Needs nursing facility, intermediate    Provided Post Acute Provider List? Yes    Post Acute Provider List Nursing Home    Delivered To Support Person    Support Person Augustine    Method of Delivery In person    Current Discharge Risk chronically ill                   Discharge Plan       Row Name 06/21/24 1149       Plan    Plan [facility] accepted, [facility] to admit. No precert or PASRR needed.    Patient/Family in Agreement with Plan yes    Plan Comments CM received message from Palliative that [facility] will be meeting with patient's family this AM. CM met with sons and daughter in law at bedside to obtain signature for IMM letter. Confirmed that pt has been at [facility] for rehab, previously living at home with grandson. Liadhruv Roldan met with family to discuss hospice services. Yuli updated CM and nursing that family wants to see if patient can verbalize agreement with hospice and stopping her dialysis treatments. Dr Jurado and nursing met with pt at bedside and confirmed she was A/O to make decision and she wants to do hospice care and stop dialysis treatment. CM spoke to sons in [facility] and confirmed that pt would not meet criteria for GIP admission. SNF list provided to them to review for facility placement and indira Muiñz's choices are 1) [facility] and 2) [facility]. Referral made to [facility] and liadhruv Joyce notified. She confirmed LTC bed being available and acceptance today. Updated [facility] liaison Yuli, Dr Jurado, and nursing. Plan is to discharge today  [facility].        Continued Care and Services - Admitted Since 6/20/2024       Destination Coordination complete.      Service Provider Request Status Selected Services Address Phone Fax Patient Preferred    [facility] [place]  Selected Nursing Home [address] [place] [address] [phone]     [facility]ER Declined  Family chose different SNF. N/A [address] [place] [address] [phone] --              Home Medical Care Coordination complete.      Service Provider Request Status Selected Services Address Phone Fax Patient Preferred    [facility]  Selected Home Hospice [address] [place] [address] [phone] --                  Expected Discharge Date and Time       Expected Discharge Date Expected Discharge Time    Jun 21, 2024            Demographic Summary       Row Name 06/21/24 1146       General Information    Admission Type inpatient    Arrived From emergency department;physician office - internal    Required Notices Provided Important Message from Medicare    Referral Source admission list    Reason for Consult care coordination/care conference;discharge planning    Preferred Language English       Contact Information    Permission Granted to Share Info With                    Functional Status       Row Name 06/21/24 1146       Functional Status    Usual Activity Tolerance poor    Current Activity Tolerance poor       Functional Status, IADL    Medications assistive equipment and person    Meal Preparation assistive equipment and person    Housekeeping assistive equipment and person    Laundry assistive equipment and person    Shopping assistive equipment and person             Megan Naegele, RN     Office Phone: [phone]  Office Cell: [phone]

## 2024-06-21 NOTE — CASE MANAGEMENT/SOCIAL WORK
Continued Stay Note  HYUN Duque     Patient Name: Deann Warren  MRN: [id number]  Today's Date: 6/21/2024    Admit Date: 6/20/2024    Plan: [facility] accepted, [facility] to admit. No precert or PASRR needed.   Discharge Plan       Row Name 06/21/24 3564       Plan    Plan Comments CM received update from [facility] liaison Maria Del Carmen that they will need patient's candida auris final results before she can admit. Updated Dr Jurado and nursing and discharge cancelled. Nursing contacted lab who reported the result should be final by tomorrow afternoon. CM contacted son Antonino and updated him by phone.             Megan Naegele, RN     Office Phone: [phone]  Office Cell: [phone]

## 2024-06-21 NOTE — DISCHARGE SUMMARY
Hospitalist Service   DISCHARGE SUMMARY    Patient Name: Deann Warren  : 1940  MRN: [id number]    Date of Admission: 2024  Date of Discharge:  24    Primary Care Physician: Antonino Shen MD      Presenting Problem:   Altered mental status [R41.82]  Fever, unspecified fever cause [R50.9]  Altered mental status, unspecified altered mental status type [R41.82]    Active and Resolved Hospital Problems:  Active Hospital Problems    Diagnosis POA    **Altered mental status [R41.82] Yes      Resolved Hospital Problems   No resolved problems to display.         Hospital Course     Hospital Course:  Deann Warren is a 83 y.o. female with a history of end-stage renal disease on dialysis, A-fib, hypertension, hyperlipidemia and recurrent syncope, cardiomyopathy, multivessel CAD currently being medically managed who presented via EMS for lethargy and weakness.  Patient was currently admitted last week and was sent to rehab.  She went to see cardiology for follow-up and was noted to be lethargic and obtunded and was sent to the ER via EMS.  Patient has longstanding history of ESRD and recurrent syncope, recent echo with EF of 25% with TR and pulmonary hypertension.  Noted to be having some pulmonary congestion on imaging.  Nephrology and cardiology consulted.  Patient is alert and oriented but does have intermittent lethargy.  Had extensive discussion with her and her 2 sons Antonino and Tai, palliative care was consulted and patient has made decision to transition to comfort care/hospice.  She does not want any further aggressive therapy.  Family in agreement as well.  Hospice consulted.  Patient stable to discharge to outlying facility with plan for hospice/comfort care.    Assessment:  Progressive decline in setting of advanced heart failure, ESRD, multivessel CAD  Sick sinus syndrome pacemaker with history of V. tach  Chronic anemia and thrombocytopenia  DM2  Acute on chronic HFrEF  Acute metabolic  encephalopathy secondary to above     Plan:  -After further discussion with patient and family, patient wants to be comfort care, does not want any further aggressive therapy  -Comfort care meds ordered, hospice consulted  -Plan to discharge to facility with hospice, patient and family does not want to go back to same facility where she came from, case management on board, [facility] accepted the patient.  Hospice to follow-up there for comfort meds.    DISCHARGE Follow Up Recommendations for labs and diagnostics:   Continue comfort measures    Reasons For Change In Medications and Indications for New Medications:  Continue comfort measures    Day of Discharge     Vital Signs:  Temp:  [97.6 °F (36.4 °C)-100.1 °F (37.8 °C)] 97.6 °F (36.4 °C)  Heart Rate:  [51-86] 77  Resp:  [11-31] 15  BP: (115-132)/(48-60) 120/55    Physical Exam:  General: Sleepy but easily awakens, chronically ill-appearing, NAD  Eyes: PERRL, EOMI, conjunctivae are clear  Cardiovascular: Regular rate and rhythm, no murmurs  Respiratory: Decreased breath sounds at the bases bilaterally, no wheezing or rales, unlabored breathing  Abdomen: Soft, nontender, positive bowel sounds, no guarding  Neurologic: A&O, CN grossly intact, moves all extremities spontaneously  Musculoskeletal: Generalized weakness, no other gross deformities  Skin: Warm, dry        Pertinent  and/or Most Recent Results     LAB RESULTS:      Lab 06/21/24  0408 06/20/24  1653 06/20/24  1647 06/17/24  0208 06/16/24  1055 06/16/24  0052 06/15/24  0507   WBC 4.78  --  5.21 11.67*  --  11.59* 12.00*   HEMOGLOBIN 8.4*  --  9.4* 8.7* 8.5* 6.7* 7.2*   HEMATOCRIT 28.4*  --  32.0* 29.2* 27.6* 22.9* 24.6*   PLATELETS 70*  --  76* 85*  --  72* 84*   NEUTROS ABS 3.52  --  3.72 9.87*  --  9.23* 10.01*   IMMATURE GRANS (ABS) 0.04  --  0.03 0.09*  --  0.78* 0.17*   LYMPHS ABS 0.61*  --  0.75 1.08  --  1.00 1.16   MONOS ABS 0.44  --  0.55 0.41  --  0.40 0.52   EOS ABS 0.13  --  0.12  0.17  --  0.13 0.09   MCV 99.3*  --  100.3* 97.3*  --  100.4* 100.8*   PROCALCITONIN  --   --  0.18  --   --   --   --    LACTATE  --  1.1  --   --   --   --   --          Lab 06/21/24  0408 06/20/24  1647 06/17/24  0208 06/16/24  0052 06/15/24  0507   SODIUM 139 137 139 140 140   POTASSIUM 3.8 3.9 3.6 3.6 3.9   CHLORIDE 103 100 103 104 104   CO2 26.9 26.9 26.6 27.7 26.4   ANION GAP 9.1 10.1 9.4 8.3 9.6   BUN 27* 27* 28* 26* 24*   CREATININE 2.94* 3.07* 3.05* 2.79* 2.36*   EGFR 15.4* 14.6* 14.7* 16.4* 20.0*   GLUCOSE 71 105* 111* 115* 75   CALCIUM 8.5* 8.8 8.7 8.3* 8.3*   MAGNESIUM  --  1.9  --   --   --    HEMOGLOBIN A1C 5.26  --   --   --   --          Lab 06/21/24  0408 06/20/24  1647 06/15/24  0507   TOTAL PROTEIN 5.1* 5.6* 4.8*   ALBUMIN 3.0* 3.4* 3.1*   GLOBULIN 2.1 2.2 1.7   ALT (SGPT) 14 19 22   AST (SGOT) 33* 41* 47*   BILIRUBIN 0.7 0.9 0.6   ALK PHOS 59 72 71         Lab 06/21/24  0625 06/21/24  0408 06/20/24  1647   PROBNP  --   --  >70,000.0*   HSTROP T 120* 118*  --              Lab 06/16/24  0122   ABO TYPING B   RH TYPING Positive   ANTIBODY SCREEN Negative         Brief Urine Lab Results  (Last result in the past 365 days)        Color   Clarity   Blood   Leuk Est   Nitrite   Protein   CREAT   Urine HCG        06/20/24 1653 Yellow   Clear   Negative   Trace   Negative   30 mg/dL (1+)                 Microbiology Results (last 10 days)       Procedure Component Value - Date/Time    Respiratory Panel PCR w/COVID-19(SARS-CoV-2) [facility] In-House, NP Swab in UTM/VTM, 2 HR TAT - Swab, Nasopharynx [[id number]]  (Normal) Collected: 06/20/24 1743    Lab Status: Final result Specimen: Swab from Nasopharynx Updated: 06/20/24 1910     ADENOVIRUS, PCR Not Detected     Coronavirus 229E Not Detected     Coronavirus HKU1 Not Detected     Coronavirus NL63 Not Detected     Coronavirus OC43 Not Detected     COVID19 Not Detected     Human Metapneumovirus Not Detected     Human Rhinovirus/Enterovirus Not  Detected     Influenza A PCR Not Detected     Influenza B PCR Not Detected     Parainfluenza Virus 1 Not Detected     Parainfluenza Virus 2 Not Detected     Parainfluenza Virus 3 Not Detected     Parainfluenza Virus 4 Not Detected     RSV, PCR Not Detected     Bordetella pertussis pcr Not Detected     Bordetella parapertussis PCR Not Detected     Chlamydophila pneumoniae PCR Not Detected     Mycoplasma pneumo by PCR Not Detected    Narrative:      In the setting of a positive respiratory panel with a viral infection PLUS a negative procalcitonin without other underlying concern for bacterial infection, consider observing off antibiotics or discontinuation of antibiotics and continue supportive care. If the respiratory panel is positive for atypical bacterial infection (Bordetella pertussis, Chlamydophila pneumoniae, or Mycoplasma pneumoniae), consider antibiotic de-escalation to target atypical bacterial infection.    Gastrointestinal Panel, PCR - Stool, Per Rectum [[id number]]  (Normal) Collected: 06/14/24 0600    Lab Status: Final result Specimen: Stool from Per Rectum Updated: 06/14/24 1143     Campylobacter Not Detected     Plesiomonas shigelloides Not Detected     Salmonella Not Detected     Vibrio Not Detected     Vibrio cholerae Not Detected     Yersinia enterocolitica Not Detected     Enteroaggregative E. coli (EAEC) Not Detected     Enteropathogenic E. coli (EPEC) Not Detected     Enterotoxigenic E. coli (ETEC) lt/st Not Detected     Shiga-like toxin-producing E. coli (STEC) stx1/stx2 Not Detected     Shigella/Enteroinvasive E. coli (EIEC) Not Detected     Cryptosporidium Not Detected     Cyclospora cayetanensis Not Detected     Entamoeba histolytica Not Detected     Giardia lamblia Not Detected     Adenovirus F40/41 Not Detected     Astrovirus Not Detected     Norovirus GI/GII Not Detected     Rotavirus A Not Detected     Sapovirus (I, II, IV or V) Not Detected    Narrative:      If Aeromonas,  Staphylococcus aureus or Bacillus cereus are suspected, please order [id number]: Stool Culture, Aeromonas, S aureus, B Cereus.    Blood Culture - Blood, Arm, Right [[id number]]  (Normal) Collected: 06/13/24 1809    Lab Status: Final result Specimen: Blood from Arm, Right Updated: 06/18/24 1831     Blood Culture No growth at 5 days    Narrative:      Less than seven (7) mL's of blood was collected.  Insufficient quantity may yield false negative results.    Blood Culture - Blood, Blood, PICC Line [[id number]]  (Abnormal) Collected: 06/11/24 1733    Lab Status: Final result Specimen: Blood, PICC Line Updated: 06/14/24 0651     Blood Culture Staphylococcus, coagulase negative     Isolated from Anaerobic Bottle     Gram Stain Anaerobic Bottle Gram positive cocci in clusters    Narrative:      Probable contaminant requires clinical correlation, susceptibility not performed unless requested by physician.      Blood Culture ID, PCR - Blood, Blood, PICC Line [[id number]]  (Abnormal) Collected: 06/11/24 1733    Lab Status: Final result Specimen: Blood, PICC Line Updated: 06/13/24 1556     BCID, PCR Staph spp, not aureus or lugdunensis. Identification by BCID2 PCR.     BOTTLE TYPE Anaerobic Bottle    Blood Culture - Blood, Arm, Right [[id number]]  (Normal) Collected: 06/11/24 1504    Lab Status: Final result Specimen: Blood from Arm, Right Updated: 06/16/24 1530     Blood Culture No growth at 5 days            CT Chest Without Contrast Diagnostic    Addendum Date: 6/20/2024    ADDENDUM #1 Additional impression point: Right thyroid lobe nodule measuring 2.8 cm. Please consider further evaluation with nonemergent/outpatient thyroid ultrasound. Electronically Signed: Pio Sharma DO  6/20/2024 10:27 PM EDT  Workstation ID: [id number] ORIGINAL REPORT: CT CHEST WO CONTRAST DIAGNOSTIC, CT ABDOMEN PELVIS WO CONTRAST Date of Exam: 6/20/2024 7:26 PM EDT Indication: Fever. Comparison: CT chest 4/9/2024. CT abdomen and pelvis 6/11/2024  Technique: Axial CT images were obtained of the chest, abdomen and pelvis without contrast administration.  Sagittal and coronal reconstructions were performed.  Automated exposure control and iterative reconstruction methods were used. Findings: CT chest: Lower Neck: Right thyroid lobe nodule measuring approximately 2.8 x 1.7 cm. Otherwise grossly unremarkable. Hilum and Mediastinum: Moderate to severe cardiomegaly. Unchanged position of right subclavian single lead pacemaker. Trace pericardial effusion. Mild enlargement of the central pulmonary arteries, unchanged. Vascular calcifications noted throughout the thoracic aorta. No definite lymphadenopathy or suspicious mass on this study performed without IV contrast Lung Parenchyma and Pleura: Small to moderate right and moderate to large left-sided pleural effusion. There is near complete collapse of the left lower lobe. There is also evidence of compressive atelectasis noted within the right lower lobe. Peripheral  reticulations and mild diffuse interstitial thickening are also noted throughout the lungs, worse in the right lower lobe. Scattered calcified granulomas are noted. The central airways appear patent. Possible wall thickening of the distal airway noted within the right lower lobe. Soft tissues: Diffuse soft tissue edema, most significantly in the left lateral chest wall and axilla.. Osseous structures: No acute osseous abnormality. CT abdomen and pelvis: Liver: Liver is normal in size and CT density. No focal lesions. Gallbladder: Status post cholecystectomy Bile Ducts: Mild dilation of the common bile duct. Spleen: Spleen is normal in size and CT density. Pancreas: A few scattered pancreatic cystic lesions noted within the pancreatic head and pancreatic tail, the largest of which measures 3.4 cm. This is unchanged. Please consider follow-up with nonemergent CT/MRI pancreatic mass protocol for further work. Adrenals: Adrenal glands are unremarkable.  Kidneys: Bilateral cortical atrophy. Punctate nonobstructing stones are noted within the kidneys bilaterally. No obstructing stones or hydronephrosis. Gastrointestinal: Very limited evaluation due to the lack of IV and oral contrast. No significant distention to suggest bowel obstruction. Small to moderate amount of stool is noted within the rectum. Bladder: The bladder is normal. Pelvis:  No suspecious mass. Peritoneum/Mesentery: Trace amount of free fluid, predominantly in the pelvis. Mild diffuse mesenteric edema. No pneumoperitoneum.   Lymph Nodes: No definite pathologically enlarged lymph nodes are noted on this study, although evaluation is limited. Vasculature: Extensive vascular calcifications of the visualized arteries. Otherwise unremarkable Abdominal Wall: Extensive soft tissue edema noted throughout the abdominal wall. Otherwise grossly unremarkable Bony Structures: No acute osseous abnormality. Unchanged compression deformities of the T11 and L1 vertebral bodies. Impression: Findings once again consistent with diffuse volume overload with worsening pulmonary edema, soft tissue edema and a small amount of ascites. Near complete collapse of the left lower lobe is most likely related to compressive atelectasis. Additionally there are patchy opacities noted within the right lower lobe, nonspecific, but concerning for superimposed infectious/inflammatory process. Multiple additional nonemergent findings as characterized above. Electronically Signed: Pio Sharma DO  6/20/2024 10:19 PM EDT  Workstation ID: [id number]    Result Date: 6/20/2024  Impression: Impression: Findings once again consistent with diffuse volume overload with worsening pulmonary edema, soft tissue edema and a small amount of ascites. Near complete collapse of the left lower lobe is most likely related to compressive atelectasis. Additionally there are patchy opacities noted within the right lower lobe, nonspecific, but concerning for  superimposed infectious/inflammatory process. Multiple additional nonemergent findings as characterized above. Electronically Signed: Pio Sharma DO  6/20/2024 10:19 PM EDT  Workstation ID: [id number]    CT Abdomen Pelvis Without Contrast    Addendum Date: 6/20/2024    ADDENDUM #1 Additional impression point: Right thyroid lobe nodule measuring 2.8 cm. Please consider further evaluation with nonemergent/outpatient thyroid ultrasound. Electronically Signed: Pio Sharma,   6/20/2024 10:27 PM EDT  Workstation ID: [id number] ORIGINAL REPORT: CT CHEST WO CONTRAST DIAGNOSTIC, CT ABDOMEN PELVIS WO CONTRAST Date of Exam: 6/20/2024 7:26 PM EDT Indication: Fever. Comparison: CT chest 4/9/2024. CT abdomen and pelvis 6/11/2024 Technique: Axial CT images were obtained of the chest, abdomen and pelvis without contrast administration.  Sagittal and coronal reconstructions were performed.  Automated exposure control and iterative reconstruction methods were used. Findings: CT chest: Lower Neck: Right thyroid lobe nodule measuring approximately 2.8 x 1.7 cm. Otherwise grossly unremarkable. Hilum and Mediastinum: Moderate to severe cardiomegaly. Unchanged position of right subclavian single lead pacemaker. Trace pericardial effusion. Mild enlargement of the central pulmonary arteries, unchanged. Vascular calcifications noted throughout the thoracic aorta. No definite lymphadenopathy or suspicious mass on this study performed without IV contrast Lung Parenchyma and Pleura: Small to moderate right and moderate to large left-sided pleural effusion. There is near complete collapse of the left lower lobe. There is also evidence of compressive atelectasis noted within the right lower lobe. Peripheral  reticulations and mild diffuse interstitial thickening are also noted throughout the lungs, worse in the right lower lobe. Scattered calcified granulomas are noted. The central airways appear patent. Possible wall thickening of the  distal airway noted within the right lower lobe. Soft tissues: Diffuse soft tissue edema, most significantly in the left lateral chest wall and axilla.. Osseous structures: No acute osseous abnormality. CT abdomen and pelvis: Liver: Liver is normal in size and CT density. No focal lesions. Gallbladder: Status post cholecystectomy Bile Ducts: Mild dilation of the common bile duct. Spleen: Spleen is normal in size and CT density. Pancreas: A few scattered pancreatic cystic lesions noted within the pancreatic head and pancreatic tail, the largest of which measures 3.4 cm. This is unchanged. Please consider follow-up with nonemergent CT/MRI pancreatic mass protocol for further work. Adrenals: Adrenal glands are unremarkable. Kidneys: Bilateral cortical atrophy. Punctate nonobstructing stones are noted within the kidneys bilaterally. No obstructing stones or hydronephrosis. Gastrointestinal: Very limited evaluation due to the lack of IV and oral contrast. No significant distention to suggest bowel obstruction. Small to moderate amount of stool is noted within the rectum. Bladder: The bladder is normal. Pelvis:  No suspecious mass. Peritoneum/Mesentery: Trace amount of free fluid, predominantly in the pelvis. Mild diffuse mesenteric edema. No pneumoperitoneum.   Lymph Nodes: No definite pathologically enlarged lymph nodes are noted on this study, although evaluation is limited. Vasculature: Extensive vascular calcifications of the visualized arteries. Otherwise unremarkable Abdominal Wall: Extensive soft tissue edema noted throughout the abdominal wall. Otherwise grossly unremarkable Bony Structures: No acute osseous abnormality. Unchanged compression deformities of the T11 and L1 vertebral bodies. Impression: Findings once again consistent with diffuse volume overload with worsening pulmonary edema, soft tissue edema and a small amount of ascites. Near complete collapse of the left lower lobe is most likely related to  compressive atelectasis. Additionally there are patchy opacities noted within the right lower lobe, nonspecific, but concerning for superimposed infectious/inflammatory process. Multiple additional nonemergent findings as characterized above. Electronically Signed: Pio Sharma DO  6/20/2024 10:19 PM EDT  Workstation ID: [id number]    Result Date: 6/20/2024  Impression: Impression: Findings once again consistent with diffuse volume overload with worsening pulmonary edema, soft tissue edema and a small amount of ascites. Near complete collapse of the left lower lobe is most likely related to compressive atelectasis. Additionally there are patchy opacities noted within the right lower lobe, nonspecific, but concerning for superimposed infectious/inflammatory process. Multiple additional nonemergent findings as characterized above. Electronically Signed: Pio Sharma DO  6/20/2024 10:19 PM EDT  Workstation ID: [id number]    XR Chest 1 View    Result Date: 6/20/2024  Impression: Impression: Findings suggestive of pulmonary edema. Atypical/multifocal pneumonia is also a possibility. Possible small left pleural effusion. Electronically Signed: Pio Sharma DO  6/20/2024 5:25 PM EDT  Workstation ID: [id number]    CT Head Without Contrast    Result Date: 6/20/2024  Impression: Impression: No acute intracranial abnormality. Electronically Signed: Pio Sharma DO  6/20/2024 5:24 PM EDT  Workstation ID: [id number]    US Liver    Result Date: 6/12/2024  Impression: Impression: 1. Liver parenchyma is normal. 2. Trace ascitic fluid adjacent to the liver. There is also a partially imaged right pleural effusion. Electronically Signed: Donaldo Cantu MD  6/12/2024 12:27 PM EDT  Workstation ID: [id number]    CT Abdomen Pelvis Without Contrast    Result Date: 6/11/2024  Impression: Impression: 1.Volume overload/CHF features as detailed above. Superimposed left lower lobe pneumonia is not excluded. Correlate with symptoms.  2.There is a nonspecific 3.4 cm cystic mass involving the pancreatic head. Follow-up dedicated pancreatic MRI with and without contrast recommended. 3.Increased density urine consistent with hematuria. 4.Other incidental findings as detailed above. Electronically Signed: Flip Lee MD  6/11/2024 4:32 PM EDT  Workstation ID: [id number]    CT Head Without Contrast    Result Date: 6/11/2024  Impression: Impression: No acute intracranial pathology. Electronically Signed: Tristian Boone MD  6/11/2024 4:27 PM EDT  Workstation ID: [id number]    XR Chest 1 View    Result Date: 6/10/2024  Impression: Impression: 1.Cardiomegaly with pulmonary vascular congestion. 2.Left basilar atelectasis or infiltrate. Electronically Signed: Armando Gonzalez MD  6/10/2024 10:43 AM EDT  Workstation ID: [id number]    XR Chest 1 View    Result Date: 6/3/2024  Impression: Impression: No significant change after pacemaker placement. Electronically Signed: Won Bravo MD  6/3/2024 6:34 PM EDT  Workstation ID: [id number]    XR Chest 1 View    Result Date: 6/1/2024  Impression: Impression: Chronic changes are noted which are stable and suggest chronic changes of CHF and pulmonary edema. Electronically Signed: Nathaniel Harman MD  6/1/2024 2:05 PM EDT  Workstation ID: [id number]     Results for orders placed during the hospital encounter of 04/07/24    Duplex Carotid Ultrasound CAR    Interpretation Summary    Right internal carotid artery demonstrates a less than 50% stenosis.    Left internal carotid artery demonstrates a less than 50% stenosis.      Results for orders placed during the hospital encounter of 04/07/24    Duplex Carotid Ultrasound CAR    Interpretation Summary    Right internal carotid artery demonstrates a less than 50% stenosis.    Left internal carotid artery demonstrates a less than 50% stenosis.      Results for orders placed during the hospital encounter of 06/10/24    Adult Transthoracic Echo Complete W/ Cont if Necessary Per  Protocol    Interpretation Summary    Left ventricular systolic function is severely decreased. Left ventricular ejection fraction appears to be 21 - 25%.    The left ventricular cavity is dilated.    The left atrial cavity is severely dilated.    Left atrial volume is severely increased.    The right atrial cavity is severely  dilated.    Moderate to severe mitral valve regurgitation is present.    Moderate tricuspid valve regurgitation is present.    Estimated right ventricular systolic pressure from tricuspid regurgitation is moderately elevated (45-55 mmHg).    Moderate to severe pulmonary hypertension is present.    Conclusion      LVE with severe global left ventricular hypokinesis, LVEF of 20 to 25%  Normal RV size  Severe biatrial enlargement.  There is color flow seen from left to right near the fossa ovalis consistent with possible ASD.  Pulmonic valve is not well visualized.  Aortic valve is trileaflet and sclerosed.  Moderate aortic regurgitation seen  Mitral valve, tricuspid valve appears structurally normal, moderate to severe mitral regurgitation seen.  Moderate tricuspid regurgitation seen.  Calculated RV systolic pressure of 48 mmHg consistent with moderate pulmonary hypertension  No pericardial effusion seen.  Pleural effusion seen  Proximal aorta appears normal in size.      Labs Pending at Discharge:  Pending Labs       Order Current Status    Blood Culture - Blood, Arm, Right In process    Blood Culture - Blood, Arm, Right In process    CANDIDA AURIS PCR - Swab, Axilla Right, Axilla Left and Groin In process            Procedures Performed           Consults:   Consults       Date and Time Order Name Status Description    6/21/2024  8:36 AM Inpatient Palliative Care MD Consult Completed     6/20/2024  5:40 PM Hospitalist (on-call MD unless specified)      6/13/2024  5:24 PM Inpatient Infectious Diseases Consult Completed     6/12/2024  7:41 AM IP Consult to Hematology and Oncology Completed      6/11/2024  4:56 PM Inpatient Gastroenterology Consult Completed     6/10/2024 11:55 AM Inpatient Cardiac Electrophysiology Consult Completed     6/10/2024 11:55 AM Inpatient Cardiology Consult Completed     6/3/2024 11:15 AM Inpatient Hospitalist Consult Completed     6/2/2024  3:38 PM Inpatient Nephrology Consult Completed     6/1/2024  5:10 PM Inpatient Cardiology Consult Completed               Discharge Details        Discharge Medications        Continue These Medications        Instructions Start Date   acetaminophen 500 MG tablet  Commonly known as: TYLENOL   500 mg, Oral, Every 6 Hours PRN      loperamide 2 MG capsule  Commonly known as: IMODIUM   2 mg, Oral, 4 Times Daily PRN      Menthol-Zinc Oxide 0.45-20 % ointment   Apply externally, To coccyx area every 12 hours      NON FORMULARY   1 dose, Topical, 3 Times Weekly, Lidocaine 2.5% ointment -  Apply 1 application Monday, Wednesday, Friday before dialysis              Stop These Medications      apixaban 2.5 MG tablet tablet  Commonly known as: ELIQUIS     atorvastatin 40 MG tablet  Commonly known as: LIPITOR     febuxostat 40 MG tablet  Commonly known as: ULORIC     folic acid 1 MG tablet  Commonly known as: FOLVITE     pantoprazole 40 MG EC tablet  Commonly known as: PROTONIX     Vitamin D3 50 MCG (2000 UT) tablet              Allergies   Allergen Reactions    Heparin Hives         Discharge Disposition:  Hospice/Medical Facility (DC - External)    Diet:  Hospital:  Diet Order   Procedures    NPO Diet NPO Type: Strict NPO         Discharge Activity:         CODE STATUS:  Code Status and Medical Interventions:   Ordered at: 06/21/24 1124     Level Of Support Discussed With:    Patient    Health Care Surrogate    Next of Kin (If No Surrogate)     Code Status (Patient has no pulse and is not breathing):    No CPR (Do Not Attempt to Resuscitate)     Medical Interventions (Patient has pulse or is breathing):    Comfort Measures         Future Appointments    Date Time Provider Department Center   7/2/2024  2:00 PM [facility]   7/11/2024 10:00 AM LAB MD [facility]   7/11/2024 10:00 AM Sonya Barahona MD [facility]           Time spent on Discharge including face to face service:  40 minutes    Signature:    Electronically signed by Soraida Jurado DO, 06/21/24, 11:43 AM EDT.      Part of this note may be an electronic transcription/translation of spoken language to printed text using the Dragon Dictation System.

## 2024-06-21 NOTE — NURSING NOTE
Observed that glucose level was 71. After sitting patient at 90 degrees and ensuring patient was completely awake. 4 Oz of orange juice was given. Patient drank orange juice with no s/s of aspiration noted.

## 2024-06-21 NOTE — CONSULTS
Nephrology Consult Note                                                [facility]      Patient Identification:  Name: Deann Warren  Age: 83 y.o.  Sex: female  :  1940  MRN: [id number]               Requesting Physician: Evelia Caballero MD  Reason for Consultation: management recommendations      History of Present Illness:     Patient is an 83-year-old white female patient with history of end-stage renal disease on hemodialysis twice a week Monday and Friday who was at rehab due to recurrent syncope was readmitted   Patient had a recent pacemaker history of cardiomyopathy coronary artery disease congestive heart failure   I was consulted to manage dialysis  Problem List:    Altered mental status     Past Medical History:  Past Medical History:   Diagnosis Date    Allergic conjunctivitis and rhinitis 2014    Anemia due to chronic kidney disease, on chronic dialysis 2020    Anxiety 2012    Bradycardia by electrocardiogram 2024    Cardiomyopathy, dilated 2024    Chronic gouty arthritis 2014    Coronary artery disease involving native coronary artery of native heart without angina pectoris 2024    Dependence on renal dialysis 2022    ESRD (end stage renal disease) 2020    Essential hypertension 2015    History of shingles 2022    Hyperlipidemia 2024    Ischemic cardiomyopathy 2024    Paroxysmal atrial fibrillation 2022    Presence of cardiac pacemaker 2024    Sick sinus syndrome 2024    Type 2 diabetes mellitus with diabetic chronic kidney disease 2022    Vitamin D deficiency 2021     Past Surgical History:  Past Surgical History:   Procedure Laterality Date    ARTERIOVENOUS FISTULA Left     CARDIAC CATHETERIZATION N/A 2024    Procedure: Right and Left Heart Cath;  Surgeon: Dilcia Lui MD;  Location: [facility] CATH INVASIVE LOCATION;  Service: Cardiology;  Laterality:  N/A;    CARDIAC CATHETERIZATION N/A 4/25/2024    Procedure: Percutaneous Coronary Intervention;  Surgeon: Jadiel Blake MD;  Location: [facility] CATH INVASIVE LOCATION;  Service: Cardiology;  Laterality: N/A;    CARDIAC ELECTROPHYSIOLOGY PROCEDURE N/A 6/3/2024    Procedure: Pacemaker DC new, [employer];  Surgeon: Zamzam Carrillo MD;  Location: [facility] CATH INVASIVE LOCATION;  Service: Cardiovascular;  Laterality: N/A;    UPPER ENDOSCOPIC ULTRASOUND W/ FNA N/A 6/13/2024    Procedure: ESOPHAGOGASTRODUODENOSCOPY WITH ULTRASOUND AND FINE NEEDLE ASPIRATION of pancreatic cyst and forcep biopsy x2 areas;  Surgeon: Cesia Hyde MD;  Location: [facility] ENDOSCOPY;  Service: Gastroenterology;  Laterality: N/A;      Home Meds:  Medications Prior to Admission   Medication Sig Dispense Refill Last Dose    acetaminophen (TYLENOL) 500 MG tablet Take 1 tablet by mouth Every 6 (Six) Hours As Needed for Mild Pain.       apixaban (ELIQUIS) 2.5 MG tablet tablet Take 1 tablet by mouth Every 12 (Twelve) Hours. Indications: Atrial Fibrillation 60 tablet 0     atorvastatin (LIPITOR) 40 MG tablet Take 1 tablet by mouth Daily. 90 tablet 3     Cholecalciferol (Vitamin D3) 50 MCG (2000 UT) tablet Take 1 tablet by mouth Daily.       febuxostat (ULORIC) 40 MG tablet Take 1 tablet by mouth Daily.       folic acid (FOLVITE) 1 MG tablet Take 1 tablet by mouth Daily. 30 tablet 0     loperamide (IMODIUM) 2 MG capsule Take 1 capsule by mouth 4 (Four) Times a Day As Needed for Diarrhea (Loose stools).       Menthol-Zinc Oxide 0.45-20 % ointment Apply  topically. To coccyx area every 12 hours       NON FORMULARY Apply 1 dose topically to the appropriate area as directed 3 (Three) Times a Week. Lidocaine 2.5% ointment -  Apply 1 application Monday, Wednesday, Friday before dialysis       pantoprazole (PROTONIX) 40 MG EC tablet Take 1 tablet by mouth 2 (Two) Times a Day Before Meals. 60 tablet 0      Current Meds:     Current Facility-Administered  Medications:     acetaminophen (TYLENOL) tablet 650 mg, 650 mg, Oral, Q4H PRN **OR** acetaminophen (TYLENOL) 160 MG/5ML oral solution 650 mg, 650 mg, Oral, Q4H PRN **OR** acetaminophen (TYLENOL) suppository 650 mg, 650 mg, Rectal, Q4H PRN, Evelia Caballero MD, 650 mg at 06/21/24 0005    apixaban (ELIQUIS) tablet 2.5 mg, 2.5 mg, Oral, Q12H, Evelia Caballero MD    atorvastatin (LIPITOR) tablet 40 mg, 40 mg, Oral, Daily, Soraida Jurado DO    sennosides-docusate (PERICOLACE) 8.6-50 MG per tablet 2 tablet, 2 tablet, Oral, BID PRN **AND** polyethylene glycol (MIRALAX) packet 17 g, 17 g, Oral, Daily PRN **AND** bisacodyl (DULCOLAX) EC tablet 5 mg, 5 mg, Oral, Daily PRN **AND** bisacodyl (DULCOLAX) suppository 10 mg, 10 mg, Rectal, Daily PRN, Evelia Caballero MD    Calcium Replacement - Follow Nurse / BPA Driven Protocol, , Does not apply, IRMA, Evelia Caballero MD    febuxostat (ULORIC) tablet 40 mg, 40 mg, Oral, Daily, Evelia Caballero MD    folic acid (FOLVITE) tablet 1 mg, 1 mg, Oral, Daily, Evelia Caballero MD    Magnesium Standard Dose Replacement - Follow Nurse / BPA Driven Protocol, , Does not apply, PRAda COLEMAN Abbas Ali, MD    morphine injection 2 mg, 2 mg, Intravenous, Q4H PRN, Soraida Jurado DO    pantoprazole (PROTONIX) EC tablet 40 mg, 40 mg, Oral, BID AC, Evelia Caballero MD    Phosphorus Replacement - Follow Nurse / BPA Driven Protocol, , Does not apply, Ada SOLIS Abbas Ali, MD    Potassium Replacement - Follow Nurse / BPA Driven Protocol, , Does not apply, Ada SOLIS Abbas Ali, MD    [COMPLETED] Insert Peripheral IV, , , Once **AND** sodium chloride 0.9 % flush 10 mL, 10 mL, Intravenous, PRN, Cisco Leal MD    sodium chloride 0.9 % flush 10 mL, 10 mL, Intravenous, Q12H, Evelia Caballero MD, 10 mL at 06/20/24 2036    sodium chloride 0.9 % flush 10 mL, 10 mL, Intravenous, PRN, Evelia Caballero MD    sodium chloride 0.9 % infusion 40 mL, 40 mL, Intravenous, PRN, Ada,  "Evelia Correa MD    Vancomycin Pharmacy Intermittent/Pulse Dosing, , Does not apply, PRN, Evelia Caballero MD    Allergies:  Allergies   Allergen Reactions    Heparin Hives     Social History:   Social History     Tobacco Use    Smoking status: Never    Smokeless tobacco: Never   Substance Use Topics    Alcohol use: Never      Family History:  Family History   Family history unknown: Yes        Review of Systems  Reviewed 12 systems were reviewed, all negative except for those mentioned in HPI    Objective:  Vitals:   /55 (BP Location: Left arm, Patient Position: Lying)   Pulse 77   Temp 97.6 °F (36.4 °C) (Oral)   Resp 15   Ht 162.6 cm (64\")   Wt 51 kg (112 lb 7 oz)   SpO2 98%   BMI 19.30 kg/m²   I/O:     Intake/Output Summary (Last 24 hours) at 6/21/2024 1042  Last data filed at 6/21/2024 1009  Gross per 24 hour   Intake 0 ml   Output --   Net 0 ml       Exam:  General Appearance:  Alert  Head:  Normocephalic, without obvious abnormality, atraumatic  Eyes:  PERRL, conjunctiva/corneas clear     Neck:  Supple,  no adenopathy;      Lungs:  Decreased BS occasion ronchi  Heart:  Regular rate and rhythm, S1 and S2 normal  Abdomen:  Soft, non-tender, bowel sounds active   Extremities: trace edema  Pulses: 2+ and symmetric all extremities  Skin:  No rashes or lesions  Data Review:  All labs (24hrs):   Recent Results (from the past 24 hour(s))   ECG 12 Lead Altered Mental Status    Collection Time: 06/20/24  4:39 PM   Result Value Ref Range    QT Interval 419 ms    QTC Interval 496 ms   Green Top (Gel)    Collection Time: 06/20/24  4:47 PM   Result Value Ref Range    Extra Tube Hold for add-ons.    Lavender Top    Collection Time: 06/20/24  4:47 PM   Result Value Ref Range    Extra Tube hold for add-on    Gold Top - SST    Collection Time: 06/20/24  4:47 PM   Result Value Ref Range    Extra Tube Hold for add-ons.    Light Blue Top    Collection Time: 06/20/24  4:47 PM   Result Value Ref Range    Extra Tube Hold " for add-ons.    Comprehensive Metabolic Panel    Collection Time: 06/20/24  4:47 PM    Specimen: Blood   Result Value Ref Range    Glucose 105 (H) 65 - 99 mg/dL    BUN 27 (H) 8 - 23 mg/dL    Creatinine 3.07 (H) 0.57 - 1.00 mg/dL    Sodium 137 136 - 145 mmol/L    Potassium 3.9 3.5 - 5.2 mmol/L    Chloride 100 98 - 107 mmol/L    CO2 26.9 22.0 - 29.0 mmol/L    Calcium 8.8 8.6 - 10.5 mg/dL    Total Protein 5.6 (L) 6.0 - 8.5 g/dL    Albumin 3.4 (L) 3.5 - 5.2 g/dL    ALT (SGPT) 19 1 - 33 U/L    AST (SGOT) 41 (H) 1 - 32 U/L    Alkaline Phosphatase 72 39 - 117 U/L    Total Bilirubin 0.9 0.0 - 1.2 mg/dL    Globulin 2.2 gm/dL    A/G Ratio 1.5 g/dL    BUN/Creatinine Ratio 8.8 7.0 - 25.0    Anion Gap 10.1 5.0 - 15.0 mmol/L    eGFR 14.6 (L) >60.0 mL/min/1.73   CBC Auto Differential    Collection Time: 06/20/24  4:47 PM    Specimen: Blood   Result Value Ref Range    WBC 5.21 3.40 - 10.80 10*3/mm3    RBC 3.19 (L) 3.77 - 5.28 10*6/mm3    Hemoglobin 9.4 (L) 12.0 - 15.9 g/dL    Hematocrit 32.0 (L) 34.0 - 46.6 %    .3 (H) 79.0 - 97.0 fL    MCH 29.5 26.6 - 33.0 pg    MCHC 29.4 (L) 31.5 - 35.7 g/dL    RDW 19.8 (H) 12.3 - 15.4 %    RDW-SD 71.1 (H) 37.0 - 54.0 fl    MPV 10.6 6.0 - 12.0 fL    Platelets 76 (L) 140 - 450 10*3/mm3    Neutrophil % 71.3 42.7 - 76.0 %    Lymphocyte % 14.4 (L) 19.6 - 45.3 %    Monocyte % 10.6 5.0 - 12.0 %    Eosinophil % 2.3 0.3 - 6.2 %    Basophil % 0.8 0.0 - 1.5 %    Immature Grans % 0.6 (H) 0.0 - 0.5 %    Neutrophils, Absolute 3.72 1.70 - 7.00 10*3/mm3    Lymphocytes, Absolute 0.75 0.70 - 3.10 10*3/mm3    Monocytes, Absolute 0.55 0.10 - 0.90 10*3/mm3    Eosinophils, Absolute 0.12 0.00 - 0.40 10*3/mm3    Basophils, Absolute 0.04 0.00 - 0.20 10*3/mm3    Immature Grans, Absolute 0.03 0.00 - 0.05 10*3/mm3    nRBC 0.0 0.0 - 0.2 /100 WBC   Scan Slide    Collection Time: 06/20/24  4:47 PM    Specimen: Blood   Result Value Ref Range    Anisocytosis Mod/2+ None Seen    Macrocytes Slight/1+ None Seen    WBC  Morphology Normal Normal    Platelet Morphology Normal Normal   Magnesium    Collection Time: 06/20/24  4:47 PM    Specimen: Blood   Result Value Ref Range    Magnesium 1.9 1.6 - 2.4 mg/dL   BNP    Collection Time: 06/20/24  4:47 PM    Specimen: Blood   Result Value Ref Range    proBNP >70,000.0 (H) 0.0 - 1,800.0 pg/mL   Procalcitonin    Collection Time: 06/20/24  4:47 PM    Specimen: Blood   Result Value Ref Range    Procalcitonin 0.18 0.00 - 0.25 ng/mL   Urinalysis With Microscopic If Indicated (No Culture) - Urine, Catheter    Collection Time: 06/20/24  4:53 PM    Specimen: Urine, Catheter   Result Value Ref Range    Color, UA Yellow Yellow, Straw    Appearance, UA Clear Clear    pH, UA 5.5 5.0 - 8.0    Specific Gravity, UA 1.017 1.005 - 1.030    Glucose, UA Negative Negative    Ketones, UA Trace (A) Negative    Bilirubin, UA Negative Negative    Blood, UA Negative Negative    Protein, UA 30 mg/dL (1+) (A) Negative    Leuk Esterase, UA Trace (A) Negative    Nitrite, UA Negative Negative    Urobilinogen, UA 0.2 E.U./dL 0.2 - 1.0 E.U./dL   POC Lactate    Collection Time: 06/20/24  4:53 PM    Specimen: Blood   Result Value Ref Range    Lactate 1.1 0.3 - 2.0 mmol/L   Urinalysis, Microscopic Only - Urine, Catheter    Collection Time: 06/20/24  4:53 PM    Specimen: Urine, Catheter   Result Value Ref Range    RBC, UA None Seen None Seen, 0-2 /HPF    WBC, UA 0-2 None Seen, 0-2 /HPF    Bacteria, UA None Seen None Seen /HPF    Squamous Epithelial Cells, UA 3-6 (A) None Seen, 0-2 /HPF    Hyaline Casts, UA None Seen None Seen /LPF    Methodology Manual Light Microscopy    Respiratory Panel PCR w/COVID-19(SARS-CoV-2) [facility] In-House, NP Swab in UTM/VTM, 2 HR TAT - Swab, Nasopharynx    Collection Time: 06/20/24  5:43 PM    Specimen: Nasopharynx; Swab   Result Value Ref Range    ADENOVIRUS, PCR Not Detected Not Detected    Coronavirus 229E Not Detected Not Detected    Coronavirus HKU1 Not Detected Not Detected     Coronavirus NL63 Not Detected Not Detected    Coronavirus OC43 Not Detected Not Detected    COVID19 Not Detected Not Detected - Ref. Range    Human Metapneumovirus Not Detected Not Detected    Human Rhinovirus/Enterovirus Not Detected Not Detected    Influenza A PCR Not Detected Not Detected    Influenza B PCR Not Detected Not Detected    Parainfluenza Virus 1 Not Detected Not Detected    Parainfluenza Virus 2 Not Detected Not Detected    Parainfluenza Virus 3 Not Detected Not Detected    Parainfluenza Virus 4 Not Detected Not Detected    RSV, PCR Not Detected Not Detected    Bordetella pertussis pcr Not Detected Not Detected    Bordetella parapertussis PCR Not Detected Not Detected    Chlamydophila pneumoniae PCR Not Detected Not Detected    Mycoplasma pneumo by PCR Not Detected Not Detected   CBC Auto Differential    Collection Time: 06/21/24  4:08 AM    Specimen: Blood   Result Value Ref Range    WBC 4.78 3.40 - 10.80 10*3/mm3    RBC 2.86 (L) 3.77 - 5.28 10*6/mm3    Hemoglobin 8.4 (L) 12.0 - 15.9 g/dL    Hematocrit 28.4 (L) 34.0 - 46.6 %    MCV 99.3 (H) 79.0 - 97.0 fL    MCH 29.4 26.6 - 33.0 pg    MCHC 29.6 (L) 31.5 - 35.7 g/dL    RDW 19.4 (H) 12.3 - 15.4 %    RDW-SD 69.2 (H) 37.0 - 54.0 fl    MPV 10.8 6.0 - 12.0 fL    Platelets 70 (L) 140 - 450 10*3/mm3    Neutrophil % 73.7 42.7 - 76.0 %    Lymphocyte % 12.8 (L) 19.6 - 45.3 %    Monocyte % 9.2 5.0 - 12.0 %    Eosinophil % 2.7 0.3 - 6.2 %    Basophil % 0.8 0.0 - 1.5 %    Immature Grans % 0.8 (H) 0.0 - 0.5 %    Neutrophils, Absolute 3.52 1.70 - 7.00 10*3/mm3    Lymphocytes, Absolute 0.61 (L) 0.70 - 3.10 10*3/mm3    Monocytes, Absolute 0.44 0.10 - 0.90 10*3/mm3    Eosinophils, Absolute 0.13 0.00 - 0.40 10*3/mm3    Basophils, Absolute 0.04 0.00 - 0.20 10*3/mm3    Immature Grans, Absolute 0.04 0.00 - 0.05 10*3/mm3    nRBC 0.0 0.0 - 0.2 /100 WBC   Comprehensive Metabolic Panel    Collection Time: 06/21/24  4:08 AM    Specimen: Blood   Result Value Ref Range     Glucose 71 65 - 99 mg/dL    BUN 27 (H) 8 - 23 mg/dL    Creatinine 2.94 (H) 0.57 - 1.00 mg/dL    Sodium 139 136 - 145 mmol/L    Potassium 3.8 3.5 - 5.2 mmol/L    Chloride 103 98 - 107 mmol/L    CO2 26.9 22.0 - 29.0 mmol/L    Calcium 8.5 (L) 8.6 - 10.5 mg/dL    Total Protein 5.1 (L) 6.0 - 8.5 g/dL    Albumin 3.0 (L) 3.5 - 5.2 g/dL    ALT (SGPT) 14 1 - 33 U/L    AST (SGOT) 33 (H) 1 - 32 U/L    Alkaline Phosphatase 59 39 - 117 U/L    Total Bilirubin 0.7 0.0 - 1.2 mg/dL    Globulin 2.1 gm/dL    A/G Ratio 1.4 g/dL    BUN/Creatinine Ratio 9.2 7.0 - 25.0    Anion Gap 9.1 5.0 - 15.0 mmol/L    eGFR 15.4 (L) >60.0 mL/min/1.73   Hemoglobin A1c    Collection Time: 06/21/24  4:08 AM    Specimen: Blood   Result Value Ref Range    Hemoglobin A1C 5.26 4.80 - 5.60 %   High Sensitivity Troponin T    Collection Time: 06/21/24  4:08 AM    Specimen: Blood   Result Value Ref Range    HS Troponin T 118 (C) <14 ng/L   Vancomycin, Random    Collection Time: 06/21/24  4:08 AM    Specimen: Blood   Result Value Ref Range    Vancomycin Random 19.10 5.00 - 40.00 mcg/mL   High Sensitivity Troponin T 2Hr    Collection Time: 06/21/24  6:25 AM    Specimen: Blood   Result Value Ref Range    HS Troponin T 120 (C) <14 ng/L    Troponin T Delta 2 >=-4 - <+4 ng/L       Current Facility-Administered Medications:     acetaminophen (TYLENOL) tablet 650 mg, 650 mg, Oral, Q4H PRN **OR** acetaminophen (TYLENOL) 160 MG/5ML oral solution 650 mg, 650 mg, Oral, Q4H PRN **OR** acetaminophen (TYLENOL) suppository 650 mg, 650 mg, Rectal, Q4H PRN, Evelia Caballero MD, 650 mg at 06/21/24 0005    apixaban (ELIQUIS) tablet 2.5 mg, 2.5 mg, Oral, Q12H, Evelia Caballero MD    atorvastatin (LIPITOR) tablet 40 mg, 40 mg, Oral, Daily, Soraida Jurado DO    sennosides-docusate (PERICOLACE) 8.6-50 MG per tablet 2 tablet, 2 tablet, Oral, BID PRN **AND** polyethylene glycol (MIRALAX) packet 17 g, 17 g, Oral, Daily PRN **AND** bisacodyl (DULCOLAX) EC tablet 5 mg, 5 mg, Oral,  Daily PRN **AND** bisacodyl (DULCOLAX) suppository 10 mg, 10 mg, Rectal, Daily PRN, Evelia Caballero MD    Calcium Replacement - Follow Nurse / BPA Driven Protocol, , Does not apply, Ada SOLIS Abbas Ali, MD    febuxostat (ULORIC) tablet 40 mg, 40 mg, Oral, Daily, Evelia Caballero MD    folic acid (FOLVITE) tablet 1 mg, 1 mg, Oral, Daily, Evelia Caballero MD    Magnesium Standard Dose Replacement - Follow Nurse / BPA Driven Protocol, , Does not apply, PRN, Evelia Caballero MD    morphine injection 2 mg, 2 mg, Intravenous, Q4H PRN, Soraida Jurado DO    pantoprazole (PROTONIX) EC tablet 40 mg, 40 mg, Oral, BID AC, Evelia Caballero MD    Phosphorus Replacement - Follow Nurse / BPA Driven Protocol, , Does not apply, PRJARED, Evelia Caballero MD    Potassium Replacement - Follow Nurse / BPA Driven Protocol, , Does not apply, IRMA, Evelia Caballero MD    [COMPLETED] Insert Peripheral IV, , , Once **AND** sodium chloride 0.9 % flush 10 mL, 10 mL, Intravenous, PRN, Cisco eLal MD    sodium chloride 0.9 % flush 10 mL, 10 mL, Intravenous, Q12H, Eevlia Caballero MD, 10 mL at 06/20/24 2036    sodium chloride 0.9 % flush 10 mL, 10 mL, Intravenous, PRN, Evelia Caballero MD    sodium chloride 0.9 % infusion 40 mL, 40 mL, Intravenous, PRN, Evelia Caballero MD    Vancomycin Pharmacy Intermittent/Pulse Dosing, , Does not apply, Ada SOLIS Abbas Ali, MD    Assessment:   End-stage renal disease hemodialysis dependent   Syncope  Diabetes   Congestive heart failure   Cardiomyopathy   Anemia    Recommendations:   Noted palliative discussion  There is no urgent need for dialysis today  Will wait for the discussion to decide further plan      Cass Garcia MD  6/21/2024  10:42 EDT

## 2024-06-21 NOTE — PLAN OF CARE
Problem: Diabetes Comorbidity  Goal: Blood Glucose Level Within Targeted Range  Outcome: Ongoing, Not Progressing  Intervention: Monitor and Manage Glycemia  Recent Flowsheet Documentation  Taken 6/21/2024 0101 by Natalie Dominguez RN  Glycemic Management: blood glucose monitored     Problem: Heart Failure Comorbidity  Goal: Maintenance of Heart Failure Symptom Control  Outcome: Ongoing, Not Progressing  Intervention: Maintain Heart Failure-Management  Recent Flowsheet Documentation  Taken 6/21/2024 0101 by Natalie Dominguez RN  Medication Review/Management:   medications reviewed   high-risk medications identified     Problem: Skin Injury Risk Increased  Goal: Skin Health and Integrity  Outcome: Ongoing, Not Progressing  Intervention: Optimize Skin Protection  Recent Flowsheet Documentation  Taken 6/21/2024 0101 by Natalie Dominguez RN  Head of Bed (HOB) Positioning: HOB at 30-45 degrees     Problem: Impaired Wound Healing  Goal: Optimal Wound Healing  Outcome: Ongoing, Not Progressing  Intervention: Promote Wound Healing  Recent Flowsheet Documentation  Taken 6/21/2024 0101 by Natalie Dominguez RN  Activity Management: unable to complete activity (see comments)     Problem: Cardiac Output Decreased  Goal: Effective Cardiac Output  Outcome: Ongoing, Not Progressing  Intervention: Optimize Cardiac Output  Recent Flowsheet Documentation  Taken 6/21/2024 0101 by Natalie Dominguez RN  Head of Bed (HOB) Positioning: HOB at 30-45 degrees  VTE Prevention/Management: sequential compression devices off     Problem: Mobility Impairment  Goal: Optimal Mobility  Outcome: Ongoing, Not Progressing  Intervention: Optimize Mobility  Recent Flowsheet Documentation  Taken 6/21/2024 0101 by Natalie Dominguez RN  Activity Management: unable to complete activity (see comments)  Assistive Device Utilized: lift device  Positioning/Transfer Devices:   pillows   in use   Goal Outcome Evaluation: no change  Lethargic. Oriented x 4. Hard of hearing.  Currently NPO d/t not being able to stay awake enough to safely swallow food or liquids. No c/o or s/s of pain/discomfort/SOB noted. From Gap. External catheter in place for bladder elimination. Currently in bed, eyes closed. Rise and fall of chest observed. Call bell in reach.

## 2024-06-21 NOTE — ED NOTES
Nursing report ED to floor  Deann Warren  83 y.o.  female    HPI:   Chief Complaint   Patient presents with    Altered Mental Status       Admitting doctor:   Evelia Caballero MD    Admitting diagnosis:   The primary encounter diagnosis was Altered mental status, unspecified altered mental status type. A diagnosis of Fever, unspecified fever cause was also pertinent to this visit.    Code status:   Current Code Status       Date Active Code Status Order ID Comments User Context       Prior            Allergies:   Heparin    Isolation:  No active isolations     Fall Risk:  Fall Risk Assessment was completed, and patient is at high risk for falls.   Predictive Model Details         24 (Low) Factor Value    Calculated 6/20/2024 23:54 Age 83    Risk of Fall Model Active Peripheral IV Present     Imaging order in this encounter Present     Diastolic BP 54     Harpal Scale 7     Magnesium 1.9 mg/dL     Number of Distinct Medication Classes administered 3     Respiratory Rate 11     Creatinine 3.07 mg/dL     Albumin 3.4 g/dL     Calcium 8.8 mg/dL     Total Bilirubin 0.9 mg/dL     Chloride 100 mmol/L     Cardiac Assessment X     Days after Admission 0.304     ALT 19 U/L     Potassium 3.9 mmol/L         Weight:       06/20/24  1640   Weight: 52.6 kg (115 lb 15.4 oz)       Intake and Output  No intake or output data in the 24 hours ending 06/20/24 3495    Diet:   Dietary Orders (From admission, onward)       Start     Ordered    06/20/24 1903  Diet: Renal, Gastrointestinal; Low Sodium (2-3g), Low Potassium, Low Phosphorus; Low Irritant; Texture: Mechanical Ground (NDD 2); Fluid Consistency: Thin (IDDSI 0)  Diet Effective Now        References:    Diet Order Crosswalk   Question Answer Comment   Diets: Renal    Diets: Gastrointestinal    Renal Diet: Low Sodium (2-3g)    Renal Diet: Low Potassium    Renal Diet: Low Phosphorus    Gastrointestinal Diet: Low Irritant    Texture: Mechanical Ground (NDD 2)    Fluid Consistency:  Thin (IDDSI 0)        06/20/24 1902                     Most recent vitals:   Vitals:    06/20/24 2301 06/20/24 2316 06/20/24 2331 06/20/24 2346   BP: 117/53 123/51 120/54 122/54   Patient Position:       Pulse: 51 72 74 72   Resp:       Temp:       TempSrc:       SpO2: 99% 98% 100% 100%   Weight:       Height:           Active LDAs/IV Access:   Lines, Drains & Airways       Active LDAs       Name Placement date Placement time Site Days    Peripheral IV 06/10/24 1007 Right Antecubital 06/10/24  1007  Antecubital  10                    Skin Condition:   Skin Assessments (last day)       Date/Time Sensory Perception Moisture Activity Mobility Nutrition Friction and Shear Harpal Score    06/20/24 2137 1-->completely limited 1-->constantly moist 1-->bedfast 1-->completely immobile 2-->probably inadequate 1-->problem 7             Labs (abnormal labs have a star):   Labs Reviewed   COMPREHENSIVE METABOLIC PANEL - Abnormal; Notable for the following components:       Result Value    Glucose 105 (*)     BUN 27 (*)     Creatinine 3.07 (*)     Total Protein 5.6 (*)     Albumin 3.4 (*)     AST (SGOT) 41 (*)     eGFR 14.6 (*)     All other components within normal limits    Narrative:     GFR Normal >60  Chronic Kidney Disease <60  Kidney Failure <15    The GFR formula is only valid for adults with stable renal function between ages 18 and 70.   URINALYSIS W/ MICROSCOPIC IF INDICATED (NO CULTURE) - Abnormal; Notable for the following components:    Ketones, UA Trace (*)     Protein, UA 30 mg/dL (1+) (*)     Leuk Esterase, UA Trace (*)     All other components within normal limits   CBC WITH AUTO DIFFERENTIAL - Abnormal; Notable for the following components:    RBC 3.19 (*)     Hemoglobin 9.4 (*)     Hematocrit 32.0 (*)     .3 (*)     MCHC 29.4 (*)     RDW 19.8 (*)     RDW-SD 71.1 (*)     Platelets 76 (*)     Lymphocyte % 14.4 (*)     Immature Grans % 0.6 (*)     All other components within normal limits   URINALYSIS,  MICROSCOPIC ONLY - Abnormal; Notable for the following components:    Squamous Epithelial Cells, UA 3-6 (*)     All other components within normal limits   BNP (IN-HOUSE) - Abnormal; Notable for the following components:    proBNP >70,000.0 (*)     All other components within normal limits    Narrative:     This assay is used as an aid in the diagnosis of individuals suspected of having heart failure. It can be used as an aid in the diagnosis of acute decompensated heart failure (ADHF) in patients presenting with signs and symptoms of ADHF to the emergency department (ED). In addition, NT-proBNP of <300 pg/mL indicates ADHF is not likely.    Age Range Result Interpretation  NT-proBNP Concentration (pg/mL:      <50             Positive            >450                   Gray                 300-450                    Negative             <300    50-75           Positive            >900                  Gray                300-900                  Negative            <300      >75             Positive            >1800                  Gray                300-1800                  Negative            <300   RESPIRATORY PANEL PCR W/ COVID-19 (SARS-COV-2), NP SWAB IN UTM/VTP, 2 HR TAT - Normal    Narrative:     In the setting of a positive respiratory panel with a viral infection PLUS a negative procalcitonin without other underlying concern for bacterial infection, consider observing off antibiotics or discontinuation of antibiotics and continue supportive care. If the respiratory panel is positive for atypical bacterial infection (Bordetella pertussis, Chlamydophila pneumoniae, or Mycoplasma pneumoniae), consider antibiotic de-escalation to target atypical bacterial infection.   MAGNESIUM - Normal   PROCALCITONIN - Normal    Narrative:     As a Marker for Sepsis (Non-Neonates):    1. <0.5 ng/mL represents a low risk of severe sepsis and/or septic shock.  2. >2 ng/mL represents a high risk of severe sepsis and/or septic  "shock.    As a Marker for Lower Respiratory Tract Infections that require antibiotic therapy:    PCT on Admission    Antibiotic Therapy       6-12 Hrs later    >0.5                Strongly Recommended  >0.25 - <0.5        Recommended   0.1 - 0.25          Discouraged              Remeasure/reassess PCT  <0.1                Strongly Discouraged     Remeasure/reassess PCT    As 28 day mortality risk marker: \"Change in Procalcitonin Result\" (>80% or <=80%) if Day 0 (or Day 1) and Day 4 values are available. Refer to [email or web address]://[email or web address]-[email or web address]    Change in PCT <=80%  A decrease of PCT levels below or equal to 80% defines a positive change in PCT test result representing a higher risk for 28-day all-cause mortality of patients diagnosed with severe sepsis for septic shock.    Change in PCT >80%  A decrease of PCT levels of more than 80% defines a negative change in PCT result representing a lower risk for 28-day all-cause mortality of patients diagnosed with severe sepsis or septic shock.      POC LACTATE - Normal   BLOOD CULTURE   BLOOD CULTURE   NATALIA AURIS PCR   RAINBOW DRAW    Narrative:     The following orders were created for panel order Henriette Draw.  Procedure                               Abnormality         Status                     ---------                               -----------         ------                     Green Top (Gel)[[id number]]                                  Final result               Lavender Top[[id number]]                                     Final result               Gold Top - SST[[id number]]                                   Final result               Light Blue Top[[id number]]                                   Final result                 Please view results for these tests on the individual orders.   SCAN SLIDE    Narrative:     No visible platelet clumping   CBC WITH AUTO DIFFERENTIAL   COMPREHENSIVE METABOLIC PANEL   HEMOGLOBIN A1C   TROPONIN   VANCOMYCIN, RANDOM   GREEN " TOP   LAVENDER TOP   GOLD TOP - SST   LIGHT BLUE TOP   CBC AND DIFFERENTIAL    Narrative:     The following orders were created for panel order CBC & Differential.  Procedure                               Abnormality         Status                     ---------                               -----------         ------                     CBC Auto Differential[[id number]]        Abnormal            Final result               Scan Slide[[id number]]                                       Final result                 Please view results for these tests on the individual orders.       LOC:  alert to painful stimuli only    Telemetry:  Telemetry    Cardiac Monitoring Ordered: yes    EKG:   ECG 12 Lead Altered Mental Status   Preliminary Result   HEART RATE= 86  bpm   RR Interval= 713  ms   NC Interval= 184  ms   P Horizontal Axis= 172  deg   P Front Axis= 0  deg   QRSD Interval= 141  ms   QT Interval= 419  ms   QTcB= 496  ms   QRS Axis= -58  deg   T Wave Axis= 117  deg   - ABNORMAL ECG -   Ventricular-paced complexes   Left bundle branch block   LVH with secondary repolarization abnormality   Inferior infarct, old   Anterior infarct, old   When compared with ECG of 10-Ryne-2024 10:03:15,   New or worsened ischemia or infarction   Electronically Signed By:    Date and Time of Study: 2024-06-20 16:39:52      Telemetry Scan   Final Result      Telemetry Scan   Final Result      Telemetry Scan   Final Result          Medications Given in the ED:   Medications   sodium chloride 0.9 % flush 10 mL (has no administration in time range)   apixaban (ELIQUIS) tablet 2.5 mg (2.5 mg Oral Not Given 6/20/24 2032)   febuxostat (ULORIC) tablet 40 mg (40 mg Oral Not Given 6/1940)   folic acid (FOLVITE) tablet 1 mg (1 mg Oral Not Given 6/1940)   loperamide (IMODIUM) capsule 2 mg (has no administration in time range)   pantoprazole (PROTONIX) EC tablet 40 mg (40 mg Oral Not Given 6/20/24 1939)   sodium chloride 0.9 % flush 10 mL (10  mL Intravenous Given 6/20/24 2036)   sodium chloride 0.9 % flush 10 mL (has no administration in time range)   sodium chloride 0.9 % infusion 40 mL (has no administration in time range)   Potassium Replacement - Follow Nurse / BPA Driven Protocol (has no administration in time range)   Magnesium Standard Dose Replacement - Follow Nurse / BPA Driven Protocol (has no administration in time range)   Phosphorus Replacement - Follow Nurse / BPA Driven Protocol (has no administration in time range)   Calcium Replacement - Follow Nurse / BPA Driven Protocol (has no administration in time range)   acetaminophen (TYLENOL) tablet 650 mg (has no administration in time range)     Or   acetaminophen (TYLENOL) 160 MG/5ML oral solution 650 mg (has no administration in time range)     Or   acetaminophen (TYLENOL) suppository 650 mg (has no administration in time range)   sennosides-docusate (PERICOLACE) 8.6-50 MG per tablet 2 tablet (has no administration in time range)     And   polyethylene glycol (MIRALAX) packet 17 g (has no administration in time range)     And   bisacodyl (DULCOLAX) EC tablet 5 mg (has no administration in time range)     And   bisacodyl (DULCOLAX) suppository 10 mg (has no administration in time range)   cefTRIAXone (ROCEPHIN) 1,000 mg in sodium chloride 0.9 % 100 mL MBP (0 mg Intravenous Stopped 6/20/24 2015)   Vancomycin Pharmacy Intermittent/Pulse Dosing (has no administration in time range)   Vancomycin HCl 1,250 mg in sodium chloride 0.9 % 250 mL VTB (0 mg Intravenous Stopped 6/20/24 2201)       Imaging results:  CT Chest Without Contrast Diagnostic    Addendum Date: 6/20/2024    ADDENDUM #1 Additional impression point: Right thyroid lobe nodule measuring 2.8 cm. Please consider further evaluation with nonemergent/outpatient thyroid ultrasound. Electronically Signed: Pio Sharma DO  6/20/2024 10:27 PM EDT  Workstation ID: [id number] ORIGINAL REPORT: CT CHEST WO CONTRAST DIAGNOSTIC, CT ABDOMEN  PELVIS WO CONTRAST Date of Exam: 6/20/2024 7:26 PM EDT Indication: Fever. Comparison: CT chest 4/9/2024. CT abdomen and pelvis 6/11/2024 Technique: Axial CT images were obtained of the chest, abdomen and pelvis without contrast administration.  Sagittal and coronal reconstructions were performed.  Automated exposure control and iterative reconstruction methods were used. Findings: CT chest: Lower Neck: Right thyroid lobe nodule measuring approximately 2.8 x 1.7 cm. Otherwise grossly unremarkable. Hilum and Mediastinum: Moderate to severe cardiomegaly. Unchanged position of right subclavian single lead pacemaker. Trace pericardial effusion. Mild enlargement of the central pulmonary arteries, unchanged. Vascular calcifications noted throughout the thoracic aorta. No definite lymphadenopathy or suspicious mass on this study performed without IV contrast Lung Parenchyma and Pleura: Small to moderate right and moderate to large left-sided pleural effusion. There is near complete collapse of the left lower lobe. There is also evidence of compressive atelectasis noted within the right lower lobe. Peripheral  reticulations and mild diffuse interstitial thickening are also noted throughout the lungs, worse in the right lower lobe. Scattered calcified granulomas are noted. The central airways appear patent. Possible wall thickening of the distal airway noted within the right lower lobe. Soft tissues: Diffuse soft tissue edema, most significantly in the left lateral chest wall and axilla.. Osseous structures: No acute osseous abnormality. CT abdomen and pelvis: Liver: Liver is normal in size and CT density. No focal lesions. Gallbladder: Status post cholecystectomy Bile Ducts: Mild dilation of the common bile duct. Spleen: Spleen is normal in size and CT density. Pancreas: A few scattered pancreatic cystic lesions noted within the pancreatic head and pancreatic tail, the largest of which measures 3.4 cm. This is unchanged.  Please consider follow-up with nonemergent CT/MRI pancreatic mass protocol for further work. Adrenals: Adrenal glands are unremarkable. Kidneys: Bilateral cortical atrophy. Punctate nonobstructing stones are noted within the kidneys bilaterally. No obstructing stones or hydronephrosis. Gastrointestinal: Very limited evaluation due to the lack of IV and oral contrast. No significant distention to suggest bowel obstruction. Small to moderate amount of stool is noted within the rectum. Bladder: The bladder is normal. Pelvis:  No suspecious mass. Peritoneum/Mesentery: Trace amount of free fluid, predominantly in the pelvis. Mild diffuse mesenteric edema. No pneumoperitoneum.   Lymph Nodes: No definite pathologically enlarged lymph nodes are noted on this study, although evaluation is limited. Vasculature: Extensive vascular calcifications of the visualized arteries. Otherwise unremarkable Abdominal Wall: Extensive soft tissue edema noted throughout the abdominal wall. Otherwise grossly unremarkable Bony Structures: No acute osseous abnormality. Unchanged compression deformities of the T11 and L1 vertebral bodies. Impression: Findings once again consistent with diffuse volume overload with worsening pulmonary edema, soft tissue edema and a small amount of ascites. Near complete collapse of the left lower lobe is most likely related to compressive atelectasis. Additionally there are patchy opacities noted within the right lower lobe, nonspecific, but concerning for superimposed infectious/inflammatory process. Multiple additional nonemergent findings as characterized above. Electronically Signed: Pio Sharma DO  6/20/2024 10:19 PM EDT  Workstation ID: [id number]    Result Date: 6/20/2024  Impression: Findings once again consistent with diffuse volume overload with worsening pulmonary edema, soft tissue edema and a small amount of ascites. Near complete collapse of the left lower lobe is most likely related to  compressive atelectasis. Additionally there are patchy opacities noted within the right lower lobe, nonspecific, but concerning for superimposed infectious/inflammatory process. Multiple additional nonemergent findings as characterized above. Electronically Signed: Pio Sharma,   6/20/2024 10:19 PM EDT  Workstation ID: [id number]    CT Abdomen Pelvis Without Contrast    Addendum Date: 6/20/2024    ADDENDUM #1 Additional impression point: Right thyroid lobe nodule measuring 2.8 cm. Please consider further evaluation with nonemergent/outpatient thyroid ultrasound. Electronically Signed: Pio Sharma,   6/20/2024 10:27 PM EDT  Workstation ID: [id number] ORIGINAL REPORT: CT CHEST WO CONTRAST DIAGNOSTIC, CT ABDOMEN PELVIS WO CONTRAST Date of Exam: 6/20/2024 7:26 PM EDT Indication: Fever. Comparison: CT chest 4/9/2024. CT abdomen and pelvis 6/11/2024 Technique: Axial CT images were obtained of the chest, abdomen and pelvis without contrast administration.  Sagittal and coronal reconstructions were performed.  Automated exposure control and iterative reconstruction methods were used. Findings: CT chest: Lower Neck: Right thyroid lobe nodule measuring approximately 2.8 x 1.7 cm. Otherwise grossly unremarkable. Hilum and Mediastinum: Moderate to severe cardiomegaly. Unchanged position of right subclavian single lead pacemaker. Trace pericardial effusion. Mild enlargement of the central pulmonary arteries, unchanged. Vascular calcifications noted throughout the thoracic aorta. No definite lymphadenopathy or suspicious mass on this study performed without IV contrast Lung Parenchyma and Pleura: Small to moderate right and moderate to large left-sided pleural effusion. There is near complete collapse of the left lower lobe. There is also evidence of compressive atelectasis noted within the right lower lobe. Peripheral  reticulations and mild diffuse interstitial thickening are also noted throughout the lungs, worse  in the right lower lobe. Scattered calcified granulomas are noted. The central airways appear patent. Possible wall thickening of the distal airway noted within the right lower lobe. Soft tissues: Diffuse soft tissue edema, most significantly in the left lateral chest wall and axilla.. Osseous structures: No acute osseous abnormality. CT abdomen and pelvis: Liver: Liver is normal in size and CT density. No focal lesions. Gallbladder: Status post cholecystectomy Bile Ducts: Mild dilation of the common bile duct. Spleen: Spleen is normal in size and CT density. Pancreas: A few scattered pancreatic cystic lesions noted within the pancreatic head and pancreatic tail, the largest of which measures 3.4 cm. This is unchanged. Please consider follow-up with nonemergent CT/MRI pancreatic mass protocol for further work. Adrenals: Adrenal glands are unremarkable. Kidneys: Bilateral cortical atrophy. Punctate nonobstructing stones are noted within the kidneys bilaterally. No obstructing stones or hydronephrosis. Gastrointestinal: Very limited evaluation due to the lack of IV and oral contrast. No significant distention to suggest bowel obstruction. Small to moderate amount of stool is noted within the rectum. Bladder: The bladder is normal. Pelvis:  No suspecious mass. Peritoneum/Mesentery: Trace amount of free fluid, predominantly in the pelvis. Mild diffuse mesenteric edema. No pneumoperitoneum.   Lymph Nodes: No definite pathologically enlarged lymph nodes are noted on this study, although evaluation is limited. Vasculature: Extensive vascular calcifications of the visualized arteries. Otherwise unremarkable Abdominal Wall: Extensive soft tissue edema noted throughout the abdominal wall. Otherwise grossly unremarkable Bony Structures: No acute osseous abnormality. Unchanged compression deformities of the T11 and L1 vertebral bodies. Impression: Findings once again consistent with diffuse volume overload with worsening  pulmonary edema, soft tissue edema and a small amount of ascites. Near complete collapse of the left lower lobe is most likely related to compressive atelectasis. Additionally there are patchy opacities noted within the right lower lobe, nonspecific, but concerning for superimposed infectious/inflammatory process. Multiple additional nonemergent findings as characterized above. Electronically Signed: Pio Sharma, DO  6/20/2024 10:19 PM EDT  Workstation ID: [id number]    Result Date: 6/20/2024  Impression: Findings once again consistent with diffuse volume overload with worsening pulmonary edema, soft tissue edema and a small amount of ascites. Near complete collapse of the left lower lobe is most likely related to compressive atelectasis. Additionally there are patchy opacities noted within the right lower lobe, nonspecific, but concerning for superimposed infectious/inflammatory process. Multiple additional nonemergent findings as characterized above. Electronically Signed: Pio Sharma,   6/20/2024 10:19 PM EDT  Workstation ID: [id number]    XR Chest 1 View    Result Date: 6/20/2024  Impression: Findings suggestive of pulmonary edema. Atypical/multifocal pneumonia is also a possibility. Possible small left pleural effusion. Electronically Signed: Pio Sharma,   6/20/2024 5:25 PM EDT  Workstation ID: [id number]    CT Head Without Contrast    Result Date: 6/20/2024  Impression: No acute intracranial abnormality. Electronically Signed: Pio Sharma,   6/20/2024 5:24 PM EDT  Workstation ID: [id number]     Social issues:   Social History     Socioeconomic History    Marital status:    Tobacco Use    Smoking status: Never    Smokeless tobacco: Never   Vaping Use    Vaping status: Never Used   Substance and Sexual Activity    Alcohol use: Never    Drug use: Never    Sexual activity: Defer       NIH Stroke Scale:  Interval: (not recorded)  1a. Level of Consciousness: (not recorded)  1b. LOC  Questions: (not recorded)  1c. LOC Commands: (not recorded)  2. Best Gaze: (not recorded)  3. Visual: (not recorded)  4. Facial Palsy: (not recorded)  5a. Motor Arm, Left: (not recorded)  5b. Motor Arm, Right: (not recorded)  6a. Motor Leg, Left: (not recorded)  6b. Motor Leg, Right: (not recorded)  7. Limb Ataxia: (not recorded)  8. Sensory: (not recorded)  9. Best Language: (not recorded)  10. Dysarthria: (not recorded)  11. Extinction and Inattention (formerly Neglect): (not recorded)    Total (NIH Stroke Scale): (not recorded)     Additional notable assessment information:NA     Nursing report ED to floor:  JULIO Joyce RN   06/20/24 23:55 EDT

## 2024-06-21 NOTE — PROGRESS NOTES
Hospitalist Service   Daily Progress Note      Patient Name: Deann Warren  : 1940  MRN: [id number]  Primary Care Physician:  Antonino Shen MD  Date of admission: 2024      Subjective      Chief Complaint: Lethargy    Patient seen and examined this morning.  Taking over care today.  Saw her during previous admission as well.  Extensive discussion with patient and family, patient does not want any further aggressive care and wants to remain comfortable.  Hospice consulted, CODE STATUS changed.  Comfort meds added.  Aggressive therapy stopped.    Pertinent positives as noted in HPI/subjective.  All other systems were reviewed and are negative.      Objective      Vitals:   Temp:  [97.6 °F (36.4 °C)-100.1 °F (37.8 °C)] 97.6 °F (36.4 °C)  Heart Rate:  [51-86] 77  Resp:  [11-31] 15  BP: (115-132)/(48-60) 120/55    Physical Exam:    General: Sleepy but easily awakens, chronically ill-appearing, NAD  Eyes: PERRL, EOMI, conjunctivae are clear  Cardiovascular: Regular rate and rhythm, no murmurs  Respiratory: Decreased breath sounds at the bases bilaterally, no wheezing or rales, unlabored breathing  Abdomen: Soft, nontender, positive bowel sounds, no guarding  Neurologic: A&O, CN grossly intact, moves all extremities spontaneously  Musculoskeletal: Generalized weakness, no other gross deformities  Skin: Warm, dry         Result Review    Result Review:  I have personally reviewed the results from the time of this admission to 2024 11:29 EDT and agree with these findings:  [x]  Laboratory  [x]  Microbiology  [x]  Radiology  [x]  EKG/Telemetry   [x]  Cardiology/Vascular   []  Pathology  [x]  Old records  []  Other:    Wounds (Last 24 Hours)       LDA Wound       Row Name 24 0800 24 0101 24 0039       Wound 24 0033 coccyx    Wound - Properties Group Placement Date: 24  -JM Placement Time: 33 Location: coccyx  -JM    Pressure Injury Stage -- U  -AH --    Dressing Appearance  open to air  -KB open to air  -AH --    Closure Open to air  -KB Open to air  -AH --    Base non-blanchable  -KB non-blanchable  -AH --    Yellow (%), Wound Tissue Color -- 10  -AH --    Periwound dry;intact  -KB dry;intact  -AH --    Periwound Temperature warm  -KB warm  -AH --    Periwound Skin Turgor soft  -KB soft  -AH --    Edges open  -KB open  -AH --    Drainage Amount none  -KB none  -AH --    Care, Wound -- cleansed with;soap and water  -AH --    Dressing Care -- skin barrier agent applied  -AH --    Periwound Care -- barrier ointment applied  -AH --    Retired Wound - Properties Group Placement Date: 06/21/24  - Placement Time: 0033 -JM Location: coccyx  -    Retired Wound - Properties Group Date first assessed: 06/21/24  - Time first assessed: 0033 - Location: coccyx  -       Wound 06/21/24 0035 Left posterior ankle    Wound - Properties Group Placement Date: 06/21/24  - Placement Time: 0035 - Side: Left  - Orientation: posterior  - Location: ankle  -    Pressure Injury Stage -- 3  -AH --    Dressing Appearance dry;intact  -KB dry;intact  -AH --    Closure Adhesive bandage  -KB Adhesive bandage  -AH --    Base dermis;slough  -KB dermis;slough  -AH --    Red (%), Wound Tissue Color -- 90  -AH --    Yellow (%), Wound Tissue Color -- 10  -AH --    Periwound dry;intact  -KB dry;intact  -AH --    Periwound Temperature cool  -KB cool  -AH --    Periwound Skin Turgor firm  -KB firm  -AH --    Edges open  -KB open  -AH --    Drainage Characteristics/Odor bleeding controlled  -KB bleeding controlled  -AH --    Drainage Amount none  -KB none  -AH --    Care, Wound -- cleansed with;soap and water  -AH --    Dressing Care -- dressing applied  -AH --    Periwound Care -- dry periwound area maintained  -AH --    Retired Wound - Properties Group Placement Date: 06/21/24  - Placement Time: 0035 - Side: Left  -JM Orientation: posterior  -JM Location: ankle  -JM    Retired Wound - Properties  Group Date first assessed: 06/21/24 - Time first assessed: 0035 -JM Side: Left  - Location: ankle  -JM       Wound 06/21/24 0036 Right clavicle    Wound - Properties Group Placement Date: 06/21/24  - Placement Time: 0036 - Side: Right  - Location: clavicle  -JM    Dressing Appearance open to air  -KB open to air  6 STERI STRIPS IN PLACE  -AH --    Closure Steri strips  6  -KB Steri strips  6  -AH --    Periwound hematoma;swelling  -KB hematoma;swelling  -AH --    Periwound Temperature cool  -KB cool  -AH --    Periwound Skin Turgor soft  -KB soft  -AH --    Edges rolled/closed  -KB rolled/closed  -AH --    Drainage Characteristics/Odor bleeding controlled  -KB bleeding controlled  -AH --    Drainage Amount small  -KB small  -AH --    Number of Adhesive Closure Strips -- 6  -AH --    Retired Wound - Properties Group Placement Date: 06/21/24  - Placement Time: 0036 - Side: Right  - Location: clavicle  -JM    Retired Wound - Properties Group Date first assessed: 06/21/24  - Time first assessed: 0036 - Side: Right  - Location: clavicle  -JM       Wound 06/21/24 0039 Right posterior heel    Wound - Properties Group Placement Date: 06/21/24  - Placement Time: 0039 - Side: Right  - Orientation: posterior  - Location: heel  -    Wound Image -- -- Images linked: 1  -    Pressure Injury Stage -- 1  -AH --    Dressing Appearance open to air  -KB open to air  -AH --    Closure None;Open to air  -KB None;Open to air  -AH --    Base blanchable;red  -KB blanchable;red  -AH --    Periwound intact  -KB intact  -AH --    Periwound Temperature cool  -KB cool  -AH --    Periwound Skin Turgor soft  -KB soft  -AH --    Edges rolled/closed  -KB rolled/closed  -AH --    Drainage Amount none  -KB none  -AH --    Retired Wound - Properties Group Placement Date: 06/21/24  - Placement Time: 0039 - Side: Right  - Orientation: posterior  - Location: heel  -JM    Retired Wound - Properties Group  Date first assessed: 06/21/24  -JM Time first assessed: 0039  -JM Side: Right  -JM Location: heel  -JM      Row Name 06/21/24 0036 06/21/24 0035 06/21/24 0033       [REMOVED] Wound 06/03/24 1556 Right upper chest Incision    Wound - Properties Group Placement Date: 06/03/24  -ELISA Placement Time: 1556  -ELISA Present on Original Admission: Y  -MK Side: Right  -ELISA Orientation: upper  -ELISA Location: chest  -ELISA Primary Wound Type: Incision  -ELISA Removal Date: 06/21/24  -JM Removal Time: 0000  -JM    Retired Wound - Properties Group Placement Date: 06/03/24  -ELISA Placement Time: 1556  -ELISA Present on Original Admission: Y  -MK Side: Right  -ELISA Orientation: upper  -ELISA Location: chest  -ELISA Primary Wound Type: Incision  -ELISA Removal Date: 06/21/24  -JM Removal Time: 0000  -JM    Retired Wound - Properties Group Date first assessed: 06/03/24  -ELISA Time first assessed: 1556  -ELISA Present on Original Admission: Y  -MK Side: Right  -ELISA Location: chest  -ELISA Primary Wound Type: Incision  -ELISA Resolution Date: 06/21/24  -JM Resolution Time: 0000  -JM       Wound 06/21/24 0033 coccyx    Wound - Properties Group Placement Date: 06/21/24  -JM Placement Time: 0033  -JM Location: coccyx  -JM    Wound Image -- -- Images linked: 1  -JM    Retired Wound - Properties Group Placement Date: 06/21/24  -JM Placement Time: 0033  -JM Location: coccyx  -JM    Retired Wound - Properties Group Date first assessed: 06/21/24  -JM Time first assessed: 0033  -JM Location: coccyx  -JM       Wound 06/21/24 0035 Left posterior ankle    Wound - Properties Group Placement Date: 06/21/24  -JM Placement Time: 0035  -JM Side: Left  -JM Orientation: posterior  -JM Location: ankle  -JM    Wound Image -- Images linked: 1  -JM --    Retired Wound - Properties Group Placement Date: 06/21/24  -JM Placement Time: 0035  -JM Side: Left  -JM Orientation: posterior  -JM Location: ankle  -JM    Retired Wound - Properties Group Date first assessed: 06/21/24  - Time first assessed:  0035  -JM Side: Left  -JM Location: ankle  -JM       Wound 06/21/24 0036 Right clavicle    Wound - Properties Group Placement Date: 06/21/24  -JM Placement Time: 0036  -JM Side: Right  -JM Location: clavicle  -JM    Wound Image Images linked: 1  -JM -- --    Retired Wound - Properties Group Placement Date: 06/21/24  -JM Placement Time: 0036  -JM Side: Right  -JM Location: clavicle  -JM    Retired Wound - Properties Group Date first assessed: 06/21/24  -JM Time first assessed: 0036  -JM Side: Right  -JM Location: clavicle  -JM       Wound 06/21/24 0039 Right posterior heel    Wound - Properties Group Placement Date: 06/21/24  -JM Placement Time: 0039  -JM Side: Right  -JM Orientation: posterior  -JM Location: heel  -JM    Retired Wound - Properties Group Placement Date: 06/21/24  -JM Placement Time: 0039  -JM Side: Right  -JM Orientation: posterior  -JM Location: heel  -JM    Retired Wound - Properties Group Date first assessed: 06/21/24  -JM Time first assessed: 0039  -JM Side: Right  -JM Location: heel  -JM       [REMOVED] Wound 04/25/24 1400 Other (See comments) coccyx MASD (Moisture associated skin damage)    Wound - Properties Group Placement Date: 04/25/24  -DM Placement Time: 1400  -DM Present on Original Admission: Y  -DM Side: Other (See comments)  -DM Location: coccyx  -DM Primary Wound Type: MASD  -DM Additional Comments: STAGE 1 ON COCCYX  -DD Removal Date: 06/21/24  -JM Removal Time: 0000  -JM    Retired Wound - Properties Group Placement Date: 04/25/24  -DM Placement Time: 1400  -DM Present on Original Admission: Y  -DM Side: Other (See comments)  -DM Location: coccyx  -DM Primary Wound Type: MASD  -DM Additional Comments: STAGE 1 ON COCCYX  -DD Removal Date: 06/21/24  -JM Removal Time: 0000  -JM    Retired Wound - Properties Group Date first assessed: 04/25/24  -DM Time first assessed: 1400  -DM Present on Original Admission: Y  -DM Side: Other (See comments)  -DM Location: coccyx  -DM Primary Wound  Type: MASD  -DM Additional Comments: STAGE 1 ON COCCYX  -DD Resolution Date: 06/21/24  -JM Resolution Time: 0000  -JM       [REMOVED] Wound 04/25/24 0821 Left posterior ankle Pressure Injury    Wound - Properties Group Placement Date: 04/25/24  -DL Placement Time: 0821  -DL Present on Original Admission: Y  -DL Side: Left  -DL Orientation: posterior  -DL Location: ankle  -DL Primary Wound Type: Pressure inj  -DL Additional Comments: UNSTAGABLE  -DD Removal Date: 06/21/24  -JM Removal Time: 0000  -JM    Retired Wound - Properties Group Placement Date: 04/25/24  -DL Placement Time: 0821  -DL Present on Original Admission: Y  -DL Side: Left  -DL Orientation: posterior  -DL Location: ankle  -DL Primary Wound Type: Pressure inj  -DL Additional Comments: UNSTAGABLE  -DD Removal Date: 06/21/24  - Removal Time: 0000  -JM    Retired Wound - Properties Group Date first assessed: 04/25/24  -DL Time first assessed: 0821  -DL Present on Original Admission: Y  -DL Side: Left  -DL Location: ankle  -DL Primary Wound Type: Pressure inj  -DL Additional Comments: UNSTAGABLE  -DD Resolution Date: 06/21/24 -JM Resolution Time: 0000 -JM              User Key  (r) = Recorded By, (t) = Taken By, (c) = Cosigned By      Initials Name Provider Type    Mary Whitfield RN Registered Nurse    Jed Brennan Technologist    Estella Whalen RN Registered Nurse    Mayelin Devi RN Registered Nurse    Maite Birch LPN Licensed Nurse    Dionne Mack, RN Registered Nurse    Natalie Antony RN Registered Nurse    Ela Castro RN Registered Nurse                      Assessment & Plan      Brief Patient Summary:  Deann Warren is a 83 y.o. female with a history of end-stage renal disease on dialysis, A-fib, hypertension, hyperlipidemia and recurrent syncope, cardiomyopathy, multivessel CAD currently being medically managed who presented via EMS for lethargy and weakness.  Patient was currently admitted  last week and was sent to rehab.  She went to see cardiology for follow-up and was noted to be lethargic and obtunded and was sent to the ER via EMS.  Patient has longstanding history of ESRD and recurrent syncope, recent echo with EF of 25% with TR and pulmonary hypertension.  Noted to be having some pulmonary congestion on imaging.  Nephrology and cardiology consulted.  Patient is alert and oriented but does have intermittent lethargy.  Had extensive discussion with, palliative care was consulted and patient has made decision to transition to comfort care/hospice.  She does not want any further aggressive therapy.  Family in agreement as well.  Hospice consulted.      sodium chloride, 10 mL, Intravenous, Q12H             I have utilized all available, immediate resources to obtain, update, or review the patient's current medications including all prescriptions, over-the-counter products, herbals, cannabis/cannabidiol products, and vitamin.mineral/dietary (nutritional) supplements.    Active Hospital Problems:  Active Hospital Problems    Diagnosis     **Altered mental status        Assessment:  Progressive decline in setting of advanced heart failure, ESRD, multivessel CAD  Sick sinus syndrome pacemaker with history of V. tach  Chronic anemia and thrombocytopenia  DM2  Acute on chronic HFrEF    Plan:  -After further discussion with patient and family, patient wants to be comfort care, does not want any further aggressive therapy  -Comfort care meds ordered, hospice consulted  -Plan to discharge to facility with hospice, patient and family does not want to go back to same facility where she came from, case management on board      CODE STATUS:    Level Of Support Discussed With: Patient; Health Care Surrogate; Next of Kin (If No Surrogate)  Code Status (Patient has no pulse and is not breathing): No CPR (Do Not Attempt to Resuscitate)  Medical Interventions (Patient has pulse or is breathing): Comfort  Measures      Disposition: Hospice/comfort care    Electronically signed by Soraida Jurado DO, 06/21/24, 11:29 EDT.  [facility] Hospitalist Team      Part of this note may be an electronic transcription/translation of spoken language to printed text using the Dragon Dictation System.

## 2024-06-21 NOTE — PLAN OF CARE
Problem: Diabetes Comorbidity  Goal: Blood Glucose Level Within Targeted Range  Outcome: Ongoing, Progressing     Problem: Heart Failure Comorbidity  Goal: Maintenance of Heart Failure Symptom Control  Outcome: Ongoing, Progressing     Problem: Skin Injury Risk Increased  Goal: Skin Health and Integrity  Outcome: Ongoing, Progressing     Problem: Impaired Wound Healing  Goal: Optimal Wound Healing  Outcome: Ongoing, Progressing     Problem: Cardiac Output Decreased  Goal: Effective Cardiac Output  Outcome: Ongoing, Progressing     Problem: Mobility Impairment  Goal: Optimal Mobility  Outcome: Ongoing, Progressing     Problem: Adult Inpatient Plan of Care  Goal: Plan of Care Review  Outcome: Ongoing, Progressing  Goal: Patient-Specific Goal (Individualized)  Outcome: Ongoing, Progressing  Goal: Absence of Hospital-Acquired Illness or Injury  Outcome: Ongoing, Progressing  Intervention: Identify and Manage Fall Risk  Recent Flowsheet Documentation  Taken 6/21/2024 1000 by Esetlla Alva RN  Safety Promotion/Fall Prevention: safety round/check completed  Taken 6/21/2024 0800 by Estella Alva RN  Safety Promotion/Fall Prevention: safety round/check completed  Intervention: Prevent Skin Injury  Recent Flowsheet Documentation  Taken 6/21/2024 1000 by Estella Alva RN  Body Position: position changed independently  Goal: Optimal Comfort and Wellbeing  Outcome: Ongoing, Progressing  Intervention: Provide Person-Centered Care  Recent Flowsheet Documentation  Taken 6/21/2024 0800 by Estella Alva RN  Trust Relationship/Rapport:   care explained   choices provided  Goal: Readiness for Transition of Care  Outcome: Ongoing, Progressing

## 2024-06-21 NOTE — CASE MANAGEMENT/SOCIAL WORK
Social Work Assessment  [facility]     Patient Name: Deann Warren  MRN: [id number]  Today's Date: 2024    Admit Date: 2024       Demographic Summary       Row Name 24 1216       General Information    Reason for Consult health care directive    General Information Comments LSW consulted for ACP. LSW met with pt at bedside. Pt is A&Ox4 and able to answer orientation questions. Name, , today's date, and where and why she is in the hospital.  Pt wanted to assign her son Tai PENA ([phone]) Tai's wife Elli PENA ([phone]) and her son Antonino PENA ([phone]) as HealthCare Represtatives. LSW faxed to HIM and provided three copies to for family, original to pt and one to Yuli with [facility]. No further needs identified.        JOSHUA Mcleod, MSW    Phone: [phone]  Fax: [phone]  Email: [email or web address]

## 2024-06-21 NOTE — NURSING NOTE
Dr. Jurado and Writer went to bedside pt was able to verbalize that she would like to be comfort care and DNR/ DRI pt states that if she is no longer able to make decisions she would like to appoint Antonino and Tai her sons at bedside to make the decisions regarding her care. Pt states that she no longer wants to do any medical treatment and she would like for dialysis to be discontinued.   RN and CNA cleaned pt up from bowel movement and removed her from telemetry monitor and bp cuff per family request. Pt was repositioned and states she is much more comfortable now.

## 2024-06-21 NOTE — CASE MANAGEMENT/SOCIAL WORK
Social Work Assessment  [facility]     Patient Name: Deann Warren  MRN: [id number]  Today's Date: 6/21/2024    Admit Date: 6/20/2024     Discharge Plan       Row Name 06/21/24 1042       Plan    Plan Comments Per chart review, Pt will be going comfort care. LACE screen not appropriate.           JOSHUA Mcleod, MSW    Phone: [phone]  Fax: [phone]  Email: [email or web address]

## 2024-06-21 NOTE — DISCHARGE PLACEMENT REQUEST
"Timothy Warren (83 y.o. Female)       Date of Birth   1940    Social Security Number       Address   [address] [place] [address]    Home Phone   [phone]    MRN   [id number]       Restorationist   None    Marital Status                               Admission Date   6/20/24    Admission Type   Emergency    Admitting Provider   Evelia Caballero MD    Attending Provider   Soraida Jurado DO    Department, Room/Bed   [facility] 3C MEDICAL INPATIENT, 355/1       Discharge Date       Discharge Disposition       Discharge Destination                                 Attending Provider: Soraida Jurado DO    Allergies: Heparin    Isolation: Contact   Infection: Candida Auris (rule out) (06/20/24)   Code Status: CPR    Ht: 162.6 cm (64\")   Wt: 51 kg (112 lb 7 oz)    Admission Cmt: None   Principal Problem: Altered mental status [R41.82]                   Active Insurance as of 6/20/2024       Primary Coverage       Payor Plan Insurance Group Employer/Plan Group    ANTHEM MEDICARE REPLACEMENT ANTHEM MEDICARE ADVANTAGE [id number]       Payor Plan Address Payor Plan Phone Number Payor Plan Fax Number Effective Dates    [address] [phone]  1/1/2024 - None Entered    [facility] [id number]         Subscriber Name Subscriber Birth Date Member ID       TIMOTHY WARREN 1940 [id number]               Secondary Coverage       Payor Plan Insurance Group Employer/Plan Group    [place]        Payor Plan Address Payor Plan Phone Number Payor Plan Fax Number Effective Dates    [address]   4/7/2024 - None Entered    [place] [address]         Subscriber Name Subscriber Birth Date Member ID       TIMOTHY WARREN 1940 [id number]                     Emergency Contacts        (Rel.) Home Phone Work Phone Mobile Phone    ROB WARREN (Son) -- -- [phone]    SANDRA RAIN (Friend) -- -- [phone]          "

## 2024-06-21 NOTE — CONSULTS
Nutrition Services  Patient Name: Deann Warren  YOB: 1940  MRN: [id number]  Admission date: 6/20/2024    *See MSA at bottom of this note.     Severe chronic disease related malnutrition R/t progression of multiple chronic medical conditions with concurrent reduced intakes; as evidenced by weight loss greater than 5% in one month, intakes meeting less than 75% needs for at least one month, with severe muscle and fat loss per NFPE.    As pt and family have elected to pursue hospice/palliative care, aggressive nutrition support (EN) is not clincially indicated. Would continue with pleasure feeds of any foods pt can safely consume.     NUTRITION SCREENING      Trending Narrative: 6/21: Pt assessed due to concern for ongoing weight loss and poor intake. Pt with significant AMS/obtunded; not able to personally provide diet Hx, but was recently admitted earlier this month -- intake trends can be observed over the last month from data from prior admission and report of decreased intake per nutrition screening on admission. Pt's weight has declined my more than 10% in the last month, and muscle and fat loss are present, C/W chronic-disease related malnutrition.  Upon review, decision today has been made to move toward hospice care - pt to return to [facility]CF with [facility] involvement. For this reason, more aggressive nutrition interventions (such as EN) are not presently indicated.        PO Diet: Diet: Regular/House; Texture: Mechanical Ground (NDD 2); Fluid Consistency: Thin (IDDSI 0)   PO Supplements: None ordered   Trending PO Intake:  Not eating - obtunded/AMS       Nutritionally-Pertinent Medications RDN Reviewed, C/W clinical course         Labs (reviewed below): Reviewed      Results from last 7 days   Lab Units 06/21/24  0408 06/20/24  1647 06/17/24  0208 06/16/24  0052 06/15/24  0507   SODIUM mmol/L 139 137 139   < > 140   POTASSIUM mmol/L 3.8 3.9 3.6   < > 3.9   CHLORIDE mmol/L 103 100 103   < > 104   CO2  "mmol/L 26.9 26.9 26.6   < > 26.4   BUN mg/dL 27* 27* 28*   < > 24*   CREATININE mg/dL 2.94* 3.07* 3.05*   < > 2.36*   CALCIUM mg/dL 8.5* 8.8 8.7   < > 8.3*   BILIRUBIN mg/dL 0.7 0.9  --   --  0.6   ALK PHOS U/L 59 72  --   --  71   ALT (SGPT) U/L 14 19  --   --  22   AST (SGOT) U/L 33* 41*  --   --  47*   GLUCOSE mg/dL 71 105* 111*   < > 75    < > = values in this interval not displayed.     Results from last 7 days   Lab Units 06/21/24  0408 06/20/24  1647   MAGNESIUM mg/dL  --  1.9   HEMOGLOBIN g/dL 8.4* 9.4*   HEMATOCRIT % 28.4* 32.0*     Lab Results   Component Value Date    HGBA1C 5.26 06/21/2024          GI Function:  Stool Output  Stool Unmeasured Occurrence: 2 (06/21/24 1623)  Bowel Incontinence: Yes (06/21/24 1623)  Stool Amount: moderate (06/21/24 1623)          Skin: stage III pressure injury to her left heel   stage III sacral pressure injury        Weight Review: Estimated body mass index is 19.3 kg/m² as calculated from the following:    Height as of this encounter: 162.6 cm (64\").    Weight as of this encounter: 51 kg (112 lb 7 oz).    Comment:   6/21: 51 kg -- 10.1% loss in 1 month    Wt Readings from Last 30 Encounters:   06/21/24 0053 51 kg (112 lb 7 oz)   06/20/24 1640 52.6 kg (115 lb 15.4 oz)   06/20/24 1546 52.6 kg (116 lb)   06/10/24 0950 56.7 kg (125 lb)   06/01/24 1815 56.7 kg (125 lb)   06/01/24 1313 56.7 kg (125 lb)   05/09/24 0957 54.9 kg (121 lb)   04/25/24 0810 52.2 kg (115 lb 1.3 oz)   04/18/24 1535 53.5 kg (118 lb)   04/09/24 0821 53.5 kg (118 lb)   04/07/24 0953 53.5 kg (118 lb)              Trending Physical   Appearance, NFPE 6/21: NFPE completed, consistent with nutrition diagnosis of malnutrition using AND/ASPEN criteria. See MSA below.             Nutrition Problem Statement: Severe chronic disease related malnutrition R/t progression of multiple chronic medical conditions with concurrent reduced intakes; as evidenced by weight loss greater than 5% in one month, intakes meeting " less than 75% needs for at least one month, with severe muscle and fat loss per NFPE.        Nutrition Intervention: Continue pleasure feeds, if pt awake/alert to take PO.          Monitoring/Evaluation I&O, PO intake, Pertinent labs, Weight, Skin status, GI status, POC/GOC        Malnutrition Severity Assessment      Patient meets criteria for : Severe Malnutrition  Malnutrition Type (Last 8 Hours)       Malnutrition Severity Assessment       Row Name 06/21/24 1627       Malnutrition Severity Assessment    Malnutrition Type Chronic Disease - Related Malnutrition      Row Name 06/21/24 1627       Insufficient Energy Intake     Insufficient Energy Intake Findings Severe    Insufficient Energy Intake  <75% of est. energy requirement for > or equal to 1 month      Row Name 06/21/24 1627       Unintentional Weight Loss     Unintentional Weight Loss Findings Severe    Unintentional Weight Loss  Weight loss greater than 5% in one month      Row Name 06/21/24 1627       Muscle Loss    Loss of Muscle Mass Findings Severe    [place] Region Severe - deep hollowing/scooping, lack of muscle to touch, facial bones well defined    Clavicle Bone Region Severe - protruding prominent bone    Dorsal Hand Region Severe - prominent depression    Patellar Region Severe - prominent bone, square looking, very little muscle definition    Anterior Thigh Region Severe - line/depression along thigh, obviously thin    Posterior Calf Region Severe - thin with very little definition/firmness      Row Name 06/21/24 1627       Fat Loss    Subcutaneous Fat Loss Findings Severe    Orbital Region  Severe - pronounced hollowness/depression, dark circles, loose saggy skin    Upper Arm Region Severe - mostly skin, very little space between folds, fingers touch      Row Name 06/21/24 1627       Criteria Met (Must meet criteria for severity in at least 2 of these categories: M Wasting, Fat Loss, Fluid, Secondary Signs, Wt. Status, Intake)    Patient meets  criteria for  Severe Malnutrition                         RD to follow up per protocol.    Electronically signed by:  Krali Carreon, JEFFERY  06/21/24

## 2024-06-22 VITALS
HEART RATE: 79 BPM | BODY MASS INDEX: 19.2 KG/M2 | OXYGEN SATURATION: 100 % | WEIGHT: 112.43 LBS | RESPIRATION RATE: 13 BRPM | SYSTOLIC BLOOD PRESSURE: 105 MMHG | HEIGHT: 64 IN | DIASTOLIC BLOOD PRESSURE: 48 MMHG | TEMPERATURE: 97.1 F

## 2024-06-22 PROBLEM — E43 SEVERE MALNUTRITION: Status: ACTIVE | Noted: 2024-06-22

## 2024-06-22 LAB — C AURIS DNA SPEC QL NAA+NON-PROBE: NOT DETECTED

## 2024-06-22 PROCEDURE — 25010000002 HYDROMORPHONE 1 MG/ML SOLUTION: Performed by: INTERNAL MEDICINE

## 2024-06-22 RX ADMIN — Medication 10 ML: at 08:09

## 2024-06-22 RX ADMIN — HYDROMORPHONE HYDROCHLORIDE 1 MG: 1 INJECTION, SOLUTION INTRAMUSCULAR; INTRAVENOUS; SUBCUTANEOUS at 04:21

## 2024-06-22 RX ADMIN — HYDROMORPHONE HYDROCHLORIDE 1 MG: 1 INJECTION, SOLUTION INTRAMUSCULAR; INTRAVENOUS; SUBCUTANEOUS at 16:32

## 2024-06-22 NOTE — CASE MANAGEMENT/SOCIAL WORK
"Physicians Statement of Medical Necessity for  Ambulance Transportation    GENERAL INFORMATION     Name: Deann Warren  YOB: 1940  Medicare #: [id number]  Transport Date: 6/22/2024 (Valid for round trips this date, or for scheduled repetitive trips for 60 days from the date signed below.)  Origin: [facility]  Destination: [facility] room A10  Is the Patient's stay covered under Medicare Part A (PPS/DRG?)Yes  Closest appropriate facility? Yes  If this a hosp-hosp transfer? No  Is this a hospice patient? Yes, Is this transport related to patient's terminal illness? Yes  will be admitting to [facility] once at facility.    MEDICAL NECESSITY QUESTIONAIRE    Ambulance Transportation is medically necessary only if other means of transportation are contraindicated or would be potentially harmful to the patient.  To meet this requirement, the patient must be either \"bed confined\" or suffer from a condition such that transport by means other than an ambulance is contraindicated by the patient's condition.  The following questions must be answered by the healthcare professional signing below for this form to be valid:     1) Describe the MEDICAL CONDITION (physical and/or mental) of this patient AT THE TIME OF AMBULANCE TRANSPORT that requires the patient to be transported in an ambulance, and why transport by other means is contraindicated by the patient's condition: comfort measures.  Past Medical History:   Diagnosis Date    Allergic conjunctivitis and rhinitis 11/06/2014    Anemia due to chronic kidney disease, on chronic dialysis 08/25/2020    Anxiety 07/30/2012    Bradycardia by electrocardiogram 06/03/2024    Cardiomyopathy, dilated 04/18/2024    Chronic gouty arthritis 11/06/2014    Coronary artery disease involving native coronary artery of native heart without angina pectoris 04/24/2024    Dependence on renal dialysis 07/01/2022    ESRD (end stage renal disease) 08/25/2020    Essential " "hypertension 09/16/2015    History of shingles 01/18/2022    Hyperlipidemia 04/08/2024    Ischemic cardiomyopathy 04/18/2024    Paroxysmal atrial fibrillation 07/01/2022    Presence of cardiac pacemaker 06/03/2024    Sick sinus syndrome 06/03/2024    Type 2 diabetes mellitus with diabetic chronic kidney disease 07/01/2022    Vitamin D deficiency 07/22/2021      Past Surgical History:   Procedure Laterality Date    ARTERIOVENOUS FISTULA Left     CARDIAC CATHETERIZATION N/A 4/25/2024    Procedure: Right and Left Heart Cath;  Surgeon: Dilcia Lui MD;  Location: [facility] CATH INVASIVE LOCATION;  Service: Cardiology;  Laterality: N/A;    CARDIAC CATHETERIZATION N/A 4/25/2024    Procedure: Percutaneous Coronary Intervention;  Surgeon: Jadiel Blake MD;  Location: [facility] CATH INVASIVE LOCATION;  Service: Cardiology;  Laterality: N/A;    CARDIAC ELECTROPHYSIOLOGY PROCEDURE N/A 6/3/2024    Procedure: Pacemaker DC new, [employer];  Surgeon: Zamzam Carrillo MD;  Location: [facility] CATH INVASIVE LOCATION;  Service: Cardiovascular;  Laterality: N/A;    UPPER ENDOSCOPIC ULTRASOUND W/ FNA N/A 6/13/2024    Procedure: ESOPHAGOGASTRODUODENOSCOPY WITH ULTRASOUND AND FINE NEEDLE ASPIRATION of pancreatic cyst and forcep biopsy x2 areas;  Surgeon: Cesia Hyde MD;  Location: [facility] ENDOSCOPY;  Service: Gastroenterology;  Laterality: N/A;      2) Is this patient \"bed confined\" as defined below?Yes   To be \"bed confined\" the patient must satisfy all three of the following criteria:  (1) unable to get up from bed without assistance; AND (2) unable to ambulate;  AND (3) unable to sit in a chair or wheelchair.  3) Can this patient safely be transported by car or wheelchair van (I.e., may safely sit during transport, without an attendant or monitoring?)No   4. In addition to completing questions 1-3 above, please check any of the following conditions that apply*:          *Note: supporting documentation for any boxes checked must be " maintained in the patient's medical records Patient is confused and Unable to sit in a chair or wheelchair due to decubitus ulcers or other wounds      SIGNATURE OF PHYSICIAN OR OTHER AUTHORIZED HEALTHCARE PROFESSIONAL    I certify that the above information is true and correct based on my evaluation of this patient, and represent that the patient requires transport by ambulance and that other forms of transport are contraindicated.  I understand that this information will be used by the Centers for Medicare and Medicaid Services (CMS) to support the determiniation of medical necessity for ambulance services, and I represent that I have personal knowledge of the patient's condition at the time of transport.    x   If this box is checked, I also certify that the patient is physically or mentally incapable of signing the ambulance service's claim form and that the institution with which I am affiliated has furnished care, services or assistance to the patient.  My signature below is made on behalf of the patient pursuant to 42 .36(b)(4). In accordance with 42 .37, the specific reason(s) that the patient is physically or mentally incapable of signing the claim for is as follows:     Signature of Physician or Healthcare Professional   Laisha Christiansen Date/Time:   6/22/2024 1043     (For Scheduled repetitive transport, this form is not valid for transports performed more than 60 days after this date).                                                                                                                                            --------------------------------------------------------------------------------------------  Printed Name and Credentials of Physician or Authorized Healthcare Professional     *Form must be signed by patient's attending physician for scheduled, repetitive transports,.  For non-repetitive ambulance transports, if unable to obtain the signature of the attending physician, any  of the following may sign (please select below):     Physician  Clinical Nurse Specialist  Registered Nurse     Physician Assistant  Discharge Planner  Licensed Practical Nurse     Nurse Practitioner x

## 2024-06-22 NOTE — PROGRESS NOTES
"                                                                                                                                      Nephrology  Progress Note                                        [facility]    Patient Identification    Name: Deann Warren  Age: 83 y.o.  Sex: female  :  1940  MRN: [id number]      DATE OF SERVICE:  2024        Subective    Asleep     Objective   Scheduled Meds:sodium chloride, 10 mL, Intravenous, Q12H          Continuous Infusions:     PRN Meds:  acetaminophen **OR** acetaminophen **OR** acetaminophen    atropine    senna-docusate sodium **AND** polyethylene glycol **AND** bisacodyl **AND** bisacodyl    carboxymethylcellulose sod    diphenoxylate-atropine    glycopyrrolate **OR** glycopyrrolate **OR** glycopyrrolate **OR** glycopyrrolate    HYDROmorphone    HYDROmorphone **OR** HYDROmorphone    LORazepam **OR** LORazepam **OR** LORazepam **OR** LORazepam **OR** LORazepam **OR** LORazepam    LORazepam **OR** LORazepam **OR** LORazepam **OR** LORazepam **OR** LORazepam **OR** LORazepam    LORazepam **OR** LORazepam **OR** LORazepam **OR** LORazepam **OR** LORazepam **OR** LORazepam    ondansetron ODT **OR** ondansetron    Scopolamine    [COMPLETED] Insert Peripheral IV **AND** sodium chloride    sodium chloride    sodium chloride     Exam:  BP 98/46 (BP Location: Left arm, Patient Position: Lying)   Pulse 79   Temp 97.3 °F (36.3 °C) (Oral)   Resp 13   Ht 162.6 cm (64\")   Wt 51 kg (112 lb 7 oz)   SpO2 100%   BMI 19.30 kg/m²     Intake/Output last 3 shifts:  No intake/output data recorded.    Intake/Output this shift:  No intake/output data recorded.    Physical exam:  General Appearance:  Alert  Head:  Normocephalic, without obvious abnormality, atraumatic  Eyes:  PERRL, conjunctiva/corneas clear     Neck:  Supple,  no adenopathy;      Lungs:  Decreased BS occasion ronchi  Heart:  Regular rate and rhythm, S1 and S2 normal  Abdomen:  Soft, " non-tender, bowel sounds active   Extremities: trace edema  Pulses: 2+ and symmetric all extremities  Skin:  No rashes or lesions       Data Review:  All labs (24hrs):   Recent Results (from the past 24 hour(s))   High Sensitivity Troponin T 2Hr    Collection Time: 06/21/24  6:25 AM    Specimen: Blood   Result Value Ref Range    HS Troponin T 120 (C) <14 ng/L    Troponin T Delta 2 >=-4 - <+4 ng/L          Imaging:  CT Chest Without Contrast Diagnostic    Addendum Date: 6/20/2024    ADDENDUM #1 Additional impression point: Right thyroid lobe nodule measuring 2.8 cm. Please consider further evaluation with nonemergent/outpatient thyroid ultrasound. Electronically Signed: Pio Sharma DO  6/20/2024 10:27 PM EDT  Workstation ID: [id number] ORIGINAL REPORT: CT CHEST WO CONTRAST DIAGNOSTIC, CT ABDOMEN PELVIS WO CONTRAST Date of Exam: 6/20/2024 7:26 PM EDT Indication: Fever. Comparison: CT chest 4/9/2024. CT abdomen and pelvis 6/11/2024 Technique: Axial CT images were obtained of the chest, abdomen and pelvis without contrast administration.  Sagittal and coronal reconstructions were performed.  Automated exposure control and iterative reconstruction methods were used. Findings: CT chest: Lower Neck: Right thyroid lobe nodule measuring approximately 2.8 x 1.7 cm. Otherwise grossly unremarkable. Hilum and Mediastinum: Moderate to severe cardiomegaly. Unchanged position of right subclavian single lead pacemaker. Trace pericardial effusion. Mild enlargement of the central pulmonary arteries, unchanged. Vascular calcifications noted throughout the thoracic aorta. No definite lymphadenopathy or suspicious mass on this study performed without IV contrast Lung Parenchyma and Pleura: Small to moderate right and moderate to large left-sided pleural effusion. There is near complete collapse of the left lower lobe. There is also evidence of compressive atelectasis noted within the right lower lobe. Peripheral  reticulations and  mild diffuse interstitial thickening are also noted throughout the lungs, worse in the right lower lobe. Scattered calcified granulomas are noted. The central airways appear patent. Possible wall thickening of the distal airway noted within the right lower lobe. Soft tissues: Diffuse soft tissue edema, most significantly in the left lateral chest wall and axilla.. Osseous structures: No acute osseous abnormality. CT abdomen and pelvis: Liver: Liver is normal in size and CT density. No focal lesions. Gallbladder: Status post cholecystectomy Bile Ducts: Mild dilation of the common bile duct. Spleen: Spleen is normal in size and CT density. Pancreas: A few scattered pancreatic cystic lesions noted within the pancreatic head and pancreatic tail, the largest of which measures 3.4 cm. This is unchanged. Please consider follow-up with nonemergent CT/MRI pancreatic mass protocol for further work. Adrenals: Adrenal glands are unremarkable. Kidneys: Bilateral cortical atrophy. Punctate nonobstructing stones are noted within the kidneys bilaterally. No obstructing stones or hydronephrosis. Gastrointestinal: Very limited evaluation due to the lack of IV and oral contrast. No significant distention to suggest bowel obstruction. Small to moderate amount of stool is noted within the rectum. Bladder: The bladder is normal. Pelvis:  No suspecious mass. Peritoneum/Mesentery: Trace amount of free fluid, predominantly in the pelvis. Mild diffuse mesenteric edema. No pneumoperitoneum.   Lymph Nodes: No definite pathologically enlarged lymph nodes are noted on this study, although evaluation is limited. Vasculature: Extensive vascular calcifications of the visualized arteries. Otherwise unremarkable Abdominal Wall: Extensive soft tissue edema noted throughout the abdominal wall. Otherwise grossly unremarkable Bony Structures: No acute osseous abnormality. Unchanged compression deformities of the T11 and L1 vertebral bodies. Impression:  Findings once again consistent with diffuse volume overload with worsening pulmonary edema, soft tissue edema and a small amount of ascites. Near complete collapse of the left lower lobe is most likely related to compressive atelectasis. Additionally there are patchy opacities noted within the right lower lobe, nonspecific, but concerning for superimposed infectious/inflammatory process. Multiple additional nonemergent findings as characterized above. Electronically Signed: Pio Sharma, DO  6/20/2024 10:19 PM EDT  Workstation ID: [id number]    Result Date: 6/20/2024  Impression: Findings once again consistent with diffuse volume overload with worsening pulmonary edema, soft tissue edema and a small amount of ascites. Near complete collapse of the left lower lobe is most likely related to compressive atelectasis. Additionally there are patchy opacities noted within the right lower lobe, nonspecific, but concerning for superimposed infectious/inflammatory process. Multiple additional nonemergent findings as characterized above. Electronically Signed: Pio Sharma,   6/20/2024 10:19 PM EDT  Workstation ID: [id number]    CT Abdomen Pelvis Without Contrast    Addendum Date: 6/20/2024    ADDENDUM #1 Additional impression point: Right thyroid lobe nodule measuring 2.8 cm. Please consider further evaluation with nonemergent/outpatient thyroid ultrasound. Electronically Signed: Pio Edith,   6/20/2024 10:27 PM EDT  Workstation ID: [id number] ORIGINAL REPORT: CT CHEST WO CONTRAST DIAGNOSTIC, CT ABDOMEN PELVIS WO CONTRAST Date of Exam: 6/20/2024 7:26 PM EDT Indication: Fever. Comparison: CT chest 4/9/2024. CT abdomen and pelvis 6/11/2024 Technique: Axial CT images were obtained of the chest, abdomen and pelvis without contrast administration.  Sagittal and coronal reconstructions were performed.  Automated exposure control and iterative reconstruction methods were used. Findings: CT chest: Lower Neck: Right  thyroid lobe nodule measuring approximately 2.8 x 1.7 cm. Otherwise grossly unremarkable. Hilum and Mediastinum: Moderate to severe cardiomegaly. Unchanged position of right subclavian single lead pacemaker. Trace pericardial effusion. Mild enlargement of the central pulmonary arteries, unchanged. Vascular calcifications noted throughout the thoracic aorta. No definite lymphadenopathy or suspicious mass on this study performed without IV contrast Lung Parenchyma and Pleura: Small to moderate right and moderate to large left-sided pleural effusion. There is near complete collapse of the left lower lobe. There is also evidence of compressive atelectasis noted within the right lower lobe. Peripheral  reticulations and mild diffuse interstitial thickening are also noted throughout the lungs, worse in the right lower lobe. Scattered calcified granulomas are noted. The central airways appear patent. Possible wall thickening of the distal airway noted within the right lower lobe. Soft tissues: Diffuse soft tissue edema, most significantly in the left lateral chest wall and axilla.. Osseous structures: No acute osseous abnormality. CT abdomen and pelvis: Liver: Liver is normal in size and CT density. No focal lesions. Gallbladder: Status post cholecystectomy Bile Ducts: Mild dilation of the common bile duct. Spleen: Spleen is normal in size and CT density. Pancreas: A few scattered pancreatic cystic lesions noted within the pancreatic head and pancreatic tail, the largest of which measures 3.4 cm. This is unchanged. Please consider follow-up with nonemergent CT/MRI pancreatic mass protocol for further work. Adrenals: Adrenal glands are unremarkable. Kidneys: Bilateral cortical atrophy. Punctate nonobstructing stones are noted within the kidneys bilaterally. No obstructing stones or hydronephrosis. Gastrointestinal: Very limited evaluation due to the lack of IV and oral contrast. No significant distention to suggest bowel  obstruction. Small to moderate amount of stool is noted within the rectum. Bladder: The bladder is normal. Pelvis:  No suspecious mass. Peritoneum/Mesentery: Trace amount of free fluid, predominantly in the pelvis. Mild diffuse mesenteric edema. No pneumoperitoneum.   Lymph Nodes: No definite pathologically enlarged lymph nodes are noted on this study, although evaluation is limited. Vasculature: Extensive vascular calcifications of the visualized arteries. Otherwise unremarkable Abdominal Wall: Extensive soft tissue edema noted throughout the abdominal wall. Otherwise grossly unremarkable Bony Structures: No acute osseous abnormality. Unchanged compression deformities of the T11 and L1 vertebral bodies. Impression: Findings once again consistent with diffuse volume overload with worsening pulmonary edema, soft tissue edema and a small amount of ascites. Near complete collapse of the left lower lobe is most likely related to compressive atelectasis. Additionally there are patchy opacities noted within the right lower lobe, nonspecific, but concerning for superimposed infectious/inflammatory process. Multiple additional nonemergent findings as characterized above. Electronically Signed: Pio Sharma DO  6/20/2024 10:19 PM EDT  Workstation ID: [id number]    Result Date: 6/20/2024  Impression: Findings once again consistent with diffuse volume overload with worsening pulmonary edema, soft tissue edema and a small amount of ascites. Near complete collapse of the left lower lobe is most likely related to compressive atelectasis. Additionally there are patchy opacities noted within the right lower lobe, nonspecific, but concerning for superimposed infectious/inflammatory process. Multiple additional nonemergent findings as characterized above. Electronically Signed: Pio Sharma DO  6/20/2024 10:19 PM EDT  Workstation ID: [id number]    XR Chest 1 View    Result Date: 6/20/2024  Impression: Findings suggestive of  pulmonary edema. Atypical/multifocal pneumonia is also a possibility. Possible small left pleural effusion. Electronically Signed: Pio Sharma,   6/20/2024 5:25 PM EDT  Workstation ID: [id number]    CT Head Without Contrast    Result Date: 6/20/2024  Impression: No acute intracranial abnormality. Electronically Signed: Pio Sharma, DO  6/20/2024 5:24 PM EDT  Workstation ID: [id number]     Assessment/Plan:     Altered mental status       End-stage renal disease hemodialysis dependent   Syncope  Diabetes   Congestive heart failure   Cardiomyopathy   Anemia     Recommendations:   Discussed with family at bedside   Patient is ready to stop dialysis  Will be available if help is needed   Thank you very much

## 2024-06-22 NOTE — CASE MANAGEMENT/SOCIAL WORK
Continued Stay Note  HYUN Duque     Patient Name: Deann Warren  MRN: [id number]  Today's Date: 6/22/2024    Admit Date: 6/20/2024    Plan: [facility] accepted, [facility] to admit. No precert or PASRR needed.   Discharge Plan       Row Name 06/22/24 1153       Plan    Plan Comments notified efe Joyce of pt's negative candida screen.  Room A10 is ready.  EMS form completed and notified Bed control.  Text sent to aubrie Byrnession informing of discharge orders.                      Expected Discharge Date and Time       Expected Discharge Date Expected Discharge Time    Jun 22, 2024               Laisha Cintron RN

## 2024-06-22 NOTE — PLAN OF CARE
Patient to D/C to [facility] with hospice. Report called to Kimber at [facility]. RN and family has been in contact with Geri with [facility]. Awaiting EMS transport. Will continue to monitor.

## 2024-06-22 NOTE — DISCHARGE SUMMARY
Hospitalist Service   DISCHARGE SUMMARY    Patient Name: Deann Warren  : 1940  MRN: [id number]    Date of Admission: 2024  Date of Discharge:  24    Primary Care Physician: Antonino Shen MD      Presenting Problem:   Altered mental status [R41.82]  Fever, unspecified fever cause [R50.9]  Altered mental status, unspecified altered mental status type [R41.82]    Active and Resolved Hospital Problems:  Active Hospital Problems    Diagnosis POA    **Altered mental status [R41.82] Yes      Resolved Hospital Problems   No resolved problems to display.         Hospital Course     Hospital Course:  Deann Warren is a 83 y.o. female with a history of end-stage renal disease on dialysis, A-fib, hypertension, hyperlipidemia and recurrent syncope, cardiomyopathy, multivessel CAD currently being medically managed who presented via EMS for lethargy and weakness.  Patient was currently admitted last week and was sent to rehab.  She went to see cardiology for follow-up and was noted to be lethargic and obtunded and was sent to the ER via EMS.  Patient has longstanding history of ESRD and recurrent syncope, recent echo with EF of 25% with TR and pulmonary hypertension.  Noted to be having some pulmonary congestion on imaging.  Nephrology and cardiology consulted.  Patient is alert and oriented but does have intermittent lethargy.  Had extensive discussion with her and her 2 sons Antonino and Tai, palliative care was consulted and patient has made decision to transition to comfort care/hospice.  She does not want any further aggressive therapy.  Family in agreement as well.  Hospice consulted.  Patient stable to discharge to outlying facility with plan for hospice/comfort care.    Assessment:  Progressive decline in setting of advanced heart failure, ESRD, multivessel CAD  Sick sinus syndrome pacemaker with history of V. tach  Chronic anemia and thrombocytopenia  DM2  Acute on chronic HFrEF  Acute metabolic  encephalopathy secondary to above     Plan:  -After further discussion with patient and family, patient wants to be comfort care, does not want any further aggressive therapy  -Comfort care meds ordered, hospice consulted  -Plan to discharge to facility with hospice, patient and family does not want to go back to same facility where she came from, case management on board, [facility] accepted the patient.  Hospice to follow-up there for comfort meds.    DISCHARGE Follow Up Recommendations for labs and diagnostics:   Continue comfort measures    Reasons For Change In Medications and Indications for New Medications:  Continue comfort measures    Day of Discharge     Vital Signs:  Temp:  [97.1 °F (36.2 °C)-97.3 °F (36.3 °C)] 97.1 °F (36.2 °C)  Heart Rate:  [79] 79  Resp:  [13] 13  BP: ()/(46-48) 105/48  Flow (L/min):  [2] 2    Physical Exam:  General: Sleepy but easily awakens, chronically ill-appearing, NAD  Eyes: PERRL, EOMI, conjunctivae are clear  Cardiovascular: Regular rate and rhythm, no murmurs  Respiratory: Decreased breath sounds at the bases bilaterally, no wheezing or rales, unlabored breathing  Abdomen: Soft, nontender, positive bowel sounds, no guarding  Neurologic: A&O, CN grossly intact, moves all extremities spontaneously  Musculoskeletal: Generalized weakness, no other gross deformities  Skin: Warm, dry        Pertinent  and/or Most Recent Results     LAB RESULTS:      Lab 06/21/24  0408 06/20/24  1653 06/20/24  1647 06/17/24  0208 06/16/24  1055 06/16/24  0052   WBC 4.78  --  5.21 11.67*  --  11.59*   HEMOGLOBIN 8.4*  --  9.4* 8.7* 8.5* 6.7*   HEMATOCRIT 28.4*  --  32.0* 29.2* 27.6* 22.9*   PLATELETS 70*  --  76* 85*  --  72*   NEUTROS ABS 3.52  --  3.72 9.87*  --  9.23*   IMMATURE GRANS (ABS) 0.04  --  0.03 0.09*  --  0.78*   LYMPHS ABS 0.61*  --  0.75 1.08  --  1.00   MONOS ABS 0.44  --  0.55 0.41  --  0.40   EOS ABS 0.13  --  0.12 0.17  --  0.13   MCV 99.3*  --  100.3* 97.3*  --   100.4*   PROCALCITONIN  --   --  0.18  --   --   --    LACTATE  --  1.1  --   --   --   --          Lab 06/21/24  0408 06/20/24  1647 06/17/24  0208 06/16/24  0052   SODIUM 139 137 139 140   POTASSIUM 3.8 3.9 3.6 3.6   CHLORIDE 103 100 103 104   CO2 26.9 26.9 26.6 27.7   ANION GAP 9.1 10.1 9.4 8.3   BUN 27* 27* 28* 26*   CREATININE 2.94* 3.07* 3.05* 2.79*   EGFR 15.4* 14.6* 14.7* 16.4*   GLUCOSE 71 105* 111* 115*   CALCIUM 8.5* 8.8 8.7 8.3*   MAGNESIUM  --  1.9  --   --    HEMOGLOBIN A1C 5.26  --   --   --          Lab 06/21/24  0408 06/20/24  1647   TOTAL PROTEIN 5.1* 5.6*   ALBUMIN 3.0* 3.4*   GLOBULIN 2.1 2.2   ALT (SGPT) 14 19   AST (SGOT) 33* 41*   BILIRUBIN 0.7 0.9   ALK PHOS 59 72         Lab 06/21/24  0625 06/21/24  0408 06/20/24  1647   PROBNP  --   --  >70,000.0*   HSTROP T 120* 118*  --              Lab 06/16/24  0122   ABO TYPING B   RH TYPING Positive   ANTIBODY SCREEN Negative         Brief Urine Lab Results  (Last result in the past 365 days)        Color   Clarity   Blood   Leuk Est   Nitrite   Protein   CREAT   Urine HCG        06/20/24 1653 Yellow   Clear   Negative   Trace   Negative   30 mg/dL (1+)                 Microbiology Results (last 10 days)       Procedure Component Value - Date/Time    Blood Culture - Blood, Arm, Right [[id number]]  (Normal) Collected: 06/20/24 1817    Lab Status: Preliminary result Specimen: Blood from Arm, Right Updated: 06/21/24 1830     Blood Culture No growth at 24 hours    Narrative:      Less than seven (7) mL's of blood was collected.  Insufficient quantity may yield false negative results.    Blood Culture - Blood, Arm, Right [[id number]]  (Normal) Collected: 06/20/24 1817    Lab Status: Preliminary result Specimen: Blood from Arm, Right Updated: 06/21/24 5575     Blood Culture No growth at 24 hours    Narrative:      Less than seven (7) mL's of blood was collected.  Insufficient quantity may yield false negative results.    Respiratory Panel PCR  w/COVID-19(SARS-CoV-2) [facility] In-House, NP Swab in UTM/VTM, 2 HR TAT - Swab, Nasopharynx [[id number]]  (Normal) Collected: 06/20/24 1743    Lab Status: Final result Specimen: Swab from Nasopharynx Updated: 06/20/24 1910     ADENOVIRUS, PCR Not Detected     Coronavirus 229E Not Detected     Coronavirus HKU1 Not Detected     Coronavirus NL63 Not Detected     Coronavirus OC43 Not Detected     COVID19 Not Detected     Human Metapneumovirus Not Detected     Human Rhinovirus/Enterovirus Not Detected     Influenza A PCR Not Detected     Influenza B PCR Not Detected     Parainfluenza Virus 1 Not Detected     Parainfluenza Virus 2 Not Detected     Parainfluenza Virus 3 Not Detected     Parainfluenza Virus 4 Not Detected     RSV, PCR Not Detected     Bordetella pertussis pcr Not Detected     Bordetella parapertussis PCR Not Detected     Chlamydophila pneumoniae PCR Not Detected     Mycoplasma pneumo by PCR Not Detected    Narrative:      In the setting of a positive respiratory panel with a viral infection PLUS a negative procalcitonin without other underlying concern for bacterial infection, consider observing off antibiotics or discontinuation of antibiotics and continue supportive care. If the respiratory panel is positive for atypical bacterial infection (Bordetella pertussis, Chlamydophila pneumoniae, or Mycoplasma pneumoniae), consider antibiotic de-escalation to target atypical bacterial infection.    Gastrointestinal Panel, PCR - Stool, Per Rectum [[id number]]  (Normal) Collected: 06/14/24 0600    Lab Status: Final result Specimen: Stool from Per Rectum Updated: 06/14/24 1143     Campylobacter Not Detected     Plesiomonas shigelloides Not Detected     Salmonella Not Detected     Vibrio Not Detected     Vibrio cholerae Not Detected     Yersinia enterocolitica Not Detected     Enteroaggregative E. coli (EAEC) Not Detected     Enteropathogenic E. coli (EPEC) Not Detected     Enterotoxigenic E. coli (ETEC)  lt/st Not Detected     Shiga-like toxin-producing E. coli (STEC) stx1/stx2 Not Detected     Shigella/Enteroinvasive E. coli (EIEC) Not Detected     Cryptosporidium Not Detected     Cyclospora cayetanensis Not Detected     Entamoeba histolytica Not Detected     Giardia lamblia Not Detected     Adenovirus F40/41 Not Detected     Astrovirus Not Detected     Norovirus GI/GII Not Detected     Rotavirus A Not Detected     Sapovirus (I, II, IV or V) Not Detected    Narrative:      If Aeromonas, Staphylococcus aureus or Bacillus cereus are suspected, please order [id number]: Stool Culture, Aeromonas, S aureus, B Cereus.    Blood Culture - Blood, Arm, Right [[id number]]  (Normal) Collected: 06/13/24 1809    Lab Status: Final result Specimen: Blood from Arm, Right Updated: 06/18/24 1831     Blood Culture No growth at 5 days    Narrative:      Less than seven (7) mL's of blood was collected.  Insufficient quantity may yield false negative results.            CT Chest Without Contrast Diagnostic    Addendum Date: 6/20/2024    ADDENDUM #1 Additional impression point: Right thyroid lobe nodule measuring 2.8 cm. Please consider further evaluation with nonemergent/outpatient thyroid ultrasound. Electronically Signed: Pio Sharma DO  6/20/2024 10:27 PM EDT  Workstation ID: [id number] ORIGINAL REPORT: CT CHEST WO CONTRAST DIAGNOSTIC, CT ABDOMEN PELVIS WO CONTRAST Date of Exam: 6/20/2024 7:26 PM EDT Indication: Fever. Comparison: CT chest 4/9/2024. CT abdomen and pelvis 6/11/2024 Technique: Axial CT images were obtained of the chest, abdomen and pelvis without contrast administration.  Sagittal and coronal reconstructions were performed.  Automated exposure control and iterative reconstruction methods were used. Findings: CT chest: Lower Neck: Right thyroid lobe nodule measuring approximately 2.8 x 1.7 cm. Otherwise grossly unremarkable. Hilum and Mediastinum: Moderate to severe cardiomegaly. Unchanged position of right subclavian  single lead pacemaker. Trace pericardial effusion. Mild enlargement of the central pulmonary arteries, unchanged. Vascular calcifications noted throughout the thoracic aorta. No definite lymphadenopathy or suspicious mass on this study performed without IV contrast Lung Parenchyma and Pleura: Small to moderate right and moderate to large left-sided pleural effusion. There is near complete collapse of the left lower lobe. There is also evidence of compressive atelectasis noted within the right lower lobe. Peripheral  reticulations and mild diffuse interstitial thickening are also noted throughout the lungs, worse in the right lower lobe. Scattered calcified granulomas are noted. The central airways appear patent. Possible wall thickening of the distal airway noted within the right lower lobe. Soft tissues: Diffuse soft tissue edema, most significantly in the left lateral chest wall and axilla.. Osseous structures: No acute osseous abnormality. CT abdomen and pelvis: Liver: Liver is normal in size and CT density. No focal lesions. Gallbladder: Status post cholecystectomy Bile Ducts: Mild dilation of the common bile duct. Spleen: Spleen is normal in size and CT density. Pancreas: A few scattered pancreatic cystic lesions noted within the pancreatic head and pancreatic tail, the largest of which measures 3.4 cm. This is unchanged. Please consider follow-up with nonemergent CT/MRI pancreatic mass protocol for further work. Adrenals: Adrenal glands are unremarkable. Kidneys: Bilateral cortical atrophy. Punctate nonobstructing stones are noted within the kidneys bilaterally. No obstructing stones or hydronephrosis. Gastrointestinal: Very limited evaluation due to the lack of IV and oral contrast. No significant distention to suggest bowel obstruction. Small to moderate amount of stool is noted within the rectum. Bladder: The bladder is normal. Pelvis:  No suspecious mass. Peritoneum/Mesentery: Trace amount of free fluid,  predominantly in the pelvis. Mild diffuse mesenteric edema. No pneumoperitoneum.   Lymph Nodes: No definite pathologically enlarged lymph nodes are noted on this study, although evaluation is limited. Vasculature: Extensive vascular calcifications of the visualized arteries. Otherwise unremarkable Abdominal Wall: Extensive soft tissue edema noted throughout the abdominal wall. Otherwise grossly unremarkable Bony Structures: No acute osseous abnormality. Unchanged compression deformities of the T11 and L1 vertebral bodies. Impression: Findings once again consistent with diffuse volume overload with worsening pulmonary edema, soft tissue edema and a small amount of ascites. Near complete collapse of the left lower lobe is most likely related to compressive atelectasis. Additionally there are patchy opacities noted within the right lower lobe, nonspecific, but concerning for superimposed infectious/inflammatory process. Multiple additional nonemergent findings as characterized above. Electronically Signed: Pio Sharma DO  6/20/2024 10:19 PM EDT  Workstation ID: [id number]    Result Date: 6/20/2024  Impression: Impression: Findings once again consistent with diffuse volume overload with worsening pulmonary edema, soft tissue edema and a small amount of ascites. Near complete collapse of the left lower lobe is most likely related to compressive atelectasis. Additionally there are patchy opacities noted within the right lower lobe, nonspecific, but concerning for superimposed infectious/inflammatory process. Multiple additional nonemergent findings as characterized above. Electronically Signed: Pio Sharma DO  6/20/2024 10:19 PM EDT  Workstation ID: [id number]    CT Abdomen Pelvis Without Contrast    Addendum Date: 6/20/2024    ADDENDUM #1 Additional impression point: Right thyroid lobe nodule measuring 2.8 cm. Please consider further evaluation with nonemergent/outpatient thyroid ultrasound. Electronically  Signed: Pio Sharma, DO  6/20/2024 10:27 PM EDT  Workstation ID: [id number] ORIGINAL REPORT: CT CHEST WO CONTRAST DIAGNOSTIC, CT ABDOMEN PELVIS WO CONTRAST Date of Exam: 6/20/2024 7:26 PM EDT Indication: Fever. Comparison: CT chest 4/9/2024. CT abdomen and pelvis 6/11/2024 Technique: Axial CT images were obtained of the chest, abdomen and pelvis without contrast administration.  Sagittal and coronal reconstructions were performed.  Automated exposure control and iterative reconstruction methods were used. Findings: CT chest: Lower Neck: Right thyroid lobe nodule measuring approximately 2.8 x 1.7 cm. Otherwise grossly unremarkable. Hilum and Mediastinum: Moderate to severe cardiomegaly. Unchanged position of right subclavian single lead pacemaker. Trace pericardial effusion. Mild enlargement of the central pulmonary arteries, unchanged. Vascular calcifications noted throughout the thoracic aorta. No definite lymphadenopathy or suspicious mass on this study performed without IV contrast Lung Parenchyma and Pleura: Small to moderate right and moderate to large left-sided pleural effusion. There is near complete collapse of the left lower lobe. There is also evidence of compressive atelectasis noted within the right lower lobe. Peripheral  reticulations and mild diffuse interstitial thickening are also noted throughout the lungs, worse in the right lower lobe. Scattered calcified granulomas are noted. The central airways appear patent. Possible wall thickening of the distal airway noted within the right lower lobe. Soft tissues: Diffuse soft tissue edema, most significantly in the left lateral chest wall and axilla.. Osseous structures: No acute osseous abnormality. CT abdomen and pelvis: Liver: Liver is normal in size and CT density. No focal lesions. Gallbladder: Status post cholecystectomy Bile Ducts: Mild dilation of the common bile duct. Spleen: Spleen is normal in size and CT density. Pancreas: A few scattered  pancreatic cystic lesions noted within the pancreatic head and pancreatic tail, the largest of which measures 3.4 cm. This is unchanged. Please consider follow-up with nonemergent CT/MRI pancreatic mass protocol for further work. Adrenals: Adrenal glands are unremarkable. Kidneys: Bilateral cortical atrophy. Punctate nonobstructing stones are noted within the kidneys bilaterally. No obstructing stones or hydronephrosis. Gastrointestinal: Very limited evaluation due to the lack of IV and oral contrast. No significant distention to suggest bowel obstruction. Small to moderate amount of stool is noted within the rectum. Bladder: The bladder is normal. Pelvis:  No suspecious mass. Peritoneum/Mesentery: Trace amount of free fluid, predominantly in the pelvis. Mild diffuse mesenteric edema. No pneumoperitoneum.   Lymph Nodes: No definite pathologically enlarged lymph nodes are noted on this study, although evaluation is limited. Vasculature: Extensive vascular calcifications of the visualized arteries. Otherwise unremarkable Abdominal Wall: Extensive soft tissue edema noted throughout the abdominal wall. Otherwise grossly unremarkable Bony Structures: No acute osseous abnormality. Unchanged compression deformities of the T11 and L1 vertebral bodies. Impression: Findings once again consistent with diffuse volume overload with worsening pulmonary edema, soft tissue edema and a small amount of ascites. Near complete collapse of the left lower lobe is most likely related to compressive atelectasis. Additionally there are patchy opacities noted within the right lower lobe, nonspecific, but concerning for superimposed infectious/inflammatory process. Multiple additional nonemergent findings as characterized above. Electronically Signed: Pio Sharma DO  6/20/2024 10:19 PM EDT  Workstation ID: [id number]    Result Date: 6/20/2024  Impression: Impression: Findings once again consistent with diffuse volume overload with  worsening pulmonary edema, soft tissue edema and a small amount of ascites. Near complete collapse of the left lower lobe is most likely related to compressive atelectasis. Additionally there are patchy opacities noted within the right lower lobe, nonspecific, but concerning for superimposed infectious/inflammatory process. Multiple additional nonemergent findings as characterized above. Electronically Signed: Pio Sharma,   6/20/2024 10:19 PM EDT  Workstation ID: [id number]    XR Chest 1 View    Result Date: 6/20/2024  Impression: Impression: Findings suggestive of pulmonary edema. Atypical/multifocal pneumonia is also a possibility. Possible small left pleural effusion. Electronically Signed: Pio Albinorodericksuki, DO  6/20/2024 5:25 PM EDT  Workstation ID: [id number]    CT Head Without Contrast    Result Date: 6/20/2024  Impression: Impression: No acute intracranial abnormality. Electronically Signed: Pio Boltonsuki,   6/20/2024 5:24 PM EDT  Workstation ID: [id number]    US Liver    Result Date: 6/12/2024  Impression: Impression: 1. Liver parenchyma is normal. 2. Trace ascitic fluid adjacent to the liver. There is also a partially imaged right pleural effusion. Electronically Signed: Donaldo Cantu MD  6/12/2024 12:27 PM EDT  Workstation ID: [id number]    CT Abdomen Pelvis Without Contrast    Result Date: 6/11/2024  Impression: Impression: 1.Volume overload/CHF features as detailed above. Superimposed left lower lobe pneumonia is not excluded. Correlate with symptoms. 2.There is a nonspecific 3.4 cm cystic mass involving the pancreatic head. Follow-up dedicated pancreatic MRI with and without contrast recommended. 3.Increased density urine consistent with hematuria. 4.Other incidental findings as detailed above. Electronically Signed: Flip Lee MD  6/11/2024 4:32 PM EDT  Workstation ID: [id number]    CT Head Without Contrast    Result Date: 6/11/2024  Impression: Impression: No acute intracranial pathology.  Electronically Signed: Tristian Boone MD  6/11/2024 4:27 PM EDT  Workstation ID: [id number]    XR Chest 1 View    Result Date: 6/10/2024  Impression: Impression: 1.Cardiomegaly with pulmonary vascular congestion. 2.Left basilar atelectasis or infiltrate. Electronically Signed: Armando Gonzalez MD  6/10/2024 10:43 AM EDT  Workstation ID: [id number]    XR Chest 1 View    Result Date: 6/3/2024  Impression: Impression: No significant change after pacemaker placement. Electronically Signed: Won Bravo MD  6/3/2024 6:34 PM EDT  Workstation ID: [id number]    XR Chest 1 View    Result Date: 6/1/2024  Impression: Impression: Chronic changes are noted which are stable and suggest chronic changes of CHF and pulmonary edema. Electronically Signed: Nathaniel Harman MD  6/1/2024 2:05 PM EDT  Workstation ID: [id number]     Results for orders placed during the hospital encounter of 04/07/24    Duplex Carotid Ultrasound CAR    Interpretation Summary    Right internal carotid artery demonstrates a less than 50% stenosis.    Left internal carotid artery demonstrates a less than 50% stenosis.      Results for orders placed during the hospital encounter of 04/07/24    Duplex Carotid Ultrasound CAR    Interpretation Summary    Right internal carotid artery demonstrates a less than 50% stenosis.    Left internal carotid artery demonstrates a less than 50% stenosis.      Results for orders placed during the hospital encounter of 06/10/24    Adult Transthoracic Echo Complete W/ Cont if Necessary Per Protocol    Interpretation Summary    Left ventricular systolic function is severely decreased. Left ventricular ejection fraction appears to be 21 - 25%.    The left ventricular cavity is dilated.    The left atrial cavity is severely dilated.    Left atrial volume is severely increased.    The right atrial cavity is severely  dilated.    Moderate to severe mitral valve regurgitation is present.    Moderate tricuspid valve regurgitation is present.     Estimated right ventricular systolic pressure from tricuspid regurgitation is moderately elevated (45-55 mmHg).    Moderate to severe pulmonary hypertension is present.    Conclusion      LVE with severe global left ventricular hypokinesis, LVEF of 20 to 25%  Normal RV size  Severe biatrial enlargement.  There is color flow seen from left to right near the fossa ovalis consistent with possible ASD.  Pulmonic valve is not well visualized.  Aortic valve is trileaflet and sclerosed.  Moderate aortic regurgitation seen  Mitral valve, tricuspid valve appears structurally normal, moderate to severe mitral regurgitation seen.  Moderate tricuspid regurgitation seen.  Calculated RV systolic pressure of 48 mmHg consistent with moderate pulmonary hypertension  No pericardial effusion seen.  Pleural effusion seen  Proximal aorta appears normal in size.      Labs Pending at Discharge:  Pending Labs       Order Current Status    [facility] PCR - Swab, Axilla Right, Axilla Left and Groin In process    Blood Culture - Blood, Arm, Right Preliminary result    Blood Culture - Blood, Arm, Right Preliminary result            Procedures Performed           Consults:   Consults       Date and Time Order Name Status Description    6/21/2024  8:36 AM Inpatient Palliative Care MD Consult Completed     6/20/2024  5:40 PM Hospitalist (on-call MD unless specified)      6/13/2024  5:24 PM Inpatient Infectious Diseases Consult Completed     6/12/2024  7:41 AM IP Consult to Hematology and Oncology Completed     6/11/2024  4:56 PM Inpatient Gastroenterology Consult Completed     6/10/2024 11:55 AM Inpatient Cardiac Electrophysiology Consult Completed     6/10/2024 11:55 AM Inpatient Cardiology Consult Completed     6/3/2024 11:15 AM Inpatient Hospitalist Consult Completed     6/2/2024  3:38 PM Inpatient Nephrology Consult Completed     6/1/2024  5:10 PM Inpatient Cardiology Consult Completed               Discharge Details        Discharge  Medications        Continue These Medications        Instructions Start Date   acetaminophen 500 MG tablet  Commonly known as: TYLENOL   500 mg, Oral, Every 6 Hours PRN      loperamide 2 MG capsule  Commonly known as: IMODIUM   2 mg, Oral, 4 Times Daily PRN      Menthol-Zinc Oxide 0.45-20 % ointment   Apply externally, To coccyx area every 12 hours      NON FORMULARY   1 dose, Topical, 3 Times Weekly, Lidocaine 2.5% ointment -  Apply 1 application Monday, Wednesday, Friday before dialysis              Stop These Medications      apixaban 2.5 MG tablet tablet  Commonly known as: ELIQUIS     atorvastatin 40 MG tablet  Commonly known as: LIPITOR     febuxostat 40 MG tablet  Commonly known as: ULORIC     folic acid 1 MG tablet  Commonly known as: FOLVITE     pantoprazole 40 MG EC tablet  Commonly known as: PROTONIX     Vitamin D3 50 MCG (2000 UT) tablet              Allergies   Allergen Reactions    Heparin Hives         Discharge Disposition:  Hospice/Medical Facility (DC - External)    Diet:  Hospital:  Diet Order   Procedures    Diet: Regular/House; Texture: Mechanical Ground (NDD 2); Fluid Consistency: Thin (IDDSI 0)         Discharge Activity:         CODE STATUS:  Code Status and Medical Interventions:   Ordered at: 06/21/24 1124     Level Of Support Discussed With:    Patient    Health Care Surrogate    Next of Kin (If No Surrogate)     Code Status (Patient has no pulse and is not breathing):    No CPR (Do Not Attempt to Resuscitate)     Medical Interventions (Patient has pulse or is breathing):    Comfort Measures         Future Appointments   Date Time Provider Department Center   7/2/2024  2:00 PM [facility] 2 [facility]   7/11/2024 10:00 AM LAB MD [facility] LAB NA  [facility]   7/11/2024 10:00 AM Sonya Barahona MD [facility] NA [facility]           Time spent on Discharge including face to face service:  40 minutes    Signature:      Electronically signed by Soraida Jurado DO, 06/22/24, 9:42 AM EDT.        Part of  this note may be an electronic transcription/translation of spoken language to printed text using the Dragon Dictation System.

## 2024-06-22 NOTE — NURSING NOTE
Patient leaving via EMS to go to [facility]. Son was called to notify of transfer. [facility] with [facility] was contacted of leave time. [facility] was attempted to be called twice and no answer at that nurses station.

## 2024-06-23 NOTE — CASE MANAGEMENT/SOCIAL WORK
Case Management Discharge Note      Final Note: [facility]    Provided Post Acute Provider List?: Yes  Post Acute Provider List: Nursing Home  Delivered To: Support Person  Support Person: SergioJoleen  Method of Delivery: In person    Selected Continued Care - Discharged on 6/22/2024 Admission date: 6/20/2024 - Discharge disposition: Hospice/Medical Facility (DC - External)      Destination Coordination complete.      Service Provider Selected Services Address Phone Fax Patient Preferred    [facility] [place] [facility] [address] [place] [address] [phone]                   Home Medical Care Coordination complete.      Service Provider Selected Services Address Phone Fax Patient Preferred    [facility] [address] [place] [address] [phone] --                     Transportation Services  Ambulance: [facility]    Final Discharge Disposition Code: 51 - hospice medical facility

## 2024-06-24 LAB — GLUCOSE BLDC GLUCOMTR-MCNC: 70 MG/DL (ref 70–105)

## 2024-06-25 LAB
BACTERIA SPEC AEROBE CULT: NORMAL
BACTERIA SPEC AEROBE CULT: NORMAL

## (undated) DEVICE — STPCK 3WY HP ROT

## (undated) DEVICE — CATH DIAG IMPULSE FR4 6F 100CM

## (undated) DEVICE — ELECTRD DEFIB M/FUNC PROPADZ RADIOL 2PK

## (undated) DEVICE — PK ENDO GI 50

## (undated) DEVICE — CABL BIPOL W/ALLGTR CLIP/SM 12FT

## (undated) DEVICE — PERCLOSE™ PROSTYLE™ SUTURE-MEDIATED CLOSURE AND REPAIR SYSTEM: Brand: PERCLOSE™ PROSTYLE™

## (undated) DEVICE — ST ACC MICROPUNCTURE STFF/CANN PLAT/TP 4F 21G 40CM

## (undated) DEVICE — 3M™ STERI-STRIP™ REINFORCED ADHESIVE SKIN CLOSURES, R1547, 1/2 IN X 4 IN (12 MM X 100 MM), 6 STRIPS/ENVELOPE: Brand: 3M™ STERI-STRIP™

## (undated) DEVICE — PENCL SMOKE/EVAC MEGADYNE TELESCP 10FT

## (undated) DEVICE — MYNXGRIP 6F/7F: Brand: MYNXGRIP

## (undated) DEVICE — CVR PROB ULTRASND CIVFLEX GEN/PURP TELESCP/FOLD 5.5X96IN LF

## (undated) DEVICE — UNDYED BRAIDED (POLYGLACTIN 910), SYNTHETIC ABSORBABLE SUTURE: Brand: COATED VICRYL

## (undated) DEVICE — IMMOB SHLDR CUT/AWAY UNIV

## (undated) DEVICE — TBG NAMIC PRESS MONTR A/ F/M 12IN

## (undated) DEVICE — ADHS LIQ MASTISOL 2/3ML

## (undated) DEVICE — CATH DIAG IMPULSE PIG .056 6F 110CM

## (undated) DEVICE — DEV INFL COMPAK W/ACCESSPLUS IN4530

## (undated) DEVICE — PACEMAKER CDS: Brand: MEDLINE INDUSTRIES, INC.

## (undated) DEVICE — SUT ETHIB 0/0 MO6 I8IN CX45D

## (undated) DEVICE — NC TREK NEO™ CORONARY DILATATION CATHETER 2.25 MM X 12 MM / RAPID-EXCHANGE: Brand: NC TREK NEO™

## (undated) DEVICE — SINGLE-USE BIOPSY FORCEPS: Brand: RADIAL JAW 4

## (undated) DEVICE — SLITTER CATH GUIDE ATTAIN ADJ

## (undated) DEVICE — BOWL PLSTC MD 16OZ BLU STRL

## (undated) DEVICE — DRAPE,INSTRUMENT,MAGNETIC,10X16: Brand: MEDLINE

## (undated) DEVICE — DRAPE,FEM ANGIO,2 WIN,STERILE: Brand: MEDLINE

## (undated) DEVICE — RADIFOCUS GLIDEWIRE: Brand: GLIDEWIRE

## (undated) DEVICE — PINNACLE INTRODUCER SHEATH: Brand: PINNACLE

## (undated) DEVICE — CATH IMG IVUS EAGLE EYE PLATIN RX DIGITAL .014IN 5FR

## (undated) DEVICE — 3M™ PATIENT PLATE, CORDED, SPLIT, LARGE, 40 PER CASE, 1179: Brand: 3M™

## (undated) DEVICE — PK TRY HEART CATH 50

## (undated) DEVICE — SWAN-GANZ THERMODILUTION CATHETER: Brand: SWAN-GANZ

## (undated) DEVICE — ANTIBACTERIAL UNDYED BRAIDED (POLYGLACTIN 910), SYNTHETIC ABSORBABLE SUTURE: Brand: COATED VICRYL

## (undated) DEVICE — CATH DIAG IMPULSE FL4 6F 100CM

## (undated) DEVICE — DEV NDL BIOP EXPECT ENDO ULTRASND SLIM/LN/HNDL 22G

## (undated) DEVICE — 3M™ IOBAN™ 2 ANTIMICROBIAL INCISE DRAPE 6650EZ: Brand: IOBAN™ 2

## (undated) DEVICE — BITEBLOCK ENDO W/STRAP 60F A/ LF DISP

## (undated) DEVICE — CATH GUIDE RIGHTSITE C315HIS-02

## (undated) DEVICE — INTRO SHEATH PRELUDE SNAP .038 7F 13CM W/SDPRT

## (undated) DEVICE — DGW .035 FC J3MM 150CM TEF HEP: Brand: EMERALD

## (undated) DEVICE — GC 7F 078 XBLAD 3.5 SH: Brand: VISTA BRITE TIP

## (undated) DEVICE — VIOLET BRAIDED (POLYGLACTIN 910), SYNTHETIC ABSORBABLE SUTURE: Brand: COATED VICRYL

## (undated) DEVICE — CONTRST ISOVUE300 61PCT 50ML